# Patient Record
Sex: FEMALE | Race: WHITE | Employment: UNEMPLOYED | URBAN - METROPOLITAN AREA
[De-identification: names, ages, dates, MRNs, and addresses within clinical notes are randomized per-mention and may not be internally consistent; named-entity substitution may affect disease eponyms.]

---

## 2017-01-25 ENCOUNTER — HOSPITAL ENCOUNTER (EMERGENCY)
Facility: HOSPITAL | Age: 52
Discharge: HOME | End: 2017-01-25
Attending: EMERGENCY MEDICINE
Payer: MEDICAID

## 2017-01-25 ENCOUNTER — APPOINTMENT (OUTPATIENT)
Dept: GENERAL RADIOLOGY | Facility: HOSPITAL | Age: 52
End: 2017-01-25
Attending: PHYSICIAN ASSISTANT
Payer: MEDICAID

## 2017-01-25 ENCOUNTER — APPOINTMENT (OUTPATIENT)
Dept: ULTRASOUND IMAGING | Facility: HOSPITAL | Age: 52
End: 2017-01-25
Attending: PHYSICIAN ASSISTANT
Payer: MEDICAID

## 2017-01-25 VITALS
SYSTOLIC BLOOD PRESSURE: 144 MMHG | HEART RATE: 88 BPM | DIASTOLIC BLOOD PRESSURE: 66 MMHG | RESPIRATION RATE: 18 BRPM | TEMPERATURE: 98 F | OXYGEN SATURATION: 96 % | HEIGHT: 65 IN | WEIGHT: 230 LBS | BODY MASS INDEX: 38.32 KG/M2

## 2017-01-25 DIAGNOSIS — N30.00 ACUTE CYSTITIS WITHOUT HEMATURIA: ICD-10-CM

## 2017-01-25 DIAGNOSIS — R10.9 ABDOMINAL PAIN, ACUTE: Primary | ICD-10-CM

## 2017-01-25 LAB
ALBUMIN SERPL BCP-MCNC: 3.9 G/DL (ref 3.5–4.8)
ALP SERPL-CCNC: 63 U/L (ref 32–100)
ALT SERPL-CCNC: 24 U/L (ref 14–54)
ANION GAP SERPL CALC-SCNC: 12 MMOL/L (ref 0–18)
AST SERPL-CCNC: 25 U/L (ref 15–41)
B-HCG UR QL: NEGATIVE
BASOPHILS # BLD: 0.2 K/UL (ref 0–0.2)
BASOPHILS NFR BLD: 2 %
BILIRUB DIRECT SERPL-MCNC: 0.1 MG/DL (ref 0–0.2)
BILIRUB SERPL-MCNC: 0.6 MG/DL (ref 0.3–1.2)
BILIRUB UR QL: NEGATIVE
BUN SERPL-MCNC: 33 MG/DL (ref 8–20)
BUN/CREAT SERPL: 31.4 (ref 10–20)
CALCIUM SERPL-MCNC: 9.7 MG/DL (ref 8.5–10.5)
CHLORIDE SERPL-SCNC: 101 MMOL/L (ref 95–110)
CO2 SERPL-SCNC: 22 MMOL/L (ref 22–32)
COLOR UR: YELLOW
CREAT SERPL-MCNC: 1.05 MG/DL (ref 0.5–1.5)
EOSINOPHIL # BLD: 0.3 K/UL (ref 0–0.7)
EOSINOPHIL NFR BLD: 3 %
ERYTHROCYTE [DISTWIDTH] IN BLOOD BY AUTOMATED COUNT: 13.3 % (ref 11–15)
GLUCOSE SERPL-MCNC: 217 MG/DL (ref 70–99)
HCT VFR BLD AUTO: 40.3 % (ref 35–48)
HGB BLD-MCNC: 13.4 G/DL (ref 12–16)
KETONES UR-MCNC: NEGATIVE MG/DL
LIPASE SERPL-CCNC: 22 U/L (ref 22–51)
LYMPHOCYTES # BLD: 2.8 K/UL (ref 1–4)
LYMPHOCYTES NFR BLD: 26 %
MCH RBC QN AUTO: 29.5 PG (ref 27–32)
MCHC RBC AUTO-ENTMCNC: 33.3 G/DL (ref 32–37)
MCV RBC AUTO: 88.6 FL (ref 80–100)
MONOCYTES # BLD: 0.7 K/UL (ref 0–1)
MONOCYTES NFR BLD: 6 %
NEUTROPHILS # BLD AUTO: 7 K/UL (ref 1.8–7.7)
NEUTROPHILS NFR BLD: 64 %
NITRITE UR QL STRIP.AUTO: NEGATIVE
OSMOLALITY UR CALC.SUM OF ELEC: 294 MOSM/KG (ref 275–295)
PH UR: 5 [PH] (ref 5–8)
PLATELET # BLD AUTO: 247 K/UL (ref 140–400)
PMV BLD AUTO: 10.3 FL (ref 7.4–10.3)
POTASSIUM SERPL-SCNC: 4.8 MMOL/L (ref 3.3–5.1)
PROT SERPL-MCNC: 7.8 G/DL (ref 5.9–8.4)
PROT UR-MCNC: 100 MG/DL
RBC # BLD AUTO: 4.55 M/UL (ref 3.7–5.4)
RBC #/AREA URNS AUTO: 2 /HPF
SODIUM SERPL-SCNC: 135 MMOL/L (ref 136–144)
SP GR UR STRIP: 1.01 (ref 1–1.03)
TROPONIN I SERPL-MCNC: 0 NG/ML (ref ?–0.03)
UROBILINOGEN UR STRIP-ACNC: <2
VIT C UR-MCNC: NEGATIVE MG/DL
WBC # BLD AUTO: 11 K/UL (ref 4–11)
WBC #/AREA URNS AUTO: 84 /HPF

## 2017-01-25 PROCEDURE — 99285 EMERGENCY DEPT VISIT HI MDM: CPT

## 2017-01-25 PROCEDURE — 76705 ECHO EXAM OF ABDOMEN: CPT

## 2017-01-25 PROCEDURE — 93005 ELECTROCARDIOGRAM TRACING: CPT

## 2017-01-25 PROCEDURE — 96361 HYDRATE IV INFUSION ADD-ON: CPT

## 2017-01-25 PROCEDURE — 83690 ASSAY OF LIPASE: CPT | Performed by: PHYSICIAN ASSISTANT

## 2017-01-25 PROCEDURE — 81025 URINE PREGNANCY TEST: CPT

## 2017-01-25 PROCEDURE — 87186 SC STD MICRODIL/AGAR DIL: CPT | Performed by: PHYSICIAN ASSISTANT

## 2017-01-25 PROCEDURE — 85025 COMPLETE CBC W/AUTO DIFF WBC: CPT

## 2017-01-25 PROCEDURE — 93010 ELECTROCARDIOGRAM REPORT: CPT | Performed by: EMERGENCY MEDICINE

## 2017-01-25 PROCEDURE — 87086 URINE CULTURE/COLONY COUNT: CPT | Performed by: PHYSICIAN ASSISTANT

## 2017-01-25 PROCEDURE — 87077 CULTURE AEROBIC IDENTIFY: CPT | Performed by: PHYSICIAN ASSISTANT

## 2017-01-25 PROCEDURE — 80048 BASIC METABOLIC PNL TOTAL CA: CPT

## 2017-01-25 PROCEDURE — S0028 INJECTION, FAMOTIDINE, 20 MG: HCPCS | Performed by: PHYSICIAN ASSISTANT

## 2017-01-25 PROCEDURE — 81001 URINALYSIS AUTO W/SCOPE: CPT | Performed by: PHYSICIAN ASSISTANT

## 2017-01-25 PROCEDURE — 84484 ASSAY OF TROPONIN QUANT: CPT | Performed by: PHYSICIAN ASSISTANT

## 2017-01-25 PROCEDURE — 80076 HEPATIC FUNCTION PANEL: CPT

## 2017-01-25 PROCEDURE — 71010 XR CHEST AP PORTABLE  (CPT=71010): CPT

## 2017-01-25 PROCEDURE — 96374 THER/PROPH/DIAG INJ IV PUSH: CPT

## 2017-01-25 RX ORDER — SULFAMETHOXAZOLE AND TRIMETHOPRIM 800; 160 MG/1; MG/1
1 TABLET ORAL 2 TIMES DAILY
Qty: 14 TABLET | Refills: 0 | Status: SHIPPED | OUTPATIENT
Start: 2017-01-25 | End: 2017-02-01

## 2017-01-25 RX ORDER — FAMOTIDINE 20 MG/1
20 TABLET ORAL DAILY
Qty: 14 TABLET | Refills: 0 | Status: SHIPPED | OUTPATIENT
Start: 2017-01-25 | End: 2017-02-08

## 2017-01-25 RX ORDER — FAMOTIDINE 10 MG/ML
20 INJECTION, SOLUTION INTRAVENOUS ONCE
Status: COMPLETED | OUTPATIENT
Start: 2017-01-25 | End: 2017-01-25

## 2017-01-25 RX ORDER — TRAMADOL HYDROCHLORIDE 50 MG/1
50 TABLET ORAL EVERY 6 HOURS PRN
Qty: 20 TABLET | Refills: 0 | Status: SHIPPED | OUTPATIENT
Start: 2017-01-25 | End: 2019-01-08

## 2017-01-25 NOTE — ED PROVIDER NOTES
Patient Seen in: [facility] Emergency Department    History   Patient presents with:  Abdomen/Flank Pain (GI/)    Stated Complaint: abdominal pain    The history is provided by the patient.      45 yof with pmhx of DM and HTN c/o epigastric and R Positive for stated complaint: abdominal pain  Other systems are as noted in HPI. Constitutional and vital signs reviewed. All other systems reviewed and negative except as noted above.     PSFH elements reviewed from today and agreed except as otherw GALLBLADDER:   Mildly contracted gallbladder limits evaluation of the lumen.  No calculi, wall thickening or pericholecystic fluid.  Negative Sonographic Rivera's sign   BILE DUCTS:   Common bile duct measures 5.4 mm.    LIVER:   Mild hepatomegaly measures   URINALYSIS WITH CULTURE REFLEX (Final result)    Abnormal Component (Lab Inquiry)       Collection Time Result Time COLOR CLARITY Spec Gravity PHURINE PROUR     01/25/17 11:50:00 01/25/17 12:12:00 Yellow Cloudy (A) 1.011 5.0 100 (A)          Collection T   The following orders were created for panel order CBC WITH DIFFERENTIAL WITH PLATELET.   Procedure                               Abnormality         Status                     ---------                               -----------         ------               Collection Time Result Time NE LYMABS MOABSO EOABSO BAABSO     01/25/17 10:43:00 01/25/17 10:56:00 7.0 2.8 0.7 0.3 0.2                      TROPONIN I, 0 HOUR (Final result) Component (Lab Inquiry)       Collection Time Result Time TROP     01/25/17 10:4

## 2017-02-02 ENCOUNTER — HOSPITAL ENCOUNTER (OUTPATIENT)
Facility: HOSPITAL | Age: 52
Setting detail: OBSERVATION
Discharge: HOME | End: 2017-02-03
Attending: EMERGENCY MEDICINE | Admitting: HOSPITALIST
Payer: MEDICAID

## 2017-02-02 DIAGNOSIS — R07.89 CHEST PAIN, ATYPICAL: Primary | ICD-10-CM

## 2017-02-02 DIAGNOSIS — N17.9 AKI (ACUTE KIDNEY INJURY) (HCC): ICD-10-CM

## 2017-02-03 ENCOUNTER — APPOINTMENT (OUTPATIENT)
Dept: ULTRASOUND IMAGING | Facility: HOSPITAL | Age: 52
End: 2017-02-03
Attending: HOSPITALIST
Payer: MEDICAID

## 2017-02-03 ENCOUNTER — APPOINTMENT (OUTPATIENT)
Dept: GENERAL RADIOLOGY | Facility: HOSPITAL | Age: 52
End: 2017-02-03
Attending: EMERGENCY MEDICINE
Payer: MEDICAID

## 2017-02-03 ENCOUNTER — APPOINTMENT (OUTPATIENT)
Dept: NUCLEAR MEDICINE | Facility: HOSPITAL | Age: 52
End: 2017-02-03
Attending: EMERGENCY MEDICINE
Payer: MEDICAID

## 2017-02-03 ENCOUNTER — APPOINTMENT (OUTPATIENT)
Dept: CV DIAGNOSTICS | Facility: HOSPITAL | Age: 52
End: 2017-02-03
Attending: HOSPITALIST
Payer: MEDICAID

## 2017-02-03 VITALS
DIASTOLIC BLOOD PRESSURE: 59 MMHG | TEMPERATURE: 97 F | HEART RATE: 78 BPM | RESPIRATION RATE: 18 BRPM | BODY MASS INDEX: 38.6 KG/M2 | OXYGEN SATURATION: 100 % | WEIGHT: 231.69 LBS | HEIGHT: 65 IN | SYSTOLIC BLOOD PRESSURE: 146 MMHG

## 2017-02-03 PROBLEM — N17.9 ACUTE RENAL FAILURE (ARF): Status: ACTIVE | Noted: 2017-02-03

## 2017-02-03 PROBLEM — N17.9 ACUTE RENAL FAILURE (ARF) (HCC): Status: ACTIVE | Noted: 2017-02-03

## 2017-02-03 PROBLEM — N17.9 AKI (ACUTE KIDNEY INJURY) (HCC): Status: ACTIVE | Noted: 2017-02-03

## 2017-02-03 PROBLEM — R07.89 CHEST PAIN, ATYPICAL: Status: ACTIVE | Noted: 2017-02-03

## 2017-02-03 PROBLEM — N17.9 AKI (ACUTE KIDNEY INJURY): Status: ACTIVE | Noted: 2017-02-03

## 2017-02-03 LAB
ALBUMIN SERPL BCP-MCNC: 4 G/DL (ref 3.5–4.8)
ALBUMIN/GLOB SERPL: 1.1 {RATIO} (ref 1–2)
ALP SERPL-CCNC: 56 U/L (ref 32–100)
ALT SERPL-CCNC: 20 U/L (ref 14–54)
ANION GAP SERPL CALC-SCNC: 10 MMOL/L (ref 0–18)
AST SERPL-CCNC: 19 U/L (ref 15–41)
BASOPHILS # BLD: 0 K/UL (ref 0–0.2)
BASOPHILS NFR BLD: 0 %
BILIRUB SERPL-MCNC: 0.4 MG/DL (ref 0.3–1.2)
BUN SERPL-MCNC: 57 MG/DL (ref 8–20)
BUN/CREAT SERPL: 31.5 (ref 10–20)
CALCIUM SERPL-MCNC: 9.4 MG/DL (ref 8.5–10.5)
CHLORIDE SERPL-SCNC: 102 MMOL/L (ref 95–110)
CHOLEST SERPL-MCNC: 171 MG/DL (ref 110–200)
CO2 SERPL-SCNC: 21 MMOL/L (ref 22–32)
CREAT SERPL-MCNC: 1.81 MG/DL (ref 0.5–1.5)
D DIMER PPP FEU-MCNC: 1.69 MCG/ML
EOSINOPHIL # BLD: 0.5 K/UL (ref 0–0.7)
EOSINOPHIL NFR BLD: 5 %
ERYTHROCYTE [DISTWIDTH] IN BLOOD BY AUTOMATED COUNT: 13.1 % (ref 11–15)
GLOBULIN PLAS-MCNC: 3.5 G/DL (ref 2.5–3.7)
GLUCOSE BLDC GLUCOMTR-MCNC: 122 MG/DL (ref 70–99)
GLUCOSE BLDC GLUCOMTR-MCNC: 189 MG/DL (ref 70–99)
GLUCOSE BLDC GLUCOMTR-MCNC: 224 MG/DL (ref 70–99)
GLUCOSE SERPL-MCNC: 232 MG/DL (ref 70–99)
HCT VFR BLD AUTO: 38.8 % (ref 35–48)
HDLC SERPL-MCNC: 35 MG/DL
HGB BLD-MCNC: 13 G/DL (ref 12–16)
LDLC SERPL CALC-MCNC: 117 MG/DL (ref 0–99)
LYMPHOCYTES # BLD: 2.2 K/UL (ref 1–4)
LYMPHOCYTES NFR BLD: 25 %
MCH RBC QN AUTO: 30 PG (ref 27–32)
MCHC RBC AUTO-ENTMCNC: 33.7 G/DL (ref 32–37)
MCV RBC AUTO: 89.2 FL (ref 80–100)
MONOCYTES # BLD: 0.5 K/UL (ref 0–1)
MONOCYTES NFR BLD: 6 %
NEUTROPHILS # BLD AUTO: 5.7 K/UL (ref 1.8–7.7)
NEUTROPHILS NFR BLD: 64 %
NONHDLC SERPL-MCNC: 136 MG/DL
OSMOLALITY UR CALC.SUM OF ELEC: 299 MOSM/KG (ref 275–295)
PLATELET # BLD AUTO: 227 K/UL (ref 140–400)
PMV BLD AUTO: 10.7 FL (ref 7.4–10.3)
POTASSIUM SERPL-SCNC: 5.2 MMOL/L (ref 3.3–5.1)
PROT SERPL-MCNC: 7.5 G/DL (ref 5.9–8.4)
RBC # BLD AUTO: 4.35 M/UL (ref 3.7–5.4)
SODIUM SERPL-SCNC: 133 MMOL/L (ref 136–144)
TRIGL SERPL-MCNC: 96 MG/DL (ref 1–149)
TROPONIN I SERPL-MCNC: 0 NG/ML (ref ?–0.03)
WBC # BLD AUTO: 9 K/UL (ref 4–11)

## 2017-02-03 PROCEDURE — 99236 HOSP IP/OBS SAME DATE HI 85: CPT | Performed by: HOSPITALIST

## 2017-02-03 PROCEDURE — 76770 US EXAM ABDO BACK WALL COMP: CPT

## 2017-02-03 PROCEDURE — 71010 XR CHEST AP PORTABLE  (CPT=71010): CPT

## 2017-02-03 PROCEDURE — 93970 EXTREMITY STUDY: CPT

## 2017-02-03 PROCEDURE — 78582 LUNG VENTILAT&PERFUS IMAGING: CPT

## 2017-02-03 RX ORDER — SODIUM CHLORIDE 0.9 % (FLUSH) 0.9 %
SYRINGE (ML) INJECTION
Status: COMPLETED
Start: 2017-02-03 | End: 2017-02-03

## 2017-02-03 RX ORDER — HEPARIN SODIUM 5000 [USP'U]/ML
5000 INJECTION, SOLUTION INTRAVENOUS; SUBCUTANEOUS EVERY 8 HOURS
Status: DISCONTINUED | OUTPATIENT
Start: 2017-02-03 | End: 2017-02-03

## 2017-02-03 RX ORDER — LISINOPRIL 20 MG/1
20 TABLET ORAL DAILY
COMMUNITY
End: 2017-03-14

## 2017-02-03 RX ORDER — SODIUM CHLORIDE 9 MG/ML
INJECTION, SOLUTION INTRAVENOUS CONTINUOUS
Status: DISCONTINUED | OUTPATIENT
Start: 2017-02-03 | End: 2017-02-03

## 2017-02-03 RX ORDER — ONDANSETRON 2 MG/ML
4 INJECTION INTRAMUSCULAR; INTRAVENOUS EVERY 4 HOURS PRN
Status: DISCONTINUED | OUTPATIENT
Start: 2017-02-03 | End: 2017-02-03

## 2017-02-03 RX ORDER — NITROGLYCERIN 0.4 MG/1
0.4 TABLET SUBLINGUAL EVERY 5 MIN PRN
Status: DISCONTINUED | OUTPATIENT
Start: 2017-02-03 | End: 2017-02-03

## 2017-02-03 RX ORDER — FAMOTIDINE 20 MG/1
20 TABLET ORAL DAILY
Status: DISCONTINUED | OUTPATIENT
Start: 2017-02-04 | End: 2017-02-03

## 2017-02-03 RX ORDER — AMLODIPINE BESYLATE 5 MG/1
5 TABLET ORAL DAILY
Qty: 30 TABLET | Refills: 0 | Status: SHIPPED | OUTPATIENT
Start: 2017-02-03 | End: 2017-05-22

## 2017-02-03 RX ORDER — TRAMADOL HYDROCHLORIDE 50 MG/1
50 TABLET ORAL ONCE
Status: COMPLETED | OUTPATIENT
Start: 2017-02-03 | End: 2017-02-03

## 2017-02-03 RX ORDER — KETOROLAC TROMETHAMINE 30 MG/ML
30 INJECTION, SOLUTION INTRAMUSCULAR; INTRAVENOUS ONCE
Status: DISCONTINUED | OUTPATIENT
Start: 2017-02-03 | End: 2017-02-03

## 2017-02-03 RX ORDER — SODIUM CHLORIDE 9 MG/ML
INJECTION, SOLUTION INTRAVENOUS
Status: DISCONTINUED
Start: 2017-02-03 | End: 2017-02-03

## 2017-02-03 RX ORDER — ASPIRIN 325 MG
325 TABLET ORAL DAILY
Status: DISCONTINUED | OUTPATIENT
Start: 2017-02-03 | End: 2017-02-03

## 2017-02-03 RX ORDER — ERGOCALCIFEROL 1.25 MG/1
50000 CAPSULE ORAL
Status: DISCONTINUED | OUTPATIENT
Start: 2017-02-03 | End: 2017-02-03

## 2017-02-03 NOTE — ED PROVIDER NOTES
Patient Seen in: [facility] Emergency Department    History   Patient presents with:  Chest Pain    Stated Complaint: \"My Lungs are hurt\". States recent cough and congestion.      HPI    44 yo F with PMH HTN, NIDDM presenting for evaluation of kyler HPI.    Physical Exam     ED Triage Vitals   BP 02/02/17 2330 174/66 mmHg   Pulse 02/02/17 2330 96   Resp 02/02/17 2330 20   Temp 02/02/17 2330 97.8 °F (36.6 °C)   Temp src 02/02/17 2330 Oral   SpO2 02/02/17 2330 97 %   O2 Device 02/02/17 2330 None (Room a GREEN   RAINBOW DRAW LAVENDER TALL (BNP)   RAINBOW DRAW DARK GREEN      EKG    Rate, intervals and axes as noted on EKG Report.   Rate: 85  Rhythm: Sinus Rhythm  Reading: NSR 85 with lateral TWI unchanged from 1/27/17 as interpreted by myself          CHEST VQ scan in AM. PCP without [facility] affilitiation, graciously admitted to no doc coverage Dr. Dedra Andrade.     Disposition and Plan     Clinical Impression:  Chest pain, atypical  (primary encounter diagnosis)  NERIS (acute kidney injury) ([facility] [address]    Disposition:  Admi

## 2017-02-03 NOTE — PLAN OF CARE
Has l. Rib area tenderness w/palpation. Having vq scan, us le, echo today, cont. To monitor. Comfortable at present.
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
## 2017-02-03 NOTE — BH PROGRESS NOTE
ADMISSION NOTE    45year old female with h/o DM, HTN CKD presents with bilateral subcostal pleuritic pain and elevated ddimer . Available medical records partially reviewed. Dictation to follow.     Maico Pearson M.D.  2/3/2017

## 2017-02-03 NOTE — ED INITIAL ASSESSMENT (HPI)
Pt states that for about 6 months now the \"underside of her rib cage\" would hurt on and off when she inhales. She denies cough and fever but occasionally she would have chest pain with some shortness of breath.   She never sought consult because the symp

## 2017-02-03 NOTE — PAYOR COMM NOTE
Jazzmine Underwood #[id number]  (47 year old F)       [facility] [id number]         Reba Nelson MD Physician Signed  ED Provider Notes 2/3/2017 12:04 AM      Expand All Collapse All    Patient Seen in: [facility] AND [facility] Emergency Department  [facility] are as noted in HPI. Constitutional and vital signs reviewed.      All other systems reviewed and negative except as noted above. PSFH elements reviewed from today and agreed except as otherwise stated in HPI.       Physical Exam      ED Triage Vitals  -----------         ------                      CBC W/ DIFFERENTIAL[[id number]]          Abnormal            Final result                 Please view results for these tests on the individual orders.    D-DIMER    TROPONIN I, [address] sinus,      Evaluation for atypical chest pain in low risk HEART and low risk [place] patient. Labs notable for NERIS and mildly elevated dimer - CXR nonacute.  Given NERIS, unable to obtain CT and after discussion with NM, unable to obtain VQ until AM given lack were reassuring.  Patient underwent a VQ scan because of the pleuritic nature of the discomfort.  This is negative.  Lower extremity Dopplers was were negative for DVT.  Chest x-ray was clear.  I discussed with the patient that given the complete reproduci rebound/guarding. Neuro:  Normal reflexes, CN. Sensory/motor exams grossly normal deficit. Coordination  and gait normal.   MS: No joint effusions.  No peripheral edema. Skin: Skin is warm and dry. No rashes, erythema, diaphoresis.    Psych:   Normal moo instructions were reviewed with the patient and they verbalized understanding and agreement.  They understand to return to the emergency room for any concerning signs or symptoms.  Greater than 30 minutes spent on discharge.                              OB

## 2017-02-03 NOTE — PLAN OF CARE
Seen by dr. Mark Sharma, had vq scan & us le & both wnl, cancelled echo, to see primary md & have op stress test. See orders & notes, discharged w/all belongings w/o c/o or distress & v/u all discharge instructions & f/u care.  rx for norvasc called to [facility]

## 2017-02-03 NOTE — DISCHARGE SUMMARY
[facility] HOSPITALIST  DISCHARGE SUMMARY     Dodie Sandoval Patient Status:  Inpatient    3/30/1965 MRN [id number]   Location [facility] 3W/SW Attending Raman Smith MD   Hosp Day # 1 PCP [address]: 2017  DATE the patient would like to decline this at this time and follow-up with her primary care physician as described above. Incidentally the patient was found to have a potassium of 5.2. No EKG changes.   We have held her lisinopril and replace it with Norvas was given:  50 mg on 2/3/2017  1:20 AM   Commonly known as:  ULTRAM        Take 1 tablet (50 mg total) by mouth every 6 (six) hours as needed for Pain.     Quantity:  20 tablet   Refills:  0         STOP taking these medications          ergocalciferol 5000

## 2017-02-04 ENCOUNTER — TELEPHONE (OUTPATIENT)
Dept: CARDIOLOGY UNIT | Facility: HOSPITAL | Age: 52
End: 2017-02-04

## 2017-02-06 ENCOUNTER — TELEPHONE (OUTPATIENT)
Dept: MEDSURG UNIT | Facility: HOSPITAL | Age: 52
End: 2017-02-06

## 2017-02-06 ENCOUNTER — APPOINTMENT (OUTPATIENT)
Dept: LAB | Age: 52
End: 2017-02-06
Attending: HOSPITALIST
Payer: MEDICAID

## 2017-02-06 DIAGNOSIS — E11.00 DM HYPEROSMOLARITY TYPE II (HCC): ICD-10-CM

## 2017-02-06 PROCEDURE — 83036 HEMOGLOBIN GLYCOSYLATED A1C: CPT

## 2017-02-06 PROCEDURE — 36415 COLL VENOUS BLD VENIPUNCTURE: CPT

## 2017-02-07 ENCOUNTER — HOSPITAL ENCOUNTER (EMERGENCY)
Facility: HOSPITAL | Age: 52
Discharge: HOME | End: 2017-02-08
Attending: EMERGENCY MEDICINE
Payer: MEDICAID

## 2017-02-07 VITALS
HEIGHT: 65 IN | BODY MASS INDEX: 38.32 KG/M2 | TEMPERATURE: 97 F | SYSTOLIC BLOOD PRESSURE: 168 MMHG | RESPIRATION RATE: 18 BRPM | HEART RATE: 86 BPM | WEIGHT: 230 LBS | OXYGEN SATURATION: 99 % | DIASTOLIC BLOOD PRESSURE: 66 MMHG

## 2017-02-07 DIAGNOSIS — R07.89 CHEST PAIN, ATYPICAL: Primary | ICD-10-CM

## 2017-02-07 LAB — HBA1C MFR BLD: 11.6 % (ref 4–6)

## 2017-02-07 PROCEDURE — 93005 ELECTROCARDIOGRAM TRACING: CPT

## 2017-02-07 PROCEDURE — 36415 COLL VENOUS BLD VENIPUNCTURE: CPT

## 2017-02-07 PROCEDURE — 93010 ELECTROCARDIOGRAM REPORT: CPT | Performed by: EMERGENCY MEDICINE

## 2017-02-07 PROCEDURE — 99284 EMERGENCY DEPT VISIT MOD MDM: CPT

## 2017-02-07 PROCEDURE — 96372 THER/PROPH/DIAG INJ SC/IM: CPT

## 2017-02-08 LAB
ALBUMIN SERPL BCP-MCNC: 3.5 G/DL (ref 3.5–4.8)
ALBUMIN/GLOB SERPL: 1 {RATIO} (ref 1–2)
ALP SERPL-CCNC: 53 U/L (ref 32–100)
ALT SERPL-CCNC: 19 U/L (ref 14–54)
ANION GAP SERPL CALC-SCNC: 9 MMOL/L (ref 0–18)
AST SERPL-CCNC: 21 U/L (ref 15–41)
BASOPHILS # BLD: 0 K/UL (ref 0–0.2)
BASOPHILS NFR BLD: 1 %
BILIRUB SERPL-MCNC: 0.6 MG/DL (ref 0.3–1.2)
BUN SERPL-MCNC: 31 MG/DL (ref 8–20)
BUN/CREAT SERPL: 24 (ref 10–20)
CALCIUM SERPL-MCNC: 9 MG/DL (ref 8.5–10.5)
CHLORIDE SERPL-SCNC: 102 MMOL/L (ref 95–110)
CO2 SERPL-SCNC: 22 MMOL/L (ref 22–32)
CREAT SERPL-MCNC: 1.29 MG/DL (ref 0.5–1.5)
EOSINOPHIL # BLD: 0.6 K/UL (ref 0–0.7)
EOSINOPHIL NFR BLD: 7 %
ERYTHROCYTE [DISTWIDTH] IN BLOOD BY AUTOMATED COUNT: 13.3 % (ref 11–15)
GLOBULIN PLAS-MCNC: 3.4 G/DL (ref 2.5–3.7)
GLUCOSE BLDC GLUCOMTR-MCNC: 396 MG/DL (ref 70–99)
GLUCOSE SERPL-MCNC: 427 MG/DL (ref 70–99)
HCT VFR BLD AUTO: 35.7 % (ref 35–48)
HGB BLD-MCNC: 11.9 G/DL (ref 12–16)
LYMPHOCYTES # BLD: 2.2 K/UL (ref 1–4)
LYMPHOCYTES NFR BLD: 25 %
MCH RBC QN AUTO: 29.4 PG (ref 27–32)
MCHC RBC AUTO-ENTMCNC: 33.2 G/DL (ref 32–37)
MCV RBC AUTO: 88.7 FL (ref 80–100)
MONOCYTES # BLD: 0.6 K/UL (ref 0–1)
MONOCYTES NFR BLD: 7 %
NEUTROPHILS # BLD AUTO: 5.2 K/UL (ref 1.8–7.7)
NEUTROPHILS NFR BLD: 60 %
OSMOLALITY UR CALC.SUM OF ELEC: 301 MOSM/KG (ref 275–295)
PLATELET # BLD AUTO: 209 K/UL (ref 140–400)
PMV BLD AUTO: 10.4 FL (ref 7.4–10.3)
POTASSIUM SERPL-SCNC: 4.3 MMOL/L (ref 3.3–5.1)
PROT SERPL-MCNC: 6.9 G/DL (ref 5.9–8.4)
RBC # BLD AUTO: 4.02 M/UL (ref 3.7–5.4)
SODIUM SERPL-SCNC: 133 MMOL/L (ref 136–144)
TROPONIN I SERPL-MCNC: 0 NG/ML (ref ?–0.03)
WBC # BLD AUTO: 8.6 K/UL (ref 4–11)

## 2017-02-08 PROCEDURE — 85025 COMPLETE CBC W/AUTO DIFF WBC: CPT | Performed by: EMERGENCY MEDICINE

## 2017-02-08 PROCEDURE — 84484 ASSAY OF TROPONIN QUANT: CPT | Performed by: EMERGENCY MEDICINE

## 2017-02-08 PROCEDURE — 80053 COMPREHEN METABOLIC PANEL: CPT | Performed by: EMERGENCY MEDICINE

## 2017-02-08 PROCEDURE — 82962 GLUCOSE BLOOD TEST: CPT

## 2017-02-08 RX ORDER — FAMOTIDINE 20 MG/1
10 TABLET ORAL ONCE
Status: COMPLETED | OUTPATIENT
Start: 2017-02-08 | End: 2017-02-08

## 2017-02-08 RX ORDER — INSULIN ASPART 100 [IU]/ML
12 INJECTION, SOLUTION INTRAVENOUS; SUBCUTANEOUS ONCE
Status: COMPLETED | OUTPATIENT
Start: 2017-02-08 | End: 2017-02-08

## 2017-02-08 RX ORDER — MELOXICAM 7.5 MG/1
7.5 TABLET ORAL DAILY
Qty: 14 TABLET | Refills: 0 | Status: SHIPPED | OUTPATIENT
Start: 2017-02-08 | End: 2017-02-22

## 2017-02-08 RX ORDER — FAMOTIDINE 20 MG/1
20 TABLET ORAL 2 TIMES DAILY
Qty: 28 TABLET | Refills: 0 | Status: SHIPPED | OUTPATIENT
Start: 2017-02-08 | End: 2017-02-22

## 2017-02-08 NOTE — ED INITIAL ASSESSMENT (HPI)
Chest pain that woke pt from sleep tonight. Pt reports mild SOB, denies dizziness. + diaphoresis at home.
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
## 2017-03-13 ENCOUNTER — OFFICE VISIT (OUTPATIENT)
Dept: FAMILY MEDICINE CLINIC | Facility: CLINIC | Age: 52
End: 2017-03-13

## 2017-03-13 VITALS
HEART RATE: 93 BPM | WEIGHT: 226.63 LBS | BODY MASS INDEX: 40.16 KG/M2 | DIASTOLIC BLOOD PRESSURE: 70 MMHG | SYSTOLIC BLOOD PRESSURE: 143 MMHG | HEIGHT: 63 IN | RESPIRATION RATE: 14 BRPM | TEMPERATURE: 98 F

## 2017-03-13 DIAGNOSIS — R20.2 PARESTHESIA OF HAND, BILATERAL: ICD-10-CM

## 2017-03-13 DIAGNOSIS — H35.30 MACULAR DEGENERATION OF LEFT EYE: ICD-10-CM

## 2017-03-13 DIAGNOSIS — G56.03 BILATERAL CARPAL TUNNEL SYNDROME: ICD-10-CM

## 2017-03-13 DIAGNOSIS — Z79.4 UNCONTROLLED TYPE 2 DIABETES MELLITUS WITH OTHER DIABETIC KIDNEY COMPLICATION, WITH LONG-TERM CURRENT USE OF INSULIN: Primary | ICD-10-CM

## 2017-03-13 DIAGNOSIS — E78.2 MIXED HYPERLIPIDEMIA: ICD-10-CM

## 2017-03-13 DIAGNOSIS — E11.29 UNCONTROLLED TYPE 2 DIABETES MELLITUS WITH OTHER DIABETIC KIDNEY COMPLICATION, WITH LONG-TERM CURRENT USE OF INSULIN: Primary | ICD-10-CM

## 2017-03-13 DIAGNOSIS — E11.65 UNCONTROLLED TYPE 2 DIABETES MELLITUS WITH OTHER DIABETIC KIDNEY COMPLICATION, WITH LONG-TERM CURRENT USE OF INSULIN: Primary | ICD-10-CM

## 2017-03-13 PROBLEM — IMO0002 UNCONTROLLED TYPE 2 DIABETES MELLITUS WITH KIDNEY COMPLICATION, WITH LONG-TERM CURRENT USE OF INSULIN: Status: ACTIVE | Noted: 2017-03-13

## 2017-03-13 PROCEDURE — 99204 OFFICE O/P NEW MOD 45 MIN: CPT | Performed by: FAMILY MEDICINE

## 2017-03-13 PROCEDURE — 99212 OFFICE O/P EST SF 10 MIN: CPT | Performed by: FAMILY MEDICINE

## 2017-03-13 RX ORDER — INSULIN ASPART 100 [IU]/ML
INJECTION, SOLUTION INTRAVENOUS; SUBCUTANEOUS
Refills: 3 | COMMUNITY
Start: 2017-02-10 | End: 2017-05-22

## 2017-03-13 RX ORDER — PEN NEEDLE, DIABETIC 31 GX5/16"
NEEDLE, DISPOSABLE MISCELLANEOUS
Refills: 3 | COMMUNITY
Start: 2017-02-10

## 2017-03-13 RX ORDER — IBUPROFEN 200 MG
TABLET ORAL
Refills: 10 | COMMUNITY
Start: 2017-03-08 | End: 2019-05-23

## 2017-03-13 RX ORDER — ATORVASTATIN CALCIUM 40 MG/1
40 TABLET, FILM COATED ORAL NIGHTLY
Qty: 30 TABLET | Refills: 3 | Status: SHIPPED | OUTPATIENT
Start: 2017-03-13 | End: 2017-05-22

## 2017-03-13 RX ORDER — LANCETS
EACH MISCELLANEOUS
Refills: 6 | COMMUNITY
Start: 2017-03-08 | End: 2017-04-06

## 2017-03-13 NOTE — PROGRESS NOTES
Patient ID: Jaden Kamara is a 45year old female. HPI  Patient presents with:  Diabetes    One year of tingling and numbness in both hands. It is first, second third fingers. She thought it would j ust go away on its own.     She quit smoking sometim VOLUME      7.4-10.3 fL 10.4 (H)    Neutrophils %       60    Lymphocytes %       25    Monocytes %       7    Eosinophils %       7    Basophils %       1    Neutrophils Absolute      1.8-7.7 K/UL 5.2    Lymphocytes Absolute      1.0-4.0 K/UL 2.2    Monoc temperature source Oral, resp. rate 14, height 5' 3\" (1.6 m), weight 226 lb 9.6 oz (102.785 kg), last menstrual period 07/20/2013. Physical Exam   Constitutional: Patient is oriented to person, place, and time.  Patient appears well-developed and well-nou member understands and agrees to plan. Return in about 3 weeks (around 4/3/2017).         Jane Toribio, DO  3/13/2017

## 2017-03-13 NOTE — PATIENT INSTRUCTIONS
Lets increase Lantus to 50 units once daily and then the NovoLog should be 15 units 3 times daily before meals.

## 2017-03-14 ENCOUNTER — HOSPITAL ENCOUNTER (EMERGENCY)
Facility: HOSPITAL | Age: 52
Discharge: HOME | End: 2017-03-14
Attending: EMERGENCY MEDICINE
Payer: MEDICAID

## 2017-03-14 ENCOUNTER — APPOINTMENT (OUTPATIENT)
Dept: CT IMAGING | Facility: HOSPITAL | Age: 52
End: 2017-03-14
Attending: PHYSICIAN ASSISTANT
Payer: MEDICAID

## 2017-03-14 ENCOUNTER — APPOINTMENT (OUTPATIENT)
Dept: GENERAL RADIOLOGY | Facility: HOSPITAL | Age: 52
End: 2017-03-14
Attending: PHYSICIAN ASSISTANT
Payer: MEDICAID

## 2017-03-14 VITALS
TEMPERATURE: 98 F | DIASTOLIC BLOOD PRESSURE: 51 MMHG | BODY MASS INDEX: 37.74 KG/M2 | RESPIRATION RATE: 18 BRPM | OXYGEN SATURATION: 98 % | HEIGHT: 63 IN | HEART RATE: 99 BPM | WEIGHT: 213 LBS | SYSTOLIC BLOOD PRESSURE: 147 MMHG

## 2017-03-14 DIAGNOSIS — S09.90XA HEAD INJURY, INITIAL ENCOUNTER: ICD-10-CM

## 2017-03-14 DIAGNOSIS — S16.1XXA NECK STRAIN, INITIAL ENCOUNTER: ICD-10-CM
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
## 2017-03-14 DIAGNOSIS — S02.19XA CLOSED FRACTURE OF TEMPORAL BONE, INITIAL ENCOUNTER (HCC): Primary | ICD-10-CM

## 2017-03-14 LAB — GLUCOSE BLDC GLUCOMTR-MCNC: 282 MG/DL (ref 70–99)

## 2017-03-14 PROCEDURE — 70450 CT HEAD/BRAIN W/O DYE: CPT

## 2017-03-14 PROCEDURE — 72040 X-RAY EXAM NECK SPINE 2-3 VW: CPT

## 2017-03-14 PROCEDURE — 82962 GLUCOSE BLOOD TEST: CPT

## 2017-03-14 PROCEDURE — 99284 EMERGENCY DEPT VISIT MOD MDM: CPT

## 2017-03-14 NOTE — ED PROVIDER NOTES
Patient Seen in: [facility] Emergency Department    History   Patient presents with:  Fall (musculoskeletal, neurologic)    Stated Complaint:     The history is provided by the patient.      45 yof with pmhx of DM2, HTN reporting a slip and fall la History reviewed. No pertinent family history.       Smoking Status: Former Smoker                   Packs/Day: 0.50  Years: 13        Quit date: 01/25/2016    Smokeless Status: Never Used                        Alcohol Use: No                Review of Sy Cardiovascular: Normal rate and regular rhythm. Pulmonary/Chest: Effort normal and breath sounds normal. No respiratory distress. She has no decreased breath sounds. She has no wheezes. Abdominal: Soft. Normal appearance. There is no tenderness.    Katarzyna Bray in this area at the right temporal region. Otherwise, no visualize    calvarial fracture, mass, or other significant visible lesion.     SINUSES:         Prior surgery and repair of fracture of the anterior wall of the left maxillary sinus.  Prior surgery a 62288  [phone]    Schedule an appointment as soon as possible for a visit in 1 week        Medications Prescribed:  Current Discharge Medication List

## 2017-03-14 NOTE — ED NOTES
Pt with c/o posterior head pain, neck pain, right ear pain, left knee past after slipping on ice and falling this am .  Denies LOC, denies nausea or vomiting  Denies use of blood thinners  Moves all extremities wihout difficulty

## 2017-03-14 NOTE — ED NOTES
Patient discharged home with written/verbal discharge instructions which patient verbalizes understanding. Gait steady.

## 2017-03-14 NOTE — ED INITIAL ASSESSMENT (HPI)
Pt came in for fall on ice today. Hit back of head, no LOC. Ambulatory with steady gait, speaking in full sentences, RR even and nonlabored. No blood thinners.

## 2017-04-03 ENCOUNTER — OFFICE VISIT (OUTPATIENT)
Dept: PODIATRY CLINIC | Facility: CLINIC | Age: 52
End: 2017-04-03

## 2017-04-03 DIAGNOSIS — I73.9 CLAUDICATION OF BOTH LOWER EXTREMITIES (HCC): Primary | ICD-10-CM

## 2017-04-03 DIAGNOSIS — Z79.4 TYPE 2 DIABETES MELLITUS WITH DIABETIC POLYNEUROPATHY, WITH LONG-TERM CURRENT USE OF INSULIN (HCC): ICD-10-CM

## 2017-04-03 DIAGNOSIS — E11.42 TYPE 2 DIABETES MELLITUS WITH DIABETIC POLYNEUROPATHY, WITH LONG-TERM CURRENT USE OF INSULIN (HCC): ICD-10-CM

## 2017-04-03 PROCEDURE — 99243 OFF/OP CNSLTJ NEW/EST LOW 30: CPT | Performed by: PODIATRIST

## 2017-04-03 PROCEDURE — 99212 OFFICE O/P EST SF 10 MIN: CPT | Performed by: PODIATRIST

## 2017-04-03 NOTE — PROGRESS NOTES
HPI:    Patient ID: Concepción Garcia is a 47year old female. HPI  This 44-year-old insulin-dependent diabetic presents to me today as a new patient on consult from Abdifatah Tran. Patient states that she is here for a diabetic foot evaluation.   She has been a Exam  On physical examination the dorsalis pedis pulse was diminished and I was unable to elicit the posterior tibial pulse. Her skin is dry and hair growth is noted although diminished. Her nails are moderately dystrophic.   There are no marks nor irrita

## 2017-04-05 ENCOUNTER — TELEPHONE (OUTPATIENT)
Dept: FAMILY MEDICINE CLINIC | Facility: CLINIC | Age: 52
End: 2017-04-05

## 2017-04-05 DIAGNOSIS — Z79.4 UNCONTROLLED TYPE 2 DIABETES MELLITUS WITH COMPLICATION, WITH LONG-TERM CURRENT USE OF INSULIN (HCC): Primary | ICD-10-CM

## 2017-04-05 DIAGNOSIS — E11.65 UNCONTROLLED TYPE 2 DIABETES MELLITUS WITH COMPLICATION, WITH LONG-TERM CURRENT USE OF INSULIN (HCC): Primary | ICD-10-CM

## 2017-04-05 DIAGNOSIS — E11.8 UNCONTROLLED TYPE 2 DIABETES MELLITUS WITH COMPLICATION, WITH LONG-TERM CURRENT USE OF INSULIN (HCC): Primary | ICD-10-CM

## 2017-04-05 NOTE — TELEPHONE ENCOUNTER
Current Outpatient Prescriptions:  MICROLET LANCETS Does not apply Misc U TID Disp:  Rfl: 6   DENZEL CONTOUR NEXT TEST In Vitro Strip U UTD Disp:  Rfl: 4   Blood Glucose Monitoring Suppl (DENZEL CONTOUR NEXT MONITOR) w/Device Does not apply Kit U UTD Disp:

## 2017-04-06 RX ORDER — LANCETS
EACH MISCELLANEOUS
Qty: 100 EACH | Refills: 2 | Status: SHIPPED | OUTPATIENT
Start: 2017-04-06 | End: 2020-02-26

## 2017-04-06 NOTE — TELEPHONE ENCOUNTER
Chart reviewed, One Aly Contour Next Meter, lancets and strips refilled per [facility] refill protocol.     Diabetes Medications  Protocol Criteria:  · Appointment scheduled in the past 6 months or the next 3 months  · A1C < 7.5 in the past 6 months  · Creatini

## 2017-05-18 RX ORDER — INSULIN GLARGINE 100 [IU]/ML
INJECTION, SOLUTION SUBCUTANEOUS
Qty: 4 PEN | Refills: 0 | Status: SHIPPED | OUTPATIENT
Start: 2017-05-18 | End: 2017-05-22

## 2017-05-19 NOTE — TELEPHONE ENCOUNTER
JANETH- Please call and schedule an appointment for patient with Dr. Shashi Gilbert. See message below.

## 2017-05-22 ENCOUNTER — OFFICE VISIT (OUTPATIENT)
Dept: FAMILY MEDICINE CLINIC | Facility: CLINIC | Age: 52
End: 2017-05-22

## 2017-05-22 VITALS
BODY MASS INDEX: 39.69 KG/M2 | HEIGHT: 63 IN | WEIGHT: 224 LBS | SYSTOLIC BLOOD PRESSURE: 134 MMHG | HEART RATE: 91 BPM | TEMPERATURE: 97 F | DIASTOLIC BLOOD PRESSURE: 61 MMHG

## 2017-05-22 DIAGNOSIS — I10 ESSENTIAL HYPERTENSION: ICD-10-CM

## 2017-05-22 DIAGNOSIS — E78.2 MIXED HYPERLIPIDEMIA: ICD-10-CM

## 2017-05-22 DIAGNOSIS — Z79.4 UNCONTROLLED TYPE 2 DIABETES MELLITUS WITH OTHER DIABETIC KIDNEY COMPLICATION, WITH LONG-TERM CURRENT USE OF INSULIN: Primary | ICD-10-CM

## 2017-05-22 DIAGNOSIS — R20.2 RIGHT HAND PARESTHESIA: ICD-10-CM

## 2017-05-22 DIAGNOSIS — E11.65 UNCONTROLLED TYPE 2 DIABETES MELLITUS WITH OTHER DIABETIC KIDNEY COMPLICATION, WITH LONG-TERM CURRENT USE OF INSULIN: Primary | ICD-10-CM

## 2017-05-22 DIAGNOSIS — E11.29 UNCONTROLLED TYPE 2 DIABETES MELLITUS WITH OTHER DIABETIC KIDNEY COMPLICATION, WITH LONG-TERM CURRENT USE OF INSULIN: Primary | ICD-10-CM

## 2017-05-22 PROCEDURE — 99212 OFFICE O/P EST SF 10 MIN: CPT | Performed by: FAMILY MEDICINE

## 2017-05-22 PROCEDURE — 99214 OFFICE O/P EST MOD 30 MIN: CPT | Performed by: FAMILY MEDICINE

## 2017-05-22 RX ORDER — ATORVASTATIN CALCIUM 40 MG/1
40 TABLET, FILM COATED ORAL NIGHTLY
Qty: 30 TABLET | Refills: 3 | Status: SHIPPED | OUTPATIENT
Start: 2017-05-22 | End: 2017-11-06

## 2017-05-22 RX ORDER — LISINOPRIL 20 MG/1
TABLET ORAL
Refills: 1 | COMMUNITY
Start: 2017-05-02 | End: 2018-01-18

## 2017-05-22 RX ORDER — AMLODIPINE BESYLATE 5 MG/1
5 TABLET ORAL DAILY
Qty: 30 TABLET | Refills: 3 | Status: SHIPPED | OUTPATIENT
Start: 2017-05-22 | End: 2017-09-11

## 2017-05-22 RX ORDER — INSULIN ASPART 100 [IU]/ML
15 INJECTION, SOLUTION INTRAVENOUS; SUBCUTANEOUS
Qty: 6 PEN | Refills: 1 | Status: SHIPPED | OUTPATIENT
Start: 2017-05-22 | End: 2018-02-27

## 2017-05-22 NOTE — PROGRESS NOTES
Patient ID: Ольга Padilla is a 47year old female. HPI  Patient presents with:  Medication Request    She is taking 50 units of the basaglar and15 units of NovoLog 3 times daily before meals.   She did see the ophthalmologist and the podiatrist but is y Contour Next Monitor.  Disp: 1 Device Rfl: 0   NOVOLOG FLEXPEN 100 UNIT/ML Subcutaneous Solution Pen-injector  Disp:  Rfl: 3   BD PEN NEEDLE SHORT U/F 31G X 8 MM Does not apply Misc U TO INJ INSULIN UTD QID Disp:  Rfl: 3   Insulin Syringe 29G X 1/2\" 0.5 ML almost immediately with palpation. No hand atrophy. Nursing note and vitals reviewed.          ASSESSMENT/PLAN:     Diagnoses and all orders for this visit:    Uncontrolled type 2 diabetes mellitus with other diabetic kidney complication, with long-term c

## 2017-05-26 ENCOUNTER — APPOINTMENT (OUTPATIENT)
Dept: LAB | Age: 52
End: 2017-05-26
Attending: FAMILY MEDICINE
Payer: MEDICAID

## 2017-05-26 DIAGNOSIS — E11.65 UNCONTROLLED TYPE 2 DIABETES MELLITUS WITH OTHER DIABETIC KIDNEY COMPLICATION, WITH LONG-TERM CURRENT USE OF INSULIN: ICD-10-CM

## 2017-05-26 DIAGNOSIS — E78.2 MIXED HYPERLIPIDEMIA: ICD-10-CM

## 2017-05-26 DIAGNOSIS — Z79.4 UNCONTROLLED TYPE 2 DIABETES MELLITUS WITH OTHER DIABETIC KIDNEY COMPLICATION, WITH LONG-TERM CURRENT USE OF INSULIN: ICD-10-CM

## 2017-05-26 DIAGNOSIS — E11.29 UNCONTROLLED TYPE 2 DIABETES MELLITUS WITH OTHER DIABETIC KIDNEY COMPLICATION, WITH LONG-TERM CURRENT USE OF INSULIN: ICD-10-CM

## 2017-05-26 DIAGNOSIS — I10 ESSENTIAL HYPERTENSION: ICD-10-CM

## 2017-05-26 PROCEDURE — 84460 ALANINE AMINO (ALT) (SGPT): CPT

## 2017-05-26 PROCEDURE — 83036 HEMOGLOBIN GLYCOSYLATED A1C: CPT

## 2017-05-26 PROCEDURE — 80048 BASIC METABOLIC PNL TOTAL CA: CPT

## 2017-05-26 PROCEDURE — 36415 COLL VENOUS BLD VENIPUNCTURE: CPT

## 2017-05-26 PROCEDURE — 80061 LIPID PANEL: CPT

## 2017-05-26 PROCEDURE — 84450 TRANSFERASE (AST) (SGOT): CPT

## 2017-06-05 ENCOUNTER — TELEPHONE (OUTPATIENT)
Dept: INTERNAL MEDICINE CLINIC | Facility: CLINIC | Age: 52
End: 2017-06-05

## 2017-06-05 NOTE — TELEPHONE ENCOUNTER
----- Message from Shantel Ryder DO sent at 5/30/2017  9:52 PM CDT -----  Diabetes is minimally better. See the endocrinologist.  Even with the atorvastatin 40 mg your bad cholesterol is still elevated.   I would rather keep you on the same dose and have

## 2017-06-07 ENCOUNTER — APPOINTMENT (OUTPATIENT)
Dept: ENDOCRINOLOGY | Facility: HOSPITAL | Age: 52
End: 2017-06-07

## 2017-06-07 ENCOUNTER — OFFICE VISIT (OUTPATIENT)
Dept: ENDOCRINOLOGY CLINIC | Facility: CLINIC | Age: 52
End: 2017-06-07

## 2017-06-07 VITALS
HEART RATE: 79 BPM | SYSTOLIC BLOOD PRESSURE: 140 MMHG | HEIGHT: 64 IN | WEIGHT: 231.38 LBS | DIASTOLIC BLOOD PRESSURE: 84 MMHG | BODY MASS INDEX: 39.5 KG/M2

## 2017-06-07 DIAGNOSIS — Z79.4 UNCONTROLLED TYPE 2 DIABETES MELLITUS WITH HYPERGLYCEMIA, WITH LONG-TERM CURRENT USE OF INSULIN (HCC): Primary | ICD-10-CM

## 2017-06-07 DIAGNOSIS — E11.65 UNCONTROLLED TYPE 2 DIABETES MELLITUS WITH HYPERGLYCEMIA, WITH LONG-TERM CURRENT USE OF INSULIN (HCC): Primary | ICD-10-CM

## 2017-06-07 PROCEDURE — 82962 GLUCOSE BLOOD TEST: CPT | Performed by: INTERNAL MEDICINE

## 2017-06-07 PROCEDURE — 99204 OFFICE O/P NEW MOD 45 MIN: CPT | Performed by: INTERNAL MEDICINE

## 2017-06-07 PROCEDURE — 36416 COLLJ CAPILLARY BLOOD SPEC: CPT | Performed by: INTERNAL MEDICINE

## 2017-06-07 PROCEDURE — 99212 OFFICE O/P EST SF 10 MIN: CPT | Performed by: INTERNAL MEDICINE

## 2017-06-07 NOTE — PATIENT INSTRUCTIONS
Tomorrow morning skip basaglar and take in the evening at 9pm Basaglar 60 units SQ bedtime    Novolog  INSULIN SLIDING SCALE  Base Values  Breakfast: 15  Lunch: 15  Dinner: 20  Ranges:  80-99: -2  100-119: 0  120-139: 0  140-159: 1  160-179: 1  180-199: 2

## 2017-06-07 NOTE — PROGRESS NOTES
Name: Mirian Pedro  Date: 6/7/2017    Referring Physician: No ref.  provider found    HISTORY OF PRESENT ILLNESS   Mirian Pedro is a 47year old female who presents for diabetes mellitus diagnosed 23 years ago, transitioned to insulin therapy 15 years ago Vitro Strip, Check blood glucose three times a day., Disp: 100 strip, Rfl: 2  •  MICROLET LANCETS Does not apply Misc, Check glucose three times a day., Disp: 100 each, Rfl: 2  •  Blood Glucose Monitoring Suppl ([employer] CONTOUR MONITOR) Does not apply Device EXAM  /84 mmHg  Pulse 79  Ht 5' 4\" (1.626 m)  Wt 231 lb 6.4 oz (104.962 kg)  BMI 39.70 kg/m2  LMP 07/20/2013    General Appearance:  alert, well developed, in no acute distress  Eyes:  normal conjunctivae, sclera. , normal sclera and normal pupils  E coordinating care.     RTC 2 months        Orders Placed This Encounter  POC Finger stick glucose [73043]      6/7/2017  Maame Barnes MD

## 2017-06-14 NOTE — TELEPHONE ENCOUNTER
Transfer to [phone] to relay results     Patient was seen by ENDO 6/7/17    Left message for patient to call back for lab results 5/26/17;  Patient to f/u with DR CORTEZ after EMG .

## 2017-07-05 ENCOUNTER — TELEPHONE (OUTPATIENT)
Dept: FAMILY MEDICINE CLINIC | Facility: CLINIC | Age: 52
End: 2017-07-05

## 2017-07-05 NOTE — TELEPHONE ENCOUNTER
Patient is calling to request a referral for Dr. Lunsford Smoker / Ophthalmologist. Problem with left eye, follow up visit. Patient has an appointment tomorrow at 2:15 PM. Please advise.
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
## 2017-07-06 NOTE — TELEPHONE ENCOUNTER
LM on pt voice mail that a ref is not required. Pt needs to be sure Dr. Rafael Handy accepts her ins.

## 2017-07-09 DIAGNOSIS — E78.2 MIXED HYPERLIPIDEMIA: ICD-10-CM

## 2017-07-11 DIAGNOSIS — E11.65 UNCONTROLLED TYPE 2 DIABETES MELLITUS WITH COMPLICATION, WITH LONG-TERM CURRENT USE OF INSULIN (HCC): ICD-10-CM

## 2017-07-11 DIAGNOSIS — Z79.4 UNCONTROLLED TYPE 2 DIABETES MELLITUS WITH COMPLICATION, WITH LONG-TERM CURRENT USE OF INSULIN (HCC): ICD-10-CM

## 2017-07-11 DIAGNOSIS — E11.8 UNCONTROLLED TYPE 2 DIABETES MELLITUS WITH COMPLICATION, WITH LONG-TERM CURRENT USE OF INSULIN (HCC): ICD-10-CM

## 2017-07-13 RX ORDER — ATORVASTATIN CALCIUM 40 MG/1
TABLET, FILM COATED ORAL
Qty: 30 TABLET | Refills: 2 | Status: SHIPPED | OUTPATIENT
Start: 2017-07-13 | End: 2017-11-06

## 2017-07-13 NOTE — TELEPHONE ENCOUNTER
Cholesterol Medications: Refilled per protocol    Protocol Criteria:  · Appointment scheduled in the past 12 months or in the next 3 months  · ALT & LDL on file in the past 12 months  · ALT result < 80  · LDL result <130   Recent Outpatient Visits

## 2017-07-15 NOTE — TELEPHONE ENCOUNTER
Diabetes Medications  Protocol Criteria:  · Appointment scheduled in the past 6 months or the next 3 months  · A1C < 7.5 in the past 6 months  · Creatinine in the past 12 months  · Creatinine result < 1.5   Recent Outpatient Visits            1 month ago U

## 2017-08-03 NOTE — TELEPHONE ENCOUNTER
Medication refilled as requested per protocol.     Patient's plan does not require referrals. Dr Lilian Kimble office is looking for something referring patient.

## 2017-08-09 ENCOUNTER — OFFICE VISIT (OUTPATIENT)
Dept: ENDOCRINOLOGY CLINIC | Facility: CLINIC | Age: 52
End: 2017-08-09

## 2017-08-09 VITALS
BODY MASS INDEX: 40.06 KG/M2 | HEIGHT: 64 IN | SYSTOLIC BLOOD PRESSURE: 126 MMHG | WEIGHT: 234.63 LBS | DIASTOLIC BLOOD PRESSURE: 73 MMHG | HEART RATE: 76 BPM

## 2017-08-09 DIAGNOSIS — I70.209 DIABETES TYPE 2 WITH ATHEROSCLEROSIS OF ARTERIES OF EXTREMITIES (HCC): Primary | ICD-10-CM

## 2017-08-09 DIAGNOSIS — E11.51 DIABETES TYPE 2 WITH ATHEROSCLEROSIS OF ARTERIES OF EXTREMITIES (HCC): Primary | ICD-10-CM

## 2017-08-09 LAB
CARTRIDGE LOT#: ABNORMAL NUMERIC
GLUCOSE BLOOD: 124
HEMOGLOBIN A1C: 10.3 % (ref 4.3–5.6)
TEST STRIP LOT #: NORMAL NUMERIC

## 2017-08-09 PROCEDURE — 83036 HEMOGLOBIN GLYCOSYLATED A1C: CPT | Performed by: INTERNAL MEDICINE

## 2017-08-09 PROCEDURE — 82962 GLUCOSE BLOOD TEST: CPT | Performed by: INTERNAL MEDICINE

## 2017-08-09 PROCEDURE — 99212 OFFICE O/P EST SF 10 MIN: CPT | Performed by: INTERNAL MEDICINE

## 2017-08-09 PROCEDURE — 36416 COLLJ CAPILLARY BLOOD SPEC: CPT | Performed by: INTERNAL MEDICINE

## 2017-08-09 PROCEDURE — 99213 OFFICE O/P EST LOW 20 MIN: CPT | Performed by: INTERNAL MEDICINE

## 2017-08-09 NOTE — PROGRESS NOTES
Name: Roopa Odonnell  Date: 8/9/2017    Referring Physician: No ref.  provider found    HISTORY OF PRESENT ILLNESS   Roopa Odonnell is a 47year old female who presents for diabetes mellitus diagnosed 23 years ago, transitioned to insulin therapy 15 years ago Monitoring Suppl (ALY CONTOUR MONITOR) Does not apply Device, Provide one Aly Contour Next Monitor. , Disp: 1 Device, Rfl: 0  •  BD PEN NEEDLE SHORT U/F 31G X 8 MM Does not apply Misc, U TO INJ INSULIN UTD QID, Disp: , Rfl: 3  •  Insulin Syringe 29G X 1 Surgical History:  No date: OTHER SURGICAL HISTORY      Comment: lipoma removed      PHYSICAL EXAM  /73 (BP Location: Left arm, Patient Position: Sitting, Cuff Size: large)   Pulse 76   Ht 5' 4\" (1.626 m)   Wt 234 lb 9.6 oz (106.4 kg)   LMP 07/20/20 week    8/9/2017  Maribell Newman MD

## 2017-08-09 NOTE — PATIENT INSTRUCTIONS
Basaglar 75 units SQ dinner    Novolog  INSULIN SLIDING SCALE  Base Values  Breakfast: 20  Lunch: 20  Dinner: 20  Ranges:  80-99: -2  100-119: 0  120-139: 0  140-159: 1  160-179: 2  180-199: 3  200-219: 4  220-239: 5  240-259: 6  260-279: 7  280-299: 8  30

## 2017-09-11 DIAGNOSIS — I10 ESSENTIAL HYPERTENSION: ICD-10-CM

## 2017-09-12 RX ORDER — AMLODIPINE BESYLATE 5 MG/1
TABLET ORAL
Qty: 30 TABLET | Refills: 2 | Status: SHIPPED | OUTPATIENT
Start: 2017-09-12 | End: 2018-03-20

## 2017-09-12 NOTE — TELEPHONE ENCOUNTER
Hypertensive Medications: Refilled per protocol    Protocol Criteria:  · Appointment scheduled in the past 6 months or in the next 3 months  · BMP or CMP in the past 12 months  · Creatinine result < 2  Recent Outpatient Visits            1 month ago Diabet

## 2017-09-16 DIAGNOSIS — Z79.4 UNCONTROLLED TYPE 2 DIABETES MELLITUS WITH COMPLICATION, WITH LONG-TERM CURRENT USE OF INSULIN (HCC): ICD-10-CM

## 2017-09-16 DIAGNOSIS — E11.65 UNCONTROLLED TYPE 2 DIABETES MELLITUS WITH COMPLICATION, WITH LONG-TERM CURRENT USE OF INSULIN (HCC): ICD-10-CM

## 2017-09-16 DIAGNOSIS — E11.8 UNCONTROLLED TYPE 2 DIABETES MELLITUS WITH COMPLICATION, WITH LONG-TERM CURRENT USE OF INSULIN (HCC): ICD-10-CM

## 2017-09-21 NOTE — TELEPHONE ENCOUNTER
Diabetes Medications  Protocol Criteria:  · Appointment scheduled in the past 6 months or the next 3 months  · A1C < 7.5 in the past 6 months  · Creatinine in the past 12 months  · Creatinine result < 1.5   Recent Outpatient Visits            1 month ago D

## 2017-11-06 DIAGNOSIS — E78.2 MIXED HYPERLIPIDEMIA: ICD-10-CM

## 2017-11-06 RX ORDER — ATORVASTATIN CALCIUM 40 MG/1
TABLET, FILM COATED ORAL
Qty: 30 TABLET | Refills: 0 | Status: SHIPPED | OUTPATIENT
Start: 2017-11-06 | End: 2017-12-11

## 2017-11-06 NOTE — TELEPHONE ENCOUNTER
Cholesterol Medications  Protocol Criteria:  · Appointment scheduled in the past 12 months or in the next 3 months  · ALT & LDL on file in the past 12 months  · ALT result < 80  · LDL result <130   Recent Outpatient Visits            2 months ago Diabetes

## 2017-11-06 NOTE — TELEPHONE ENCOUNTER
Signed Prescriptions Disp Refills    ATORVASTATIN 40 MG Oral Tab 30 tablet 0      Sig: TAKE 1 TABLET(40 MG) BY MOUTH EVERY NIGHT FOR CHOLESTEROL        Authorizing Provider: Dalia Cogan        Ordering User: Sheba Burt           Refill appr

## 2017-12-11 DIAGNOSIS — E78.2 MIXED HYPERLIPIDEMIA: ICD-10-CM

## 2017-12-13 RX ORDER — ATORVASTATIN CALCIUM 40 MG/1
TABLET, FILM COATED ORAL
Qty: 90 TABLET | Refills: 0 | Status: SHIPPED | OUTPATIENT
Start: 2017-12-13 | End: 2018-03-20

## 2017-12-13 NOTE — TELEPHONE ENCOUNTER
Cholesterol Medications: Medication filled for 90 days per protocol.     Protocol Criteria:  · Appointment scheduled in the past 12 months or in the next 3 months  · ALT & LDL on file in the past 12 months  · ALT result < 80  · LDL result <130   Recent Outp

## 2018-01-13 ENCOUNTER — HOSPITAL ENCOUNTER (EMERGENCY)
Facility: HOSPITAL | Age: 53
Discharge: HOME | End: 2018-01-13
Attending: EMERGENCY MEDICINE
Payer: MEDICAID

## 2018-01-13 ENCOUNTER — APPOINTMENT (OUTPATIENT)
Dept: ULTRASOUND IMAGING | Facility: HOSPITAL | Age: 53
End: 2018-01-13
Attending: EMERGENCY MEDICINE
Payer: MEDICAID

## 2018-01-13 VITALS
HEIGHT: 64 IN | HEART RATE: 86 BPM | OXYGEN SATURATION: 96 % | TEMPERATURE: 98 F | RESPIRATION RATE: 18 BRPM | BODY MASS INDEX: 36.7 KG/M2 | WEIGHT: 215 LBS | DIASTOLIC BLOOD PRESSURE: 61 MMHG | SYSTOLIC BLOOD PRESSURE: 130 MMHG

## 2018-01-13 DIAGNOSIS — R25.2 MUSCLE CRAMPS: Primary | ICD-10-CM

## 2018-01-13 DIAGNOSIS — E86.0 DEHYDRATION: ICD-10-CM

## 2018-01-13 DIAGNOSIS — N39.0 URINARY TRACT INFECTION WITHOUT HEMATURIA, SITE UNSPECIFIED: ICD-10-CM

## 2018-01-13 LAB
ALBUMIN SERPL BCP-MCNC: 4 G/DL (ref 3.5–4.8)
ALP SERPL-CCNC: 57 U/L (ref 32–100)
ALT SERPL-CCNC: 21 U/L (ref 14–54)
ANION GAP SERPL CALC-SCNC: 13 MMOL/L (ref 0–18)
AST SERPL-CCNC: 22 U/L (ref 15–41)
BASOPHILS # BLD: 0.1 K/UL (ref 0–0.2)
BASOPHILS NFR BLD: 1 %
BILIRUB DIRECT SERPL-MCNC: 0.1 MG/DL (ref 0–0.2)
BILIRUB SERPL-MCNC: 0.4 MG/DL (ref 0.3–1.2)
BILIRUB UR QL: NEGATIVE
BUN SERPL-MCNC: 32 MG/DL (ref 8–20)
BUN/CREAT SERPL: 26.7 (ref 10–20)
CALCIUM SERPL-MCNC: 9.4 MG/DL (ref 8.5–10.5)
CHLORIDE SERPL-SCNC: 101 MMOL/L (ref 95–110)
CK SERPL-CCNC: 460 U/L (ref 38–234)
CLARITY UR: CLEAR
CO2 SERPL-SCNC: 21 MMOL/L (ref 22–32)
COLOR UR: YELLOW
CREAT SERPL-MCNC: 1.2 MG/DL (ref 0.5–1.5)
EOSINOPHIL # BLD: 0.4 K/UL (ref 0–0.7)
EOSINOPHIL NFR BLD: 4 %
ERYTHROCYTE [DISTWIDTH] IN BLOOD BY AUTOMATED COUNT: 13.1 % (ref 11–15)
GLUCOSE SERPL-MCNC: 135 MG/DL (ref 70–99)
GLUCOSE UR-MCNC: >=500 MG/DL
HCT VFR BLD AUTO: 39.5 % (ref 35–48)
HGB BLD-MCNC: 13.1 G/DL (ref 12–16)
KETONES UR-MCNC: NEGATIVE MG/DL
LIPASE SERPL-CCNC: 26 U/L (ref 22–51)
LYMPHOCYTES # BLD: 3.3 K/UL (ref 1–4)
LYMPHOCYTES NFR BLD: 31 %
MCH RBC QN AUTO: 30.2 PG (ref 27–32)
MCHC RBC AUTO-ENTMCNC: 33.2 G/DL (ref 32–37)
MCV RBC AUTO: 90.9 FL (ref 80–100)
MONOCYTES # BLD: 0.9 K/UL (ref 0–1)
MONOCYTES NFR BLD: 8 %
NEUTROPHILS # BLD AUTO: 6 K/UL (ref 1.8–7.7)
NEUTROPHILS NFR BLD: 56 %
NITRITE UR QL STRIP.AUTO: POSITIVE
OSMOLALITY UR CALC.SUM OF ELEC: 289 MOSM/KG (ref 275–295)
PH UR: 5 [PH] (ref 5–8)
PLATELET # BLD AUTO: 275 K/UL (ref 140–400)
PMV BLD AUTO: 10.3 FL (ref 7.4–10.3)
POTASSIUM SERPL-SCNC: 4.3 MMOL/L (ref 3.3–5.1)
PROT SERPL-MCNC: 7.5 G/DL (ref 5.9–8.4)
PROT UR-MCNC: 100 MG/DL
RBC # BLD AUTO: 4.35 M/UL (ref 3.7–5.4)
RBC #/AREA URNS AUTO: 2 /HPF
SODIUM SERPL-SCNC: 135 MMOL/L (ref 136–144)
SP GR UR STRIP: 1.01 (ref 1–1.03)
UROBILINOGEN UR STRIP-ACNC: <2
VIT C UR-MCNC: NEGATIVE MG/DL
WBC # BLD AUTO: 10.7 K/UL (ref 4–11)
WBC #/AREA URNS AUTO: 21 /HPF

## 2018-01-13 PROCEDURE — 80076 HEPATIC FUNCTION PANEL: CPT | Performed by: EMERGENCY MEDICINE

## 2018-01-13 PROCEDURE — 82550 ASSAY OF CK (CPK): CPT | Performed by: EMERGENCY MEDICINE

## 2018-01-13 PROCEDURE — 76705 ECHO EXAM OF ABDOMEN: CPT | Performed by: EMERGENCY MEDICINE

## 2018-01-13 PROCEDURE — 87077 CULTURE AEROBIC IDENTIFY: CPT | Performed by: EMERGENCY MEDICINE

## 2018-01-13 PROCEDURE — 99284 EMERGENCY DEPT VISIT MOD MDM: CPT

## 2018-01-13 PROCEDURE — 83690 ASSAY OF LIPASE: CPT | Performed by: EMERGENCY MEDICINE

## 2018-01-13 PROCEDURE — 87186 SC STD MICRODIL/AGAR DIL: CPT | Performed by: EMERGENCY MEDICINE

## 2018-01-13 PROCEDURE — 96360 HYDRATION IV INFUSION INIT: CPT

## 2018-01-13 PROCEDURE — 81001 URINALYSIS AUTO W/SCOPE: CPT | Performed by: EMERGENCY MEDICINE

## 2018-01-13 PROCEDURE — 85025 COMPLETE CBC W/AUTO DIFF WBC: CPT | Performed by: EMERGENCY MEDICINE

## 2018-01-13 PROCEDURE — 87086 URINE CULTURE/COLONY COUNT: CPT | Performed by: EMERGENCY MEDICINE

## 2018-01-13 PROCEDURE — 80048 BASIC METABOLIC PNL TOTAL CA: CPT | Performed by: EMERGENCY MEDICINE

## 2018-01-13 RX ORDER — NITROFURANTOIN 25; 75 MG/1; MG/1
100 CAPSULE ORAL 2 TIMES DAILY
Qty: 20 CAPSULE | Refills: 0 | Status: SHIPPED | OUTPATIENT
Start: 2018-01-13 | End: 2018-01-23

## 2018-01-13 NOTE — ED INITIAL ASSESSMENT (HPI)
Pt  Received via EMS with intermittent upper quadrant abdominal cramping for the past \"couple weeks\". Pt denies any N/V/D or chest pain. Pt also c/o left calf cramping that is intermittent. Calf warm, dry, and tender to the touch.

## 2018-01-13 NOTE — ED PROVIDER NOTES
Patient Seen in: [facility] AND [facility] Emergency Department    History   Patient presents with:  Abdomen/Flank Pain (GI/)    Stated Complaint: Upper quadrant abdominal pain    HPI    Patient is a 44-year-old female who presents with 2 weeks of intermittent cm (5' 4\")   Wt 97.5 kg   LMP 07/20/2013   SpO2 96%   BMI 36.90 kg/m²         Physical Exam    GENERAL: No acute distress, awake and alert  HEENT: MMM, EOMI, PERRL  Neck: supple, non tender  CV: RRR, no murmurs  Resp: CTAB, no wheezes or retractions  Ab: DRAW LAVENDER   RAINBOW DRAW DARK GREEN   RAINBOW DRAW LIGHT GREEN   RAINBOW DRAW GOLD   RAINBOW DRAW LAVENDER TALL (BNP)   URINE CULTURE, ROUTINE   CBC W/ DIFFERENTIAL       ED Course as of Jan 13 1851  ----------------------------------------------------

## 2018-01-15 ENCOUNTER — TELEPHONE (OUTPATIENT)
Dept: OTHER | Age: 53
End: 2018-01-15

## 2018-01-16 NOTE — TELEPHONE ENCOUNTER
Pt returned call, verified pt name and . Reviewed doctor's recommendations, appt scheduled 18 as advised. Pt had no further questions at this time.

## 2018-01-16 NOTE — TELEPHONE ENCOUNTER
Notes Recorded by Meño Hendrickson DO on 1/15/2018 at 8:54 AM CST  Let her know that I have not seen her since May.  She has diabetes.  I saw that she went to the emergency room. Hussain Marshall was spilling quite a bit of sugar in her urine. [facility] an appointment in t

## 2018-01-18 ENCOUNTER — OFFICE VISIT (OUTPATIENT)
Dept: FAMILY MEDICINE CLINIC | Facility: CLINIC | Age: 53
End: 2018-01-18

## 2018-01-18 VITALS
WEIGHT: 234 LBS | DIASTOLIC BLOOD PRESSURE: 60 MMHG | SYSTOLIC BLOOD PRESSURE: 152 MMHG | HEART RATE: 116 BPM | BODY MASS INDEX: 39.95 KG/M2 | HEIGHT: 64 IN | TEMPERATURE: 98 F

## 2018-01-18 DIAGNOSIS — R10.9 ABDOMINAL PAIN, UNSPECIFIED ABDOMINAL LOCATION: ICD-10-CM

## 2018-01-18 DIAGNOSIS — E11.29 UNCONTROLLED TYPE 2 DIABETES MELLITUS WITH OTHER DIABETIC KIDNEY COMPLICATION, WITH LONG-TERM CURRENT USE OF INSULIN: Primary | ICD-10-CM

## 2018-01-18 DIAGNOSIS — E11.65 UNCONTROLLED TYPE 2 DIABETES MELLITUS WITH OTHER DIABETIC KIDNEY COMPLICATION, WITH LONG-TERM CURRENT USE OF INSULIN: Primary | ICD-10-CM

## 2018-01-18 DIAGNOSIS — Z23 NEED FOR VACCINATION: ICD-10-CM

## 2018-01-18 DIAGNOSIS — N17.9 ACUTE RENAL FAILURE, UNSPECIFIED ACUTE RENAL FAILURE TYPE (HCC): ICD-10-CM

## 2018-01-18 DIAGNOSIS — K83.8 DILATED BILE DUCT: ICD-10-CM

## 2018-01-18 DIAGNOSIS — Z79.4 UNCONTROLLED TYPE 2 DIABETES MELLITUS WITH OTHER DIABETIC KIDNEY COMPLICATION, WITH LONG-TERM CURRENT USE OF INSULIN: Primary | ICD-10-CM

## 2018-01-18 DIAGNOSIS — I10 ESSENTIAL HYPERTENSION: ICD-10-CM

## 2018-01-18 PROCEDURE — 99212 OFFICE O/P EST SF 10 MIN: CPT | Performed by: FAMILY MEDICINE

## 2018-01-18 PROCEDURE — 99214 OFFICE O/P EST MOD 30 MIN: CPT | Performed by: FAMILY MEDICINE

## 2018-01-18 RX ORDER — LISINOPRIL 40 MG/1
40 TABLET ORAL DAILY
Qty: 30 TABLET | Refills: 3 | Status: SHIPPED | OUTPATIENT
Start: 2018-01-18 | End: 2018-03-20

## 2018-01-18 RX ORDER — ASPIRIN 81 MG/1
TABLET, CHEWABLE ORAL
Refills: 6 | COMMUNITY
Start: 2017-12-12

## 2018-01-18 NOTE — PROGRESS NOTES
Patient ID: Talon Avalos is a 47year old female. HPI  Patient presents with:  ER F/U: [facility]    Us Gallbladder (cpt=76705)    Result Date: 1/13/2018  CONCLUSION:   1. No cholelithiasis or sonographic evidence of acute cholecystitis.   2. Borderline bilia 134/61  03/14/17 : 147/51        Review of Systems      Past Medical History:   Diagnosis Date   • High blood pressure    • Lipid screening 4/2/2007    per NG   • Lipoma     surgical excision age 24   • Pneumonia due to organism    • Renal disorder    • Ty X 1/2\" 0.5 ML Does not apply Misc INJ 1 UNITS INTO THE SKIN TID Disp:  Rfl: 10   Blood Glucose Monitoring Suppl ([employer] CONTOUR NEXT MONITOR) w/Device Does not apply Kit U UTD Disp:  Rfl: 0   aspirin 81 MG Oral Tab Take 81 mg by mouth daily.  Disp:  Rfl: normal  Abdominal pain, unspecified abdominal location  MRCP has been ordered  Essential hypertension  -     lisinopril 40 MG Oral Tab; Take 1 tablet (40 mg total) by mouth daily.   Blood pressure clearly not controlled we will increase the lisinopril to 40

## 2018-02-14 ENCOUNTER — OFFICE VISIT (OUTPATIENT)
Dept: ENDOCRINOLOGY CLINIC | Facility: CLINIC | Age: 53
End: 2018-02-14

## 2018-02-14 VITALS
HEIGHT: 65 IN | HEART RATE: 71 BPM | DIASTOLIC BLOOD PRESSURE: 79 MMHG | WEIGHT: 230 LBS | BODY MASS INDEX: 38.32 KG/M2 | SYSTOLIC BLOOD PRESSURE: 143 MMHG

## 2018-02-14 DIAGNOSIS — Z79.4 UNCONTROLLED TYPE 2 DIABETES MELLITUS WITH OTHER DIABETIC KIDNEY COMPLICATION, WITH LONG-TERM CURRENT USE OF INSULIN: ICD-10-CM

## 2018-02-14 DIAGNOSIS — E11.8 UNCONTROLLED TYPE 2 DIABETES MELLITUS WITH COMPLICATION, UNSPECIFIED LONG TERM INSULIN USE STATUS: Primary | ICD-10-CM

## 2018-02-14 DIAGNOSIS — E11.65 UNCONTROLLED TYPE 2 DIABETES MELLITUS WITH OTHER DIABETIC KIDNEY COMPLICATION, WITH LONG-TERM CURRENT USE OF INSULIN: ICD-10-CM

## 2018-02-14 DIAGNOSIS — E11.65 UNCONTROLLED TYPE 2 DIABETES MELLITUS WITH COMPLICATION, UNSPECIFIED LONG TERM INSULIN USE STATUS: Primary | ICD-10-CM

## 2018-02-14 DIAGNOSIS — E11.29 UNCONTROLLED TYPE 2 DIABETES MELLITUS WITH OTHER DIABETIC KIDNEY COMPLICATION, WITH LONG-TERM CURRENT USE OF INSULIN: ICD-10-CM

## 2018-02-14 LAB
CARTRIDGE LOT#: ABNORMAL NUMERIC
GLUCOSE BLOOD: 297
HEMOGLOBIN A1C: 9.5 % (ref 4.3–5.6)
TEST STRIP LOT #: NORMAL NUMERIC

## 2018-02-14 PROCEDURE — 36416 COLLJ CAPILLARY BLOOD SPEC: CPT | Performed by: INTERNAL MEDICINE

## 2018-02-14 PROCEDURE — 83036 HEMOGLOBIN GLYCOSYLATED A1C: CPT | Performed by: INTERNAL MEDICINE

## 2018-02-14 PROCEDURE — 82962 GLUCOSE BLOOD TEST: CPT | Performed by: INTERNAL MEDICINE

## 2018-02-14 PROCEDURE — 99214 OFFICE O/P EST MOD 30 MIN: CPT | Performed by: INTERNAL MEDICINE

## 2018-02-14 PROCEDURE — 99212 OFFICE O/P EST SF 10 MIN: CPT | Performed by: INTERNAL MEDICINE

## 2018-02-14 RX ORDER — LISINOPRIL 20 MG/1
TABLET ORAL
Refills: 2 | COMMUNITY
Start: 2018-02-05 | End: 2018-02-14 | Stop reason: DRUGHIGH

## 2018-02-14 NOTE — PROGRESS NOTES
Name: Jaden Kamara  Date: 2/14/2018    Referring Physician: No ref.  provider found    HISTORY OF PRESENT ILLNESS   Jaden Kamara is a 47year old female who presents for diabetes mellitus diagnosed 23 years ago, transitioned to insulin therapy 15 years ag Strip, TEST THREE TIMES DAILY, Disp: 100 strip, Rfl: 1  •  Insulin Glargine (BASAGLAR KWIKPEN) 100 UNIT/ML Subcutaneous Solution Pen-injector, INJECT 45 UNITS INTO THE SKIN DAILY (Patient taking differently: INJECT 60 UNITS INTO THE SKIN DAILY ), Disp: 4 p or unspecified type diabetes mellitus without mention of complication, not stated as uncontrolled    • Unspecified essential hypertension        Surgical history:   Past Surgical History:  No date: EYE SURGERY  No date: LIPOMA REMOVAL  No date: OTHER SURGI medications  -However given current problem issue with pancreas and nausea will need to await result of MRCP  -Therefore continue current insulin dose for now and discussed possible ways to improve compliance  -Continue Novolog 20 units SQ TID with meals

## 2018-02-14 NOTE — PATIENT INSTRUCTIONS
Change to Basaglar 72 units SQ dinner    Novolog    INSULIN SLIDING SCALE  Base Values  Breakfast: 20  Lunch: 20  Dinner: 20  Ranges:  80-99: -2  100-119: 0  120-139: 0  140-159: 1  160-179: 1  180-199: 2  200-219: 2  220-239: 3  240-259: 3  260-279: 4  28

## 2018-02-27 DIAGNOSIS — Z79.4 UNCONTROLLED TYPE 2 DIABETES MELLITUS WITH OTHER DIABETIC KIDNEY COMPLICATION, WITH LONG-TERM CURRENT USE OF INSULIN: ICD-10-CM

## 2018-02-27 DIAGNOSIS — E11.65 UNCONTROLLED TYPE 2 DIABETES MELLITUS WITH OTHER DIABETIC KIDNEY COMPLICATION, WITH LONG-TERM CURRENT USE OF INSULIN: ICD-10-CM

## 2018-02-27 DIAGNOSIS — E11.29 UNCONTROLLED TYPE 2 DIABETES MELLITUS WITH OTHER DIABETIC KIDNEY COMPLICATION, WITH LONG-TERM CURRENT USE OF INSULIN: ICD-10-CM

## 2018-03-01 RX ORDER — INSULIN ASPART 100 [IU]/ML
INJECTION, SOLUTION INTRAVENOUS; SUBCUTANEOUS
Qty: 30 ML | Refills: 0 | Status: SHIPPED | OUTPATIENT
Start: 2018-03-01 | End: 2019-01-08

## 2018-03-01 NOTE — TELEPHONE ENCOUNTER
Pharmacy calling to follow up on pending medication. Pharmacy states that Pt is out of medication. Please advise.

## 2018-03-02 ENCOUNTER — HOSPITAL ENCOUNTER (OUTPATIENT)
Dept: MRI IMAGING | Age: 53
Discharge: HOME | End: 2018-03-02
Attending: FAMILY MEDICINE
Payer: MEDICAID

## 2018-03-02 DIAGNOSIS — K83.8 DILATED BILE DUCT: ICD-10-CM

## 2018-03-02 PROCEDURE — 74181 MRI ABDOMEN W/O CONTRAST: CPT | Performed by: FAMILY MEDICINE

## 2018-03-05 ENCOUNTER — TELEPHONE (OUTPATIENT)
Dept: FAMILY MEDICINE CLINIC | Facility: CLINIC | Age: 53
End: 2018-03-05

## 2018-03-06 ENCOUNTER — TELEPHONE (OUTPATIENT)
Dept: OTHER | Age: 53
End: 2018-03-06

## 2018-03-06 NOTE — TELEPHONE ENCOUNTER
I did receive quite a bit of notes from [facility].   Lab date was 2/25/2018 showing a white blood cell count which was elevated in 19 but otherwise hemoglobin, hematocrit, platelets were normal.  The neutrophils, lymphocytes, monocytes, eosinophils and b

## 2018-03-06 NOTE — TELEPHONE ENCOUNTER
----- Message from Nicanor Velasquez DO sent at 3/4/2018  8:15 PM CST -----  Please come see me as I think he was supposed to see me around February 8, 2018. Your blood pressure was a bit high so we did increase her lisinopril to 40 mg at the last visit.    A

## 2018-03-06 NOTE — TELEPHONE ENCOUNTER
Patient calling - verified patient's name and  - informed patient of doctor's note - patient verbalized understanding. Appt created with Dr Maryjane Smith in [address] [place] [address] for 3/20 at 2:30. Patient verbalized intent to comply.

## 2018-03-20 ENCOUNTER — OFFICE VISIT (OUTPATIENT)
Dept: FAMILY MEDICINE CLINIC | Facility: CLINIC | Age: 53
End: 2018-03-20

## 2018-03-20 ENCOUNTER — TELEPHONE (OUTPATIENT)
Dept: FAMILY MEDICINE CLINIC | Facility: CLINIC | Age: 53
End: 2018-03-20

## 2018-03-20 VITALS
WEIGHT: 228 LBS | SYSTOLIC BLOOD PRESSURE: 150 MMHG | DIASTOLIC BLOOD PRESSURE: 69 MMHG | BODY MASS INDEX: 38.93 KG/M2 | HEART RATE: 84 BPM | HEIGHT: 64 IN | TEMPERATURE: 97 F

## 2018-03-20 DIAGNOSIS — E78.2 MIXED HYPERLIPIDEMIA: ICD-10-CM

## 2018-03-20 DIAGNOSIS — I10 ESSENTIAL HYPERTENSION: Primary | ICD-10-CM

## 2018-03-20 DIAGNOSIS — E11.65 UNCONTROLLED TYPE 2 DIABETES MELLITUS WITH OTHER DIABETIC KIDNEY COMPLICATION, WITH LONG-TERM CURRENT USE OF INSULIN: ICD-10-CM

## 2018-03-20 DIAGNOSIS — Z79.4 UNCONTROLLED TYPE 2 DIABETES MELLITUS WITH OTHER DIABETIC KIDNEY COMPLICATION, WITH LONG-TERM CURRENT USE OF INSULIN: ICD-10-CM

## 2018-03-20 DIAGNOSIS — E11.29 UNCONTROLLED TYPE 2 DIABETES MELLITUS WITH OTHER DIABETIC KIDNEY COMPLICATION, WITH LONG-TERM CURRENT USE OF INSULIN: ICD-10-CM

## 2018-03-20 DIAGNOSIS — R20.2 PARESTHESIA OF BOTH HANDS: ICD-10-CM

## 2018-03-20 DIAGNOSIS — E66.01 SEVERE OBESITY (BMI 35.0-35.9 WITH COMORBIDITY) (HCC): ICD-10-CM

## 2018-03-20 DIAGNOSIS — N28.1 RENAL CYST, LEFT: ICD-10-CM

## 2018-03-20 DIAGNOSIS — D73.89 SPLENIC LESION: ICD-10-CM

## 2018-03-20 PROCEDURE — 99212 OFFICE O/P EST SF 10 MIN: CPT | Performed by: FAMILY MEDICINE

## 2018-03-20 PROCEDURE — 99214 OFFICE O/P EST MOD 30 MIN: CPT | Performed by: FAMILY MEDICINE

## 2018-03-20 RX ORDER — AMLODIPINE BESYLATE 5 MG/1
TABLET ORAL
Qty: 90 TABLET | Refills: 1 | Status: SHIPPED | OUTPATIENT
Start: 2018-03-20 | End: 2018-04-30

## 2018-03-20 RX ORDER — LISINOPRIL 40 MG/1
40 TABLET ORAL DAILY
Qty: 90 TABLET | Refills: 1 | Status: SHIPPED | OUTPATIENT
Start: 2018-03-20 | End: 2018-09-17

## 2018-03-20 RX ORDER — ATORVASTATIN CALCIUM 40 MG/1
TABLET, FILM COATED ORAL
Qty: 90 TABLET | Refills: 1 | Status: SHIPPED | OUTPATIENT
Start: 2018-03-20 | End: 2018-09-17

## 2018-03-20 NOTE — PROGRESS NOTES
Patient ID: Miguel A Pelaez is a 47year old female. HPI  Patient presents with:  Test Results: MRI of abdomen ,    =====  CONCLUSION:   1. Normal MRCP. 2. 5 indeterminate noncystic splenic lesions.  In this patient with no known malignancy, these prob High blood pressure    • Lipid screening 4/2/2007    per NG   • Lipoma     surgical excision age 24   • Pneumonia due to organism    • Renal disorder    • Type II or unspecified type diabetes mellitus without mention of complication, not stated as Marylee Corning UTD Disp:  Rfl: 0     Allergies:  Penicillins             Hives   PHYSICAL EXAM:   Physical Exam  Blood pressure 150/69, pulse 84, temperature (!) 97.1 °F (36.2 °C), temperature source Tympanic, height 5' 4\" (1.626 m), weight 228 lb (103.4 kg), last menst [facility])  Continue seeing endocrinology and continue your diabetic regimen  Mixed hyperlipidemia  -     atorvastatin 40 MG Oral Tab; TAKE 1 TABLET(40 MG) BY MOUTH EVERY NIGHT FOR CHOLESTEROL    Splenic lesion  I reviewed the MRCP with her  Severe obesity (BMI

## 2018-03-21 NOTE — TELEPHONE ENCOUNTER
Please let her know I forgot to give her the referral to Dr. Alaina Seay, the weight loss specialist.  Go ahead and give her the number and she can make an appointment.

## 2018-04-11 ENCOUNTER — OFFICE VISIT (OUTPATIENT)
Dept: NEUROLOGY | Facility: CLINIC | Age: 53
End: 2018-04-11

## 2018-04-11 DIAGNOSIS — R20.2 NUMBNESS AND TINGLING IN BOTH HANDS: Primary | ICD-10-CM

## 2018-04-11 DIAGNOSIS — R20.0 NUMBNESS AND TINGLING IN BOTH HANDS: Primary | ICD-10-CM

## 2018-04-11 PROCEDURE — 95911 NRV CNDJ TEST 9-10 STUDIES: CPT | Performed by: PHYSICAL MEDICINE & REHABILITATION

## 2018-04-11 PROCEDURE — 95886 MUSC TEST DONE W/N TEST COMP: CPT | Performed by: PHYSICAL MEDICINE & REHABILITATION

## 2018-04-11 NOTE — PROCEDURES
[place] [address]  [place]  Phone: [phone]  Fax: [phone]    ELECTRODIAGNOSTIC REPORT          Patient: Joe Painter YOB: 1965  Patient ID: [id number] Hand Dominance: Right h response was considered absent for Wrist stimulation  • In the L MEDIAN - Digit II study  o the peak latency was increased for Wrist stimulation  • In the R ULNAR - Digit  V study  o the peak latency was increased for Wrist stimulation  o the peak amplitud Segments Distance Lat Diff Velocity     ms mV ms  cm ms m/s   R MEDIAN - APB      Wrist Dig II 10.73 2.1 10.36 Wrist - Dig II 7.5        Elbow Dig II 17.40 1.5 9.53 Elbow - Wrist 20 6.67 30   L MEDIAN - APB      Wrist Dig II 9.48 4.8 6.25 Wrist - Dig II 7.

## 2018-04-11 NOTE — PROGRESS NOTES
She has bilateral carpal tunnel syndrome - severe on the right, moderate to severe on the left. 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     Thanks for the consult,  Lara Ramírez DO  Physical Medicine and Re

## 2018-04-17 ENCOUNTER — TELEPHONE (OUTPATIENT)
Dept: OTHER | Age: 53
End: 2018-04-17

## 2018-04-17 NOTE — TELEPHONE ENCOUNTER
Verified name and .   Results/recommendations reviewed with pt and pt verb understanding                                        Result Notes     Notes recorded by Donnal Dance, RN on 2018 at 10:48 AM CDT  [facility]et - transfer to triage - pt has f/u

## 2018-04-30 ENCOUNTER — OFFICE VISIT (OUTPATIENT)
Dept: FAMILY MEDICINE CLINIC | Facility: CLINIC | Age: 53
End: 2018-04-30

## 2018-04-30 VITALS
HEART RATE: 96 BPM | HEIGHT: 64 IN | TEMPERATURE: 98 F | DIASTOLIC BLOOD PRESSURE: 66 MMHG | SYSTOLIC BLOOD PRESSURE: 152 MMHG | BODY MASS INDEX: 37.73 KG/M2 | WEIGHT: 221 LBS

## 2018-04-30 DIAGNOSIS — I10 ESSENTIAL HYPERTENSION: ICD-10-CM

## 2018-04-30 DIAGNOSIS — G56.03 BILATERAL CARPAL TUNNEL SYNDROME: Primary | ICD-10-CM

## 2018-04-30 DIAGNOSIS — R29.898 WEAKNESS OF RIGHT HAND: ICD-10-CM

## 2018-04-30 DIAGNOSIS — M79.642 PAIN IN BOTH HANDS: ICD-10-CM

## 2018-04-30 DIAGNOSIS — E11.42 DIABETIC POLYNEUROPATHY ASSOCIATED WITH TYPE 2 DIABETES MELLITUS (HCC): ICD-10-CM

## 2018-04-30 DIAGNOSIS — G62.9 AXONAL POLYNEUROPATHY: ICD-10-CM

## 2018-04-30 DIAGNOSIS — M79.641 PAIN IN BOTH HANDS: ICD-10-CM

## 2018-04-30 PROCEDURE — 99215 OFFICE O/P EST HI 40 MIN: CPT | Performed by: FAMILY MEDICINE

## 2018-04-30 PROCEDURE — 99212 OFFICE O/P EST SF 10 MIN: CPT | Performed by: FAMILY MEDICINE

## 2018-04-30 RX ORDER — GABAPENTIN 100 MG/1
100 CAPSULE ORAL 2 TIMES DAILY
Qty: 180 CAPSULE | Refills: 0 | Status: SHIPPED | OUTPATIENT
Start: 2018-04-30 | End: 2018-07-27

## 2018-04-30 RX ORDER — AMLODIPINE BESYLATE 10 MG/1
10 TABLET ORAL DAILY
Qty: 90 TABLET | Refills: 1 | Status: SHIPPED | OUTPATIENT
Start: 2018-04-30 | End: 2018-10-31

## 2018-04-30 NOTE — PROGRESS NOTES
Patient ID: Bryce Lopez is a 46year old female. HPI  Patient presents with:  Test Results    Falguni Saavedra DO   Physical Medicine   Procedure Orders:   1.  EMG [[id number]] ordered by Falguni Saavedra DO at 04/11/18 1457      []Manual[]Template  [] · the velocity was reduced for A.Elbow-B. Elbow segment  · In the L ULNAR - ADM study  · the amplitude was reduced for Wrist stimulation  · the velocity was reduced for B. Elbow-Wrist segment  · the amplitude was reduced for B. Elbow stimulation  · the amplit 3. There is a superimposed bilateral median mononeuropathy at the wrist consistent with the clinical diagnosis of carpal tunnel syndrome.  The findings are severe in the right upper extremity, and moderate to severe in the left upper extremity.     Thank yo Neurological: Positive for weakness and numbness. Negative for dizziness, tremors, seizures, speech difficulty and light-headedness.          Past Medical History:   Diagnosis Date   • High blood pressure    • Lipid screening 4/2/2007    per NG   • Lipoma Insulin Syringe 29G X 1/2\" 0.5 ML Does not apply Misc INJ 1 UNITS INTO THE SKIN TID Disp:  Rfl: 10   Blood Glucose Monitoring Suppl (Cool Containers CONTOUR NEXT MONITOR) w/Device Does not apply Kit U UTD Disp:  Rfl: 0   TraMADol HCl 50 MG Oral Tab Take 1 tablet (5 Diabetic polyneuropathy associated with type 2 diabetes mellitus (HCC)  -     gabapentin 100 MG Oral Cap; Take 1 capsule (100 mg total) by mouth 2 (two) times daily.   In the upper arms bilaterally per EMG  Pain in both hands  -     PLASTIC SURGERY - INTERN

## 2018-05-02 ENCOUNTER — OFFICE VISIT (OUTPATIENT)
Dept: SURGERY | Facility: CLINIC | Age: 53
End: 2018-05-02

## 2018-05-02 VITALS
HEART RATE: 80 BPM | OXYGEN SATURATION: 96 % | WEIGHT: 220.19 LBS | DIASTOLIC BLOOD PRESSURE: 74 MMHG | HEIGHT: 64 IN | BODY MASS INDEX: 37.59 KG/M2 | SYSTOLIC BLOOD PRESSURE: 168 MMHG | RESPIRATION RATE: 16 BRPM

## 2018-05-02 DIAGNOSIS — E66.9 OBESITY (BMI 30-39.9): ICD-10-CM

## 2018-05-02 DIAGNOSIS — R12 HEART BURN: ICD-10-CM

## 2018-05-02 DIAGNOSIS — I10 ESSENTIAL HYPERTENSION: ICD-10-CM

## 2018-05-02 DIAGNOSIS — E11.65 UNCONTROLLED TYPE 2 DIABETES MELLITUS WITH OTHER DIABETIC KIDNEY COMPLICATION, WITH LONG-TERM CURRENT USE OF INSULIN: Primary | ICD-10-CM

## 2018-05-02 DIAGNOSIS — R53.82 CHRONIC FATIGUE: ICD-10-CM

## 2018-05-02 DIAGNOSIS — Z79.4 UNCONTROLLED TYPE 2 DIABETES MELLITUS WITH OTHER DIABETIC KIDNEY COMPLICATION, WITH LONG-TERM CURRENT USE OF INSULIN: Primary | ICD-10-CM

## 2018-05-02 DIAGNOSIS — E11.29 UNCONTROLLED TYPE 2 DIABETES MELLITUS WITH OTHER DIABETIC KIDNEY COMPLICATION, WITH LONG-TERM CURRENT USE OF INSULIN: Primary | ICD-10-CM

## 2018-05-02 PROCEDURE — 99204 OFFICE O/P NEW MOD 45 MIN: CPT | Performed by: INTERNAL MEDICINE

## 2018-05-02 RX ORDER — TOPIRAMATE 25 MG/1
25 TABLET ORAL 2 TIMES DAILY
Qty: 60 TABLET | Refills: 1 | Status: SHIPPED | OUTPATIENT
Start: 2018-05-02 | End: 2018-06-26

## 2018-05-02 NOTE — PATIENT INSTRUCTIONS
Start topiramate for your cravings. Please take one tablet for the next week in the evening with dinner.    After the first week, you may add the second dose in the mornings with breakfast.

## 2018-05-02 NOTE — PROGRESS NOTES
The Wellness and Weight Loss Consultation Note       Date of Consult:  2018    Patient:  Lanie Lozano  :      3/30/1965  MRN:      [id number]    Referring Provider: Dr. Anna Patiño       Chief Complaint:  Patient presents with:  Consult  Weight Nadia UNIT/ML Subcutaneous Solution Pen-injector INJECT 72 UNITS INTO THE SKIN DAILY Disp: 30 mL Rfl: 3   aspirin 81 MG Oral Chew Tab CSW ONE T D Disp:  Rfl: 6   MICROLET LANCETS Does not apply Misc Check glucose three times a day.  Disp: 100 each Rfl: 2   TraMAD veggies   +fast eater  +portion sizes    Soda Drinker?: No  If yes, how much?:      Number of restaurant or fast food meals/week:  4 meals/week    Nutritional Goals Reviewed and Discussed:     Limit carbohydrates to 100 gms per day, Eat 100-200 calories wi Uncontrolled type 2 diabetes mellitus with other diabetic kidney complication, with long-term current use of insulin (HCC)  (primary encounter diagnosis)  Essential hypertension  Chronic fatigue  Heart burn  Obesity (BMI 30-39. 9)    PLAN     Patient is n

## 2018-05-05 DIAGNOSIS — E11.29 UNCONTROLLED TYPE 2 DIABETES MELLITUS WITH OTHER DIABETIC KIDNEY COMPLICATION, WITH LONG-TERM CURRENT USE OF INSULIN: ICD-10-CM

## 2018-05-05 DIAGNOSIS — Z79.4 UNCONTROLLED TYPE 2 DIABETES MELLITUS WITH OTHER DIABETIC KIDNEY COMPLICATION, WITH LONG-TERM CURRENT USE OF INSULIN: ICD-10-CM

## 2018-05-05 DIAGNOSIS — E11.65 UNCONTROLLED TYPE 2 DIABETES MELLITUS WITH OTHER DIABETIC KIDNEY COMPLICATION, WITH LONG-TERM CURRENT USE OF INSULIN: ICD-10-CM

## 2018-05-09 RX ORDER — INSULIN ASPART 100 [IU]/ML
INJECTION, SOLUTION INTRAVENOUS; SUBCUTANEOUS
Qty: 30 ML | Refills: 0 | Status: SHIPPED | OUTPATIENT
Start: 2018-05-09 | End: 2019-01-09

## 2018-06-12 ENCOUNTER — OFFICE VISIT (OUTPATIENT)
Dept: FAMILY MEDICINE CLINIC | Facility: CLINIC | Age: 53
End: 2018-06-12

## 2018-06-12 VITALS
WEIGHT: 224 LBS | DIASTOLIC BLOOD PRESSURE: 60 MMHG | SYSTOLIC BLOOD PRESSURE: 120 MMHG | BODY MASS INDEX: 38 KG/M2 | TEMPERATURE: 98 F | HEART RATE: 84 BPM

## 2018-06-12 DIAGNOSIS — E11.29 UNCONTROLLED TYPE 2 DIABETES MELLITUS WITH OTHER DIABETIC KIDNEY COMPLICATION, WITH LONG-TERM CURRENT USE OF INSULIN: ICD-10-CM

## 2018-06-12 DIAGNOSIS — E11.42 DIABETIC POLYNEUROPATHY ASSOCIATED WITH TYPE 2 DIABETES MELLITUS (HCC): Primary | ICD-10-CM

## 2018-06-12 DIAGNOSIS — Z79.4 UNCONTROLLED TYPE 2 DIABETES MELLITUS WITH OTHER DIABETIC KIDNEY COMPLICATION, WITH LONG-TERM CURRENT USE OF INSULIN: ICD-10-CM

## 2018-06-12 DIAGNOSIS — E11.65 UNCONTROLLED TYPE 2 DIABETES MELLITUS WITH OTHER DIABETIC KIDNEY COMPLICATION, WITH LONG-TERM CURRENT USE OF INSULIN: ICD-10-CM

## 2018-06-12 DIAGNOSIS — I10 ESSENTIAL HYPERTENSION: ICD-10-CM

## 2018-06-12 DIAGNOSIS — Z23 NEED FOR VACCINATION: ICD-10-CM

## 2018-06-12 DIAGNOSIS — G56.03 BILATERAL CARPAL TUNNEL SYNDROME: ICD-10-CM

## 2018-06-12 PROCEDURE — 99214 OFFICE O/P EST MOD 30 MIN: CPT | Performed by: FAMILY MEDICINE

## 2018-06-12 PROCEDURE — 90732 PPSV23 VACC 2 YRS+ SUBQ/IM: CPT | Performed by: FAMILY MEDICINE

## 2018-06-12 PROCEDURE — 99212 OFFICE O/P EST SF 10 MIN: CPT | Performed by: FAMILY MEDICINE

## 2018-06-12 PROCEDURE — 90471 IMMUNIZATION ADMIN: CPT | Performed by: FAMILY MEDICINE

## 2018-06-12 NOTE — PROGRESS NOTES
Patient ID: Olivier Lopez is a 46year old female. HPI  Patient presents with: Follow - Up  HTN  High Cholesterol  Diabetes    ASSESSMENT/PLAN: From our last visit.      Diagnoses and all orders for this visit:    Bilateral carpal tunnel syndrome  - 01/13/2018   MOPERCENT 8 01/13/2018   EOPERCENT 4 01/13/2018   BAPERCENT 1 01/13/2018   NE 6.0 01/13/2018   LYMABS 3.3 01/13/2018   MOABSO 0.9 01/13/2018   EOABSO 0.4 01/13/2018   BAABSO 0.1 01/13/2018         Lab Results  Component Value Date    (H Florencia 140 (H) 05/26/2017     No results found for: T4F, TSH, TSHT4    No results found for: B12, VITB12    No results found for: IRON, IRONTOT    No results found for: SAT    No results found for: IRON, IRONTOT, TIBC, IRONSAT, TRANSFERRIN, TIBCP, IRONB (40 mg total) by mouth daily. Take with the amlodipine for blood pressure.  Disp: 90 tablet Rfl: 1   atorvastatin 40 MG Oral Tab TAKE 1 TABLET(40 MG) BY MOUTH EVERY NIGHT FOR CHOLESTEROL Disp: 90 tablet Rfl: 1   NOVOLOG FLEXPEN 100 UNIT/ML Subcutaneous Solu normal heart sounds. Pulmonary/Chest: Effort normal and breath sounds normal. No respiratory distress. Abdominal: Normal appearance and bowel sounds are normal. There is    no tenderness.   Neurological: Patient is alert and oriented to person, place, Provider: Brad Lopes MD          Requested Specialty: SURGERY, PLASTIC          Number of Visits Requested: 3      Podiatry- Dr Micki Sargent:              ([facility]). HE IS ALSO AT THE [place] LOCATION.           Referral Pr

## 2018-06-12 NOTE — PATIENT INSTRUCTIONS
HEALTH TIPS     Exercise, work on your diet, watch your portion sizes. Avoid eating late at night. Make sure to EAT BREAKFAST as people who skip breakfast do not lose weight.   I myself have 3-4 \"Brown 'N Serve\" s

## 2018-06-26 RX ORDER — TOPIRAMATE 25 MG/1
TABLET ORAL
Qty: 60 TABLET | Refills: 0 | Status: SHIPPED | OUTPATIENT
Start: 2018-06-26 | End: 2018-07-27

## 2018-07-27 DIAGNOSIS — G62.9 AXONAL POLYNEUROPATHY: ICD-10-CM

## 2018-07-27 DIAGNOSIS — E11.42 DIABETIC POLYNEUROPATHY ASSOCIATED WITH TYPE 2 DIABETES MELLITUS (HCC): ICD-10-CM

## 2018-07-27 DIAGNOSIS — G56.03 BILATERAL CARPAL TUNNEL SYNDROME: ICD-10-CM

## 2018-07-27 RX ORDER — TOPIRAMATE 25 MG/1
TABLET ORAL
Qty: 60 TABLET | Refills: 0 | Status: SHIPPED | OUTPATIENT
Start: 2018-07-27 | End: 2019-01-08

## 2018-07-28 RX ORDER — GABAPENTIN 100 MG/1
CAPSULE ORAL
Qty: 180 CAPSULE | Refills: 0 | Status: SHIPPED | OUTPATIENT
Start: 2018-07-28 | End: 2018-10-31

## 2018-07-28 NOTE — TELEPHONE ENCOUNTER
Gabapentin 100 mg PASSED Neurology Protocol. Rx filled per protocol.      Refill Protocol Appointment Criteria  · Appointment scheduled in the past 6 months or in the next 3 months  Recent Outpatient Visits            1 month ago Diabetic polyneuropathy ass

## 2018-08-27 RX ORDER — TOPIRAMATE 25 MG/1
TABLET ORAL
Qty: 60 TABLET | Refills: 0 | OUTPATIENT
Start: 2018-08-27

## 2018-09-17 DIAGNOSIS — E78.2 MIXED HYPERLIPIDEMIA: ICD-10-CM

## 2018-09-17 DIAGNOSIS — I10 ESSENTIAL HYPERTENSION: ICD-10-CM

## 2018-09-18 RX ORDER — LISINOPRIL 40 MG/1
TABLET ORAL
Qty: 90 TABLET | Refills: 0 | Status: SHIPPED | OUTPATIENT
Start: 2018-09-18 | End: 2018-12-26

## 2018-09-18 NOTE — TELEPHONE ENCOUNTER
Protocol failed, please advise on prescription request  Cholesterol Medications  Protocol Criteria:  · Appointment scheduled in the past 12 months or in the next 3 months  · ALT & LDL on file in the past 12 months  · ALT result < 80  · LDL result <130   Re Lola Felix,     Office Visit    5 months ago Numbness and tingling in both hands    [facility], [address], [address] [place], [place]    Office Visit    6 months ago Essential hypertension    [address]

## 2018-09-20 RX ORDER — ATORVASTATIN CALCIUM 40 MG/1
TABLET, FILM COATED ORAL
Qty: 90 TABLET | Refills: 0 | Status: SHIPPED | OUTPATIENT
Start: 2018-09-20 | End: 2019-01-08

## 2018-10-31 DIAGNOSIS — G56.03 BILATERAL CARPAL TUNNEL SYNDROME: ICD-10-CM

## 2018-10-31 DIAGNOSIS — G62.9 AXONAL POLYNEUROPATHY: ICD-10-CM

## 2018-10-31 DIAGNOSIS — E11.42 DIABETIC POLYNEUROPATHY ASSOCIATED WITH TYPE 2 DIABETES MELLITUS (HCC): ICD-10-CM

## 2018-10-31 DIAGNOSIS — I10 ESSENTIAL HYPERTENSION: ICD-10-CM

## 2018-11-01 RX ORDER — AMLODIPINE BESYLATE 10 MG/1
TABLET ORAL
Qty: 90 TABLET | Refills: 0 | Status: SHIPPED | OUTPATIENT
Start: 2018-11-01 | End: 2019-01-08

## 2018-11-01 RX ORDER — GABAPENTIN 100 MG/1
CAPSULE ORAL
Qty: 180 CAPSULE | Refills: 0 | Status: SHIPPED | OUTPATIENT
Start: 2018-11-01 | End: 2019-01-08

## 2018-12-25 DIAGNOSIS — E78.2 MIXED HYPERLIPIDEMIA: ICD-10-CM

## 2018-12-26 DIAGNOSIS — I10 ESSENTIAL HYPERTENSION: ICD-10-CM

## 2018-12-28 ENCOUNTER — TELEPHONE (OUTPATIENT)
Dept: OTHER | Age: 53
End: 2018-12-28

## 2018-12-28 RX ORDER — ATORVASTATIN CALCIUM 40 MG/1
TABLET, FILM COATED ORAL
Qty: 90 TABLET | Refills: 0 | Status: SHIPPED | OUTPATIENT
Start: 2018-12-28 | End: 2019-01-08

## 2018-12-28 RX ORDER — LISINOPRIL 40 MG/1
TABLET ORAL
Qty: 90 TABLET | Refills: 0 | Status: SHIPPED | OUTPATIENT
Start: 2018-12-28 | End: 2019-01-08

## 2018-12-28 NOTE — TELEPHONE ENCOUNTER
Hypertensive Medications  Protocol Criteria:  · Appointment scheduled in the past 6 months or in the next 3 months  · BMP or CMP in the past 12 months  · Creatinine result < 2  Recent Outpatient Visits            6 months ago Diabetic polyneuropathy associ

## 2018-12-28 NOTE — TELEPHONE ENCOUNTER
Pt schedule an appt on 1/8 and states out of meds below and req a refill until appt. •  AMLODIPINE BESYLATE 10 MG Oral Tab, TAKE 1 TABLET BY MOUTH DAILY.  TAKE WITH THE LISINOPRIL 40 MG., Disp: 90 tablet, Rfl: 0  •  LISINOPRIL 40 MG Oral Tab, TAKE 1 TA

## 2019-01-08 ENCOUNTER — TELEPHONE (OUTPATIENT)
Dept: ENDOCRINOLOGY CLINIC | Facility: CLINIC | Age: 54
End: 2019-01-08

## 2019-01-08 ENCOUNTER — LAB ENCOUNTER (OUTPATIENT)
Dept: LAB | Age: 54
End: 2019-01-08
Attending: FAMILY MEDICINE
Payer: MEDICAID

## 2019-01-08 ENCOUNTER — OFFICE VISIT (OUTPATIENT)
Dept: FAMILY MEDICINE CLINIC | Facility: CLINIC | Age: 54
End: 2019-01-08
Payer: MEDICAID

## 2019-01-08 VITALS
HEIGHT: 64 IN | SYSTOLIC BLOOD PRESSURE: 118 MMHG | TEMPERATURE: 98 F | WEIGHT: 222 LBS | HEART RATE: 94 BPM | DIASTOLIC BLOOD PRESSURE: 63 MMHG | BODY MASS INDEX: 37.9 KG/M2

## 2019-01-08 DIAGNOSIS — E11.65 UNCONTROLLED TYPE 2 DIABETES MELLITUS WITH HYPERGLYCEMIA (HCC): ICD-10-CM

## 2019-01-08 DIAGNOSIS — E11.65 UNCONTROLLED TYPE 2 DIABETES MELLITUS WITH KIDNEY COMPLICATION, WITH LONG-TERM CURRENT USE OF INSULIN (HCC): Primary | ICD-10-CM

## 2019-01-08 DIAGNOSIS — G56.03 BILATERAL CARPAL TUNNEL SYNDROME: ICD-10-CM

## 2019-01-08 DIAGNOSIS — E11.42 DIABETIC POLYNEUROPATHY ASSOCIATED WITH TYPE 2 DIABETES MELLITUS (HCC): ICD-10-CM

## 2019-01-08 DIAGNOSIS — Z79.4 UNCONTROLLED TYPE 2 DIABETES MELLITUS WITH KIDNEY COMPLICATION, WITH LONG-TERM CURRENT USE OF INSULIN (HCC): Primary | ICD-10-CM

## 2019-01-08 DIAGNOSIS — Z79.4 UNCONTROLLED TYPE 2 DIABETES MELLITUS WITH KIDNEY COMPLICATION, WITH LONG-TERM CURRENT USE OF INSULIN (HCC): ICD-10-CM

## 2019-01-08 DIAGNOSIS — I10 ESSENTIAL HYPERTENSION: ICD-10-CM

## 2019-01-08 DIAGNOSIS — Z23 NEED FOR VACCINATION: ICD-10-CM

## 2019-01-08 DIAGNOSIS — E11.29 UNCONTROLLED TYPE 2 DIABETES MELLITUS WITH KIDNEY COMPLICATION, WITH LONG-TERM CURRENT USE OF INSULIN (HCC): Primary | ICD-10-CM

## 2019-01-08 DIAGNOSIS — E78.2 MIXED HYPERLIPIDEMIA: ICD-10-CM

## 2019-01-08 DIAGNOSIS — E11.65 UNCONTROLLED TYPE 2 DIABETES MELLITUS WITH KIDNEY COMPLICATION, WITH LONG-TERM CURRENT USE OF INSULIN (HCC): ICD-10-CM

## 2019-01-08 DIAGNOSIS — E11.65 UNCONTROLLED TYPE 2 DIABETES MELLITUS WITH OTHER DIABETIC KIDNEY COMPLICATION, WITH LONG-TERM CURRENT USE OF INSULIN: ICD-10-CM

## 2019-01-08 DIAGNOSIS — E11.29 UNCONTROLLED TYPE 2 DIABETES MELLITUS WITH OTHER DIABETIC KIDNEY COMPLICATION, WITH LONG-TERM CURRENT USE OF INSULIN: ICD-10-CM

## 2019-01-08 DIAGNOSIS — Z79.4 UNCONTROLLED TYPE 2 DIABETES MELLITUS WITH OTHER DIABETIC KIDNEY COMPLICATION, WITH LONG-TERM CURRENT USE OF INSULIN: ICD-10-CM

## 2019-01-08 DIAGNOSIS — E11.29 UNCONTROLLED TYPE 2 DIABETES MELLITUS WITH KIDNEY COMPLICATION, WITH LONG-TERM CURRENT USE OF INSULIN (HCC): ICD-10-CM

## 2019-01-08 DIAGNOSIS — G62.9 AXONAL POLYNEUROPATHY: ICD-10-CM

## 2019-01-08 LAB
ALT SERPL-CCNC: 31 U/L (ref 14–54)
ANION GAP SERPL CALC-SCNC: 10 MMOL/L (ref 0–18)
AST SERPL-CCNC: 24 U/L (ref 15–41)
BASOPHILS # BLD: 0.1 K/UL (ref 0–0.2)
BASOPHILS NFR BLD: 1 %
BUN SERPL-MCNC: 49 MG/DL (ref 8–20)
BUN/CREAT SERPL: 37.1 (ref 10–20)
CALCIUM SERPL-MCNC: 10 MG/DL (ref 8.5–10.5)
CHLORIDE SERPL-SCNC: 101 MMOL/L (ref 95–110)
CHOLEST SERPL-MCNC: 231 MG/DL (ref 110–200)
CO2 SERPL-SCNC: 24 MMOL/L (ref 22–32)
CREAT SERPL-MCNC: 1.32 MG/DL (ref 0.5–1.5)
CREAT UR-MCNC: 134.3 MG/DL
EOSINOPHIL # BLD: 0.4 K/UL (ref 0–0.7)
EOSINOPHIL NFR BLD: 5 %
ERYTHROCYTE [DISTWIDTH] IN BLOOD BY AUTOMATED COUNT: 13.5 % (ref 11–15)
EST. AVERAGE GLUCOSE BLD GHB EST-MCNC: 318 MG/DL (ref 68–126)
GLUCOSE SERPL-MCNC: 188 MG/DL (ref 70–99)
HBA1C MFR BLD HPLC: 12.7 % (ref ?–5.7)
HCT VFR BLD AUTO: 36.9 % (ref 35–48)
HDLC SERPL-MCNC: 45 MG/DL
HGB BLD-MCNC: 12.4 G/DL (ref 12–16)
LDLC SERPL CALC-MCNC: 164 MG/DL (ref 0–99)
LYMPHOCYTES # BLD: 2.6 K/UL (ref 1–4)
LYMPHOCYTES NFR BLD: 26 %
MCH RBC QN AUTO: 30.7 PG (ref 27–32)
MCHC RBC AUTO-ENTMCNC: 33.6 G/DL (ref 32–37)
MCV RBC AUTO: 91.4 FL (ref 80–100)
MICROALBUMIN UR-MCNC: 12.6 MG/DL (ref 0–1.8)
MICROALBUMIN/CREAT UR: 93.8 MG/G{CREAT} (ref 0–20)
MONOCYTES # BLD: 0.6 K/UL (ref 0–1)
MONOCYTES NFR BLD: 6 %
NEUTROPHILS # BLD AUTO: 6.1 K/UL (ref 1.8–7.7)
NEUTROPHILS NFR BLD: 62 %
NONHDLC SERPL-MCNC: 186 MG/DL
OSMOLALITY UR CALC.SUM OF ELEC: 298 MOSM/KG (ref 275–295)
PLATELET # BLD AUTO: 265 K/UL (ref 140–400)
PMV BLD AUTO: 11 FL (ref 7.4–10.3)
POTASSIUM SERPL-SCNC: 5.3 MMOL/L (ref 3.3–5.1)
RBC # BLD AUTO: 4.04 M/UL (ref 3.7–5.4)
SODIUM SERPL-SCNC: 135 MMOL/L (ref 136–144)
TRIGL SERPL-MCNC: 108 MG/DL (ref 1–149)
WBC # BLD AUTO: 9.9 K/UL (ref 4–11)

## 2019-01-08 PROCEDURE — 83036 HEMOGLOBIN GLYCOSYLATED A1C: CPT

## 2019-01-08 PROCEDURE — 90471 IMMUNIZATION ADMIN: CPT | Performed by: FAMILY MEDICINE

## 2019-01-08 PROCEDURE — 99212 OFFICE O/P EST SF 10 MIN: CPT | Performed by: FAMILY MEDICINE

## 2019-01-08 PROCEDURE — 36415 COLL VENOUS BLD VENIPUNCTURE: CPT

## 2019-01-08 PROCEDURE — 80061 LIPID PANEL: CPT

## 2019-01-08 PROCEDURE — 84460 ALANINE AMINO (ALT) (SGPT): CPT

## 2019-01-08 PROCEDURE — 90686 IIV4 VACC NO PRSV 0.5 ML IM: CPT | Performed by: FAMILY MEDICINE

## 2019-01-08 PROCEDURE — 99214 OFFICE O/P EST MOD 30 MIN: CPT | Performed by: FAMILY MEDICINE

## 2019-01-08 PROCEDURE — 82570 ASSAY OF URINE CREATININE: CPT

## 2019-01-08 PROCEDURE — 82043 UR ALBUMIN QUANTITATIVE: CPT

## 2019-01-08 PROCEDURE — 80048 BASIC METABOLIC PNL TOTAL CA: CPT

## 2019-01-08 PROCEDURE — 84450 TRANSFERASE (AST) (SGOT): CPT

## 2019-01-08 PROCEDURE — 85025 COMPLETE CBC W/AUTO DIFF WBC: CPT

## 2019-01-08 RX ORDER — AMLODIPINE BESYLATE 10 MG/1
TABLET ORAL
Qty: 90 TABLET | Refills: 1 | Status: SHIPPED | OUTPATIENT
Start: 2019-01-08 | End: 2019-05-31

## 2019-01-08 RX ORDER — GABAPENTIN 100 MG/1
CAPSULE ORAL
Qty: 180 CAPSULE | Refills: 1 | Status: SHIPPED | OUTPATIENT
Start: 2019-01-08 | End: 2019-05-31

## 2019-01-08 RX ORDER — ATORVASTATIN CALCIUM 40 MG/1
40 TABLET, FILM COATED ORAL NIGHTLY
Qty: 90 TABLET | Refills: 1 | Status: SHIPPED | OUTPATIENT
Start: 2019-01-08 | End: 2019-04-03

## 2019-01-08 RX ORDER — LISINOPRIL 40 MG/1
TABLET ORAL
Qty: 90 TABLET | Refills: 1 | Status: SHIPPED | OUTPATIENT
Start: 2019-01-08 | End: 2019-05-31

## 2019-01-08 RX ORDER — INSULIN ASPART 100 [IU]/ML
INJECTION, SOLUTION INTRAVENOUS; SUBCUTANEOUS
Qty: 30 ML | Refills: 0 | Status: SHIPPED | OUTPATIENT
Start: 2019-01-08 | End: 2019-01-09

## 2019-01-08 NOTE — TELEPHONE ENCOUNTER
Pt states she needs a appointment sooner then offered in march due to needing a refill on her insulin pt is without insulin now  please call thank you

## 2019-01-08 NOTE — TELEPHONE ENCOUNTER
Returned call to patient. Explained that if appt booked insulin can be refilled through follow up visit. Offered first available with [facility] and will refill meds or sooner appt with [facility]. Declined [facility] appt.  Booked with [facility]  And refills sent for insulin per ishaan

## 2019-01-08 NOTE — PROGRESS NOTES
Patient ID: Miguel A Pelaez is a 46year old female. HPI  Patient presents with:  Medication Follow-Up    I had seen her in June of last year. In June I did a referral to podiatry, ophthalmology and order labs on her.   She has not had any of these don Outpatient Medications:  ATORVASTATIN 40 MG Oral Tab TAKE 1 TABLET(40 MG) BY MOUTH EVERY NIGHT FOR CHOLESTEROL Disp: 90 tablet Rfl: 0   LISINOPRIL 40 MG Oral Tab TAKE 1 TABLET BY MOUTH DAILY.  TAKE WITH THE AMLODIPINE FOR BLOOD PRESSURE Disp: 90 tablet Rfl: mouth every 6 (six) hours as needed for Pain.  Disp: 20 tablet Rfl: 0     Allergies:  Penicillins             HIVES   PHYSICAL EXAM:   Physical Exam  Blood pressure 118/63, pulse 94, temperature 98 °F (36.7 °C), temperature source Oral, height 5' 4\" (1.626 TABLET BY MOUTH DAILY. TAKE WITH THE AMLODIPINE FOR BLOOD PRESSURE  -     AmLODIPine Besylate 10 MG Oral Tab; TAKE 1 TABLET BY MOUTH DAILY. TAKE WITH THE LISINOPRIL 40 MG.     Diabetic polyneuropathy associated with type 2 diabetes mellitus (HCC)  -     BERNABE Referred to Girma Neves MD          Requested Specialty:ENDOCRINOLOGY          Number of Visits Requested:3        Follow up if symptoms persist.  Take medicine (if given) as prescribed.   Approach to treatment discussed and patient/family membe

## 2019-01-09 ENCOUNTER — TELEPHONE (OUTPATIENT)
Dept: ENDOCRINOLOGY CLINIC | Facility: CLINIC | Age: 54
End: 2019-01-09

## 2019-01-09 RX ORDER — INSULIN LISPRO 100 U/ML
20 INJECTION, SOLUTION SUBCUTANEOUS 3 TIMES DAILY
Qty: 30 ML | Refills: 0 | Status: SHIPPED | OUTPATIENT
Start: 2019-01-09 | End: 2019-02-16

## 2019-01-09 NOTE — TELEPHONE ENCOUNTER
Covered alternative Admelog. Changed per [facility] protocol at same dose.

## 2019-01-09 NOTE — TELEPHONE ENCOUNTER
Current Outpatient Medications:  NOVOLOG FLEXPEN 100 UNIT/ML Subcutaneous Solution Pen-injector INJECT 20 UNITS UNDER THE SKIN THREE TIMES DAILY BEFORE MEALS Disp: 30 mL Rfl: 0     Med not covered alt requested Interdisciplinary team rounds were held 5/25/2017 with the following team members:Care Management, Nursing, Nurse Practitioner, Nutrition, Palliative Care, Pastoral Care, Pharmacy, Physical Therapy, Physician, Respiratory Therapy and Clinical Coordinator. Plan of care discussed. See clinical pathway and/or care plan for interventions and desired outcomes.

## 2019-01-14 ENCOUNTER — TELEPHONE (OUTPATIENT)
Dept: OTHER | Age: 54
End: 2019-01-14

## 2019-01-14 NOTE — PROGRESS NOTES
Patient returning a call, advised Dr Abhinav Arreola note and verbalized understanding. Notes recorded by Amalia Luis DO on 1/11/2019 at 2:00 PM CST  Must see endocrinology as her hemoglobin A1c is much too high at 12.7 and used to be 9.9 . . Anna Spencer

## 2019-01-21 ENCOUNTER — OFFICE VISIT (OUTPATIENT)
Dept: PODIATRY CLINIC | Facility: CLINIC | Age: 54
End: 2019-01-21
Payer: MEDICAID

## 2019-01-21 DIAGNOSIS — E11.42 TYPE 2 DIABETES MELLITUS WITH DIABETIC POLYNEUROPATHY, WITH LONG-TERM CURRENT USE OF INSULIN (HCC): Primary | ICD-10-CM

## 2019-01-21 DIAGNOSIS — Z79.4 TYPE 2 DIABETES MELLITUS WITH DIABETIC POLYNEUROPATHY, WITH LONG-TERM CURRENT USE OF INSULIN (HCC): Primary | ICD-10-CM

## 2019-01-21 DIAGNOSIS — M79.674 PAIN IN TOES OF BOTH FEET: ICD-10-CM

## 2019-01-21 DIAGNOSIS — M79.675 PAIN IN TOES OF BOTH FEET: ICD-10-CM

## 2019-01-21 DIAGNOSIS — B35.1 ONYCHOMYCOSIS: ICD-10-CM

## 2019-01-21 PROCEDURE — 99212 OFFICE O/P EST SF 10 MIN: CPT | Performed by: PODIATRIST

## 2019-01-21 PROCEDURE — 11721 DEBRIDE NAIL 6 OR MORE: CPT | Performed by: PODIATRIST

## 2019-01-21 PROCEDURE — 99243 OFF/OP CNSLTJ NEW/EST LOW 30: CPT | Performed by: PODIATRIST

## 2019-01-21 NOTE — PROGRESS NOTES
HPI:    Patient ID: Dina López is a 46year old female. This 50-year-old female presents as a new patient to me on consult from [address]   For a diabetic foot evaluation but admits to having pain associated with her toenails and states that she is u Disp: 100 each Rfl: 2   Blood Glucose Monitoring Suppl (ALY CONTOUR MONITOR) Does not apply Device Provide one Aly Contour Next Monitor.  Disp: 1 Device Rfl: 0   BD PEN NEEDLE SHORT U/F 31G X 8 MM Does not apply Misc U TO INJ INSULIN UTD QID Disp:  Rfl: defined types were placed in this encounter.       Meds This Visit:  Requested Prescriptions      No prescriptions requested or ordered in this encounter       Imaging & Referrals:  None       SV#[id number]

## 2019-01-25 DIAGNOSIS — I10 ESSENTIAL HYPERTENSION: ICD-10-CM

## 2019-01-25 RX ORDER — AMLODIPINE BESYLATE 10 MG/1
TABLET ORAL
Qty: 90 TABLET | Refills: 0 | Status: ON HOLD | OUTPATIENT
Start: 2019-01-25 | End: 2019-05-03

## 2019-01-25 NOTE — TELEPHONE ENCOUNTER
Refill passed per [facility] protocol.   Hypertensive Medications  Protocol Criteria:  · Appointment scheduled in the past 6 months or in the next 3 months  · BMP or CMP in the past 12 months  · Creatinine result < 2  Recent Outpatient Visits

## 2019-01-28 DIAGNOSIS — IMO0002 UNCONTROLLED TYPE 2 DIABETES MELLITUS WITH COMPLICATION, WITH LONG-TERM CURRENT USE OF INSULIN: ICD-10-CM

## 2019-01-28 RX ORDER — PERPHENAZINE 16 MG/1
TABLET, FILM COATED ORAL
Qty: 300 STRIP | Refills: 0 | Status: SHIPPED | OUTPATIENT
Start: 2019-01-28 | End: 2019-05-23

## 2019-02-07 ENCOUNTER — OFFICE VISIT (OUTPATIENT)
Dept: ENDOCRINOLOGY CLINIC | Facility: CLINIC | Age: 54
End: 2019-02-07
Payer: MEDICAID

## 2019-02-07 VITALS
BODY MASS INDEX: 39 KG/M2 | WEIGHT: 228.63 LBS | DIASTOLIC BLOOD PRESSURE: 75 MMHG | SYSTOLIC BLOOD PRESSURE: 134 MMHG | HEART RATE: 83 BPM

## 2019-02-07 DIAGNOSIS — E11.65 UNCONTROLLED TYPE 2 DIABETES MELLITUS WITH HYPERGLYCEMIA (HCC): Primary | ICD-10-CM

## 2019-02-07 LAB
GLUCOSE BLOOD: 102
TEST STRIP LOT #: NORMAL NUMERIC

## 2019-02-07 PROCEDURE — 82962 GLUCOSE BLOOD TEST: CPT | Performed by: INTERNAL MEDICINE

## 2019-02-07 PROCEDURE — 99212 OFFICE O/P EST SF 10 MIN: CPT | Performed by: INTERNAL MEDICINE

## 2019-02-07 PROCEDURE — 36416 COLLJ CAPILLARY BLOOD SPEC: CPT | Performed by: INTERNAL MEDICINE

## 2019-02-07 PROCEDURE — 99214 OFFICE O/P EST MOD 30 MIN: CPT | Performed by: INTERNAL MEDICINE

## 2019-02-07 RX ORDER — GLIMEPIRIDE 4 MG/1
4 TABLET ORAL
Qty: 30 TABLET | Refills: 3 | Status: SHIPPED | OUTPATIENT
Start: 2019-02-07 | End: 2019-05-31

## 2019-02-07 RX ORDER — METFORMIN HYDROCHLORIDE 500 MG/1
500 TABLET, EXTENDED RELEASE ORAL 2 TIMES DAILY WITH MEALS
Qty: 60 TABLET | Refills: 3 | Status: SHIPPED | OUTPATIENT
Start: 2019-02-07 | End: 2019-05-31

## 2019-02-07 NOTE — PATIENT INSTRUCTIONS
Increase Basaglar to 80 units SQ daily    Start Victoza 0.6mg SQ daily for one week then increase to 1.2mg SQ daily    Start Stegaltro 15mg daily in the morning    Start Glimepiride 4mg daily in the morning    Start Metformin ER 2 tablets daily in the [employer]

## 2019-02-07 NOTE — PROGRESS NOTES
Name: Giovanni Vo  Date: 2/7/2019    Referring Physician: No ref.  provider found    HISTORY OF PRESENT ILLNESS   Giovanni Vo is a 46year old female who presents for diabetes mellitus diagnosed 23 years ago, transitioned to insulin therapy 15 years CAPSULE(100 MG) BY MOUTH TWICE DAILY, Disp: 180 capsule, Rfl: 1  •  AmLODIPine Besylate 10 MG Oral Tab, TAKE 1 TABLET BY MOUTH DAILY.  TAKE WITH THE LISINOPRIL 40 MG., Disp: 90 tablet, Rfl: 1  •  insulin glargine (BASAGLAR KWIKPEN) 100 UNIT/ML Subcutaneous without mention of complication, not stated as uncontrolled    • Unspecified essential hypertension        Surgical history:   Past Surgical History:   Procedure Laterality Date   • EYE SURGERY     • LIPOMA REMOVAL     • OTHER SURGICAL HISTORY      lipoma injection during visit  -Start Stegaltro 15mg PO daily, verbalized understanding of risks and benefits  -Start Glimepiride 4mg PO daily, verbalized understanding of risks and benefits  -Start Metformin ER 1000mg PO daily, verbalized understanding of risks

## 2019-02-18 RX ORDER — INSULIN LISPRO 100 U/ML
INJECTION, SOLUTION SUBCUTANEOUS
Qty: 30 ML | Refills: 5 | Status: ON HOLD | OUTPATIENT
Start: 2019-02-18 | End: 2019-05-03

## 2019-03-04 DIAGNOSIS — E11.65 UNCONTROLLED TYPE 2 DIABETES MELLITUS WITH HYPERGLYCEMIA (HCC): ICD-10-CM

## 2019-03-04 NOTE — TELEPHONE ENCOUNTER
insulin glargine (BASAGLAR KWIKPEN) 100 UNIT/ML Subcutaneous Solution Pen-injector INJECT 72 UNITS INTO THE SKIN DAILY Disp: 30 mL Rfl: 3

## 2019-03-14 ENCOUNTER — OFFICE VISIT (OUTPATIENT)
Dept: ENDOCRINOLOGY CLINIC | Facility: CLINIC | Age: 54
End: 2019-03-14
Payer: MEDICAID

## 2019-03-14 VITALS
WEIGHT: 233 LBS | HEART RATE: 98 BPM | BODY MASS INDEX: 40 KG/M2 | SYSTOLIC BLOOD PRESSURE: 120 MMHG | DIASTOLIC BLOOD PRESSURE: 68 MMHG

## 2019-03-14 DIAGNOSIS — E11.65 UNCONTROLLED TYPE 2 DIABETES MELLITUS WITH HYPERGLYCEMIA (HCC): Primary | ICD-10-CM

## 2019-03-14 LAB
CARTRIDGE LOT#: ABNORMAL NUMERIC
GLUCOSE BLOOD: 55
HEMOGLOBIN A1C: 8.9 % (ref 4.3–5.6)
TEST STRIP LOT #: NORMAL NUMERIC

## 2019-03-14 PROCEDURE — 99214 OFFICE O/P EST MOD 30 MIN: CPT | Performed by: INTERNAL MEDICINE

## 2019-03-14 PROCEDURE — 36416 COLLJ CAPILLARY BLOOD SPEC: CPT | Performed by: INTERNAL MEDICINE

## 2019-03-14 PROCEDURE — 83036 HEMOGLOBIN GLYCOSYLATED A1C: CPT | Performed by: INTERNAL MEDICINE

## 2019-03-14 PROCEDURE — 82962 GLUCOSE BLOOD TEST: CPT | Performed by: INTERNAL MEDICINE

## 2019-03-14 NOTE — PROGRESS NOTES
Name: Thomas Reina  Date: 3/14/2019    Referring Physician: No ref.  provider found    HISTORY OF PRESENT ILLNESS   Thomas Reina is a 46year old female who presents for diabetes mellitus diagnosed 23 years ago, transitioned to insulin therapy 15 year MetFORMIN HCl  MG Oral Tablet 24 Hr, Take 1 tablet (500 mg total) by mouth 2 (two) times daily with meals. , Disp: 60 tablet, Rfl: 3  •  CONTOUR NEXT TEST In Vitro Strip, TEST THREE TIMES DAILY. , Disp: 300 strip, Rfl: 0  •  AMLODIPINE BESYLATE 10 MG O date: 1/25/2016        Years since quitting: 3.1      Smokeless tobacco: Never Used    Substance and Sexual Activity      Alcohol use: No        Alcohol/week: 0.0 oz      Drug use: No      Medical History:   Past Medical History:   Diagnosis Date   • High retinopathy, neuropathy, nephropathy and cardiovascular disease  -Discussed importance of SBGM  -Discussed importance of low CHO diet, recommend 45gm per meal or 135gm per day  -Decrease Basaglar to 50 units SQ daily   -Continue Victoza 1.2mg SQ daily, kwabena

## 2019-03-14 NOTE — PATIENT INSTRUCTIONS
Decrease Basaglar to 50 units SQ daily     Continue Victoza 1.2mg SQ daily   Continue Glimepiride 4mg PO daily  Continue Stegaltro 15mg PO daily  Continue Metformin ER 1000mg PO BID    Send MyChart note with BG levels in 2 weeks    OR send message earlier

## 2019-04-02 ENCOUNTER — APPOINTMENT (OUTPATIENT)
Dept: LAB | Age: 54
End: 2019-04-02
Attending: FAMILY MEDICINE
Payer: MEDICAID

## 2019-04-02 ENCOUNTER — OFFICE VISIT (OUTPATIENT)
Dept: FAMILY MEDICINE CLINIC | Facility: CLINIC | Age: 54
End: 2019-04-02
Payer: MEDICAID

## 2019-04-02 VITALS
DIASTOLIC BLOOD PRESSURE: 64 MMHG | HEIGHT: 64 IN | TEMPERATURE: 96 F | SYSTOLIC BLOOD PRESSURE: 130 MMHG | BODY MASS INDEX: 38.07 KG/M2 | RESPIRATION RATE: 20 BRPM | HEART RATE: 78 BPM | WEIGHT: 223 LBS

## 2019-04-02 DIAGNOSIS — R29.898 WEAKNESS OF BOTH HANDS: ICD-10-CM

## 2019-04-02 DIAGNOSIS — E11.8 UNCONTROLLED TYPE 2 DIABETES MELLITUS WITH COMPLICATION, WITH LONG-TERM CURRENT USE OF INSULIN (HCC): ICD-10-CM

## 2019-04-02 DIAGNOSIS — E78.2 MIXED HYPERLIPIDEMIA: ICD-10-CM

## 2019-04-02 DIAGNOSIS — E11.65 UNCONTROLLED TYPE 2 DIABETES MELLITUS WITH COMPLICATION, WITH LONG-TERM CURRENT USE OF INSULIN (HCC): ICD-10-CM

## 2019-04-02 DIAGNOSIS — E11.42 DIABETIC POLYNEUROPATHY ASSOCIATED WITH TYPE 2 DIABETES MELLITUS (HCC): ICD-10-CM

## 2019-04-02 DIAGNOSIS — G56.03 BILATERAL CARPAL TUNNEL SYNDROME: Primary | ICD-10-CM

## 2019-04-02 DIAGNOSIS — Z79.4 UNCONTROLLED TYPE 2 DIABETES MELLITUS WITH COMPLICATION, WITH LONG-TERM CURRENT USE OF INSULIN (HCC): ICD-10-CM

## 2019-04-02 PROCEDURE — 99214 OFFICE O/P EST MOD 30 MIN: CPT | Performed by: FAMILY MEDICINE

## 2019-04-02 PROCEDURE — 84450 TRANSFERASE (AST) (SGOT): CPT

## 2019-04-02 PROCEDURE — 84460 ALANINE AMINO (ALT) (SGPT): CPT

## 2019-04-02 PROCEDURE — 99212 OFFICE O/P EST SF 10 MIN: CPT | Performed by: FAMILY MEDICINE

## 2019-04-02 PROCEDURE — 80061 LIPID PANEL: CPT

## 2019-04-02 PROCEDURE — 36415 COLL VENOUS BLD VENIPUNCTURE: CPT

## 2019-04-02 NOTE — PROGRESS NOTES
Patient ID: Raoul Pratt is a 53year old female. HPI  Patient presents with:  Diabetes: Patient present for follow up visit    ASSESSMENT/PLAN:       1.  Diabetes Mellitus Type 2, Uncontrolled  -Uncontrolled, HgA1c 8.7% -->significantly improved  -C but neda needs to see ophthalmologist.       Wt Readings from Last 6 Encounters:  04/02/19 : 223 lb (101.2 kg)  03/14/19 : 233 lb (105.7 kg)  02/07/19 : 228 lb 9.6 oz (103.7 kg)  01/08/19 : 222 lb (100.7 kg)  06/12/18 : 224 lb (101.6 kg)  05/02/18 : 220 l (VICTOZA) 18 MG/3ML Subcutaneous Solution Pen-injector Inject 1.8 mg into the skin daily.  Disp: 9 mL Rfl: 3   glimepiride 4 MG Oral Tab Take 1 tablet (4 mg total) by mouth every morning before breakfast. Disp: 30 tablet Rfl: 3   MetFORMIN HCl  MG Ora menstrual period 07/20/2013, not currently breastfeeding. Physical Exam   Constitutional: Patient is oriented to person, place, and time. Patient appears well-developed and well-nourished. No distress. HENT:   Head: Normocephalic and atraumatic.    Tamia Chen Familia Sheets  4/2/2019      By signing my name below, I, Fiorella Wong,  attest that this documentation has been prepared under the direction and in the presence of Gregorio Martell DO.    Electronically Signed: Fiorella Wong, 4/2/2019, 10:16 AM.    Tiago DANIELS

## 2019-04-08 ENCOUNTER — TELEPHONE (OUTPATIENT)
Dept: FAMILY MEDICINE CLINIC | Facility: CLINIC | Age: 54
End: 2019-04-08

## 2019-04-08 RX ORDER — BLOOD-GLUCOSE CONTROL, NORMAL
EACH MISCELLANEOUS
Qty: 3 VIAL | Refills: 3 | Status: SHIPPED | OUTPATIENT
Start: 2019-04-08

## 2019-04-08 RX ORDER — BLOOD-GLUCOSE METER
EACH MISCELLANEOUS
Qty: 1 KIT | Refills: 0 | Status: SHIPPED | OUTPATIENT
Start: 2019-04-08 | End: 2019-08-02

## 2019-04-08 NOTE — TELEPHONE ENCOUNTER
Rx approved for 1 year per diabetic supply protocol.       Diabetes Medications  Protocol Criteria:  · Appointment scheduled in the past 6 months or the next 3 months  · A1C < 7.5 in the past 6 months  · Creatinine in the past 12 months  · Creatinine result

## 2019-04-08 NOTE — TELEPHONE ENCOUNTER
Per Inés York the insurance no longer covers the Contour next medication. It does cover the One touch ultra, or the Verio. The pt will need the meters, strips, lancets and anything else . Can the doctor write a new script for the diabetic meter and supplies.

## 2019-04-11 ENCOUNTER — OFFICE VISIT (OUTPATIENT)
Dept: SURGERY | Facility: CLINIC | Age: 54
End: 2019-04-11
Payer: MEDICAID

## 2019-04-11 DIAGNOSIS — G56.03 BILATERAL CARPAL TUNNEL SYNDROME: Primary | ICD-10-CM

## 2019-04-11 PROCEDURE — 99243 OFF/OP CNSLTJ NEW/EST LOW 30: CPT | Performed by: PLASTIC SURGERY

## 2019-04-11 PROCEDURE — 99212 OFFICE O/P EST SF 10 MIN: CPT | Performed by: PLASTIC SURGERY

## 2019-04-11 RX ORDER — ERTUGLIFLOZIN 15 MG/1
15 TABLET, FILM COATED ORAL DAILY
Refills: 2 | COMMUNITY
Start: 2019-04-01 | End: 2019-12-02

## 2019-04-11 RX ORDER — HYDROCODONE BITARTRATE AND ACETAMINOPHEN 7.5; 325 MG/1; MG/1
1 TABLET ORAL
Qty: 10 TABLET | Refills: 0 | Status: SHIPPED | OUTPATIENT
Start: 2019-04-11 | End: 2019-05-31

## 2019-04-11 NOTE — H&P (VIEW-ONLY)
Lena Crystal is a 53year old female that presents with Patient presents with:  Carpal Tunnel Syndrome: Bilateral  .    REFERRED BY:  Tiago Amin     Pacemaker: No  Latex Allergy: no  Coumadin: No  Plavix: No  Other anticoagulants: No  Cardiac stents: 6/10    Night symptoms: Yes, pain worse at night awakened at night with tingling    Functional problems: Hands are weak and drops objects   Pt feels condition worsening     Previous therapy: none          Current Outpatient Medications:  Blood Glucose Xochitl MOUTH DAILY. TAKE WITH THE LISINOPRIL 40 MG.  Disp: 90 tablet Rfl: 1   aspirin 81 MG Oral Chew Tab CSW ONE T D Disp:  Rfl: 6   DENZEL CONTOUR NEXT TEST In Vitro Strip TEST THREE TIMES DAILY Disp: 100 strip Rfl: 1   MICROLET LANCETS Does not apply Misc Check 1/25/2016        Years since quitting: 3.2      Smokeless tobacco: Never Used    Substance and Sexual Activity      Alcohol use: No        Alcohol/week: 0.0 oz      Drug use: No      Sexual activity: Not on file    Lifestyle      Physical activity: bilateral index, middle and ring   Median Nerve Compression: bilateral index, middle and ring      ASSESSMENT/PLAN:       CARPAL TUNNEL SYNDROME  bilateral        SEVERITY INDEX    Constant numbness, Absent 2-point discrimination, Thenar atrophy, NR sensor hand/digit may not regain normal ROM or normal function.     Because of the peripheral neuropathy, some numbness may persist postoperatively    PLAN    RECTR; LECTR in approximately 6 weeks          4/11/2019  Angel Councilman, MD      [facility] [place]

## 2019-04-11 NOTE — H&P
Bailey Mercado is a 53year old female that presents with Patient presents with:  Carpal Tunnel Syndrome: Bilateral  .    REFERRED BY:  Tiago Amin     Pacemaker: No  Latex Allergy: no  Coumadin: No  Plavix: No  Other anticoagulants: No  Cardiac stents: 6/10    Night symptoms: Yes, pain worse at night awakened at night with tingling    Functional problems: Hands are weak and drops objects   Pt feels condition worsening     Previous therapy: none          Current Outpatient Medications:  Blood Glucose Xochitl MOUTH DAILY. TAKE WITH THE LISINOPRIL 40 MG.  Disp: 90 tablet Rfl: 1   aspirin 81 MG Oral Chew Tab CSW ONE T D Disp:  Rfl: 6   DENZEL CONTOUR NEXT TEST In Vitro Strip TEST THREE TIMES DAILY Disp: 100 strip Rfl: 1   MICROLET LANCETS Does not apply Misc Check 1/25/2016        Years since quitting: 3.2      Smokeless tobacco: Never Used    Substance and Sexual Activity      Alcohol use: No        Alcohol/week: 0.0 oz      Drug use: No      Sexual activity: Not on file    Lifestyle      Physical activity: bilateral index, middle and ring   Median Nerve Compression: bilateral index, middle and ring      ASSESSMENT/PLAN:       CARPAL TUNNEL SYNDROME  bilateral        SEVERITY INDEX    Constant numbness, Absent 2-point discrimination, Thenar atrophy, NR sensor hand/digit may not regain normal ROM or normal function.     Because of the peripheral neuropathy, some numbness may persist postoperatively    PLAN    RECTR; LECTR in approximately 6 weeks          4/11/2019  Juliane Menard MD      [facility]  [place]

## 2019-04-11 NOTE — PROGRESS NOTES
Pt request for surgery signed by pt and witnessed and signed by RN. Prescription for norco and narcotic hand-out instruction sheet given to and reviewed w/patient.   Pre-Surgical Instruction Handout, Hand Elevation Handout, Dressing Protector Handout, and

## 2019-04-18 ENCOUNTER — TELEPHONE (OUTPATIENT)
Dept: SURGERY | Facility: CLINIC | Age: 54
End: 2019-04-18

## 2019-04-18 DIAGNOSIS — G56.01 RIGHT CARPAL TUNNEL SYNDROME: Primary | ICD-10-CM

## 2019-05-03 ENCOUNTER — ANESTHESIA (OUTPATIENT)
Dept: SURGERY | Facility: HOSPITAL | Age: 54
End: 2019-05-03

## 2019-05-03 ENCOUNTER — HOSPITAL DOCUMENTATION (OUTPATIENT)
Dept: SURGERY | Facility: CLINIC | Age: 54
End: 2019-05-03

## 2019-05-03 ENCOUNTER — ANESTHESIA EVENT (OUTPATIENT)
Dept: SURGERY | Facility: HOSPITAL | Age: 54
End: 2019-05-03

## 2019-05-03 ENCOUNTER — HOSPITAL ENCOUNTER (OUTPATIENT)
Facility: HOSPITAL | Age: 54
Setting detail: HOSPITAL OUTPATIENT SURGERY
Discharge: HOME | End: 2019-05-03
Attending: PLASTIC SURGERY | Admitting: PLASTIC SURGERY
Payer: MEDICAID

## 2019-05-03 VITALS
WEIGHT: 217.88 LBS | SYSTOLIC BLOOD PRESSURE: 114 MMHG | OXYGEN SATURATION: 95 % | DIASTOLIC BLOOD PRESSURE: 65 MMHG | HEIGHT: 65 IN | BODY MASS INDEX: 36.3 KG/M2 | TEMPERATURE: 97 F | HEART RATE: 77 BPM | RESPIRATION RATE: 15 BRPM

## 2019-05-03 DIAGNOSIS — G56.01 RIGHT CARPAL TUNNEL SYNDROME: Primary | ICD-10-CM

## 2019-05-03 PROCEDURE — 29848 WRIST ENDOSCOPY/SURGERY: CPT | Performed by: PLASTIC SURGERY

## 2019-05-03 PROCEDURE — 01N54ZZ RELEASE MEDIAN NERVE, PERCUTANEOUS ENDOSCOPIC APPROACH: ICD-10-PCS | Performed by: PLASTIC SURGERY

## 2019-05-03 RX ORDER — MIDAZOLAM HYDROCHLORIDE 1 MG/ML
INJECTION INTRAMUSCULAR; INTRAVENOUS AS NEEDED
Status: DISCONTINUED | OUTPATIENT
Start: 2019-05-03 | End: 2019-05-03 | Stop reason: SURG

## 2019-05-03 RX ORDER — MORPHINE SULFATE 2 MG/ML
2 INJECTION, SOLUTION INTRAMUSCULAR; INTRAVENOUS EVERY 10 MIN PRN
Status: DISCONTINUED | OUTPATIENT
Start: 2019-05-03 | End: 2019-05-03

## 2019-05-03 RX ORDER — NALOXONE HYDROCHLORIDE 0.4 MG/ML
80 INJECTION, SOLUTION INTRAMUSCULAR; INTRAVENOUS; SUBCUTANEOUS AS NEEDED
Status: DISCONTINUED | OUTPATIENT
Start: 2019-05-03 | End: 2019-05-03

## 2019-05-03 RX ORDER — MORPHINE SULFATE 4 MG/ML
4 INJECTION, SOLUTION INTRAMUSCULAR; INTRAVENOUS EVERY 10 MIN PRN
Status: DISCONTINUED | OUTPATIENT
Start: 2019-05-03 | End: 2019-05-03

## 2019-05-03 RX ORDER — SODIUM CHLORIDE, SODIUM LACTATE, POTASSIUM CHLORIDE, CALCIUM CHLORIDE 600; 310; 30; 20 MG/100ML; MG/100ML; MG/100ML; MG/100ML
INJECTION, SOLUTION INTRAVENOUS CONTINUOUS
Status: DISCONTINUED | OUTPATIENT
Start: 2019-05-03 | End: 2019-05-03

## 2019-05-03 RX ORDER — HYDROCODONE BITARTRATE AND ACETAMINOPHEN 5; 325 MG/1; MG/1
2 TABLET ORAL AS NEEDED
Status: COMPLETED | OUTPATIENT
Start: 2019-05-03 | End: 2019-05-03

## 2019-05-03 RX ORDER — FAMOTIDINE 20 MG/1
20 TABLET ORAL ONCE
Status: COMPLETED | OUTPATIENT
Start: 2019-05-03 | End: 2019-05-03

## 2019-05-03 RX ORDER — HYDROCODONE BITARTRATE AND ACETAMINOPHEN 7.5; 325 MG/1; MG/1
1 TABLET ORAL EVERY 4 HOURS PRN
Status: DISCONTINUED | OUTPATIENT
Start: 2019-05-03 | End: 2019-05-03

## 2019-05-03 RX ORDER — METOCLOPRAMIDE 10 MG/1
10 TABLET ORAL ONCE
Status: COMPLETED | OUTPATIENT
Start: 2019-05-03 | End: 2019-05-03

## 2019-05-03 RX ORDER — ONDANSETRON 2 MG/ML
INJECTION INTRAMUSCULAR; INTRAVENOUS AS NEEDED
Status: DISCONTINUED | OUTPATIENT
Start: 2019-05-03 | End: 2019-05-03 | Stop reason: SURG

## 2019-05-03 RX ORDER — PHENYLEPHRINE HCL 10 MG/ML
VIAL (ML) INJECTION AS NEEDED
Status: DISCONTINUED | OUTPATIENT
Start: 2019-05-03 | End: 2019-05-03 | Stop reason: SURG

## 2019-05-03 RX ORDER — MORPHINE SULFATE 10 MG/ML
6 INJECTION, SOLUTION INTRAMUSCULAR; INTRAVENOUS EVERY 10 MIN PRN
Status: DISCONTINUED | OUTPATIENT
Start: 2019-05-03 | End: 2019-05-03

## 2019-05-03 RX ORDER — HYDROCODONE BITARTRATE AND ACETAMINOPHEN 5; 325 MG/1; MG/1
1 TABLET ORAL AS NEEDED
Status: COMPLETED | OUTPATIENT
Start: 2019-05-03 | End: 2019-05-03

## 2019-05-03 RX ORDER — DEXTROSE MONOHYDRATE 25 G/50ML
50 INJECTION, SOLUTION INTRAVENOUS
Status: DISCONTINUED | OUTPATIENT
Start: 2019-05-03 | End: 2019-05-03

## 2019-05-03 RX ORDER — HYDROMORPHONE HYDROCHLORIDE 1 MG/ML
0.4 INJECTION, SOLUTION INTRAMUSCULAR; INTRAVENOUS; SUBCUTANEOUS EVERY 5 MIN PRN
Status: DISCONTINUED | OUTPATIENT
Start: 2019-05-03 | End: 2019-05-03

## 2019-05-03 RX ORDER — INSULIN ASPART 100 [IU]/ML
INJECTION, SOLUTION INTRAVENOUS; SUBCUTANEOUS ONCE
Status: DISCONTINUED | OUTPATIENT
Start: 2019-05-03 | End: 2019-05-03

## 2019-05-03 RX ORDER — HYDROMORPHONE HYDROCHLORIDE 1 MG/ML
0.2 INJECTION, SOLUTION INTRAMUSCULAR; INTRAVENOUS; SUBCUTANEOUS EVERY 5 MIN PRN
Status: DISCONTINUED | OUTPATIENT
Start: 2019-05-03 | End: 2019-05-03

## 2019-05-03 RX ORDER — KETAMINE HYDROCHLORIDE 50 MG/ML
INJECTION, SOLUTION, CONCENTRATE INTRAMUSCULAR; INTRAVENOUS AS NEEDED
Status: DISCONTINUED | OUTPATIENT
Start: 2019-05-03 | End: 2019-05-03 | Stop reason: SURG

## 2019-05-03 RX ORDER — HYDROMORPHONE HYDROCHLORIDE 1 MG/ML
0.6 INJECTION, SOLUTION INTRAMUSCULAR; INTRAVENOUS; SUBCUTANEOUS EVERY 5 MIN PRN
Status: DISCONTINUED | OUTPATIENT
Start: 2019-05-03 | End: 2019-05-03

## 2019-05-03 RX ORDER — ONDANSETRON 2 MG/ML
4 INJECTION INTRAMUSCULAR; INTRAVENOUS ONCE AS NEEDED
Status: DISCONTINUED | OUTPATIENT
Start: 2019-05-03 | End: 2019-05-03

## 2019-05-03 RX ORDER — ACETAMINOPHEN 500 MG
1000 TABLET ORAL ONCE
Status: COMPLETED | OUTPATIENT
Start: 2019-05-03 | End: 2019-05-03

## 2019-05-03 RX ADMIN — PHENYLEPHRINE HCL 100 MCG: 10 MG/ML VIAL (ML) INJECTION at 13:08:00

## 2019-05-03 RX ADMIN — ONDANSETRON 4 MG: 2 INJECTION INTRAMUSCULAR; INTRAVENOUS at 12:41:00

## 2019-05-03 RX ADMIN — PHENYLEPHRINE HCL 100 MCG: 10 MG/ML VIAL (ML) INJECTION at 12:37:00

## 2019-05-03 RX ADMIN — PHENYLEPHRINE HCL 100 MCG: 10 MG/ML VIAL (ML) INJECTION at 12:41:00

## 2019-05-03 RX ADMIN — SODIUM CHLORIDE, SODIUM LACTATE, POTASSIUM CHLORIDE, CALCIUM CHLORIDE: 600; 310; 30; 20 INJECTION, SOLUTION INTRAVENOUS at 12:13:00

## 2019-05-03 RX ADMIN — PHENYLEPHRINE HCL 100 MCG: 10 MG/ML VIAL (ML) INJECTION at 12:47:00

## 2019-05-03 RX ADMIN — PHENYLEPHRINE HCL 100 MCG: 10 MG/ML VIAL (ML) INJECTION at 12:46:00

## 2019-05-03 RX ADMIN — PHENYLEPHRINE HCL 100 MCG: 10 MG/ML VIAL (ML) INJECTION at 13:02:00

## 2019-05-03 RX ADMIN — MIDAZOLAM HYDROCHLORIDE 2 MG: 1 INJECTION INTRAMUSCULAR; INTRAVENOUS at 12:17:00

## 2019-05-03 RX ADMIN — PHENYLEPHRINE HCL 100 MCG: 10 MG/ML VIAL (ML) INJECTION at 13:01:00

## 2019-05-03 RX ADMIN — KETAMINE HYDROCHLORIDE 50 MG: 50 INJECTION, SOLUTION, CONCENTRATE INTRAMUSCULAR; INTRAVENOUS at 12:48:00

## 2019-05-03 RX ADMIN — SODIUM CHLORIDE, SODIUM LACTATE, POTASSIUM CHLORIDE, CALCIUM CHLORIDE: 600; 310; 30; 20 INJECTION, SOLUTION INTRAVENOUS at 13:27:00

## 2019-05-03 NOTE — BRIEF OP NOTE
Pre-Operative Diagnosis: carpal tunnel syndrome     Post-Operative Diagnosis: carpal tunnel syndrome      Procedure Performed:   Procedure(s):  right endoscopic carpal tunnel release    Surgeon(s) and Role:     * Damari Delacruz MD - Primary    Assi

## 2019-05-03 NOTE — INTERVAL H&P NOTE
Pre-op Diagnosis: carpal tunnel syndrome    The above referenced H&P was reviewed by Flor Bland MD on 5/3/2019, the patient was examined and no significant changes have occurred in the patient's condition since the H&P was performed.   I discuss

## 2019-05-03 NOTE — ANESTHESIA POSTPROCEDURE EVALUATION
Patient: Olivier Lopez    Procedure Summary     Date:  05/03/19 Room / Location:  [address] [place] [address] MAIN OR 01 / [address] [facility] MAIN OR    Anesthesia Start:  3030 Anesthesia Stop:  4097    Procedure:  ENDOSCOPIC CARPAL TUNNEL RELEASE (Right ) Diagnosis:  (carpal tunnel syndrome)

## 2019-05-03 NOTE — ANESTHESIA PROCEDURE NOTES
Airway  Date/Time: 5/3/2019 12:34 PM  Urgency: elective    Airway not difficult    General Information and Staff    Patient location during procedure: OR  Anesthesiologist: Liu Peace MD  Resident/CRNA: Phong Yaor, MARNI  Performed: MARNI Riddle

## 2019-05-03 NOTE — ANESTHESIA PREPROCEDURE EVALUATION
Anesthesia PreOp Note    HPI:     Marvel Gallego is a 53year old female who presents for preoperative consultation requested by: Ayden Salmon MD    Date of Surgery: 5/3/2019    Procedure(s):  ENDOSCOPIC CARPAL TUNNEL RELEASE  Indication: carpal uncontrolled        Past Surgical History:   Procedure Laterality Date   • EYE SURGERY      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   • LIPOMA REMOVAL           Medications Prior to Admission:  STEGLATRO 15 MG Oral Tab tablet Take 15 mg by mouth daily.  Dis MG Oral Tab TAKE 1 TABLET BY MOUTH DAILY. TAKE WITH THE LISINOPRIL 40 MG.  Disp: 90 tablet Rfl: 1 5/3/2019 at 0730   aspirin 81 MG Oral Chew Tab [facility] ONE T D Disp:  Rfl: 6 5/2/2019 at 0800   DENZEL CONTOUR NEXT TEST In Vitro Strip TEST THREE TIMES DAILY Disp: on file      Food insecurity:        Worry: Not on file        Inability: Not on file      Transportation needs:        Medical: Not on file        Non-medical: Not on file    Tobacco Use      Smoking status: Former Smoker        Packs/day: 0.50        Yea reviewed and Nursing notes reviewed    No history of anesthetic complications   Airway   Mallampati: II  TM distance: >3 FB  Neck ROM: full  Dental    (+) upper dentures and lower dentures    Pulmonary - normal exam   (-) recent URI    ROS comment: Smoker

## 2019-05-03 NOTE — OPERATIVE REPORT
[facility]    PATIENT'S NAME: Thomas Robledo   ATTENDING PHYSICIAN: Yohan Michaud MD   OPERATING PHYSICIAN: Yohan Michaud MD   PATIENT ACCOUNT#:   [id number]    LOCATION:  [place] [address]  MEDICAL RECORD #:   [id number] proximal to the proximal incision. A curved dissector was placed deep to the antebrachial fascia and in continuity with the transverse carpal ligament. The slide cannula and endoscope were placed.   We had excellent visualization of the dorsal surface of

## 2019-05-04 ENCOUNTER — TELEPHONE (OUTPATIENT)
Dept: SURGERY | Facility: CLINIC | Age: 54
End: 2019-05-04

## 2019-05-04 NOTE — TELEPHONE ENCOUNTER
LVM for pt to please call the office with any questions and/or concerns. Reminded next appointment 5/6/19 with OT  Dr Xander Justice notified.

## 2019-05-06 ENCOUNTER — OFFICE VISIT (OUTPATIENT)
Dept: SURGERY | Facility: CLINIC | Age: 54
End: 2019-05-06
Payer: MEDICAID

## 2019-05-06 DIAGNOSIS — M25.641 JOINT STIFFNESS OF HAND, RIGHT: Primary | ICD-10-CM

## 2019-05-06 DIAGNOSIS — M62.81 DISTAL MUSCLE WEAKNESS: ICD-10-CM

## 2019-05-06 NOTE — PROGRESS NOTES
Carpal Tunnel Post - Op Note:    Subjective: My right hand is swollen.       Objective:  Occupational Therapy completed the following educational areas status post elective carpal tunnel procedure:    1)  Dressing was removed and handwashing technique was r

## 2019-05-08 ENCOUNTER — OFFICE VISIT (OUTPATIENT)
Dept: SURGERY | Facility: CLINIC | Age: 54
End: 2019-05-08
Payer: MEDICAID

## 2019-05-08 DIAGNOSIS — M25.641 JOINT STIFFNESS OF HAND, RIGHT: Primary | ICD-10-CM

## 2019-05-08 DIAGNOSIS — M62.81 DISTAL MUSCLE WEAKNESS: ICD-10-CM

## 2019-05-08 PROCEDURE — 97110 THERAPEUTIC EXERCISES: CPT | Performed by: OCCUPATIONAL THERAPIST

## 2019-05-08 PROCEDURE — 97166 OT EVAL MOD COMPLEX 45 MIN: CPT | Performed by: OCCUPATIONAL THERAPIST

## 2019-05-08 NOTE — PROGRESS NOTES
OCCUPATIONAL THERAPY EVALUATION:   Dino Mckeon   [id number]       SUBJECTIVE:    HX of Injury: Right hand pain and numbness. Chief Complaint:   Without complaints. Precautions: Protected use of the right hand.   Premorbid Functional Status: Independ self-care, leisure and work related tasks:  . Patient will be seen 2 x /week for 3 weeks or a total of 6 visits. Pt. was advised regarding the findings of this evaluation and agrees to the plan of care.      Sheryle Holt I have reviewed the graeme

## 2019-05-15 ENCOUNTER — OFFICE VISIT (OUTPATIENT)
Dept: SURGERY | Facility: CLINIC | Age: 54
End: 2019-05-15
Payer: MEDICAID

## 2019-05-15 DIAGNOSIS — M25.642 STIFFNESS OF JOINT, HAND, LEFT: ICD-10-CM

## 2019-05-15 DIAGNOSIS — M25.641 JOINT STIFFNESS OF HAND, RIGHT: Primary | ICD-10-CM

## 2019-05-15 PROCEDURE — 97110 THERAPEUTIC EXERCISES: CPT | Performed by: OCCUPATIONAL THERAPIST

## 2019-05-15 NOTE — PROGRESS NOTES
Subjective: What a difference, my hand feels really good. Objective:     Current level of performance:  ADL: Independent  Work: N/A  Leisure: Not addressed    Measurements/Tests:  ROM:         Full right hand range of motion.          Treatment Provide

## 2019-05-23 ENCOUNTER — TELEPHONE (OUTPATIENT)
Dept: SURGERY | Facility: CLINIC | Age: 54
End: 2019-05-23

## 2019-05-23 ENCOUNTER — OFFICE VISIT (OUTPATIENT)
Dept: SURGERY | Facility: CLINIC | Age: 54
End: 2019-05-23
Payer: MEDICAID

## 2019-05-23 DIAGNOSIS — M25.641 JOINT STIFFNESS OF HAND, RIGHT: Primary | ICD-10-CM

## 2019-05-23 DIAGNOSIS — G56.01 RIGHT CARPAL TUNNEL SYNDROME: Primary | ICD-10-CM

## 2019-05-23 DIAGNOSIS — G56.02 CARPAL TUNNEL SYNDROME, LEFT: Primary | ICD-10-CM

## 2019-05-23 DIAGNOSIS — M62.81 DISTAL MUSCLE WEAKNESS: ICD-10-CM

## 2019-05-23 DIAGNOSIS — G56.03 BILATERAL CARPAL TUNNEL SYNDROME: ICD-10-CM

## 2019-05-23 PROCEDURE — 97110 THERAPEUTIC EXERCISES: CPT | Performed by: OCCUPATIONAL THERAPIST

## 2019-05-23 PROCEDURE — 99024 POSTOP FOLLOW-UP VISIT: CPT | Performed by: PLASTIC SURGERY

## 2019-05-23 PROCEDURE — 99212 OFFICE O/P EST SF 10 MIN: CPT | Performed by: PLASTIC SURGERY

## 2019-05-23 RX ORDER — ACETAMINOPHEN 500 MG
500 TABLET ORAL EVERY 6 HOURS PRN
COMMUNITY

## 2019-05-23 NOTE — H&P
Needs L ECTR      Pacemaker: No  Latex Allergy: no  Coumadin: No  Plavix: No  Other anticoagulants: No  Cardiac stents: No    HAND DOMINANCE: Right  Profession: Disability, Retired    RECONSTRUCTIVE HISTORY    SUN EXPOSURE  Current no  Past no  [place] what carpal tunnel syndrome is including treatment options. The patient  may not have relief of symptoms with treatment. This is a severe carpal tunnel syndrome.     Because of the severity (see Severity Index) of this carpal tunnel, non-operative t Disp: 300 strip Rfl: 3   Blood Glucose Calibration (ONETOUCH ULTRA CONTROL) In Vitro Solution Use to check blood sugar three times daily Disp: 3 vial Rfl: 3   ONETOUCH LANCETS Does not apply Misc Test blood sugar three times daily.  Disp: 400 each Rfl: 1 UNITS INTO THE SKIN TID Disp:  Rfl: 10   Blood Glucose Monitoring Suppl ([employer] CONTOUR NEXT MONITOR) w/Device Does not apply Kit U UTD Disp:  Rfl: 0   HYDROcodone-acetaminophen 7.5-325 MG Oral Tab Take 1 tablet by mouth every 4 to 6 hours as needed for Marjan Wolf Alcohol use: No        Alcohol/week: 0.0 oz      Drug use: No      Sexual activity: Not on file    Lifestyle      Physical activity:        Days per week: Not on file        Minutes per session: Not on file      Stress: Not on file    Relationships      So

## 2019-05-23 NOTE — PROGRESS NOTES
Pt request for surgery signed by pt and witnessed and signed by RN. Narcotic hand-out instruction sheet given to and reviewed w/patient, pt has supply of norco 7.5mg.   Pre-Surgical Instruction Handout, and Post-Operative Instruction Handout given to and

## 2019-05-23 NOTE — H&P (VIEW-ONLY)
Needs L ECTR      Pacemaker: No  Latex Allergy: no  Coumadin: No  Plavix: No  Other anticoagulants: No  Cardiac stents: No    HAND DOMINANCE: Right  Profession: Disability, Retired    RECONSTRUCTIVE HISTORY    SUN EXPOSURE  Current no  Past no  [place] what carpal tunnel syndrome is including treatment options. The patient  may not have relief of symptoms with treatment. This is a severe carpal tunnel syndrome.     Because of the severity (see Severity Index) of this carpal tunnel, non-operative t Disp: 300 strip Rfl: 3   Blood Glucose Calibration (ONETOUCH ULTRA CONTROL) In Vitro Solution Use to check blood sugar three times daily Disp: 3 vial Rfl: 3   ONETOUCH LANCETS Does not apply Misc Test blood sugar three times daily.  Disp: 400 each Rfl: 1 UNITS INTO THE SKIN TID Disp:  Rfl: 10   Blood Glucose Monitoring Suppl ([employer] CONTOUR NEXT MONITOR) w/Device Does not apply Kit U UTD Disp:  Rfl: 0   HYDROcodone-acetaminophen 7.5-325 MG Oral Tab Take 1 tablet by mouth every 4 to 6 hours as needed for [employer] Alcohol use: No        Alcohol/week: 0.0 oz      Drug use: No      Sexual activity: Not on file    Lifestyle      Physical activity:        Days per week: Not on file        Minutes per session: Not on file      Stress: Not on file    Relationships      So

## 2019-05-23 NOTE — PROGRESS NOTES
Subjective: I can't believe how much better I feel.       Objective:     Current level of performance:  ADL: Independent  Work: N/A  Leisure: Not addressed    Measurements/Tests:  ROM:  Testing By: fernando   Strength Right: 60 #      Strength Left: 65 #

## 2019-05-23 NOTE — PROGRESS NOTES
Surgery 1: RECTR  - Date: 05/03/19  - Days Since: 20  \"Doing much better\"  Denies pain and need for analgesics  Less numbness and tingling of fingertips. Scars healing well without s/s of infection, slight tenderness and scabbing.   Doing Eucerin massage

## 2019-05-28 ENCOUNTER — ANESTHESIA (OUTPATIENT)
Dept: SURGERY | Facility: HOSPITAL | Age: 54
End: 2019-05-28
Payer: MEDICAID

## 2019-05-28 ENCOUNTER — ANESTHESIA EVENT (OUTPATIENT)
Dept: SURGERY | Facility: HOSPITAL | Age: 54
End: 2019-05-28
Payer: MEDICAID

## 2019-05-28 ENCOUNTER — HOSPITAL ENCOUNTER (OUTPATIENT)
Facility: HOSPITAL | Age: 54
Setting detail: HOSPITAL OUTPATIENT SURGERY
Discharge: HOME | End: 2019-05-28
Attending: PLASTIC SURGERY | Admitting: PLASTIC SURGERY
Payer: MEDICAID

## 2019-05-28 ENCOUNTER — HOSPITAL DOCUMENTATION (OUTPATIENT)
Dept: SURGERY | Facility: CLINIC | Age: 54
End: 2019-05-28

## 2019-05-28 VITALS
HEIGHT: 65 IN | OXYGEN SATURATION: 98 % | HEART RATE: 90 BPM | TEMPERATURE: 98 F | DIASTOLIC BLOOD PRESSURE: 58 MMHG | BODY MASS INDEX: 36.65 KG/M2 | SYSTOLIC BLOOD PRESSURE: 119 MMHG | RESPIRATION RATE: 16 BRPM | WEIGHT: 220 LBS

## 2019-05-28 DIAGNOSIS — G56.02 CARPAL TUNNEL SYNDROME OF LEFT WRIST: Primary | ICD-10-CM

## 2019-05-28 DIAGNOSIS — G56.02 CARPAL TUNNEL SYNDROME, LEFT: Primary | ICD-10-CM

## 2019-05-28 PROCEDURE — 29848 WRIST ENDOSCOPY/SURGERY: CPT | Performed by: PLASTIC SURGERY

## 2019-05-28 PROCEDURE — 01N54ZZ RELEASE MEDIAN NERVE, PERCUTANEOUS ENDOSCOPIC APPROACH: ICD-10-PCS | Performed by: PLASTIC SURGERY

## 2019-05-28 RX ORDER — DEXTROSE MONOHYDRATE 25 G/50ML
50 INJECTION, SOLUTION INTRAVENOUS
Status: DISCONTINUED | OUTPATIENT
Start: 2019-05-28 | End: 2019-05-28

## 2019-05-28 RX ORDER — MORPHINE SULFATE 2 MG/ML
2 INJECTION, SOLUTION INTRAMUSCULAR; INTRAVENOUS EVERY 10 MIN PRN
Status: DISCONTINUED | OUTPATIENT
Start: 2019-05-28 | End: 2019-05-28

## 2019-05-28 RX ORDER — HYDROCODONE BITARTRATE AND ACETAMINOPHEN 5; 325 MG/1; MG/1
1 TABLET ORAL AS NEEDED
Status: DISCONTINUED | OUTPATIENT
Start: 2019-05-28 | End: 2019-05-28

## 2019-05-28 RX ORDER — METOCLOPRAMIDE 10 MG/1
10 TABLET ORAL ONCE
Status: COMPLETED | OUTPATIENT
Start: 2019-05-28 | End: 2019-05-28

## 2019-05-28 RX ORDER — ACETAMINOPHEN 500 MG
1000 TABLET ORAL ONCE
Status: COMPLETED | OUTPATIENT
Start: 2019-05-28 | End: 2019-05-28

## 2019-05-28 RX ORDER — GLYCOPYRROLATE 0.2 MG/ML
INJECTION INTRAMUSCULAR; INTRAVENOUS AS NEEDED
Status: DISCONTINUED | OUTPATIENT
Start: 2019-05-28 | End: 2019-05-28 | Stop reason: SURG

## 2019-05-28 RX ORDER — HYDROMORPHONE HYDROCHLORIDE 1 MG/ML
0.6 INJECTION, SOLUTION INTRAMUSCULAR; INTRAVENOUS; SUBCUTANEOUS EVERY 5 MIN PRN
Status: DISCONTINUED | OUTPATIENT
Start: 2019-05-28 | End: 2019-05-28

## 2019-05-28 RX ORDER — HYDROMORPHONE HYDROCHLORIDE 1 MG/ML
0.4 INJECTION, SOLUTION INTRAMUSCULAR; INTRAVENOUS; SUBCUTANEOUS EVERY 5 MIN PRN
Status: DISCONTINUED | OUTPATIENT
Start: 2019-05-28 | End: 2019-05-28

## 2019-05-28 RX ORDER — HYDROMORPHONE HYDROCHLORIDE 1 MG/ML
0.2 INJECTION, SOLUTION INTRAMUSCULAR; INTRAVENOUS; SUBCUTANEOUS EVERY 5 MIN PRN
Status: DISCONTINUED | OUTPATIENT
Start: 2019-05-28 | End: 2019-05-28

## 2019-05-28 RX ORDER — SODIUM CHLORIDE, SODIUM LACTATE, POTASSIUM CHLORIDE, CALCIUM CHLORIDE 600; 310; 30; 20 MG/100ML; MG/100ML; MG/100ML; MG/100ML
INJECTION, SOLUTION INTRAVENOUS CONTINUOUS
Status: DISCONTINUED | OUTPATIENT
Start: 2019-05-28 | End: 2019-05-28

## 2019-05-28 RX ORDER — HALOPERIDOL 5 MG/ML
0.25 INJECTION INTRAMUSCULAR ONCE AS NEEDED
Status: DISCONTINUED | OUTPATIENT
Start: 2019-05-28 | End: 2019-05-28

## 2019-05-28 RX ORDER — MORPHINE SULFATE 4 MG/ML
4 INJECTION, SOLUTION INTRAMUSCULAR; INTRAVENOUS EVERY 10 MIN PRN
Status: DISCONTINUED | OUTPATIENT
Start: 2019-05-28 | End: 2019-05-28

## 2019-05-28 RX ORDER — ONDANSETRON 2 MG/ML
4 INJECTION INTRAMUSCULAR; INTRAVENOUS ONCE AS NEEDED
Status: DISCONTINUED | OUTPATIENT
Start: 2019-05-28 | End: 2019-05-28

## 2019-05-28 RX ORDER — PROCHLORPERAZINE EDISYLATE 5 MG/ML
5 INJECTION INTRAMUSCULAR; INTRAVENOUS ONCE AS NEEDED
Status: DISCONTINUED | OUTPATIENT
Start: 2019-05-28 | End: 2019-05-28

## 2019-05-28 RX ORDER — FAMOTIDINE 20 MG/1
20 TABLET ORAL ONCE
Status: COMPLETED | OUTPATIENT
Start: 2019-05-28 | End: 2019-05-28

## 2019-05-28 RX ORDER — NALOXONE HYDROCHLORIDE 0.4 MG/ML
80 INJECTION, SOLUTION INTRAMUSCULAR; INTRAVENOUS; SUBCUTANEOUS AS NEEDED
Status: DISCONTINUED | OUTPATIENT
Start: 2019-05-28 | End: 2019-05-28

## 2019-05-28 RX ORDER — HYDROCODONE BITARTRATE AND ACETAMINOPHEN 7.5; 325 MG/1; MG/1
1 TABLET ORAL EVERY 4 HOURS PRN
Status: DISCONTINUED | OUTPATIENT
Start: 2019-05-28 | End: 2019-05-28

## 2019-05-28 RX ORDER — HYDROCODONE BITARTRATE AND ACETAMINOPHEN 5; 325 MG/1; MG/1
2 TABLET ORAL AS NEEDED
Status: DISCONTINUED | OUTPATIENT
Start: 2019-05-28 | End: 2019-05-28

## 2019-05-28 RX ORDER — MIDAZOLAM HYDROCHLORIDE 1 MG/ML
INJECTION INTRAMUSCULAR; INTRAVENOUS AS NEEDED
Status: DISCONTINUED | OUTPATIENT
Start: 2019-05-28 | End: 2019-05-28 | Stop reason: SURG

## 2019-05-28 RX ORDER — MORPHINE SULFATE 10 MG/ML
6 INJECTION, SOLUTION INTRAMUSCULAR; INTRAVENOUS EVERY 10 MIN PRN
Status: DISCONTINUED | OUTPATIENT
Start: 2019-05-28 | End: 2019-05-28

## 2019-05-28 RX ADMIN — MIDAZOLAM HYDROCHLORIDE 2 MG: 1 INJECTION INTRAMUSCULAR; INTRAVENOUS at 08:59:00

## 2019-05-28 RX ADMIN — SODIUM CHLORIDE, SODIUM LACTATE, POTASSIUM CHLORIDE, CALCIUM CHLORIDE: 600; 310; 30; 20 INJECTION, SOLUTION INTRAVENOUS at 09:27:00

## 2019-05-28 RX ADMIN — GLYCOPYRROLATE 0.1 MG: 0.2 INJECTION INTRAMUSCULAR; INTRAVENOUS at 08:59:00

## 2019-05-28 RX ADMIN — SODIUM CHLORIDE, SODIUM LACTATE, POTASSIUM CHLORIDE, CALCIUM CHLORIDE: 600; 310; 30; 20 INJECTION, SOLUTION INTRAVENOUS at 08:57:00

## 2019-05-28 NOTE — ANESTHESIA PREPROCEDURE EVALUATION
Anesthesia PreOp Note    HPI:     Marivel Hutson is a 53year old female who presents for preoperative consultation requested by: Luz Marina Mendez MD    Date of Surgery: 5/28/2019    Procedure(s):  ENDOSCOPIC CARPAL TUNNEL RELEASE  Indication: carpa Osteoarthritis    • Renal disorder    • Type II or unspecified type diabetes mellitus without mention of complication, not stated as uncontrolled     diabetes 24 yrs       Past Surgical History:   Procedure Laterality Date   • ENDOSCOPIC CARPAL TUNNEL RELE (500 mg total) by mouth 2 (two) times daily with meals. Disp: 60 tablet Rfl: 3 5/27/2019 at 1800   lisinopril 40 MG Oral Tab TAKE 1 TABLET BY MOUTH DAILY.  TAKE WITH THE AMLODIPINE FOR BLOOD PRESSURE Disp: 90 tablet Rfl: 1 5/27/2019 at 0800   gabapentin 100 on file    Tobacco Use      Smoking status: Former Smoker        Packs/day: 0.50        Years: 15.00        Pack years: 7.5        Quit date: 1/25/2016        Years since quitting: 3.3      Smokeless tobacco: Never Used    Substance and Sexual Activity auscultation  Cardiovascular - normal exam  (+) hypertension,     Rhythm: regular  Rate: normal    Neuro/Psych    (+)  neuromuscular disease,       GI/Hepatic/Renal - negative ROS     Endo/Other    (+) diabetes mellitus,   Abdominal              Anesthesia

## 2019-05-28 NOTE — INTERVAL H&P NOTE
Pre-op Diagnosis: carpal tunnel syndrome    The above referenced H&P was reviewed by Mono Wakefield MD on 5/28/2019, the patient was examined and no significant changes have occurred in the patient's condition since the H&P was performed.   I discus

## 2019-05-28 NOTE — ANESTHESIA POSTPROCEDURE EVALUATION
Patient: Faiza Watkins    Procedure Summary     Date:  05/28/19 Room / Location:  [facility] OR 01 / [facility] OR    Anesthesia Start:  5684 Anesthesia Stop:  9753    Procedure:  ENDOSCOPIC CARPAL TUNNEL RELEASE (Left Wrist) Diagnosis:  (carpal tunnel syndr

## 2019-05-28 NOTE — OPERATIVE REPORT
[facility]    PATIENT'S NAME: Marco A Chatman   ATTENDING PHYSICIAN: Juliane Menard MD   OPERATING PHYSICIAN: Juliane Menard MD   PATIENT ACCOUNT#:   [id number]    LOCATION:  [facility]  MEDICAL RECORD #:   [id number] placed. We had excellent visualization of the dorsal surface of the transverse carpal ligament. Complete release of the transverse carpal ligament was accomplished with a push knife, a triangle knife, and a pull knife.   We had excellent herniation of pal

## 2019-05-28 NOTE — ANESTHESIA PROCEDURE NOTES
Peripheral Block    Anesthesiologist:  Frankie Tabares MD  CRNA:  Paola Andino CRNA  Performed by:   Anesthesiologist and CRNA  Patient Location:  OR  Start Time:  5/28/2019 9:01 AM  End Time:  5/28/2019 9:08 AM  Site Identification: surface landmarks

## 2019-05-28 NOTE — BRIEF OP NOTE
Pre-Operative Diagnosis: carpal tunnel syndrome     Post-Operative Diagnosis: carpal tunnel syndrome      Procedure Performed:   Procedure(s):  left endoscopic carpal tunnel release    Surgeon(s) and Role:     * Benigno Hernandez MD - Primary    [place]

## 2019-05-29 ENCOUNTER — TELEPHONE (OUTPATIENT)
Dept: SURGERY | Facility: CLINIC | Age: 54
End: 2019-05-29

## 2019-05-29 DIAGNOSIS — I10 ESSENTIAL HYPERTENSION: ICD-10-CM

## 2019-05-29 RX ORDER — AMLODIPINE BESYLATE 10 MG/1
TABLET ORAL
Qty: 90 TABLET | Refills: 1 | Status: SHIPPED | OUTPATIENT
Start: 2019-05-29 | End: 2019-05-31

## 2019-05-29 NOTE — TELEPHONE ENCOUNTER
Spoke w/the pt and she states, \"I'm doing good, I have a little pain and taking tylenol, which helps\". Pt reminded to keep dressing clean and dry, and elevate surgical hand to decrease pain and swelling.    Pt reminded of rescheduled OT appt on 5/30/19

## 2019-05-30 ENCOUNTER — OFFICE VISIT (OUTPATIENT)
Dept: SURGERY | Facility: CLINIC | Age: 54
End: 2019-05-30
Payer: MEDICAID

## 2019-05-30 DIAGNOSIS — M25.642 STIFFNESS OF JOINT, HAND, LEFT: Primary | ICD-10-CM

## 2019-05-30 DIAGNOSIS — M62.81 DISTAL MUSCLE WEAKNESS: ICD-10-CM

## 2019-05-30 NOTE — PROGRESS NOTES
Carpal Tunnel Post - Op Note:    Subjective: I am doing great      Objective:  Occupational Therapy completed the following educational areas status post elective carpal tunnel procedure:    1)  Dressing was removed and handwashing technique was reviewed u

## 2019-05-30 NOTE — TELEPHONE ENCOUNTER
Refill passed per [facility] protocol. Requested Prescriptions   Pending Prescriptions Disp Refills   • AMLODIPINE BESYLATE 10 MG Oral Tab [Pharmacy Med Name: AMLODIPINE BESYLATE 10MG TABLETS] 90 tablet 0     Sig: TAKE 1 TABLET BY MOUTH DAILY.  TAKE

## 2019-05-31 ENCOUNTER — OFFICE VISIT (OUTPATIENT)
Dept: FAMILY MEDICINE CLINIC | Facility: CLINIC | Age: 54
End: 2019-05-31
Payer: MEDICAID

## 2019-05-31 VITALS
WEIGHT: 218 LBS | HEIGHT: 64 IN | SYSTOLIC BLOOD PRESSURE: 120 MMHG | DIASTOLIC BLOOD PRESSURE: 65 MMHG | BODY MASS INDEX: 37.22 KG/M2 | TEMPERATURE: 97 F | HEART RATE: 84 BPM

## 2019-05-31 DIAGNOSIS — G62.9 AXONAL POLYNEUROPATHY: ICD-10-CM

## 2019-05-31 DIAGNOSIS — J30.89 OTHER ALLERGIC RHINITIS: ICD-10-CM

## 2019-05-31 DIAGNOSIS — E78.2 MIXED HYPERLIPIDEMIA: ICD-10-CM

## 2019-05-31 DIAGNOSIS — E11.8 UNCONTROLLED TYPE 2 DIABETES MELLITUS WITH COMPLICATION, WITH LONG-TERM CURRENT USE OF INSULIN (HCC): ICD-10-CM

## 2019-05-31 DIAGNOSIS — Z00.00 ADULT GENERAL MEDICAL EXAM: Primary | ICD-10-CM

## 2019-05-31 DIAGNOSIS — Z12.31 VISIT FOR SCREENING MAMMOGRAM: ICD-10-CM

## 2019-05-31 DIAGNOSIS — Z12.11 SCREENING FOR COLON CANCER: ICD-10-CM

## 2019-05-31 DIAGNOSIS — G56.03 BILATERAL CARPAL TUNNEL SYNDROME: ICD-10-CM

## 2019-05-31 DIAGNOSIS — I10 ESSENTIAL HYPERTENSION: ICD-10-CM

## 2019-05-31 DIAGNOSIS — Z79.4 UNCONTROLLED TYPE 2 DIABETES MELLITUS WITH COMPLICATION, WITH LONG-TERM CURRENT USE OF INSULIN (HCC): ICD-10-CM

## 2019-05-31 DIAGNOSIS — Z12.4 CERVICAL CANCER SCREENING: ICD-10-CM

## 2019-05-31 DIAGNOSIS — E11.42 DIABETIC POLYNEUROPATHY ASSOCIATED WITH TYPE 2 DIABETES MELLITUS (HCC): ICD-10-CM

## 2019-05-31 DIAGNOSIS — E11.65 UNCONTROLLED TYPE 2 DIABETES MELLITUS WITH COMPLICATION, WITH LONG-TERM CURRENT USE OF INSULIN (HCC): ICD-10-CM

## 2019-05-31 PROCEDURE — 99212 OFFICE O/P EST SF 10 MIN: CPT | Performed by: FAMILY MEDICINE

## 2019-05-31 PROCEDURE — 99396 PREV VISIT EST AGE 40-64: CPT | Performed by: FAMILY MEDICINE

## 2019-05-31 PROCEDURE — 99213 OFFICE O/P EST LOW 20 MIN: CPT | Performed by: FAMILY MEDICINE

## 2019-05-31 RX ORDER — METFORMIN HYDROCHLORIDE 500 MG/1
500 TABLET, EXTENDED RELEASE ORAL 2 TIMES DAILY WITH MEALS
Qty: 180 TABLET | Refills: 1 | Status: SHIPPED | OUTPATIENT
Start: 2019-05-31 | End: 2019-12-02

## 2019-05-31 RX ORDER — AMLODIPINE BESYLATE 10 MG/1
TABLET ORAL
Qty: 90 TABLET | Refills: 1 | Status: SHIPPED | OUTPATIENT
Start: 2019-05-31 | End: 2019-12-02

## 2019-05-31 RX ORDER — GABAPENTIN 100 MG/1
CAPSULE ORAL
Qty: 180 CAPSULE | Refills: 1 | Status: SHIPPED | OUTPATIENT
Start: 2019-05-31 | End: 2019-12-02

## 2019-05-31 RX ORDER — FLUTICASONE PROPIONATE 50 MCG
2 SPRAY, SUSPENSION (ML) NASAL DAILY
Qty: 3 BOTTLE | Refills: 1 | Status: SHIPPED | OUTPATIENT
Start: 2019-05-31 | End: 2019-09-28

## 2019-05-31 RX ORDER — LISINOPRIL 40 MG/1
TABLET ORAL
Qty: 90 TABLET | Refills: 1 | Status: SHIPPED | OUTPATIENT
Start: 2019-05-31 | End: 2019-10-11

## 2019-05-31 RX ORDER — GLIMEPIRIDE 4 MG/1
4 TABLET ORAL
Qty: 90 TABLET | Refills: 1 | Status: SHIPPED | OUTPATIENT
Start: 2019-05-31 | End: 2019-11-08

## 2019-05-31 NOTE — PROGRESS NOTES
Patient ID: Prince Goss is a 53year old female. HPI  Patient presents with:  Routine Physical  Does not smoke, is single, and pt is on disability. Pt has lost weight since last visit. She is walking a lot to stay active.  Pt is motivated to lose EOPERCENT 5 01/08/2019    BAPERCENT 1 01/08/2019    NE 6.1 01/08/2019    LYMABS 2.6 01/08/2019    MOABSO 0.6 01/08/2019    EOABSO 0.4 01/08/2019    BAABSO 0.1 01/08/2019       Lab Results   Component Value Date     (H) 01/08/2019    BUN 49 (H) 01/08 04/02/2019    LDL 39 04/02/2019    VLDL 12 04/02/2019    NONHDLC 51 04/02/2019     No results found for: T4F, TSH, TSHT4    No results found for: B12, VITB12    No results found for: IRON, IRONTOT    No results found for: SAT    No results found for: IRON, degeneration of left eye    • Neuropathy    • Obesity (BMI 30-39. 9)    • Osteoarthritis    • Renal disorder    • Type II or unspecified type diabetes mellitus without mention of complication, not stated as uncontrolled     diabetes 24 yrs       Past Surgic Relationship status: Not on file      Intimate partner violence:        Fear of current or ex partner: Not on file        Emotionally abused: Not on file        Physically abused: Not on file        Forced sexual activity: Not on file    Other Topics AmLODIPine Besylate 10 MG Oral Tab TAKE 1 TABLET BY MOUTH DAILY. TAKE WITH THE LISINOPRIL 40 MG. Disp: 90 tablet Rfl: 1   aspirin 81 MG Oral Chew Tab CSW ONE T D Disp:  Rfl: 6   MICROLET LANCETS Does not apply Misc Check glucose three times a day.  Disp: pressure 120/65, pulse 84, temperature 97.3 °F (36.3 °C), temperature source Oral, height 5' 4\" (1.626 m), weight 218 lb (98.9 kg), last menstrual period 07/20/2013, not currently breastfeeding.          ASSESSMENT/PLAN:     Diagnoses and all orders for th allergic rhinitis  -     Fluticasone Propionate 50 MCG/ACT Nasal Suspension; 2 sprays by Nasal route daily. Squeeze nose after sprays. Do not snort into the back of the throat.         Referrals (if applicable)  Orders Placed This Encounter      YAEL ROSE

## 2019-05-31 NOTE — PATIENT INSTRUCTIONS
Start Flonase every morning and you could take your Claritin at the same time. This will really help open up your nasal passages and stop you from being congested.

## 2019-06-04 RX ORDER — ERTUGLIFLOZIN 15 MG/1
TABLET, FILM COATED ORAL
Qty: 30 TABLET | Refills: 0 | Status: SHIPPED | OUTPATIENT
Start: 2019-06-04 | End: 2019-07-02

## 2019-06-11 ENCOUNTER — OFFICE VISIT (OUTPATIENT)
Dept: SURGERY | Facility: CLINIC | Age: 54
End: 2019-06-11
Payer: MEDICAID

## 2019-06-11 DIAGNOSIS — M62.81 DISTAL MUSCLE WEAKNESS: ICD-10-CM

## 2019-06-11 DIAGNOSIS — M25.642 STIFFNESS OF JOINT, HAND, LEFT: Primary | ICD-10-CM

## 2019-06-11 PROCEDURE — 97110 THERAPEUTIC EXERCISES: CPT | Performed by: OCCUPATIONAL THERAPIST

## 2019-06-11 PROCEDURE — 97166 OT EVAL MOD COMPLEX 45 MIN: CPT | Performed by: OCCUPATIONAL THERAPIST

## 2019-06-11 NOTE — PROGRESS NOTES
OCCUPATIONAL THERAPY EVALUATION:   Virginia Byers   [id number]       SUBJECTIVE:    HX of Injury: Left hand pain and numbness. Chief Complaint:   Status post LECTR, without complaints. .    Precautions: Protected use of the left hand.   Premorbid Functiona tasks:  . Patient will be seen 1 x /week for 3 weeks or a total of 3 visits. Pt. was advised regarding the findings of this evaluation and agrees to the plan of care. Jackie Prior     I have reviewed the treatment plan and concur.    Nayla Helms

## 2019-06-12 ENCOUNTER — HOSPITAL ENCOUNTER (OUTPATIENT)
Dept: MAMMOGRAPHY | Age: 54
Discharge: HOME | End: 2019-06-12
Attending: FAMILY MEDICINE
Payer: MEDICAID

## 2019-06-12 DIAGNOSIS — Z12.31 VISIT FOR SCREENING MAMMOGRAM: ICD-10-CM

## 2019-06-12 PROCEDURE — 77063 BREAST TOMOSYNTHESIS BI: CPT | Performed by: FAMILY MEDICINE

## 2019-06-12 PROCEDURE — 77067 SCR MAMMO BI INCL CAD: CPT | Performed by: FAMILY MEDICINE

## 2019-06-17 ENCOUNTER — OFFICE VISIT (OUTPATIENT)
Dept: SURGERY | Facility: CLINIC | Age: 54
End: 2019-06-17
Payer: MEDICAID

## 2019-06-17 DIAGNOSIS — M25.642 STIFFNESS OF JOINT, HAND, LEFT: Primary | ICD-10-CM

## 2019-06-17 DIAGNOSIS — M62.81 DISTAL MUSCLE WEAKNESS: ICD-10-CM

## 2019-06-17 DIAGNOSIS — G56.02 CARPAL TUNNEL SYNDROME, LEFT: Primary | ICD-10-CM

## 2019-06-17 DIAGNOSIS — G56.01 RIGHT CARPAL TUNNEL SYNDROME: ICD-10-CM

## 2019-06-17 PROCEDURE — 99024 POSTOP FOLLOW-UP VISIT: CPT | Performed by: PLASTIC SURGERY

## 2019-06-17 PROCEDURE — 99212 OFFICE O/P EST SF 10 MIN: CPT | Performed by: PLASTIC SURGERY

## 2019-06-17 PROCEDURE — 97110 THERAPEUTIC EXERCISES: CPT | Performed by: OCCUPATIONAL THERAPIST

## 2019-06-17 NOTE — PROGRESS NOTES
Surgery 1: RECTR  - Date: 05/03/19  - Days Since: 45    Surgery 2: Radha Alvarado  - Date: 05/28/19  - Days Since: 20      Pt states she's doing \"good\"  Denies pain, numbness and tingling to RH and LH incisional sites   LH incisional sites have minimal scabbing,

## 2019-06-17 NOTE — PROGRESS NOTES
Subjective: I feel great. Objective:     Current level of performance:  ADL: Independent  Work: Disabled.   Leisure: Not addressed    Measurements/Tests:  ROM:  Testing By: fernando   Strength Right: 60 #      Strength Left: 50 #      Treatment Prov

## 2019-07-02 RX ORDER — ERTUGLIFLOZIN 15 MG/1
TABLET, FILM COATED ORAL
Qty: 30 TABLET | Refills: 0 | Status: SHIPPED | OUTPATIENT
Start: 2019-07-02 | End: 2019-08-01

## 2019-07-11 RX ORDER — LIRAGLUTIDE 6 MG/ML
INJECTION SUBCUTANEOUS
Qty: 9 ML | Refills: 0 | Status: SHIPPED | OUTPATIENT
Start: 2019-07-11 | End: 2019-08-14

## 2019-07-19 ENCOUNTER — OFFICE VISIT (OUTPATIENT)
Dept: ENDOCRINOLOGY CLINIC | Facility: CLINIC | Age: 54
End: 2019-07-19
Payer: MEDICAID

## 2019-07-19 VITALS
SYSTOLIC BLOOD PRESSURE: 102 MMHG | BODY MASS INDEX: 38 KG/M2 | HEART RATE: 80 BPM | DIASTOLIC BLOOD PRESSURE: 60 MMHG | WEIGHT: 221 LBS

## 2019-07-19 DIAGNOSIS — E11.65 UNCONTROLLED TYPE 2 DIABETES MELLITUS WITH HYPERGLYCEMIA (HCC): Primary | ICD-10-CM

## 2019-07-19 LAB
CARTRIDGE LOT#: ABNORMAL NUMERIC
GLUCOSE BLOOD: 118
HEMOGLOBIN A1C: 7.1 % (ref 4.3–5.6)
TEST STRIP LOT #: NORMAL NUMERIC

## 2019-07-19 PROCEDURE — 83036 HEMOGLOBIN GLYCOSYLATED A1C: CPT | Performed by: INTERNAL MEDICINE

## 2019-07-19 PROCEDURE — 99213 OFFICE O/P EST LOW 20 MIN: CPT | Performed by: INTERNAL MEDICINE

## 2019-07-19 PROCEDURE — 36416 COLLJ CAPILLARY BLOOD SPEC: CPT | Performed by: INTERNAL MEDICINE

## 2019-07-19 PROCEDURE — 82962 GLUCOSE BLOOD TEST: CPT | Performed by: INTERNAL MEDICINE

## 2019-07-19 NOTE — PROGRESS NOTES
Name: Wagner Glynn  Date: 7/19/2019    Referring Physician: No ref.  provider found    HISTORY OF PRESENT ILLNESS   Wagner Glynn is a 53year old female who presents for diabetes mellitus diagnosed 23 years ago, transitioned to insulin therapy 15 year LISINOPRIL 40 MG., Disp: 90 tablet, Rfl: 1  •  metFORMIN HCl  MG Oral Tablet 24 Hr, Take 1 tablet (500 mg total) by mouth 2 (two) times daily with meals. , Disp: 180 tablet, Rfl: 1  •  glimepiride 4 MG Oral Tab, Take 1 tablet (4 mg total) by mouth jay History      Marital status:       Spouse name: Not on file      Number of children: Not on file      Years of education: Not on file      Highest education level: Not on file    Tobacco Use      Smoking status: Former Smoker        Packs/day: 0.50 nodules or cervical lymphadenopathy  Back: no kyphosis or back tenderness  Respiratory:  clear to auscultation bilaterally  Cardiovascular:  regular rate, rhythm, , no murmurs, S3 or S4  Gastrointestinal:  normal bowel sounds and no palpable masses in abdo

## 2019-08-01 RX ORDER — ERTUGLIFLOZIN 15 MG/1
TABLET, FILM COATED ORAL
Qty: 30 TABLET | Refills: 5 | Status: SHIPPED | OUTPATIENT
Start: 2019-08-01 | End: 2019-10-25

## 2019-08-02 RX ORDER — METFORMIN HYDROCHLORIDE 500 MG/1
TABLET, EXTENDED RELEASE ORAL
Qty: 60 TABLET | Refills: 5 | Status: SHIPPED | OUTPATIENT
Start: 2019-08-02 | End: 2019-12-02

## 2019-08-02 RX ORDER — BLOOD-GLUCOSE METER
EACH MISCELLANEOUS
Qty: 1 KIT | Refills: 0 | Status: SHIPPED | OUTPATIENT
Start: 2019-08-02

## 2019-08-02 NOTE — TELEPHONE ENCOUNTER
Refill passed per [facility] protocol.   Refill Protocol Appointment Criteria  · Appointment scheduled in the past 12 months or in the next 3 months  Recent Outpatient Visits            2 weeks ago Uncontrolled type 2 diabetes mellitus with hyperglycem

## 2019-08-14 RX ORDER — LIRAGLUTIDE 6 MG/ML
INJECTION SUBCUTANEOUS
Qty: 9 ML | Refills: 2 | Status: SHIPPED | OUTPATIENT
Start: 2019-08-14 | End: 2019-10-24

## 2019-09-05 RX ORDER — BLOOD-GLUCOSE METER
EACH MISCELLANEOUS
Qty: 1 KIT | Refills: 0 | OUTPATIENT
Start: 2019-09-05

## 2019-09-26 DIAGNOSIS — E78.2 MIXED HYPERLIPIDEMIA: ICD-10-CM

## 2019-09-28 RX ORDER — ATORVASTATIN CALCIUM 20 MG/1
TABLET, FILM COATED ORAL
Qty: 90 TABLET | Refills: 1 | Status: SHIPPED | OUTPATIENT
Start: 2019-09-28 | End: 2020-03-26

## 2019-09-28 NOTE — TELEPHONE ENCOUNTER
Please review; protocol failed.     Requested Prescriptions     Pending Prescriptions Disp Refills   • ATORVASTATIN 20 MG Oral Tab [Pharmacy Med Name: ATORVASTATIN 20MG TABLETS] 90 tablet 0     Sig: TAKE 1 TABLET(20 MG) BY MOUTH EVERY NIGHT         Recent V

## 2019-10-10 DIAGNOSIS — I10 ESSENTIAL HYPERTENSION: ICD-10-CM

## 2019-10-11 RX ORDER — LISINOPRIL 40 MG/1
TABLET ORAL
Qty: 90 TABLET | Refills: 1 | Status: SHIPPED | OUTPATIENT
Start: 2019-10-11 | End: 2020-04-25

## 2019-10-24 ENCOUNTER — OFFICE VISIT (OUTPATIENT)
Dept: ENDOCRINOLOGY CLINIC | Facility: CLINIC | Age: 54
End: 2019-10-24
Payer: MEDICAID

## 2019-10-24 VITALS
HEART RATE: 88 BPM | DIASTOLIC BLOOD PRESSURE: 65 MMHG | SYSTOLIC BLOOD PRESSURE: 116 MMHG | BODY MASS INDEX: 38 KG/M2 | WEIGHT: 219.38 LBS

## 2019-10-24 DIAGNOSIS — E11.65 UNCONTROLLED TYPE 2 DIABETES MELLITUS WITH HYPERGLYCEMIA (HCC): Primary | ICD-10-CM

## 2019-10-24 PROCEDURE — 99213 OFFICE O/P EST LOW 20 MIN: CPT | Performed by: INTERNAL MEDICINE

## 2019-10-24 PROCEDURE — 82962 GLUCOSE BLOOD TEST: CPT | Performed by: INTERNAL MEDICINE

## 2019-10-24 PROCEDURE — 83036 HEMOGLOBIN GLYCOSYLATED A1C: CPT | Performed by: INTERNAL MEDICINE

## 2019-10-24 PROCEDURE — 36416 COLLJ CAPILLARY BLOOD SPEC: CPT | Performed by: INTERNAL MEDICINE

## 2019-10-24 NOTE — PROGRESS NOTES
Name: Bryon Wills  Date: 10/24/2019    Referring Physician: No ref.  provider found    HISTORY OF PRESENT ILLNESS   Bryon Wills is a 53year old female who presents for diabetes mellitus diagnosed 23 years ago, transitioned to insulin therapy 15 arturo SUGAR THREE TIMES DAILY, Disp: 1 kit, Rfl: 0  •  STEGLATRO 15 MG Oral Tab tablet, TAKE 1 TABLET(15 MG) BY MOUTH DAILY, Disp: 30 tablet, Rfl: 5  •  gabapentin 100 MG Oral Cap, TAKE 1 CAPSULE(100 MG) BY MOUTH TWICE DAILY, Disp: 180 capsule, Rfl: 1  •  amLODI on file      Years of education: Not on file      Highest education level: Not on file    Tobacco Use      Smoking status: Former Smoker        Packs/day: 0.50        Years: 15.00        Pack years: 7.5        Quit date: 1/25/2016        [facility]  Respiratory:  clear to auscultation bilaterally  Cardiovascular:  regular rate, rhythm, , no murmurs, S3 or S4  Gastrointestinal:  normal bowel sounds and no palpable masses in abdomen, organomegaly or tenderness   Musculoskeletal:  normal musc

## 2019-10-25 RX ORDER — ERTUGLIFLOZIN 15 MG/1
TABLET, FILM COATED ORAL
Qty: 30 TABLET | Refills: 0 | Status: SHIPPED | OUTPATIENT
Start: 2019-10-25 | End: 2019-12-27

## 2019-11-08 DIAGNOSIS — IMO0002 UNCONTROLLED TYPE 2 DIABETES MELLITUS WITH COMPLICATION, WITH LONG-TERM CURRENT USE OF INSULIN: ICD-10-CM

## 2019-11-08 RX ORDER — GLIMEPIRIDE 4 MG/1
TABLET ORAL
Qty: 90 TABLET | Refills: 0 | Status: SHIPPED | OUTPATIENT
Start: 2019-11-08 | End: 2020-01-24

## 2019-11-17 DIAGNOSIS — IMO0002 UNCONTROLLED TYPE 2 DIABETES MELLITUS WITH COMPLICATION, WITH LONG-TERM CURRENT USE OF INSULIN: ICD-10-CM

## 2019-12-02 ENCOUNTER — APPOINTMENT (OUTPATIENT)
Dept: LAB | Age: 54
End: 2019-12-02
Attending: FAMILY MEDICINE
Payer: MEDICAID

## 2019-12-02 ENCOUNTER — OFFICE VISIT (OUTPATIENT)
Dept: FAMILY MEDICINE CLINIC | Facility: CLINIC | Age: 54
End: 2019-12-02
Payer: MEDICAID

## 2019-12-02 VITALS
WEIGHT: 218.63 LBS | HEIGHT: 64 IN | SYSTOLIC BLOOD PRESSURE: 130 MMHG | HEART RATE: 89 BPM | TEMPERATURE: 97 F | BODY MASS INDEX: 37.33 KG/M2 | DIASTOLIC BLOOD PRESSURE: 68 MMHG

## 2019-12-02 DIAGNOSIS — E78.2 MIXED HYPERLIPIDEMIA: ICD-10-CM

## 2019-12-02 DIAGNOSIS — F51.02 INSOMNIA DUE TO PSYCHOLOGICAL STRESS: ICD-10-CM

## 2019-12-02 DIAGNOSIS — R01.1 HEART MURMUR: ICD-10-CM

## 2019-12-02 DIAGNOSIS — Z87.898 HISTORY OF DOMESTIC VIOLENCE: ICD-10-CM

## 2019-12-02 DIAGNOSIS — I10 ESSENTIAL HYPERTENSION: ICD-10-CM

## 2019-12-02 DIAGNOSIS — R79.89 ELEVATED LIVER FUNCTION TESTS: ICD-10-CM

## 2019-12-02 DIAGNOSIS — R79.89 ELEVATED SERUM CREATININE: ICD-10-CM

## 2019-12-02 DIAGNOSIS — G62.9 AXONAL POLYNEUROPATHY: ICD-10-CM

## 2019-12-02 DIAGNOSIS — E11.42 DIABETIC POLYNEUROPATHY ASSOCIATED WITH TYPE 2 DIABETES MELLITUS (HCC): ICD-10-CM

## 2019-12-02 DIAGNOSIS — Z79.4 UNCONTROLLED TYPE 2 DIABETES MELLITUS WITH COMPLICATION, WITH LONG-TERM CURRENT USE OF INSULIN (HCC): Primary | ICD-10-CM

## 2019-12-02 DIAGNOSIS — F32.A DEPRESSIVE DISORDER: ICD-10-CM

## 2019-12-02 DIAGNOSIS — F41.9 ANXIETY: ICD-10-CM

## 2019-12-02 DIAGNOSIS — E11.65 UNCONTROLLED TYPE 2 DIABETES MELLITUS WITH HYPERGLYCEMIA (HCC): ICD-10-CM

## 2019-12-02 DIAGNOSIS — E11.8 UNCONTROLLED TYPE 2 DIABETES MELLITUS WITH COMPLICATION, WITH LONG-TERM CURRENT USE OF INSULIN (HCC): Primary | ICD-10-CM

## 2019-12-02 DIAGNOSIS — Z23 NEED FOR VACCINATION: ICD-10-CM

## 2019-12-02 DIAGNOSIS — E11.65 UNCONTROLLED TYPE 2 DIABETES MELLITUS WITH COMPLICATION, WITH LONG-TERM CURRENT USE OF INSULIN (HCC): Primary | ICD-10-CM

## 2019-12-02 PROCEDURE — 99215 OFFICE O/P EST HI 40 MIN: CPT | Performed by: FAMILY MEDICINE

## 2019-12-02 PROCEDURE — 84460 ALANINE AMINO (ALT) (SGPT): CPT

## 2019-12-02 PROCEDURE — 90686 IIV4 VACC NO PRSV 0.5 ML IM: CPT | Performed by: FAMILY MEDICINE

## 2019-12-02 PROCEDURE — 84450 TRANSFERASE (AST) (SGOT): CPT

## 2019-12-02 PROCEDURE — 90471 IMMUNIZATION ADMIN: CPT | Performed by: FAMILY MEDICINE

## 2019-12-02 PROCEDURE — 80048 BASIC METABOLIC PNL TOTAL CA: CPT

## 2019-12-02 PROCEDURE — 36415 COLL VENOUS BLD VENIPUNCTURE: CPT

## 2019-12-02 RX ORDER — AMLODIPINE BESYLATE 10 MG/1
TABLET ORAL
Qty: 90 TABLET | Refills: 1 | Status: SHIPPED | OUTPATIENT
Start: 2019-12-02 | End: 2020-05-04

## 2019-12-02 RX ORDER — ESCITALOPRAM OXALATE 10 MG/1
10 TABLET ORAL DAILY
Qty: 90 TABLET | Refills: 0 | Status: SHIPPED | OUTPATIENT
Start: 2019-12-02 | End: 2020-05-04

## 2019-12-02 RX ORDER — GABAPENTIN 100 MG/1
CAPSULE ORAL
Qty: 180 CAPSULE | Refills: 1 | Status: SHIPPED | OUTPATIENT
Start: 2019-12-02 | End: 2020-05-04

## 2019-12-02 RX ORDER — TRAZODONE HYDROCHLORIDE 50 MG/1
TABLET ORAL
Qty: 180 TABLET | Refills: 0 | Status: SHIPPED | OUTPATIENT
Start: 2019-12-02 | End: 2020-05-04

## 2019-12-02 RX ORDER — METFORMIN HYDROCHLORIDE 500 MG/1
500 TABLET, EXTENDED RELEASE ORAL 2 TIMES DAILY WITH MEALS
Qty: 180 TABLET | Refills: 1 | Status: SHIPPED | OUTPATIENT
Start: 2019-12-02 | End: 2020-05-04

## 2019-12-02 NOTE — PROGRESS NOTES
Patient ID: Thomas Reina is a 53year old female. HPI  Patient presents with:  Medication Follow-Up: refills  Diabetes: follow up    Last seen by me on 5/31/19. Regularly sees Dr. Raul Bryant for endocrinology.  Last saw her on 10/24/19, I reviewed the she is a good person and does not deserve the way he treats her. No suicidal ideations. She was told she has a heart murmur when she was a young child. No heart murmur recently. No syncope or orthostasis. There is no diaphoresis.     Would like to re BLOODURINE Moderate (A) 01/13/2018    PHURINE 5.0 01/13/2018    PROUR 100  (A) 01/13/2018    UROBILINOGEN <2.0 01/13/2018    NITRITE Positive (A) 01/13/2018    LEUUR Trace (A) 01/13/2018    WBCUR 21 (H) 01/13/2018    RBCUR 2 01/13/2018    EPIUR Few 01/13/2 Carpal tunnel syndrome, left 05/23/2019   • High blood pressure    • High cholesterol    • Lipid screening 4/2/2007    per NG   • Lipoma     surgical excision age 24   • Macular degeneration of left eye    • Neuropathy    • Obesity (BMI 30-39. 9)    • Osteo tablet 1   • acetaminophen 500 MG Oral Tab Take 500 mg by mouth every 6 (six) hours as needed for Pain.      • Glucose Blood (ONETOUCH ULTRA BLUE) In Vitro Strip Test blood sugar three times daily 300 strip 3   • Blood Glucose Calibration (ONETOUCH ULTRA CO last menstrual period 07/20/2013, not currently breastfeeding.    12/02/19  1418 12/02/19  1431   BP: 126/66 130/68   Pulse: 89    Temp: 97.1 °F (36.2 °C)    TempSrc: Oral    Weight: 218 lb 9.6 oz (99.2 kg)    Height: 5' 4\" (1.626 m)            ASSESSMENT/ ago.  Insomnia due to psychological stress  -     traZODone HCl 50 MG Oral Tab; 1 by mouth nightly for sleep. If after 7 to 10 days still having trouble sleeping start taking 2 at the same time. -     escitalopram 10 MG Oral Tab;  Take 1 tablet (10 mg tot under the direction and in the presence of Heather Rosas DO. Electronically Signed: Morenita Guerrier, 12/2/2019, 2:17 PM.    ITiago DO,  personally performed the services described in this documentation.  All medical record entries made by the

## 2019-12-27 RX ORDER — ERTUGLIFLOZIN 15 MG/1
TABLET, FILM COATED ORAL
Qty: 30 TABLET | Refills: 0 | Status: SHIPPED | OUTPATIENT
Start: 2019-12-27 | End: 2020-04-27

## 2020-01-24 ENCOUNTER — OFFICE VISIT (OUTPATIENT)
Dept: ENDOCRINOLOGY CLINIC | Facility: CLINIC | Age: 55
End: 2020-01-24
Payer: MEDICAID

## 2020-01-24 VITALS
SYSTOLIC BLOOD PRESSURE: 119 MMHG | BODY MASS INDEX: 37 KG/M2 | WEIGHT: 218 LBS | HEART RATE: 81 BPM | DIASTOLIC BLOOD PRESSURE: 55 MMHG

## 2020-01-24 DIAGNOSIS — E11.65 UNCONTROLLED TYPE 2 DIABETES MELLITUS WITH HYPERGLYCEMIA (HCC): Primary | ICD-10-CM

## 2020-01-24 LAB
CARTRIDGE LOT#: ABNORMAL NUMERIC
GLUCOSE BLOOD: 61
HEMOGLOBIN A1C: 7 % (ref 4.3–5.6)
TEST STRIP LOT #: NORMAL NUMERIC

## 2020-01-24 PROCEDURE — 36416 COLLJ CAPILLARY BLOOD SPEC: CPT | Performed by: INTERNAL MEDICINE

## 2020-01-24 PROCEDURE — 83036 HEMOGLOBIN GLYCOSYLATED A1C: CPT | Performed by: INTERNAL MEDICINE

## 2020-01-24 PROCEDURE — 82962 GLUCOSE BLOOD TEST: CPT | Performed by: INTERNAL MEDICINE

## 2020-01-24 PROCEDURE — 99213 OFFICE O/P EST LOW 20 MIN: CPT | Performed by: INTERNAL MEDICINE

## 2020-01-24 RX ORDER — GLIMEPIRIDE 2 MG/1
2 TABLET ORAL
Qty: 90 TABLET | Refills: 1 | Status: SHIPPED | OUTPATIENT
Start: 2020-01-24 | End: 2020-05-13

## 2020-01-24 NOTE — PROGRESS NOTES
Name: Bailey Mercado  Date: 1/24/2020    Referring Physician: No ref.  provider found    HISTORY OF PRESENT ILLNESS   Bailey Mercado is a 53year old female who presents for diabetes mellitus diagnosed 23 years ago, transitioned to insulin therapy 15 year 1  •  traZODone HCl 50 MG Oral Tab, 1 by mouth nightly for sleep. If after 7 to 10 days still having trouble sleeping start taking 2 at the same time. , Disp: 180 tablet, Rfl: 0  •  escitalopram 10 MG Oral Tab, Take 1 tablet (10 mg total) by mouth daily.  Viji Euceda on file      Number of children: Not on file      Years of education: Not on file      Highest education level: Not on file    Tobacco Use      Smoking status: Former Smoker        Packs/day: 0.50        Years: 15.00        Pack years: 7.5        Quit date kyphosis or back tenderness  Respiratory:  clear to auscultation bilaterally  Cardiovascular:  regular rate, rhythm, , no murmurs, S3 or S4  Gastrointestinal:  normal bowel sounds and no palpable masses in abdomen, organomegaly or tenderness   Musculoskele

## 2020-02-25 ENCOUNTER — HOSPITAL ENCOUNTER (OUTPATIENT)
Age: 55
Discharge: HOME | End: 2020-02-25
Attending: EMERGENCY MEDICINE
Payer: MEDICAID

## 2020-02-25 VITALS
RESPIRATION RATE: 18 BRPM | WEIGHT: 215 LBS | BODY MASS INDEX: 39.56 KG/M2 | OXYGEN SATURATION: 98 % | HEIGHT: 62 IN | DIASTOLIC BLOOD PRESSURE: 55 MMHG | TEMPERATURE: 97 F | HEART RATE: 92 BPM | SYSTOLIC BLOOD PRESSURE: 145 MMHG

## 2020-02-25 DIAGNOSIS — J01.00 ACUTE NON-RECURRENT MAXILLARY SINUSITIS: Primary | ICD-10-CM

## 2020-02-25 DIAGNOSIS — IMO0002 UNCONTROLLED TYPE 2 DIABETES MELLITUS WITH COMPLICATION, WITH LONG-TERM CURRENT USE OF INSULIN: ICD-10-CM

## 2020-02-25 PROCEDURE — 99214 OFFICE O/P EST MOD 30 MIN: CPT

## 2020-02-25 PROCEDURE — 99213 OFFICE O/P EST LOW 20 MIN: CPT

## 2020-02-25 RX ORDER — AZITHROMYCIN 250 MG/1
TABLET, FILM COATED ORAL
Qty: 1 PACKAGE | Refills: 0 | Status: SHIPPED | OUTPATIENT
Start: 2020-02-25 | End: 2020-05-04

## 2020-02-25 RX ORDER — FLUTICASONE PROPIONATE 50 MCG
2 SPRAY, SUSPENSION (ML) NASAL DAILY
Qty: 16 G | Refills: 0 | Status: SHIPPED | OUTPATIENT
Start: 2020-02-25 | End: 2020-03-26

## 2020-02-25 RX ORDER — ECHINACEA PURPUREA EXTRACT 125 MG
2 TABLET ORAL AS NEEDED
Qty: 60 ML | Refills: 0 | Status: SHIPPED | OUTPATIENT
Start: 2020-02-25 | End: 2020-03-01

## 2020-02-25 NOTE — ED PROVIDER NOTES
Patient Seen in: [facility] In [address] [place] [address]    History   Patient presents with:  Headache    Stated Complaint: Back Of Head Pain/Ear Pain    HPI      HISTORY OF PRESENT ILLNESS:Patient complains of URI symptoms for 10 days.   Complains o Oral Tab,  Take 1 tablet (2 mg total) by mouth daily with breakfast.   STEGLATRO 15 MG Oral Tab tablet,  TAKE 1 TABLET(15 MG) BY MOUTH DAILY   gabapentin 100 MG Oral Cap,  TAKE 1 CAPSULE(100 MG) BY MOUTH TWICE DAILY   amLODIPine Besylate 10 MG Oral Tab,  T Father    • Diabetes Mother      Family history reviewed with patient/caregiver and is not pertinent to presenting problem.    Social History    Tobacco Use      Smoking status: Former Smoker        Packs/day: 0.50        Years: 15.00        Pack years: 7.5 [facility]  [phone]    Schedule an appointment as soon as possible for a visit in 1 week  If symptoms worsen      Medications Prescribed:  Current Discharge Medication List    START taking these medications    Saline Nasal Spray (AYR) 0.65 % Nasal Solution

## 2020-02-25 NOTE — ED INITIAL ASSESSMENT (HPI)
Hx of periorbital wall fx that required surg and has some chronic pain in left eye no for past 2 days has pain in mastoid area of head- no nausea or vomiting pain helped with tylenol thought it was sinus issue because recent cold no blurred vision that is

## 2020-02-26 RX ORDER — LANCETS 30 GAUGE
EACH MISCELLANEOUS
Qty: 400 EACH | Refills: 5 | Status: SHIPPED | OUTPATIENT
Start: 2020-02-26 | End: 2020-12-31

## 2020-02-27 RX ORDER — GLIMEPIRIDE 4 MG/1
TABLET ORAL
Qty: 90 TABLET | Refills: 1 | OUTPATIENT
Start: 2020-02-27

## 2020-03-26 DIAGNOSIS — E78.2 MIXED HYPERLIPIDEMIA: ICD-10-CM

## 2020-03-26 RX ORDER — ATORVASTATIN CALCIUM 20 MG/1
TABLET, FILM COATED ORAL
Qty: 90 TABLET | Refills: 1 | Status: SHIPPED | OUTPATIENT
Start: 2020-03-26 | End: 2020-09-24

## 2020-04-25 DIAGNOSIS — I10 ESSENTIAL HYPERTENSION: ICD-10-CM

## 2020-04-25 DIAGNOSIS — E11.65 UNCONTROLLED TYPE 2 DIABETES MELLITUS WITH HYPERGLYCEMIA (HCC): ICD-10-CM

## 2020-04-25 RX ORDER — LISINOPRIL 40 MG/1
TABLET ORAL
Qty: 90 TABLET | Refills: 0 | Status: SHIPPED | OUTPATIENT
Start: 2020-04-25 | End: 2020-07-27

## 2020-04-26 NOTE — TELEPHONE ENCOUNTER
Refilled times 1 but needs appointment before the next prescription will be filled. Please call patient and set up an office visit as overdue. Overdue for diabetic exam.  Set up for appointment after May 1, 2020.

## 2020-04-27 RX ORDER — INSULIN GLARGINE 100 [IU]/ML
INJECTION, SOLUTION SUBCUTANEOUS
Qty: 30 ML | Refills: 0 | Status: SHIPPED | OUTPATIENT
Start: 2020-04-27 | End: 2020-07-07

## 2020-04-27 RX ORDER — ERTUGLIFLOZIN 15 MG/1
TABLET, FILM COATED ORAL
Qty: 30 TABLET | Refills: 0 | Status: SHIPPED | OUTPATIENT
Start: 2020-04-27 | End: 2020-12-02

## 2020-05-04 ENCOUNTER — OFFICE VISIT (OUTPATIENT)
Dept: FAMILY MEDICINE CLINIC | Facility: CLINIC | Age: 55
End: 2020-05-04
Payer: MEDICAID

## 2020-05-04 VITALS
TEMPERATURE: 99 F | HEIGHT: 62 IN | WEIGHT: 218.63 LBS | DIASTOLIC BLOOD PRESSURE: 64 MMHG | BODY MASS INDEX: 40.23 KG/M2 | HEART RATE: 88 BPM | SYSTOLIC BLOOD PRESSURE: 127 MMHG

## 2020-05-04 DIAGNOSIS — E78.2 MIXED HYPERLIPIDEMIA: ICD-10-CM

## 2020-05-04 DIAGNOSIS — I10 ESSENTIAL HYPERTENSION: Primary | ICD-10-CM

## 2020-05-04 DIAGNOSIS — E11.42 DIABETIC POLYNEUROPATHY ASSOCIATED WITH TYPE 2 DIABETES MELLITUS (HCC): ICD-10-CM

## 2020-05-04 DIAGNOSIS — Z79.4 UNCONTROLLED TYPE 2 DIABETES MELLITUS WITH COMPLICATION, WITH LONG-TERM CURRENT USE OF INSULIN (HCC): ICD-10-CM

## 2020-05-04 DIAGNOSIS — R60.0 BILATERAL LEG EDEMA: ICD-10-CM

## 2020-05-04 DIAGNOSIS — G47.62 NOCTURNAL LEG CRAMPS: ICD-10-CM

## 2020-05-04 DIAGNOSIS — G62.9 AXONAL POLYNEUROPATHY: ICD-10-CM

## 2020-05-04 DIAGNOSIS — E11.65 UNCONTROLLED TYPE 2 DIABETES MELLITUS WITH HYPERGLYCEMIA (HCC): ICD-10-CM

## 2020-05-04 DIAGNOSIS — E11.65 UNCONTROLLED TYPE 2 DIABETES MELLITUS WITH COMPLICATION, WITH LONG-TERM CURRENT USE OF INSULIN (HCC): ICD-10-CM

## 2020-05-04 DIAGNOSIS — E11.8 UNCONTROLLED TYPE 2 DIABETES MELLITUS WITH COMPLICATION, WITH LONG-TERM CURRENT USE OF INSULIN (HCC): ICD-10-CM

## 2020-05-04 PROCEDURE — 99214 OFFICE O/P EST MOD 30 MIN: CPT | Performed by: FAMILY MEDICINE

## 2020-05-04 RX ORDER — AMLODIPINE BESYLATE 10 MG/1
TABLET ORAL
Qty: 90 TABLET | Refills: 1 | Status: SHIPPED | OUTPATIENT
Start: 2020-05-04 | End: 2020-11-23

## 2020-05-04 RX ORDER — GABAPENTIN 100 MG/1
CAPSULE ORAL
Qty: 180 CAPSULE | Refills: 1 | Status: SHIPPED | OUTPATIENT
Start: 2020-05-04 | End: 2020-11-23

## 2020-05-04 RX ORDER — METFORMIN HYDROCHLORIDE 500 MG/1
500 TABLET, EXTENDED RELEASE ORAL 2 TIMES DAILY WITH MEALS
Qty: 180 TABLET | Refills: 1 | Status: SHIPPED | OUTPATIENT
Start: 2020-05-04 | End: 2020-11-23

## 2020-05-04 NOTE — PROGRESS NOTES
Patient ID: Lebron Arguelles is a 63year old female. HPI  Patient presents with: Follow - Up: diabetic f/u    Last seen by me on 12/2/20. I reviewed labs with the pt. Her A1C was 7.0 on 1/24/20.  Informed pt Dr. Johan Lake has ordered labs for the pt on MCV 91.4 01/08/2019    MCH 30.7 01/08/2019    MCHC 33.6 01/08/2019    RDW 13.5 01/08/2019    NEPERCENT 62 01/08/2019    LYPERCENT 26 01/08/2019    MOPERCENT 6 01/08/2019    EOPERCENT 5 01/08/2019    BAPERCENT 1 01/08/2019    NE 6.1 01/08/2019    LYMABS MALBP 12.6 (H) 01/08/2019    CREUR 134.3 01/08/2019       Lab Results   Component Value Date    CHOLEST 101 04/02/2019    TRIG 58 04/02/2019    HDL 50 04/02/2019    LDL 39 04/02/2019    VLDL 12 04/02/2019    NONHDLC 51 04/02/2019       Wt Readings from not stated as uncontrolled     diabetes 24 yrs       Past Surgical History:   Procedure Laterality Date   • ENDOSCOPIC CARPAL TUNNEL RELEASE Left 5/28/2019    Performed by Amanda Hitchcock MD at [facility] OR   • ENDOSCOPIC CARPAL TUNNEL RELEASE Right w/Device Does not apply Kit TEST BLOOD SUGAR THREE TIMES DAILY 1 kit 0   • acetaminophen 500 MG Oral Tab Take 500 mg by mouth every 6 (six) hours as needed for Pain.      • Blood Glucose Calibration (ONETOUCH ULTRA CONTROL) In Vitro Solution Use to check bl the endocrinology labs ordered in January. Depending on the kidney function we may need to decrease the amlodipine as she clearly is getting some edema of the legs.     Uncontrolled type 2 diabetes mellitus with complication, with long-term current use of record entries made by the scribe were at my direction and in my presence. I have reviewed the chart and discharge instructions (if applicable) and agree that the record reflects my personal performance and is accurate and complete.   Jr Sellers, DO, 5/

## 2020-05-06 RX ORDER — PEN NEEDLE, DIABETIC 31 GX5/16"
NEEDLE, DISPOSABLE MISCELLANEOUS
Qty: 400 EACH | Refills: 0 | Status: SHIPPED | OUTPATIENT
Start: 2020-05-06 | End: 2021-09-16

## 2020-05-06 NOTE — TELEPHONE ENCOUNTER
Left message to call back. Needs labs and also need bs.    lov 1/24/20    Fu 5/29/20    Dr Blake Leal patient had gerald wth dr Chauncey Cardozo (5/4) and told him she stopped taking glimepiride for 1mo.  Due to pharmacy issues he told her to cont hold until next aic which n

## 2020-05-06 NOTE — TELEPHONE ENCOUNTER
Patient calling to request 3 script refills for rx:Novolog,rx:Pen Needles,rx:Glimipride 2 MG, and alternative for rx:Ozempic, insurance will not cover. Please call at:[phone],thanks.

## 2020-05-07 ENCOUNTER — APPOINTMENT (OUTPATIENT)
Dept: LAB | Age: 55
End: 2020-05-07
Attending: FAMILY MEDICINE
Payer: MEDICAID

## 2020-05-07 DIAGNOSIS — E11.65 UNCONTROLLED TYPE 2 DIABETES MELLITUS WITH COMPLICATION, WITH LONG-TERM CURRENT USE OF INSULIN (HCC): ICD-10-CM

## 2020-05-07 DIAGNOSIS — G47.62 NOCTURNAL LEG CRAMPS: ICD-10-CM

## 2020-05-07 DIAGNOSIS — E11.65 UNCONTROLLED TYPE 2 DIABETES MELLITUS WITH HYPERGLYCEMIA (HCC): ICD-10-CM

## 2020-05-07 DIAGNOSIS — E78.2 MIXED HYPERLIPIDEMIA: ICD-10-CM

## 2020-05-07 DIAGNOSIS — Z79.4 UNCONTROLLED TYPE 2 DIABETES MELLITUS WITH COMPLICATION, WITH LONG-TERM CURRENT USE OF INSULIN (HCC): ICD-10-CM

## 2020-05-07 DIAGNOSIS — R79.89 ELEVATED SERUM CREATININE: ICD-10-CM

## 2020-05-07 DIAGNOSIS — E11.8 UNCONTROLLED TYPE 2 DIABETES MELLITUS WITH COMPLICATION, WITH LONG-TERM CURRENT USE OF INSULIN (HCC): ICD-10-CM

## 2020-05-07 PROCEDURE — 82570 ASSAY OF URINE CREATININE: CPT

## 2020-05-07 PROCEDURE — 82043 UR ALBUMIN QUANTITATIVE: CPT

## 2020-05-07 PROCEDURE — 83735 ASSAY OF MAGNESIUM: CPT

## 2020-05-07 PROCEDURE — 36415 COLL VENOUS BLD VENIPUNCTURE: CPT

## 2020-05-07 PROCEDURE — 83036 HEMOGLOBIN GLYCOSYLATED A1C: CPT

## 2020-05-07 PROCEDURE — 80061 LIPID PANEL: CPT

## 2020-05-07 PROCEDURE — 80053 COMPREHEN METABOLIC PANEL: CPT

## 2020-05-07 PROCEDURE — 84443 ASSAY THYROID STIM HORMONE: CPT

## 2020-05-07 NOTE — TELEPHONE ENCOUNTER
Called patient and is not home. states [facility] will call me back with blood sugar readings. She already had blood work done today. - pending.

## 2020-05-13 NOTE — TELEPHONE ENCOUNTER
LOV 1/29/2020  F/U 5/29/2020      Component  Ref Range & Units 5/7/20  9:10 AM   HgbA1C  <5.7 % 7.8High

## 2020-05-14 RX ORDER — PEN NEEDLE, DIABETIC 31 GX5/16"
NEEDLE, DISPOSABLE MISCELLANEOUS
Qty: 400 EACH | Refills: 0 | Status: SHIPPED | OUTPATIENT
Start: 2020-05-14 | End: 2021-03-21

## 2020-05-14 RX ORDER — GLIMEPIRIDE 4 MG/1
TABLET ORAL
Qty: 30 TABLET | Refills: 3 | OUTPATIENT
Start: 2020-05-14

## 2020-05-14 RX ORDER — GLIMEPIRIDE 4 MG/1
4 TABLET ORAL
Qty: 90 TABLET | Refills: 1 | Status: SHIPPED | OUTPATIENT
Start: 2020-05-14 | End: 2020-05-29

## 2020-05-14 NOTE — TELEPHONE ENCOUNTER
Refill for pen needles sent to pharmacy. Please follow up with patient on recent blood sugar readings and also if she is currently taking ozempic?      Seems that insurance was recently not covering ozempic and if she is not taking it and her blood sugar

## 2020-05-14 NOTE — TELEPHONE ENCOUNTER
LOV 1/29/2020  F/U 5/29/2020    Glimepiride was sent on 5/13 as patient had A1C drawn. Pended rx for pen needles.

## 2020-05-29 ENCOUNTER — VIRTUAL PHONE E/M (OUTPATIENT)
Dept: ENDOCRINOLOGY CLINIC | Facility: CLINIC | Age: 55
End: 2020-05-29
Payer: MEDICAID

## 2020-05-29 DIAGNOSIS — Z79.4 UNCONTROLLED TYPE 2 DIABETES MELLITUS WITH COMPLICATION, WITH LONG-TERM CURRENT USE OF INSULIN (HCC): Primary | ICD-10-CM

## 2020-05-29 DIAGNOSIS — E11.8 UNCONTROLLED TYPE 2 DIABETES MELLITUS WITH COMPLICATION, WITH LONG-TERM CURRENT USE OF INSULIN (HCC): Primary | ICD-10-CM

## 2020-05-29 DIAGNOSIS — E11.65 UNCONTROLLED TYPE 2 DIABETES MELLITUS WITH COMPLICATION, WITH LONG-TERM CURRENT USE OF INSULIN (HCC): Primary | ICD-10-CM

## 2020-05-29 PROCEDURE — 99213 OFFICE O/P EST LOW 20 MIN: CPT | Performed by: INTERNAL MEDICINE

## 2020-05-29 RX ORDER — GLIMEPIRIDE 2 MG/1
2 TABLET ORAL
Qty: 90 TABLET | Refills: 1 | Status: SHIPPED | OUTPATIENT
Start: 2020-05-29 | End: 2021-02-15

## 2020-05-29 NOTE — PROGRESS NOTES
Virtual Telephone Check-In    Florian Mendoza verbally consents to a Virtual/Telephone Check-In visit on 05/29/20. Patient has been referred to the [facility] website at [email or web address] to review the yearly Consent to Treat document.     Patient underst Liraglutide (VICTOZA) 18 MG/3ML Subcutaneous Solution Pen-injector, ADMINISTER 1.8 MG UNDER THE SKIN DAILY, Disp: 9 mL, Rfl: 2  •  TRUEPLUS PEN NEEDLES 31G X 8 MM Does not apply Misc, USE TO INJECT INSULIN AS DIRECTED FOUR TIMES DAILY, Disp: 400 each, Rfl: Blood Glucose Calibration (ONETOUCH ULTRA CONTROL) In Vitro Solution, Use to check blood sugar three times daily, Disp: 3 vial, Rfl: 3  •  aspirin 81 MG Oral Chew Tab, CSW ONE T D, Disp: , Rfl: 6  •  Blood Glucose Monitoring Suppl ([employer] CONTOUR MONITOR) D 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   • LIPOMA REMOVAL     • WRIST ARTHROSCOP,RELEASE Rj Agudelo LIG Right 05/03/2019    Right endoscopic carpal tunnel release   • WRIST ARTHROSCOP,RELEASE ARMAAN LIG Left 05/28/2019    Left endoscopic carpal tunnel rele labs, medications, radiology tests and decision making. Appropriate medical decision-making and tests are ordered as detailed in the plan of care above.

## 2020-06-25 ENCOUNTER — TELEPHONE (OUTPATIENT)
Dept: ENDOCRINOLOGY CLINIC | Facility: CLINIC | Age: 55
End: 2020-06-25

## 2020-07-06 DIAGNOSIS — E11.65 UNCONTROLLED TYPE 2 DIABETES MELLITUS WITH HYPERGLYCEMIA (HCC): ICD-10-CM

## 2020-07-07 NOTE — TELEPHONE ENCOUNTER
Patient calling in to advise that she ran out of insulin 4 days ago. Please refill as soon as possible. Thank you.

## 2020-07-27 ENCOUNTER — TELEPHONE (OUTPATIENT)
Dept: FAMILY MEDICINE CLINIC | Facility: CLINIC | Age: 55
End: 2020-07-27

## 2020-07-27 DIAGNOSIS — I10 ESSENTIAL HYPERTENSION: ICD-10-CM

## 2020-07-27 RX ORDER — LISINOPRIL 40 MG/1
40 TABLET ORAL DAILY
Qty: 90 TABLET | Refills: 1 | Status: SHIPPED | OUTPATIENT
Start: 2020-07-27 | End: 2021-04-16

## 2020-07-27 NOTE — TELEPHONE ENCOUNTER
LISINOPRIL 40 MG Oral Tab, TAKE 1 TABLET BY MOUTH DAILY WITH AMLODIPINE FOR BLOOD PRESSURE., Disp: 90 tablet, Rfl: 0

## 2020-09-17 DIAGNOSIS — E11.65 UNCONTROLLED TYPE 2 DIABETES MELLITUS WITH HYPERGLYCEMIA (HCC): ICD-10-CM

## 2020-09-17 NOTE — TELEPHONE ENCOUNTER
•  insulin glargine (BASAGLAR KWIKPEN) 100 UNIT/ML Subcutaneous Solution Pen-injector, Inject 50 Units into the skin daily. , Disp: 30 mL, Rfl: 0

## 2020-09-22 DIAGNOSIS — E78.2 MIXED HYPERLIPIDEMIA: ICD-10-CM

## 2020-09-24 RX ORDER — ATORVASTATIN CALCIUM 20 MG/1
TABLET, FILM COATED ORAL
Qty: 90 TABLET | Refills: 1 | Status: SHIPPED | OUTPATIENT
Start: 2020-09-24 | End: 2021-01-16

## 2020-11-23 DIAGNOSIS — E11.8 UNCONTROLLED TYPE 2 DIABETES MELLITUS WITH COMPLICATION, WITH LONG-TERM CURRENT USE OF INSULIN (HCC): ICD-10-CM

## 2020-11-23 DIAGNOSIS — E11.42 DIABETIC POLYNEUROPATHY ASSOCIATED WITH TYPE 2 DIABETES MELLITUS (HCC): ICD-10-CM

## 2020-11-23 DIAGNOSIS — E11.65 UNCONTROLLED TYPE 2 DIABETES MELLITUS WITH COMPLICATION, WITH LONG-TERM CURRENT USE OF INSULIN (HCC): ICD-10-CM

## 2020-11-23 DIAGNOSIS — Z79.4 UNCONTROLLED TYPE 2 DIABETES MELLITUS WITH COMPLICATION, WITH LONG-TERM CURRENT USE OF INSULIN (HCC): ICD-10-CM

## 2020-11-23 DIAGNOSIS — G62.9 AXONAL POLYNEUROPATHY: ICD-10-CM

## 2020-11-23 DIAGNOSIS — I10 ESSENTIAL HYPERTENSION: ICD-10-CM

## 2020-11-23 RX ORDER — GABAPENTIN 100 MG/1
CAPSULE ORAL
Qty: 180 CAPSULE | Refills: 1 | Status: SHIPPED | OUTPATIENT
Start: 2020-11-23 | End: 2021-01-05

## 2020-11-23 RX ORDER — METFORMIN HYDROCHLORIDE 500 MG/1
500 TABLET, EXTENDED RELEASE ORAL 2 TIMES DAILY WITH MEALS
Qty: 180 TABLET | Refills: 1 | Status: SHIPPED | OUTPATIENT
Start: 2020-11-23 | End: 2021-05-16

## 2020-11-23 RX ORDER — AMLODIPINE BESYLATE 10 MG/1
TABLET ORAL
Qty: 90 TABLET | Refills: 1 | Status: SHIPPED | OUTPATIENT
Start: 2020-11-23 | End: 2021-01-05

## 2020-11-23 NOTE — TELEPHONE ENCOUNTER
•  amLODIPine Besylate 10 MG Oral Tab, TAKE 1 TABLET BY MOUTH DAILY. TAKE WITH THE LISINOPRIL 40 MG., Disp: 90 tablet, Rfl: 1    •  metFORMIN HCl  MG Oral Tablet 24 Hr, Take 1 tablet (500 mg total) by mouth 2 (two) times daily with meals. , Disp: 180

## 2020-12-01 NOTE — TELEPHONE ENCOUNTER
Current Outpatient Medications   Medication Sig Dispense Refill   • STEGLATRO 15 MG Oral Tab tablet TAKE 1 TABLET(15 MG) BY MOUTH DAILY 30 tablet 0

## 2020-12-31 DIAGNOSIS — E11.42 DIABETIC POLYNEUROPATHY ASSOCIATED WITH TYPE 2 DIABETES MELLITUS (HCC): ICD-10-CM

## 2020-12-31 DIAGNOSIS — E11.65 UNCONTROLLED TYPE 2 DIABETES MELLITUS WITH COMPLICATION, WITH LONG-TERM CURRENT USE OF INSULIN (HCC): ICD-10-CM

## 2020-12-31 DIAGNOSIS — Z79.4 UNCONTROLLED TYPE 2 DIABETES MELLITUS WITH COMPLICATION, WITH LONG-TERM CURRENT USE OF INSULIN (HCC): ICD-10-CM

## 2020-12-31 DIAGNOSIS — E11.8 UNCONTROLLED TYPE 2 DIABETES MELLITUS WITH COMPLICATION, WITH LONG-TERM CURRENT USE OF INSULIN (HCC): ICD-10-CM

## 2020-12-31 DIAGNOSIS — G62.9 AXONAL POLYNEUROPATHY: ICD-10-CM

## 2020-12-31 RX ORDER — LANCETS 30 GAUGE
1 EACH MISCELLANEOUS 3 TIMES DAILY
Qty: 400 EACH | Refills: 5 | Status: SHIPPED | OUTPATIENT
Start: 2020-12-31 | End: 2021-06-21

## 2020-12-31 NOTE — TELEPHONE ENCOUNTER
•  gabapentin 100 MG Oral Cap, TAKE 1 CAPSULE(100 MG) BY MOUTH TWICE DAILY, Disp: 180 capsule, Rfl: 1    •  ONETOUCH DELICA LANCETS 75Z Does not apply Misc, TEST BLOOD SUGAR THREE TIMES DAILY, Disp: 400 each, Rfl: 5    •  amLODIPine Besylate 10 MG Oral Tab

## 2021-01-04 DIAGNOSIS — G62.9 AXONAL POLYNEUROPATHY: ICD-10-CM

## 2021-01-04 DIAGNOSIS — E11.42 DIABETIC POLYNEUROPATHY ASSOCIATED WITH TYPE 2 DIABETES MELLITUS (HCC): ICD-10-CM

## 2021-01-04 DIAGNOSIS — I10 ESSENTIAL HYPERTENSION: ICD-10-CM

## 2021-01-04 NOTE — TELEPHONE ENCOUNTER
•  amLODIPine Besylate 10 MG Oral Tab, TAKE 1 TABLET BY MOUTH DAILY.  TAKE WITH THE LISINOPRIL 40 MG., Disp: 90 tablet, Rfl: 1    •  gabapentin 100 MG Oral Cap, TAKE 1 CAPSULE(100 MG) BY MOUTH TWICE DAILY, Disp: 180 capsule, Rfl: 1

## 2021-01-05 RX ORDER — GABAPENTIN 100 MG/1
CAPSULE ORAL
Qty: 180 CAPSULE | Refills: 1 | Status: SHIPPED | OUTPATIENT
Start: 2021-01-05 | End: 2021-11-12

## 2021-01-05 RX ORDER — AMLODIPINE BESYLATE 10 MG/1
TABLET ORAL
Qty: 30 TABLET | Refills: 0 | Status: SHIPPED | OUTPATIENT
Start: 2021-01-05 | End: 2021-05-16

## 2021-01-06 NOTE — TELEPHONE ENCOUNTER
Spoke with patient. Provided message below. Patient verbalized understanding. Appointment scheduled with Dr. Nelson Ibarra on 1/18/21.

## 2021-01-16 DIAGNOSIS — E78.2 MIXED HYPERLIPIDEMIA: ICD-10-CM

## 2021-01-16 RX ORDER — ATORVASTATIN CALCIUM 20 MG/1
TABLET, FILM COATED ORAL
Qty: 90 TABLET | Refills: 1 | Status: SHIPPED | OUTPATIENT
Start: 2021-01-16 | End: 2021-07-19

## 2021-01-18 ENCOUNTER — OFFICE VISIT (OUTPATIENT)
Dept: FAMILY MEDICINE CLINIC | Facility: CLINIC | Age: 56
End: 2021-01-18
Payer: MEDICAID

## 2021-01-18 VITALS
TEMPERATURE: 97 F | DIASTOLIC BLOOD PRESSURE: 64 MMHG | WEIGHT: 232 LBS | HEART RATE: 81 BPM | HEIGHT: 62 IN | SYSTOLIC BLOOD PRESSURE: 123 MMHG | BODY MASS INDEX: 42.69 KG/M2

## 2021-01-18 DIAGNOSIS — Z79.4 UNCONTROLLED TYPE 2 DIABETES MELLITUS WITH KIDNEY COMPLICATION, WITH LONG-TERM CURRENT USE OF INSULIN (HCC): Primary | ICD-10-CM

## 2021-01-18 DIAGNOSIS — J30.89 OTHER ALLERGIC RHINITIS: ICD-10-CM

## 2021-01-18 DIAGNOSIS — Z12.11 SCREENING FOR COLON CANCER: ICD-10-CM

## 2021-01-18 DIAGNOSIS — H69.82 DYSFUNCTION OF LEFT EUSTACHIAN TUBE: ICD-10-CM

## 2021-01-18 DIAGNOSIS — R80.9 MICROALBUMINURIA DUE TO TYPE 2 DIABETES MELLITUS (HCC): ICD-10-CM

## 2021-01-18 DIAGNOSIS — E11.29 MICROALBUMINURIA DUE TO TYPE 2 DIABETES MELLITUS (HCC): ICD-10-CM

## 2021-01-18 DIAGNOSIS — E11.65 UNCONTROLLED TYPE 2 DIABETES MELLITUS WITH KIDNEY COMPLICATION, WITH LONG-TERM CURRENT USE OF INSULIN (HCC): Primary | ICD-10-CM

## 2021-01-18 DIAGNOSIS — E11.29 UNCONTROLLED TYPE 2 DIABETES MELLITUS WITH KIDNEY COMPLICATION, WITH LONG-TERM CURRENT USE OF INSULIN (HCC): Primary | ICD-10-CM

## 2021-01-18 DIAGNOSIS — M65.332 TRIGGER MIDDLE FINGER OF LEFT HAND: ICD-10-CM

## 2021-01-18 PROCEDURE — 99214 OFFICE O/P EST MOD 30 MIN: CPT | Performed by: FAMILY MEDICINE

## 2021-01-18 PROCEDURE — 3078F DIAST BP <80 MM HG: CPT | Performed by: FAMILY MEDICINE

## 2021-01-18 PROCEDURE — 3008F BODY MASS INDEX DOCD: CPT | Performed by: FAMILY MEDICINE

## 2021-01-18 PROCEDURE — 3074F SYST BP LT 130 MM HG: CPT | Performed by: FAMILY MEDICINE

## 2021-01-18 RX ORDER — MONTELUKAST SODIUM 10 MG/1
10 TABLET ORAL NIGHTLY
Qty: 90 TABLET | Refills: 1 | Status: SHIPPED | OUTPATIENT
Start: 2021-01-18 | End: 2021-07-15

## 2021-01-18 RX ORDER — LIRAGLUTIDE 6 MG/ML
INJECTION SUBCUTANEOUS
Qty: 9 ML | Refills: 2 | Status: SHIPPED | OUTPATIENT
Start: 2021-01-18 | End: 2021-04-16

## 2021-01-18 RX ORDER — FLUTICASONE PROPIONATE 50 MCG
2 SPRAY, SUSPENSION (ML) NASAL DAILY
Qty: 3 BOTTLE | Refills: 1 | Status: SHIPPED | OUTPATIENT
Start: 2021-01-18 | End: 2021-12-07

## 2021-01-18 NOTE — TELEPHONE ENCOUNTER
LMTCB and sent [facility]- pt is due for apt    LOV 5/29/20 RTC 4 mos  No F/U (no show 9/28/20)
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
## 2021-01-18 NOTE — PATIENT INSTRUCTIONS
*When you see your eye doctor at [facility] have them fax me a note to [phone], attention Dr. Ivana Crenshaw.       *Go ahead and make a appointment with Amena Mabry my nurse practitioner for a Pap smear and well woman exam.

## 2021-02-15 RX ORDER — GLIMEPIRIDE 2 MG/1
TABLET ORAL
Qty: 90 TABLET | Refills: 1 | Status: SHIPPED | OUTPATIENT
Start: 2021-02-15 | End: 2021-08-16

## 2021-03-03 RX ORDER — ERTUGLIFLOZIN 15 MG/1
TABLET, FILM COATED ORAL
Qty: 30 TABLET | Refills: 0 | Status: SHIPPED | OUTPATIENT
Start: 2021-03-03 | End: 2021-06-16

## 2021-03-03 NOTE — TELEPHONE ENCOUNTER
LOV 5/29/20. No f/u. No showed to apt 9/28/20. [facility] reminder, Pended 1 month supply.

## 2021-03-18 DIAGNOSIS — Z23 NEED FOR VACCINATION: ICD-10-CM

## 2021-03-21 NOTE — TELEPHONE ENCOUNTER
LOV 5/29/20 RTC 4 mos  No F/U     [facility] reminder sent and note added to script.

## 2021-03-22 RX ORDER — PEN NEEDLE, DIABETIC 31 GX5/16"
NEEDLE, DISPOSABLE MISCELLANEOUS
Qty: 400 EACH | Refills: 0 | Status: SHIPPED | OUTPATIENT
Start: 2021-03-22 | End: 2021-09-16

## 2021-04-16 DIAGNOSIS — I10 ESSENTIAL HYPERTENSION: ICD-10-CM

## 2021-04-16 RX ORDER — LISINOPRIL 40 MG/1
TABLET ORAL
Qty: 90 TABLET | Refills: 1 | Status: SHIPPED | OUTPATIENT
Start: 2021-04-16 | End: 2021-10-14

## 2021-04-16 RX ORDER — LIRAGLUTIDE 6 MG/ML
INJECTION SUBCUTANEOUS
Qty: 9 ML | Refills: 2 | Status: SHIPPED | OUTPATIENT
Start: 2021-04-16 | End: 2021-07-16

## 2021-04-19 ENCOUNTER — HOSPITAL ENCOUNTER (EMERGENCY)
Facility: HOSPITAL | Age: 56
Discharge: HOME | End: 2021-04-19
Attending: EMERGENCY MEDICINE
Payer: MEDICAID

## 2021-04-19 VITALS
OXYGEN SATURATION: 96 % | RESPIRATION RATE: 19 BRPM | WEIGHT: 200 LBS | DIASTOLIC BLOOD PRESSURE: 84 MMHG | HEIGHT: 65 IN | BODY MASS INDEX: 33.32 KG/M2 | SYSTOLIC BLOOD PRESSURE: 140 MMHG | HEART RATE: 102 BPM | TEMPERATURE: 99 F

## 2021-04-19 DIAGNOSIS — N17.9 AKI (ACUTE KIDNEY INJURY) (HCC): Primary | ICD-10-CM

## 2021-04-19 PROCEDURE — 83690 ASSAY OF LIPASE: CPT | Performed by: EMERGENCY MEDICINE

## 2021-04-19 PROCEDURE — 80076 HEPATIC FUNCTION PANEL: CPT | Performed by: EMERGENCY MEDICINE

## 2021-04-19 PROCEDURE — 93010 ELECTROCARDIOGRAM REPORT: CPT | Performed by: EMERGENCY MEDICINE

## 2021-04-19 PROCEDURE — 84443 ASSAY THYROID STIM HORMONE: CPT | Performed by: EMERGENCY MEDICINE

## 2021-04-19 PROCEDURE — 83735 ASSAY OF MAGNESIUM: CPT | Performed by: EMERGENCY MEDICINE

## 2021-04-19 PROCEDURE — 93005 ELECTROCARDIOGRAM TRACING: CPT

## 2021-04-19 PROCEDURE — 85025 COMPLETE CBC W/AUTO DIFF WBC: CPT | Performed by: EMERGENCY MEDICINE

## 2021-04-19 PROCEDURE — 96360 HYDRATION IV INFUSION INIT: CPT

## 2021-04-19 PROCEDURE — 80048 BASIC METABOLIC PNL TOTAL CA: CPT | Performed by: EMERGENCY MEDICINE

## 2021-04-19 PROCEDURE — 99284 EMERGENCY DEPT VISIT MOD MDM: CPT

## 2021-04-19 NOTE — ED PROVIDER NOTES
Patient Seen in: [facility] AND [facility] Emergency Department      History   Patient presents with:  Fatigue    Stated Complaint: fatigue, increased irritability, crying    HPI/Subjective:   HPI  18-year-old female with diabetes, high cholesterol, hyper blood movements intact. Pupils: Pupils are equal, round, and reactive to light. Cardiovascular:      Rate and Rhythm: Normal rate and regular rhythm. Heart sounds: Normal heart sounds.    Pulmonary:      Effort: Pulmonary effort is normal.      Breath Abnormality         Status                     ---------                               -----------         ------                     CBC W/ DIFFERENTIAL[[id number]]                              Final result                 Please view results for these jose

## 2021-04-19 NOTE — ED INITIAL ASSESSMENT (HPI)
Pt from home for complaints of fatigue x 3 days with \"restless sleeping\". No fevers. Pt reports decrease in appetite and reports that she is having \"crying outbursts\" and has been \"yelling at her \" with irritability x 3 days.

## 2021-04-22 ENCOUNTER — TELEPHONE (OUTPATIENT)
Dept: FAMILY MEDICINE CLINIC | Facility: CLINIC | Age: 56
End: 2021-04-22

## 2021-04-22 ENCOUNTER — OFFICE VISIT (OUTPATIENT)
Dept: FAMILY MEDICINE CLINIC | Facility: CLINIC | Age: 56
End: 2021-04-22
Payer: MEDICAID

## 2021-04-22 ENCOUNTER — LAB ENCOUNTER (OUTPATIENT)
Dept: LAB | Age: 56
End: 2021-04-22
Attending: FAMILY MEDICINE
Payer: MEDICAID

## 2021-04-22 VITALS
HEART RATE: 93 BPM | SYSTOLIC BLOOD PRESSURE: 109 MMHG | TEMPERATURE: 98 F | HEIGHT: 65 IN | WEIGHT: 254.38 LBS | BODY MASS INDEX: 42.38 KG/M2 | DIASTOLIC BLOOD PRESSURE: 70 MMHG

## 2021-04-22 DIAGNOSIS — L30.9 ECZEMA, UNSPECIFIED TYPE: ICD-10-CM

## 2021-04-22 DIAGNOSIS — E11.65 UNCONTROLLED TYPE 2 DIABETES MELLITUS WITH KIDNEY COMPLICATION, WITH LONG-TERM CURRENT USE OF INSULIN (HCC): ICD-10-CM

## 2021-04-22 DIAGNOSIS — N17.9 AKI (ACUTE KIDNEY INJURY) (HCC): ICD-10-CM

## 2021-04-22 DIAGNOSIS — E11.29 UNCONTROLLED TYPE 2 DIABETES MELLITUS WITH KIDNEY COMPLICATION, WITH LONG-TERM CURRENT USE OF INSULIN (HCC): ICD-10-CM

## 2021-04-22 DIAGNOSIS — Z79.4 UNCONTROLLED TYPE 2 DIABETES MELLITUS WITH KIDNEY COMPLICATION, WITH LONG-TERM CURRENT USE OF INSULIN (HCC): ICD-10-CM

## 2021-04-22 DIAGNOSIS — R53.83 LETHARGY: ICD-10-CM

## 2021-04-22 DIAGNOSIS — R01.1 HEART MURMUR: ICD-10-CM

## 2021-04-22 DIAGNOSIS — R53.1 WEAKNESS GENERALIZED: ICD-10-CM

## 2021-04-22 DIAGNOSIS — E11.29 MICROALBUMINURIA DUE TO TYPE 2 DIABETES MELLITUS (HCC): ICD-10-CM

## 2021-04-22 DIAGNOSIS — R80.9 MICROALBUMINURIA DUE TO TYPE 2 DIABETES MELLITUS (HCC): ICD-10-CM

## 2021-04-22 DIAGNOSIS — R53.83 LETHARGY: Primary | ICD-10-CM

## 2021-04-22 DIAGNOSIS — Z72.820 POOR SLEEP: ICD-10-CM

## 2021-04-22 PROCEDURE — 36415 COLL VENOUS BLD VENIPUNCTURE: CPT

## 2021-04-22 PROCEDURE — 80061 LIPID PANEL: CPT

## 2021-04-22 PROCEDURE — 3061F NEG MICROALBUMINURIA REV: CPT | Performed by: FAMILY MEDICINE

## 2021-04-22 PROCEDURE — 85025 COMPLETE CBC W/AUTO DIFF WBC: CPT

## 2021-04-22 PROCEDURE — 3060F POS MICROALBUMINURIA REV: CPT | Performed by: INTERNAL MEDICINE

## 2021-04-22 PROCEDURE — 3046F HEMOGLOBIN A1C LEVEL >9.0%: CPT | Performed by: INTERNAL MEDICINE

## 2021-04-22 PROCEDURE — 87086 URINE CULTURE/COLONY COUNT: CPT

## 2021-04-22 PROCEDURE — 81001 URINALYSIS AUTO W/SCOPE: CPT

## 2021-04-22 PROCEDURE — 99214 OFFICE O/P EST MOD 30 MIN: CPT | Performed by: FAMILY MEDICINE

## 2021-04-22 PROCEDURE — 82570 ASSAY OF URINE CREATININE: CPT

## 2021-04-22 PROCEDURE — 87186 SC STD MICRODIL/AGAR DIL: CPT

## 2021-04-22 PROCEDURE — 86769 SARS-COV-2 COVID-19 ANTIBODY: CPT

## 2021-04-22 PROCEDURE — 3008F BODY MASS INDEX DOCD: CPT | Performed by: FAMILY MEDICINE

## 2021-04-22 PROCEDURE — 84460 ALANINE AMINO (ALT) (SGPT): CPT

## 2021-04-22 PROCEDURE — 83036 HEMOGLOBIN GLYCOSYLATED A1C: CPT

## 2021-04-22 PROCEDURE — 80048 BASIC METABOLIC PNL TOTAL CA: CPT

## 2021-04-22 PROCEDURE — 3078F DIAST BP <80 MM HG: CPT | Performed by: FAMILY MEDICINE

## 2021-04-22 PROCEDURE — 82043 UR ALBUMIN QUANTITATIVE: CPT

## 2021-04-22 PROCEDURE — 84443 ASSAY THYROID STIM HORMONE: CPT

## 2021-04-22 PROCEDURE — 3060F POS MICROALBUMINURIA REV: CPT | Performed by: FAMILY MEDICINE

## 2021-04-22 PROCEDURE — 84450 TRANSFERASE (AST) (SGOT): CPT

## 2021-04-22 PROCEDURE — 3061F NEG MICROALBUMINURIA REV: CPT | Performed by: INTERNAL MEDICINE

## 2021-04-22 PROCEDURE — 87077 CULTURE AEROBIC IDENTIFY: CPT

## 2021-04-22 PROCEDURE — 3074F SYST BP LT 130 MM HG: CPT | Performed by: FAMILY MEDICINE

## 2021-04-22 RX ORDER — CIPROFLOXACIN 500 MG/1
500 TABLET, FILM COATED ORAL 2 TIMES DAILY
Qty: 14 TABLET | Refills: 0 | Status: SHIPPED | OUTPATIENT
Start: 2021-04-22 | End: 2021-04-29

## 2021-04-22 NOTE — PROGRESS NOTES
Patient ID: Venkatesh Hopkins is a 61year old female. HPI  Patient presents with:  Er F/u: kidney injury    Last seen by me on 1/18/2021. Pt reports she has had a lot of stress recently. She is not working and has been on disability for 13 years.  She for weakness. Psychiatric/Behavioral: Positive for sleep disturbance.            Medical History:      Past Medical History:   Diagnosis Date   • Acute carpal tunnel syndrome of right wrist 04/11/2019    Right endoscopic carpal tunnel release   • Carpal t Oral Tab TAKE 1 TABLET BY MOUTH DAILY. TAKE WITH THE LISINOPRIL 40 MG.  30 tablet 0   • gabapentin 100 MG Oral Cap TAKE 1 CAPSULE(100 MG) BY MOUTH TWICE DAILY 180 capsule 1   • OneTouch Delica Lancets 03Q Does not apply Misc 1 strip by In Vitro route 3 (thr Cardiovascular: Normal rate and regular rhythm. 2/6 SERENITY at right upper sternal border   Pulmonary/Chest: Effort normal and breath sounds normal. No respiratory distress. Neurological: Patient is alert and oriented to person, place, and time.    Skin: Sk Caity    4/22/2021    By signing my name below, Mer Holley,  attest that this documentation has been prepared under the direction and in the presence of Patrica Vega DO.    Electronically Signed: Vince Webster, 4/22/2021, 10:19 AM.    I,

## 2021-04-22 NOTE — TELEPHONE ENCOUNTER
Dann Elmore calling from the [facility] lab :     Critical high Blood glucose result of 464    Please see other aspects of BMP and advise          BASIC METABOLIC PANEL (8)  Order: [id number]  Collected:  4/22/2021 11:20 AM Status:  Final result Dx:  Isela Lancaster

## 2021-04-23 ENCOUNTER — IMMUNIZATION (OUTPATIENT)
Dept: LAB | Facility: HOSPITAL | Age: 56
End: 2021-04-23
Attending: HOSPITALIST
Payer: MEDICAID

## 2021-04-23 DIAGNOSIS — Z23 NEED FOR VACCINATION: Primary | ICD-10-CM

## 2021-04-23 PROCEDURE — 0011A SARSCOV2 VAC 100MCG/0.5ML IM: CPT

## 2021-05-16 DIAGNOSIS — I10 ESSENTIAL HYPERTENSION: ICD-10-CM

## 2021-05-16 DIAGNOSIS — E11.65 UNCONTROLLED TYPE 2 DIABETES MELLITUS WITH COMPLICATION, WITH LONG-TERM CURRENT USE OF INSULIN (HCC): ICD-10-CM

## 2021-05-16 DIAGNOSIS — E11.8 UNCONTROLLED TYPE 2 DIABETES MELLITUS WITH COMPLICATION, WITH LONG-TERM CURRENT USE OF INSULIN (HCC): ICD-10-CM

## 2021-05-16 DIAGNOSIS — Z79.4 UNCONTROLLED TYPE 2 DIABETES MELLITUS WITH COMPLICATION, WITH LONG-TERM CURRENT USE OF INSULIN (HCC): ICD-10-CM

## 2021-05-16 RX ORDER — AMLODIPINE BESYLATE 10 MG/1
TABLET ORAL
Qty: 90 TABLET | Refills: 0 | Status: SHIPPED | OUTPATIENT
Start: 2021-05-16 | End: 2021-08-16

## 2021-05-16 RX ORDER — METFORMIN HYDROCHLORIDE 500 MG/1
TABLET, EXTENDED RELEASE ORAL
Qty: 180 TABLET | Refills: 1 | Status: SHIPPED | OUTPATIENT
Start: 2021-05-16 | End: 2021-11-12

## 2021-05-21 ENCOUNTER — IMMUNIZATION (OUTPATIENT)
Dept: LAB | Facility: HOSPITAL | Age: 56
End: 2021-05-21
Attending: EMERGENCY MEDICINE
Payer: MEDICAID

## 2021-05-21 DIAGNOSIS — Z23 NEED FOR VACCINATION: Primary | ICD-10-CM

## 2021-05-21 PROCEDURE — 0012A SARSCOV2 VAC 100MCG/0.5ML IM: CPT

## 2021-05-24 DIAGNOSIS — L30.9 ECZEMA, UNSPECIFIED TYPE: ICD-10-CM

## 2021-05-24 NOTE — TELEPHONE ENCOUNTER
Current Outpatient Medications:     •  triamcinolone acetonide 0.1 % External Cream, Apply topically 2 (two) times daily as needed. , Disp: 60 g, Rfl: 0

## 2021-05-26 DIAGNOSIS — E11.65 UNCONTROLLED TYPE 2 DIABETES MELLITUS WITH HYPERGLYCEMIA (HCC): ICD-10-CM

## 2021-05-26 RX ORDER — INSULIN GLARGINE 100 [IU]/ML
INJECTION, SOLUTION SUBCUTANEOUS
Qty: 45 ML | Refills: 0 | Status: SHIPPED | OUTPATIENT
Start: 2021-05-26 | End: 2021-09-16

## 2021-05-26 NOTE — TELEPHONE ENCOUNTER
LOV 05/29/20. RTC 4 months. Patient has not been reading the [employer] messages. Called to schedule first available. Booked 07/26/21. Pended 3 month supply.

## 2021-06-16 RX ORDER — ERTUGLIFLOZIN 15 MG/1
TABLET, FILM COATED ORAL
Qty: 90 TABLET | Refills: 0 | Status: SHIPPED | OUTPATIENT
Start: 2021-06-16 | End: 2021-06-17

## 2021-06-17 RX ORDER — ERTUGLIFLOZIN 15 MG/1
TABLET, FILM COATED ORAL
Qty: 30 TABLET | Refills: 0 | Status: SHIPPED | OUTPATIENT
Start: 2021-06-17 | End: 2021-09-17

## 2021-06-17 NOTE — TELEPHONE ENCOUNTER
LOV: 05/29/20  LR: 6/16/21    OK to refuse - duplicate    Future Appointments   Date Time Provider Kathryn Chew   7/26/2021  2:30 PM Sherice Sims MD [address] [place] [address]

## 2021-06-21 ENCOUNTER — TELEPHONE (OUTPATIENT)
Dept: FAMILY MEDICINE CLINIC | Facility: CLINIC | Age: 56
End: 2021-06-21

## 2021-06-21 DIAGNOSIS — Z79.4 UNCONTROLLED TYPE 2 DIABETES MELLITUS WITH COMPLICATION, WITH LONG-TERM CURRENT USE OF INSULIN (HCC): ICD-10-CM

## 2021-06-21 DIAGNOSIS — E11.65 UNCONTROLLED TYPE 2 DIABETES MELLITUS WITH COMPLICATION, WITH LONG-TERM CURRENT USE OF INSULIN (HCC): ICD-10-CM

## 2021-06-21 DIAGNOSIS — E11.8 UNCONTROLLED TYPE 2 DIABETES MELLITUS WITH COMPLICATION, WITH LONG-TERM CURRENT USE OF INSULIN (HCC): ICD-10-CM

## 2021-06-21 RX ORDER — BLOOD SUGAR DIAGNOSTIC
STRIP MISCELLANEOUS
Qty: 400 STRIP | Refills: 3 | Status: SHIPPED | OUTPATIENT
Start: 2021-06-21

## 2021-06-21 RX ORDER — LANCETS 30 GAUGE
1 EACH MISCELLANEOUS 3 TIMES DAILY
Qty: 400 EACH | Refills: 5 | Status: SHIPPED | OUTPATIENT
Start: 2021-06-21

## 2021-06-21 NOTE — TELEPHONE ENCOUNTER
•  OneTouch Delica Lancets 89N Does not apply Misc, 1 strip by In Vitro route 3 (three) times daily. , Disp: 400 each, Rfl: 5

## 2021-07-15 DIAGNOSIS — E78.2 MIXED HYPERLIPIDEMIA: ICD-10-CM

## 2021-07-15 DIAGNOSIS — H69.92 DYSFUNCTION OF LEFT EUSTACHIAN TUBE: ICD-10-CM

## 2021-07-15 DIAGNOSIS — J30.89 OTHER ALLERGIC RHINITIS: ICD-10-CM

## 2021-07-15 RX ORDER — MONTELUKAST SODIUM 10 MG/1
10 TABLET ORAL NIGHTLY
Qty: 90 TABLET | Refills: 1 | Status: SHIPPED | OUTPATIENT
Start: 2021-07-15 | End: 2022-01-11

## 2021-07-16 RX ORDER — LIRAGLUTIDE 6 MG/ML
INJECTION SUBCUTANEOUS
Qty: 9 ML | Refills: 0 | Status: SHIPPED | OUTPATIENT
Start: 2021-07-16 | End: 2021-08-16

## 2021-07-16 NOTE — TELEPHONE ENCOUNTER
LOV: 05/29/20    Future Appointments   Date Time Provider Kathryn Chew   7/26/2021  2:30 PM Noé Calvo MD [address] [place] [address]

## 2021-07-19 RX ORDER — ATORVASTATIN CALCIUM 20 MG/1
TABLET, FILM COATED ORAL
Qty: 90 TABLET | Refills: 1 | Status: SHIPPED | OUTPATIENT
Start: 2021-07-19 | End: 2022-01-11

## 2021-08-14 ENCOUNTER — HOSPITAL ENCOUNTER (OUTPATIENT)
Dept: CV DIAGNOSTICS | Facility: HOSPITAL | Age: 56
Discharge: HOME | End: 2021-08-14
Attending: FAMILY MEDICINE
Payer: MEDICAID

## 2021-08-14 DIAGNOSIS — I10 ESSENTIAL HYPERTENSION: ICD-10-CM

## 2021-08-14 DIAGNOSIS — R53.83 LETHARGY: ICD-10-CM

## 2021-08-14 DIAGNOSIS — R01.1 HEART MURMUR: ICD-10-CM

## 2021-08-14 PROCEDURE — 93306 TTE W/DOPPLER COMPLETE: CPT | Performed by: FAMILY MEDICINE

## 2021-08-16 DIAGNOSIS — E11.65 UNCONTROLLED TYPE 2 DIABETES MELLITUS WITH COMPLICATION, WITH LONG-TERM CURRENT USE OF INSULIN (HCC): Primary | ICD-10-CM

## 2021-08-16 DIAGNOSIS — Z79.4 UNCONTROLLED TYPE 2 DIABETES MELLITUS WITH COMPLICATION, WITH LONG-TERM CURRENT USE OF INSULIN (HCC): Primary | ICD-10-CM

## 2021-08-16 DIAGNOSIS — E11.8 UNCONTROLLED TYPE 2 DIABETES MELLITUS WITH COMPLICATION, WITH LONG-TERM CURRENT USE OF INSULIN (HCC): Primary | ICD-10-CM

## 2021-08-16 RX ORDER — LIRAGLUTIDE 6 MG/ML
INJECTION SUBCUTANEOUS
Qty: 9 ML | Refills: 0 | Status: SHIPPED | OUTPATIENT
Start: 2021-08-16 | End: 2021-09-13

## 2021-08-16 RX ORDER — GLIMEPIRIDE 2 MG/1
TABLET ORAL
Qty: 30 TABLET | Refills: 0 | Status: SHIPPED | OUTPATIENT
Start: 2021-08-16 | End: 2021-09-13

## 2021-08-16 RX ORDER — AMLODIPINE BESYLATE 10 MG/1
TABLET ORAL
Qty: 90 TABLET | Refills: 0 | Status: SHIPPED | OUTPATIENT
Start: 2021-08-16 | End: 2021-12-28

## 2021-09-13 RX ORDER — GLIMEPIRIDE 2 MG/1
TABLET ORAL
Qty: 30 TABLET | Refills: 0 | Status: SHIPPED | OUTPATIENT
Start: 2021-09-13 | End: 2021-09-17

## 2021-09-13 RX ORDER — LIRAGLUTIDE 6 MG/ML
INJECTION SUBCUTANEOUS
Qty: 9 ML | Refills: 0 | Status: SHIPPED | OUTPATIENT
Start: 2021-09-13 | End: 2021-09-16

## 2021-09-15 DIAGNOSIS — E11.65 UNCONTROLLED TYPE 2 DIABETES MELLITUS WITH HYPERGLYCEMIA (HCC): ICD-10-CM

## 2021-09-16 RX ORDER — LIRAGLUTIDE 6 MG/ML
INJECTION SUBCUTANEOUS
Qty: 9 ML | Refills: 0 | Status: SHIPPED | OUTPATIENT
Start: 2021-09-16 | End: 2021-11-12

## 2021-09-16 RX ORDER — PEN NEEDLE, DIABETIC 31 GX5/16"
NEEDLE, DISPOSABLE MISCELLANEOUS
Qty: 400 EACH | Refills: 0 | Status: SHIPPED | OUTPATIENT
Start: 2021-09-16

## 2021-09-16 RX ORDER — INSULIN GLARGINE 100 [IU]/ML
INJECTION, SOLUTION SUBCUTANEOUS
Qty: 45 ML | Refills: 0 | Status: SHIPPED | OUTPATIENT
Start: 2021-09-16 | End: 2021-09-17

## 2021-09-17 ENCOUNTER — OFFICE VISIT (OUTPATIENT)
Dept: ENDOCRINOLOGY CLINIC | Facility: CLINIC | Age: 56
End: 2021-09-17
Payer: MEDICAID

## 2021-09-17 VITALS
SYSTOLIC BLOOD PRESSURE: 133 MMHG | BODY MASS INDEX: 39 KG/M2 | DIASTOLIC BLOOD PRESSURE: 73 MMHG | HEART RATE: 85 BPM | WEIGHT: 237 LBS

## 2021-09-17 DIAGNOSIS — E11.65 UNCONTROLLED TYPE 2 DIABETES MELLITUS WITH HYPERGLYCEMIA (HCC): Primary | ICD-10-CM

## 2021-09-17 LAB
CARTRIDGE LOT#: ABNORMAL NUMERIC
GLUCOSE BLOOD: 187
HEMOGLOBIN A1C: 10.3 % (ref 4.3–5.6)
TEST STRIP LOT #: NORMAL NUMERIC

## 2021-09-17 PROCEDURE — 83036 HEMOGLOBIN GLYCOSYLATED A1C: CPT | Performed by: INTERNAL MEDICINE

## 2021-09-17 PROCEDURE — 3046F HEMOGLOBIN A1C LEVEL >9.0%: CPT | Performed by: FAMILY MEDICINE

## 2021-09-17 PROCEDURE — 36416 COLLJ CAPILLARY BLOOD SPEC: CPT | Performed by: INTERNAL MEDICINE

## 2021-09-17 PROCEDURE — 3078F DIAST BP <80 MM HG: CPT | Performed by: INTERNAL MEDICINE

## 2021-09-17 PROCEDURE — 82947 ASSAY GLUCOSE BLOOD QUANT: CPT | Performed by: INTERNAL MEDICINE

## 2021-09-17 PROCEDURE — 99214 OFFICE O/P EST MOD 30 MIN: CPT | Performed by: INTERNAL MEDICINE

## 2021-09-17 PROCEDURE — 3075F SYST BP GE 130 - 139MM HG: CPT | Performed by: INTERNAL MEDICINE

## 2021-09-17 RX ORDER — GLIMEPIRIDE 4 MG/1
4 TABLET ORAL
Qty: 90 TABLET | Refills: 1 | Status: SHIPPED | OUTPATIENT
Start: 2021-09-17

## 2021-09-17 RX ORDER — INSULIN GLARGINE 100 [IU]/ML
INJECTION, SOLUTION SUBCUTANEOUS
Qty: 60 ML | Refills: 1 | Status: SHIPPED | OUTPATIENT
Start: 2021-09-17

## 2021-09-17 NOTE — PATIENT INSTRUCTIONS
STOP Stegaltro    START Farxiga 10mg daily    INCREASE Basaglar to 60 unit subcutaneous daily    INCREASE Glimepiride to 4mg daily    CONTINUE Victoza and Metformin

## 2021-09-17 NOTE — PROGRESS NOTES
Name: Marvel Gallego  Date: 9/17/2021    HISTORY OF PRESENT ILLNESS   Marvel Gallego is a 61year old female who presents for diabetes mellitus diagnosed 23 years ago, transitioned to insulin therapy 15 years ago.       Prior HbA, C or glycohemoglobin wer 1 TABLET(2 MG) BY MOUTH DAILY WITH BREAKFAST, Disp: 30 tablet, Rfl: 0  •  AMLODIPINE BESYLATE 10 MG Oral Tab, TAKE 1 TABLET BY MOUTH EVERY DAY(TAKE WITH LISINOPRIL 40MG), Disp: 90 tablet, Rfl: 0  •  ATORVASTATIN 20 MG Oral Tab, TAKE 1 TABLET(20 MG) BY MOUT [employer] Contour Next Monitor. , Disp: 1 Device, Rfl: 0  •  BD PEN NEEDLE SHORT U/F 31G X 8 MM Does not apply Misc, U TO INJ INSULIN UTD QID, Disp: , Rfl: 3     Allergies:     Penicillins             HIVES    Social History:   Social History    Socioeconomic H breathing.     Lymph Nodes:  No abnormal nodes noted  Skin:  normal moisture and skin texture  Hematologic:  no excessive bruising  Psychiatric:  oriented to time, self, and place  Feet: AbNormal monofilament exam, 2+ DP, PT pulses, mild onchomycosis, no sk

## 2021-09-22 ENCOUNTER — TELEPHONE (OUTPATIENT)
Dept: ENDOCRINOLOGY CLINIC | Facility: CLINIC | Age: 56
End: 2021-09-22

## 2021-09-22 NOTE — TELEPHONE ENCOUNTER
Medication PA Requested: dapagliflozin (FARXIGA) 10 MG Oral Tab,  Disp:                                                         CoverMyMeds Used:  Key:  Quantity: 30 tablet, Rfl: 5   Day Supply: 30  Sig: Take 1 tablet (10 mg total) by mouth daily. ,  [address]

## 2021-09-22 NOTE — TELEPHONE ENCOUNTER
•  dapagliflozin (FARXIGA) 10 MG Oral Tab, Take 1 tablet (10 mg total) by mouth daily. , Disp: 30 tablet, Rfl: 5    KEY: [id number]

## 2021-09-23 NOTE — TELEPHONE ENCOUNTER
Medication PA Requested: dapagliflozin (FARXIGA) 10 MG Oral Tab,  Disp:                                                         CoverMyMeds Used:  Key:  [id number]  Quantity: 30 tablet, Rfl: 5   Day Supply: 30  Sig: Take 1 tablet (10 mg total) by mouth daily

## 2021-09-28 NOTE — TELEPHONE ENCOUNTER
PA Denied for [place]. No update in cover my meds, called [facility] [employer] [phone], spoke with Julian Fernandez. States [place] had duo review. Step therapy was approved but is nonpreferred.  Patient needs to try Invokana and Jardiance or submit informa

## 2021-09-29 RX ORDER — EMPAGLIFLOZIN 25 MG/1
25 TABLET, FILM COATED ORAL DAILY
Qty: 90 TABLET | Refills: 0 | Status: SHIPPED | OUTPATIENT
Start: 2021-09-29

## 2021-09-29 NOTE — TELEPHONE ENCOUNTER
Spoke to patient who is agreeable to trying Jardiance. Prescription sent to patient's pharmacy per provider request below.

## 2021-10-12 ENCOUNTER — TELEPHONE (OUTPATIENT)
Dept: ENDOCRINOLOGY CLINIC | Facility: CLINIC | Age: 56
End: 2021-10-12

## 2021-10-13 DIAGNOSIS — I10 ESSENTIAL HYPERTENSION: ICD-10-CM

## 2021-10-13 NOTE — TELEPHONE ENCOUNTER
Please review. Protocol failed / No protocol.     Requested Prescriptions   Pending Prescriptions Disp Refills    LISINOPRIL 40 MG Oral Tab [Pharmacy Med Name: LISINOPRIL 40MG TABLETS] 90 tablet 1     Sig: TAKE 1 TABLET BY MOUTH DAILY WITH AMLODIPINE        Hypertensive Medications Protocol Failed - 10/13/2021  5:58 AM        Failed - GFR Non- > 50     Lab Results   Component Value Date    GFRNAA 28 (L) 04/22/2021                 Passed - CMP or BMP in past 12 months        Passed - Appointment in past 6 or next 3 months              Future Appointments         Provider Department Appt Notes    In 1 month Odilia Jurado, [facility] Endocrinology 2 month fu             Recent Outpatient Visits              3 weeks ago Uncontrolled type 2 diabetes mellitus with hyperglycemia [facility])    [address] Endocrinology Elizabeth Hermosillo MD    Office Visit    5 months ago [address] [facility], [facility], [address], [place],     Office Visit    8 months ago Uncontrolled type 2 diabetes mellitus with kidney complication, with long-term current use of insulin ([facility]    [address] [facility], [address], [place],     Office Visit    1 year ago Uncontrolled type 2 diabetes mellitus with complication, with long-term current use of insulin [facility])    [address] [facility] Endocrinology Elizabeth Hermosillo MD    Virtual Phone E/M    1 year ago Essential hypertension    [address] [facility], [address], [place], [place]    Office Visit

## 2021-10-14 RX ORDER — LISINOPRIL 40 MG/1
TABLET ORAL
Qty: 90 TABLET | Refills: 0 | Status: SHIPPED | OUTPATIENT
Start: 2021-10-14 | End: 2022-01-11

## 2021-10-22 ENCOUNTER — APPOINTMENT (OUTPATIENT)
Dept: GENERAL RADIOLOGY | Facility: HOSPITAL | Age: 56
End: 2021-10-22
Payer: MEDICAID

## 2021-10-22 ENCOUNTER — HOSPITAL ENCOUNTER (EMERGENCY)
Facility: HOSPITAL | Age: 56
Discharge: HOME | End: 2021-10-22
Payer: MEDICAID

## 2021-10-22 VITALS
TEMPERATURE: 99 F | BODY MASS INDEX: 35.82 KG/M2 | SYSTOLIC BLOOD PRESSURE: 138 MMHG | HEIGHT: 65 IN | RESPIRATION RATE: 16 BRPM | DIASTOLIC BLOOD PRESSURE: 58 MMHG | HEART RATE: 75 BPM | OXYGEN SATURATION: 97 % | WEIGHT: 215 LBS

## 2021-10-22 DIAGNOSIS — M25.562 ACUTE PAIN OF LEFT KNEE: Primary | ICD-10-CM

## 2021-10-22 PROCEDURE — 73562 X-RAY EXAM OF KNEE 3: CPT

## 2021-10-22 PROCEDURE — 99283 EMERGENCY DEPT VISIT LOW MDM: CPT

## 2021-10-22 RX ORDER — ACETAMINOPHEN 500 MG
1000 TABLET ORAL ONCE
Status: COMPLETED | OUTPATIENT
Start: 2021-10-22 | End: 2021-10-22

## 2021-10-22 NOTE — ED PROVIDER NOTES
Patient Seen in: [facility] AND [facility] Emergency Department    History   Patient presents with:  Knee Pain    Stated Complaint: Left knee pain     HPI    HPI: Fredi Beatty is a 61year old female who presents for pain to the medial line of the left knee a UNIT/ML Subcutaneous Solution Pen-injector,  ADMINISTER 60 UNITS UNDER THE SKIN DAILY   VICTOZA 18 MG/3ML Subcutaneous Solution Pen-injector,  ADMINISTER 1.8 MG UNDER THE SKIN DAILY   TRUEPLUS 5-BEVEL PEN NEEDLES 31G X 8 MM Does not apply Misc,  USE TO INJ Father    • Diabetes Mother        Social History    Tobacco Use      Smoking status: Former Smoker        Packs/day: 0.50        Years: 15.00        Pack years: 7.5        Quit date: 2016        Years since quittin.7      Smokeless tobacco: Never Left knee x-ray discussed with patient. Chronic findings. Patient was encouraged to follow-up with orthopedics. Supportive measures discussed with patient. She is able to ambulate. Return to the ER as needed. Ace wrap provided. CMS intact.     Doc Alarcon

## 2021-10-22 NOTE — ED QUICK NOTES
Assumed care to this pt who came in ambulatory with the use of a cane from triage to room 56 for left knee pain x 3 weeks , denies trauma, noted swelling to left lower leg. Pt is A/O x 4, breathing is unlabored.   Pt changed to hospital gown, warm blanket,

## 2021-10-22 NOTE — ED INITIAL ASSESSMENT (HPI)
Pt reports knee pain for 3 weeks, reports increase in pain, pt reports unable to sleep due to pain, denies injury

## 2021-11-12 DIAGNOSIS — IMO0002 UNCONTROLLED TYPE 2 DIABETES MELLITUS WITH COMPLICATION, WITH LONG-TERM CURRENT USE OF INSULIN: ICD-10-CM

## 2021-11-12 DIAGNOSIS — E11.42 DIABETIC POLYNEUROPATHY ASSOCIATED WITH TYPE 2 DIABETES MELLITUS (HCC): ICD-10-CM

## 2021-11-12 DIAGNOSIS — G62.9 AXONAL POLYNEUROPATHY: ICD-10-CM

## 2021-11-12 RX ORDER — GABAPENTIN 100 MG/1
CAPSULE ORAL
Qty: 180 CAPSULE | Refills: 1 | Status: SHIPPED | OUTPATIENT
Start: 2021-11-12 | End: 2022-03-29

## 2021-11-12 RX ORDER — LIRAGLUTIDE 6 MG/ML
INJECTION SUBCUTANEOUS
Qty: 9 ML | Refills: 2 | Status: SHIPPED | OUTPATIENT
Start: 2021-11-12 | End: 2021-12-06

## 2021-11-12 NOTE — TELEPHONE ENCOUNTER
Refill passed per [facility] Mario protocol.     Requested Prescriptions   Pending Prescriptions Disp Refills    GABAPENTIN 100 MG Oral Cap [Pharmacy Med Name: GABAPENTIN 100MG CAPSULES] 180 capsule 1     Sig: TAKE 1 CAPSULE(100 MG) BY MOUTH TWICE DAILY        Neurology Medications Passed - 11/12/2021  5:59 AM        Passed - Appointment in the past 6 or next 3 months              Future Appointments         Provider Department Appt Notes    In 3 days Lawrence Adorno, [facility], [facility], Subhash follow up   policy informed    In 6 days [facility] Endocrinology 2 month fu             Recent Outpatient Visits              1 month ago Uncontrolled type 2 diabetes mellitus with hyperglycemia [facility])    [address] [place] [address] Mario Endocrinology Masha Dc MD    Office Visit    6 months ago [address] [facility], [facility], [address], [address] Shahida Corrigan DO    Office Visit    9 months ago Uncontrolled type 2 diabetes mellitus with kidney complication, with long-term current use of insulin ([facility]    [facility], [address], [address] Shahida Corrigan DO    Office Visit    1 year ago Uncontrolled type 2 diabetes mellitus with complication, with long-term current use of insulin [facility])    [address] [place] [address] [facility] Endocrinology Masha Dc MD    Virtual Phone E/M    1 year ago Essential hypertension    [address] [place], [place] [address]susie Martin [facility], [address], [address] Shahida Corrigan, [place]    Office Visit

## 2021-11-12 NOTE — TELEPHONE ENCOUNTER
Please review; protocol failed.     Requested Prescriptions   Pending Prescriptions Disp Refills    METFORMIN HCL  MG Oral Tablet 24 Hr [Pharmacy Med Name: METFORMIN ER 500MG 24HR TABS] 180 tablet 1     Sig: TAKE 1 TABLET(500 MG) BY MOUTH TWICE DAILY WITH MEALS        Diabetes Medication Protocol Failed - 11/12/2021  5:59 AM        Failed - Last A1C < 7.5 and within past 6 months     Lab Results   Component Value Date    A1C 10.3 (A) 09/17/2021               Failed - GFR Non- > 50     Lab Results   Component Value Date    GFRNAA 28 (L) 04/22/2021                 Passed - Appointment in past 6 or next 3 months        Passed - GFR in the past 12 months              Future Appointments         Provider Department Appt Notes    In 3 days Re[facility], [facility], [place] follow up   policy informed    In 6 days [facility], [address] [place] [address] Endocrinology 2 month fu              Recent Outpatient Visits              1 month ago Uncontrolled type 2 diabetes mellitus with hyperglycemia [facility])    [place] [employer] [facility] Endocrinology Ethel Munoz MD    Office Visit    6 months ago [address] [facility], [address], [address], [place],     Office Visit    9 months ago Uncontrolled type 2 diabetes mellitus with kidney complication, with long-term current use of insulin ([facility]    [place] [employer] [facility] [facility], [address], [address], [place],     Office Visit    1 year ago Uncontrolled type 2 diabetes mellitus with complication, with long-term current use of insulin [facility])    [place] [employer] [facility] Endocrinology Ethel Munoz MD    Virtual Phone E/M    1 year ago Essential hypertension    [facility], [facility], [address], [place], [place]    Office Visit

## 2021-11-13 RX ORDER — METFORMIN HYDROCHLORIDE 500 MG/1
500 TABLET, EXTENDED RELEASE ORAL 2 TIMES DAILY WITH MEALS
Qty: 180 TABLET | Refills: 0 | Status: SHIPPED | OUTPATIENT
Start: 2021-11-13 | End: 2022-02-10

## 2021-12-05 DIAGNOSIS — L30.9 ECZEMA, UNSPECIFIED TYPE: ICD-10-CM

## 2021-12-06 ENCOUNTER — TELEPHONE (OUTPATIENT)
Dept: FAMILY MEDICINE CLINIC | Facility: CLINIC | Age: 56
End: 2021-12-06

## 2021-12-06 DIAGNOSIS — J30.89 OTHER ALLERGIC RHINITIS: ICD-10-CM

## 2021-12-06 DIAGNOSIS — H69.92 DYSFUNCTION OF LEFT EUSTACHIAN TUBE: ICD-10-CM

## 2021-12-06 RX ORDER — LIRAGLUTIDE 6 MG/ML
INJECTION SUBCUTANEOUS
Qty: 9 ML | Refills: 2 | Status: SHIPPED | OUTPATIENT
Start: 2021-12-06 | End: 2022-01-13

## 2021-12-06 RX ORDER — ERTUGLIFLOZIN 15 MG/1
TABLET, FILM COATED ORAL
Qty: 90 TABLET | Refills: 1 | Status: SHIPPED | OUTPATIENT
Start: 2021-12-06 | End: 2021-12-09

## 2021-12-06 NOTE — TELEPHONE ENCOUNTER
Per pharmacy PA is needed for the following medication. Drug: Fluticasone 50MCG nasal sp (120) RX    Message: Plan limits. Max Daily dose of .[phone] exceeded. Please call plan at [phone] to initiate PA.  Patient id# [id number]

## 2021-12-07 RX ORDER — FLUTICASONE PROPIONATE 50 MCG
2 SPRAY, SUSPENSION (ML) NASAL DAILY
Qty: 1 EACH | Refills: 0 | Status: SHIPPED | OUTPATIENT
Start: 2021-12-07 | End: 2022-03-29

## 2021-12-09 ENCOUNTER — OFFICE VISIT (OUTPATIENT)
Dept: FAMILY MEDICINE CLINIC | Facility: CLINIC | Age: 56
End: 2021-12-09
Payer: MEDICAID

## 2021-12-09 VITALS
WEIGHT: 242 LBS | DIASTOLIC BLOOD PRESSURE: 68 MMHG | SYSTOLIC BLOOD PRESSURE: 129 MMHG | HEIGHT: 65 IN | HEART RATE: 80 BPM | BODY MASS INDEX: 40.32 KG/M2 | TEMPERATURE: 97 F

## 2021-12-09 DIAGNOSIS — I35.8 NON-RHEUMATIC AORTIC SCLEROSIS: ICD-10-CM

## 2021-12-09 DIAGNOSIS — G89.29 CHRONIC PAIN OF LEFT KNEE: Primary | ICD-10-CM

## 2021-12-09 DIAGNOSIS — M25.562 CHRONIC PAIN OF LEFT KNEE: Primary | ICD-10-CM

## 2021-12-09 DIAGNOSIS — M11.262 CHONDROCALCINOSIS OF LEFT KNEE: ICD-10-CM

## 2021-12-09 DIAGNOSIS — E11.65 UNCONTROLLED TYPE 2 DIABETES MELLITUS WITH KIDNEY COMPLICATION, WITH LONG-TERM CURRENT USE OF INSULIN (HCC): ICD-10-CM

## 2021-12-09 DIAGNOSIS — Z79.4 UNCONTROLLED TYPE 2 DIABETES MELLITUS WITH KIDNEY COMPLICATION, WITH LONG-TERM CURRENT USE OF INSULIN (HCC): ICD-10-CM

## 2021-12-09 DIAGNOSIS — E11.29 UNCONTROLLED TYPE 2 DIABETES MELLITUS WITH KIDNEY COMPLICATION, WITH LONG-TERM CURRENT USE OF INSULIN (HCC): ICD-10-CM

## 2021-12-09 PROCEDURE — 3074F SYST BP LT 130 MM HG: CPT | Performed by: FAMILY MEDICINE

## 2021-12-09 PROCEDURE — 20610 DRAIN/INJ JOINT/BURSA W/O US: CPT | Performed by: FAMILY MEDICINE

## 2021-12-09 PROCEDURE — 3008F BODY MASS INDEX DOCD: CPT | Performed by: FAMILY MEDICINE

## 2021-12-09 PROCEDURE — 3078F DIAST BP <80 MM HG: CPT | Performed by: FAMILY MEDICINE

## 2021-12-09 PROCEDURE — 99214 OFFICE O/P EST MOD 30 MIN: CPT | Performed by: FAMILY MEDICINE

## 2021-12-09 RX ORDER — TRIAMCINOLONE ACETONIDE 40 MG/ML
80 INJECTION, SUSPENSION INTRA-ARTICULAR; INTRAMUSCULAR ONCE
Status: COMPLETED | OUTPATIENT
Start: 2021-12-09 | End: 2021-12-09

## 2021-12-09 RX ADMIN — TRIAMCINOLONE ACETONIDE 80 MG: 40 INJECTION, SUSPENSION INTRA-ARTICULAR; INTRAMUSCULAR at 13:06:00

## 2021-12-09 NOTE — PROGRESS NOTES
Patient ID: Prince Goss is a 61year old female. HPI  Patient presents with:  Diabetes: f/u    Left knee with medial pain. Swelling has been present as well. She states she has had months of pain now.    She had an x-ray of her left knee 10/22/202 pressure    • High cholesterol    • Lipid screening 4/2/2007    per NG   • Lipoma     surgical excision age 24   • Macular degeneration of left eye    • Neuropathy    • Obesity (BMI 30-39. 9)    • Osteoarthritis    • Renal disorder    • Type II or unspecifi • AMLODIPINE BESYLATE 10 MG Oral Tab TAKE 1 TABLET BY MOUTH EVERY DAY(TAKE WITH LISINOPRIL 40MG) 90 tablet 0   • ATORVASTATIN 20 MG Oral Tab TAKE 1 TABLET(20 MG) BY MOUTH EVERY NIGHT 90 tablet 1   • Montelukast Sodium 10 MG Oral Tab Take 1 tablet (10 mg and 2 out of 6 systolic ejection murmur at the left upper sternal border without radiation into the carotids. Pulmonary/Chest: Effort normal and breath sounds normal. No respiratory distress.    Neurological: Patient is alert and oriented to person, place, diabetes mellitus with kidney complication, with long-term current use of insulin ([facility]  Work on portion control. Avoid eating late at night. See your endocrinologist for further care. Non-rheumatic aortic sclerosis  Reviewed echocardiogram with her.

## 2021-12-27 DIAGNOSIS — I10 ESSENTIAL HYPERTENSION: ICD-10-CM

## 2021-12-28 RX ORDER — AMLODIPINE BESYLATE 10 MG/1
TABLET ORAL
Qty: 90 TABLET | Refills: 0 | Status: SHIPPED | OUTPATIENT
Start: 2021-12-28

## 2022-01-02 ENCOUNTER — APPOINTMENT (OUTPATIENT)
Dept: CT IMAGING | Facility: HOSPITAL | Age: 57
End: 2022-01-02
Attending: NURSE PRACTITIONER
Payer: MEDICAID

## 2022-01-02 ENCOUNTER — HOSPITAL ENCOUNTER (EMERGENCY)
Facility: HOSPITAL | Age: 57
Discharge: HOME | End: 2022-01-03
Payer: MEDICAID

## 2022-01-02 DIAGNOSIS — N12 PYELONEPHRITIS: Primary | ICD-10-CM

## 2022-01-02 LAB
ALBUMIN SERPL-MCNC: 3.5 G/DL (ref 3.4–5)
ALBUMIN/GLOB SERPL: 0.9 {RATIO} (ref 1–2)
ALP LIVER SERPL-CCNC: 75 U/L
ALT SERPL-CCNC: 35 U/L
ANION GAP SERPL CALC-SCNC: 5 MMOL/L (ref 0–18)
AST SERPL-CCNC: 16 U/L (ref 15–37)
BASOPHILS # BLD AUTO: 0.07 X10(3) UL (ref 0–0.2)
BASOPHILS NFR BLD AUTO: 0.7 %
BILIRUB SERPL-MCNC: 0.2 MG/DL (ref 0.1–2)
BILIRUB UR QL: NEGATIVE
BUN BLD-MCNC: 41 MG/DL (ref 7–18)
BUN/CREAT SERPL: 33.9 (ref 10–20)
CALCIUM BLD-MCNC: 8.6 MG/DL (ref 8.5–10.1)
CHLORIDE SERPL-SCNC: 108 MMOL/L (ref 98–112)
CO2 SERPL-SCNC: 26 MMOL/L (ref 21–32)
COLOR UR: YELLOW
CREAT BLD-MCNC: 1.21 MG/DL
DEPRECATED RDW RBC AUTO: 41.3 FL (ref 35.1–46.3)
EOSINOPHIL # BLD AUTO: 0.53 X10(3) UL (ref 0–0.7)
EOSINOPHIL NFR BLD AUTO: 5.2 %
ERYTHROCYTE [DISTWIDTH] IN BLOOD BY AUTOMATED COUNT: 12.7 % (ref 11–15)
GLOBULIN PLAS-MCNC: 3.9 G/DL (ref 2.8–4.4)
GLUCOSE BLD-MCNC: 207 MG/DL (ref 70–99)
GLUCOSE UR-MCNC: 150 MG/DL
HCT VFR BLD AUTO: 31.9 %
HGB BLD-MCNC: 10.3 G/DL
HYALINE CASTS #/AREA URNS AUTO: PRESENT /LPF
IMM GRANULOCYTES # BLD AUTO: 0.04 X10(3) UL (ref 0–1)
IMM GRANULOCYTES NFR BLD: 0.4 %
KETONES UR-MCNC: NEGATIVE MG/DL
LIPASE SERPL-CCNC: 218 U/L (ref 73–393)
LYMPHOCYTES # BLD AUTO: 2.5 X10(3) UL (ref 1–4)
LYMPHOCYTES NFR BLD AUTO: 24.4 %
MCH RBC QN AUTO: 29.2 PG (ref 26–34)
MCHC RBC AUTO-ENTMCNC: 32.3 G/DL (ref 31–37)
MCV RBC AUTO: 90.4 FL
MONOCYTES # BLD AUTO: 0.62 X10(3) UL (ref 0.1–1)
MONOCYTES NFR BLD AUTO: 6.1 %
NEUTROPHILS # BLD AUTO: 6.47 X10 (3) UL (ref 1.5–7.7)
NEUTROPHILS # BLD AUTO: 6.47 X10(3) UL (ref 1.5–7.7)
NEUTROPHILS NFR BLD AUTO: 63.2 %
NITRITE UR QL STRIP.AUTO: NEGATIVE
OSMOLALITY SERPL CALC.SUM OF ELEC: 304 MOSM/KG (ref 275–295)
PH UR: 5 [PH] (ref 5–8)
PLATELET # BLD AUTO: 225 10(3)UL (ref 150–450)
POTASSIUM SERPL-SCNC: 5.1 MMOL/L (ref 3.5–5.1)
PROT SERPL-MCNC: 7.4 G/DL (ref 6.4–8.2)
PROT UR-MCNC: 100 MG/DL
RBC # BLD AUTO: 3.53 X10(6)UL
SODIUM SERPL-SCNC: 139 MMOL/L (ref 136–145)
SP GR UR STRIP: 1.02 (ref 1–1.03)
UROBILINOGEN UR STRIP-ACNC: <2
WBC # BLD AUTO: 10.2 X10(3) UL (ref 4–11)
WBC #/AREA URNS AUTO: >50 /HPF
WBC CLUMPS UR QL AUTO: PRESENT /HPF

## 2022-01-02 PROCEDURE — 81001 URINALYSIS AUTO W/SCOPE: CPT | Performed by: NURSE PRACTITIONER

## 2022-01-02 PROCEDURE — 83690 ASSAY OF LIPASE: CPT | Performed by: NURSE PRACTITIONER

## 2022-01-02 PROCEDURE — 87186 SC STD MICRODIL/AGAR DIL: CPT | Performed by: NURSE PRACTITIONER

## 2022-01-02 PROCEDURE — 36415 COLL VENOUS BLD VENIPUNCTURE: CPT

## 2022-01-02 PROCEDURE — 87077 CULTURE AEROBIC IDENTIFY: CPT | Performed by: NURSE PRACTITIONER

## 2022-01-02 PROCEDURE — 85025 COMPLETE CBC W/AUTO DIFF WBC: CPT | Performed by: NURSE PRACTITIONER

## 2022-01-02 PROCEDURE — 99284 EMERGENCY DEPT VISIT MOD MDM: CPT

## 2022-01-02 PROCEDURE — 80053 COMPREHEN METABOLIC PANEL: CPT | Performed by: NURSE PRACTITIONER

## 2022-01-02 PROCEDURE — 74177 CT ABD & PELVIS W/CONTRAST: CPT | Performed by: NURSE PRACTITIONER

## 2022-01-02 PROCEDURE — 87086 URINE CULTURE/COLONY COUNT: CPT | Performed by: NURSE PRACTITIONER

## 2022-01-03 VITALS
TEMPERATURE: 99 F | WEIGHT: 225 LBS | BODY MASS INDEX: 37 KG/M2 | RESPIRATION RATE: 14 BRPM | DIASTOLIC BLOOD PRESSURE: 60 MMHG | HEART RATE: 79 BPM | SYSTOLIC BLOOD PRESSURE: 134 MMHG | OXYGEN SATURATION: 94 %

## 2022-01-03 RX ORDER — SULFAMETHOXAZOLE AND TRIMETHOPRIM 800; 160 MG/1; MG/1
1 TABLET ORAL ONCE
Status: COMPLETED | OUTPATIENT
Start: 2022-01-03 | End: 2022-01-03

## 2022-01-03 RX ORDER — SULFAMETHOXAZOLE AND TRIMETHOPRIM 800; 160 MG/1; MG/1
1 TABLET ORAL 2 TIMES DAILY
Qty: 20 TABLET | Refills: 0 | Status: SHIPPED | OUTPATIENT
Start: 2022-01-03 | End: 2022-01-13

## 2022-01-03 NOTE — ED QUICK NOTES
Pt provided with discharge instructions, prescription sent to pts phamacy. Verbalized understanding for plan of care at home and follow up. All questions/concerns addressed prior to discharge. IV removed, catheter intact.   Pt ambulatory out of ED with st

## 2022-01-03 NOTE — ED PROVIDER NOTES
Patient Seen in: [facility] AND [facility] Emergency Department      History   Patient presents with:  Abdominal Pain    Stated Complaint: Stomach pains    Subjective:   57yo/f w hx of HTN, HLD, Obesitya, DM 2 reports with central abdominal pain. Burning.  Perium HPI.  Constitutional and vital signs reviewed. All other systems reviewed and negative except as noted above.     Physical Exam     ED Triage Vitals [01/02/22 2139]   BP (!) 161/82   Pulse 97   Resp 18   Temp 99.3 °F (37.4 °C)   Temp src Temporal   SpO Non- 50 (*)     GFR, -American 57 (*)     A/G Ratio 0.9 (*)     All other components within normal limits   URINALYSIS WITH CULTURE REFLEX - Abnormal; Notable for the following components:    Clarity Urine Hazy (*)     Glucose Urine dull for a few days before that.     No fevers or leukocytosis  No cva tenderness  Ct with moderate stranding, in conjunction with UA concerning for pyelo  No vomiting, tolerating po, well appearing  No diarrhea  No weakness or lethargy  Overall well appear

## 2022-01-03 NOTE — ED INITIAL ASSESSMENT (HPI)
patient states she has upper abdominal burning pain x 3 days. +n/v, denies fever or diarrhea. Patient states she has taken tums with minimal relief.

## 2022-01-11 DIAGNOSIS — H69.92 DYSFUNCTION OF LEFT EUSTACHIAN TUBE: ICD-10-CM

## 2022-01-11 DIAGNOSIS — I10 ESSENTIAL HYPERTENSION: ICD-10-CM

## 2022-01-11 DIAGNOSIS — E78.2 MIXED HYPERLIPIDEMIA: ICD-10-CM

## 2022-01-11 DIAGNOSIS — J30.89 OTHER ALLERGIC RHINITIS: ICD-10-CM

## 2022-01-11 RX ORDER — MONTELUKAST SODIUM 10 MG/1
10 TABLET ORAL NIGHTLY
Qty: 90 TABLET | Refills: 1 | Status: SHIPPED | OUTPATIENT
Start: 2022-01-11 | End: 2022-03-29

## 2022-01-11 RX ORDER — ATORVASTATIN CALCIUM 20 MG/1
20 TABLET, FILM COATED ORAL NIGHTLY
Qty: 90 TABLET | Refills: 1 | Status: SHIPPED | OUTPATIENT
Start: 2022-01-11 | End: 2022-06-22

## 2022-01-11 RX ORDER — LISINOPRIL 40 MG/1
40 TABLET ORAL DAILY
Qty: 90 TABLET | Refills: 1 | Status: SHIPPED | OUTPATIENT
Start: 2022-01-11 | End: 2022-06-22

## 2022-01-11 NOTE — TELEPHONE ENCOUNTER
Refill passed per [facility] protocol. Requested Prescriptions   Pending Prescriptions Disp Refills    ATORVASTATIN 20 MG Oral Tab [Pharmacy Med Name: ATORVASTATIN 20MG TABLETS] 90 tablet 1     Sig: TAKE 1 TABLET(20 MG) BY MOUTH EVERY NIGHT        Cholesterol Medication Protocol Passed - 1/11/2022  5:56 AM        Passed - ALT in past 12 months        Passed - LDL in past 12 months        Passed - Last ALT < 80       Lab Results   Component Value Date    ALT 35 01/02/2022             Passed - Last LDL < 130     Lab Results   Component Value Date    LDL 33 04/22/2021               Passed - Appointment in past 12 or next 3 months             Recent Outpatient Visits              1 month ago Chronic pain of left knee    [facility], [address], [address], [place], [place]    Office Visit    3 months ago Uncontrolled type 2 diabetes mellitus with hyperglycemia [facility])    [facility] End[facility] Layton Alston MD    Office Visit    8 months ago [facility], [address], [address], [place], [place]    Office Visit    11 months ago Uncontrolled type 2 diabetes mellitus with kidney complication, with long-term current use of insulin ([address]    [facility], [address], [address], [place], [place]    Office Visit    1 year ago Uncontrolled type 2 diabetes mellitus with complication, with long-term current use of insulin [facility])    [facility] Endocrinology Layton Alston MD    Virtual Phone E/M           Future Appointments         Provider Department Appt Notes    Today Nahum Joseph, [address] Endocrinology [place] to reschedule appt. Provider not available at that time. mcps 1/10/22.  LS ---- DM/Policy informed    In 1 month Tiago Amin Luverne Chin, [place] 3 month f/u

## 2022-01-11 NOTE — TELEPHONE ENCOUNTER
[facility]. Requested Prescriptions   Pending Prescriptions Disp Refills    LISINOPRIL 40 MG Oral Tab [Pharmacy Med Name: LISINOPRIL 40MG TABLETS] 90 tablet 0     Sig: TAKE 1 TABLET BY MOUTH DAILY WITH AMLODIPINE        Hypertensive Medications Protocol Passed - 1/11/2022  5:56 AM        Passed - CMP or BMP in past 12 months        Passed - Appointment in past 6 or next 3 months        Passed - GFR Non- > 50     Lab Results   Component Value Date    GFRNAA 50 (L) 01/02/2022                      Recent Outpatient Visits              1 month ago Chronic pain of left knee    [facility], [address], [address], [place], [place]    Office Visit    3 months ago Uncontrolled type 2 diabetes mellitus with hyperglycemia [facility])    [facility] Endocrinology Manny River MD    Office Visit    8 months ago [address] [place] [address], [address], [address], [place], [place]    Office Visit    11 months ago Uncontrolled type 2 diabetes mellitus with kidney complication, with long-term current use of insulin [facility])    [facility], [address], [address], [place], [place]    Office Visit    1 year ago Uncontrolled type 2 diabetes mellitus with complication, with long-term current use of insulin [facility])    [facility] Endocrinology Manny River MD    Virtual Phone E/M           Future Appointments         Provider Department Appt Notes    Today Devin Dakin, [facility] [place] to reschedule appt. Provider not available at that time. mcps 1/10/22.  LS ---- DM/Policy informed    In 2 days Devin Dakin, [facility]     In 1 month Regino Eubanks, [address] 3 month f/u

## 2022-01-13 ENCOUNTER — OFFICE VISIT (OUTPATIENT)
Dept: ENDOCRINOLOGY CLINIC | Facility: CLINIC | Age: 57
End: 2022-01-13
Payer: MEDICAID

## 2022-01-13 VITALS
HEART RATE: 93 BPM | WEIGHT: 240 LBS | SYSTOLIC BLOOD PRESSURE: 146 MMHG | RESPIRATION RATE: 18 BRPM | HEIGHT: 65 IN | DIASTOLIC BLOOD PRESSURE: 71 MMHG | BODY MASS INDEX: 39.99 KG/M2

## 2022-01-13 DIAGNOSIS — E11.65 UNCONTROLLED TYPE 2 DIABETES MELLITUS WITH HYPERGLYCEMIA (HCC): Primary | ICD-10-CM

## 2022-01-13 LAB
CARTRIDGE LOT#: ABNORMAL NUMERIC
GLUCOSE BLOOD: 379
HEMOGLOBIN A1C: 9 % (ref 4.3–5.6)
TEST STRIP LOT #: NORMAL NUMERIC

## 2022-01-13 PROCEDURE — 3078F DIAST BP <80 MM HG: CPT | Performed by: NURSE PRACTITIONER

## 2022-01-13 PROCEDURE — 3052F HG A1C>EQUAL 8.0%<EQUAL 9.0%: CPT | Performed by: FAMILY MEDICINE

## 2022-01-13 PROCEDURE — 3077F SYST BP >= 140 MM HG: CPT | Performed by: NURSE PRACTITIONER

## 2022-01-13 PROCEDURE — 82947 ASSAY GLUCOSE BLOOD QUANT: CPT | Performed by: NURSE PRACTITIONER

## 2022-01-13 PROCEDURE — 99214 OFFICE O/P EST MOD 30 MIN: CPT | Performed by: NURSE PRACTITIONER

## 2022-01-13 PROCEDURE — 83036 HEMOGLOBIN GLYCOSYLATED A1C: CPT | Performed by: NURSE PRACTITIONER

## 2022-01-13 PROCEDURE — 36416 COLLJ CAPILLARY BLOOD SPEC: CPT | Performed by: NURSE PRACTITIONER

## 2022-01-13 PROCEDURE — 3008F BODY MASS INDEX DOCD: CPT | Performed by: NURSE PRACTITIONER

## 2022-01-13 RX ORDER — DULAGLUTIDE 1.5 MG/.5ML
1.5 INJECTION, SOLUTION SUBCUTANEOUS WEEKLY
Qty: 2 ML | Refills: 0 | Status: SHIPPED | OUTPATIENT
Start: 2022-01-13

## 2022-01-13 RX ORDER — DULAGLUTIDE 3 MG/.5ML
3 INJECTION, SOLUTION SUBCUTANEOUS WEEKLY
Qty: 2 ML | Refills: 0 | Status: SHIPPED | OUTPATIENT
Start: 2022-02-03

## 2022-01-13 NOTE — PATIENT INSTRUCTIONS
A1C: 9.0% today -->  from 10.3% on 2021  Blood glucose: 379 in clinic today    Medications:   -continue with Glimepiride 4mg once daily in AM  -continue with Metfromin ER 1,000mg twice daily   -increase Basaglar 60 --> 70 units once daily in A

## 2022-01-13 NOTE — PROGRESS NOTES
Name: Laura Fountain  Date: 1/13/2022      HISTORY OF PRESENT ILLNESS   Laura Fountain is a 61year old female who presents for diabetes mellitus diagnosed 23 years ago, transitioned to insulin therapy 15 years ago.       Prior HbA, C or glycohemoglobin w Tab, TAKE 1 TABLET BY MOUTH EVERY DAY(TAKE WITH LISINOPRIL 40MG), Disp: 90 tablet, Rfl: 0  •  TRIAMCINOLONE 0.1 % External Cream, APPLY TOPICALLY TO THE AFFECTED AREA TWICE DAILY AS NEEDED, Disp: 60 g, Rfl: 0  •  fluticasone propionate 50 MCG/ACT Nasal Miranda Take 500 mg by mouth every 6 (six) hours as needed for Pain., Disp: , Rfl:   •  Blood Glucose Calibration (ONETOUCH ULTRA CONTROL) In Vitro Solution, Use to check blood sugar three times daily, Disp: 3 vial, Rfl: 3  •  aspirin 81 MG Oral Chew Tab, CSW ONE carpal tunnel release       PHYSICAL EXAM  Resp 18   Ht 5' 5\" (1.651 m)   LMP 07/20/2013   BMI 37.44 kg/m²     General Appearance:  alert, well developed, in no acute distress  Eyes:  normal conjunctivae, sclera. , normal sclera and normal pupils  Throat/N

## 2022-02-10 RX ORDER — METFORMIN HYDROCHLORIDE 500 MG/1
TABLET, EXTENDED RELEASE ORAL
Qty: 180 TABLET | Refills: 0 | Status: SHIPPED | OUTPATIENT
Start: 2022-02-10 | End: 2022-03-29

## 2022-02-21 RX ORDER — DULAGLUTIDE 1.5 MG/.5ML
INJECTION, SOLUTION SUBCUTANEOUS
Qty: 2 ML | Refills: 0 | Status: SHIPPED | OUTPATIENT
Start: 2022-02-21

## 2022-03-21 RX ORDER — GLIMEPIRIDE 4 MG/1
TABLET ORAL
Qty: 90 TABLET | Refills: 1 | Status: SHIPPED | OUTPATIENT
Start: 2022-03-21

## 2022-03-25 RX ORDER — AMLODIPINE BESYLATE 10 MG/1
TABLET ORAL
Qty: 90 TABLET | Refills: 0 | Status: SHIPPED | OUTPATIENT
Start: 2022-03-25

## 2022-03-29 ENCOUNTER — OFFICE VISIT (OUTPATIENT)
Dept: FAMILY MEDICINE CLINIC | Facility: CLINIC | Age: 57
End: 2022-03-29
Payer: MEDICAID

## 2022-03-29 VITALS
BODY MASS INDEX: 41.22 KG/M2 | DIASTOLIC BLOOD PRESSURE: 64 MMHG | SYSTOLIC BLOOD PRESSURE: 120 MMHG | HEIGHT: 65 IN | WEIGHT: 247.38 LBS | HEART RATE: 97 BPM | TEMPERATURE: 98 F

## 2022-03-29 DIAGNOSIS — J30.89 OTHER ALLERGIC RHINITIS: ICD-10-CM

## 2022-03-29 DIAGNOSIS — R60.0 EDEMA OF BOTH LOWER EXTREMITIES: ICD-10-CM

## 2022-03-29 DIAGNOSIS — I10 ESSENTIAL HYPERTENSION: ICD-10-CM

## 2022-03-29 DIAGNOSIS — E78.2 MIXED HYPERLIPIDEMIA: ICD-10-CM

## 2022-03-29 DIAGNOSIS — E11.42 DIABETIC POLYNEUROPATHY ASSOCIATED WITH TYPE 2 DIABETES MELLITUS (HCC): ICD-10-CM

## 2022-03-29 DIAGNOSIS — G62.9 AXONAL POLYNEUROPATHY: ICD-10-CM

## 2022-03-29 DIAGNOSIS — IMO0002 UNCONTROLLED TYPE 2 DIABETES MELLITUS WITH KIDNEY COMPLICATION, WITH LONG-TERM CURRENT USE OF INSULIN: Primary | ICD-10-CM

## 2022-03-29 DIAGNOSIS — M11.262 CHONDROCALCINOSIS OF LEFT KNEE: ICD-10-CM

## 2022-03-29 DIAGNOSIS — H69.82 DYSFUNCTION OF LEFT EUSTACHIAN TUBE: ICD-10-CM

## 2022-03-29 DIAGNOSIS — IMO0002 UNCONTROLLED TYPE 2 DIABETES MELLITUS WITH COMPLICATION, WITH LONG-TERM CURRENT USE OF INSULIN: ICD-10-CM

## 2022-03-29 PROBLEM — E66.01 MORBID (SEVERE) OBESITY DUE TO EXCESS CALORIES (HCC): Status: ACTIVE | Noted: 2022-03-29

## 2022-03-29 PROCEDURE — 3008F BODY MASS INDEX DOCD: CPT | Performed by: FAMILY MEDICINE

## 2022-03-29 PROCEDURE — 3078F DIAST BP <80 MM HG: CPT | Performed by: FAMILY MEDICINE

## 2022-03-29 PROCEDURE — 3074F SYST BP LT 130 MM HG: CPT | Performed by: FAMILY MEDICINE

## 2022-03-29 PROCEDURE — 99214 OFFICE O/P EST MOD 30 MIN: CPT | Performed by: FAMILY MEDICINE

## 2022-03-29 RX ORDER — GABAPENTIN 100 MG/1
CAPSULE ORAL
Qty: 180 CAPSULE | Refills: 3 | Status: SHIPPED | OUTPATIENT
Start: 2022-03-29

## 2022-03-29 RX ORDER — METFORMIN HYDROCHLORIDE 500 MG/1
500 TABLET, EXTENDED RELEASE ORAL 2 TIMES DAILY WITH MEALS
Qty: 180 TABLET | Refills: 3 | Status: SHIPPED | OUTPATIENT
Start: 2022-03-29

## 2022-03-29 RX ORDER — FLUTICASONE PROPIONATE 50 MCG
2 SPRAY, SUSPENSION (ML) NASAL DAILY
Qty: 3 EACH | Refills: 3 | Status: SHIPPED | OUTPATIENT
Start: 2022-03-29

## 2022-03-29 RX ORDER — MONTELUKAST SODIUM 10 MG/1
10 TABLET ORAL NIGHTLY
Qty: 90 TABLET | Refills: 3 | Status: SHIPPED | OUTPATIENT
Start: 2022-03-29

## 2022-04-20 ENCOUNTER — TELEPHONE (OUTPATIENT)
Dept: ENDOCRINOLOGY CLINIC | Facility: CLINIC | Age: 57
End: 2022-04-20

## 2022-04-20 RX ORDER — DULAGLUTIDE 3 MG/.5ML
INJECTION, SOLUTION SUBCUTANEOUS
Qty: 2 ML | Refills: 0 | Status: SHIPPED | OUTPATIENT
Start: 2022-04-20 | End: 2022-04-25

## 2022-04-20 RX ORDER — DULAGLUTIDE 1.5 MG/.5ML
INJECTION, SOLUTION SUBCUTANEOUS
Qty: 2 ML | Refills: 0 | Status: SHIPPED | OUTPATIENT
Start: 2022-04-20 | End: 2022-04-25

## 2022-04-20 NOTE — TELEPHONE ENCOUNTER
LOV 1/3/2022. RTC 3/17/22. [facility]. LVM for patient. Pending 30 day supply.

## 2022-04-25 RX ORDER — DULAGLUTIDE 4.5 MG/.5ML
4.5 INJECTION, SOLUTION SUBCUTANEOUS WEEKLY
Qty: 6 ML | Refills: 0 | Status: SHIPPED | OUTPATIENT
Start: 2022-05-16

## 2022-04-25 RX ORDER — DULAGLUTIDE 3 MG/.5ML
3 INJECTION, SOLUTION SUBCUTANEOUS WEEKLY
Qty: 2 ML | Refills: 0 | Status: SHIPPED | OUTPATIENT
Start: 2022-04-25

## 2022-04-25 NOTE — TELEPHONE ENCOUNTER
Pharmacy is calling to clarify Trulicity dosage. States 2 doses were sent 1.5mg and 3 mg.  Please call back to let them know which one to fill

## 2022-04-25 NOTE — TELEPHONE ENCOUNTER
[address] Brenda Nichols noted. Her BG readings continue to stay above goal.     Lets increase Trulicity to 3mg once weekly for 4 weeks, then increase to 4.5mg once weekly. I have sent new rx to pharmacy. Thank you!

## 2022-04-25 NOTE — TELEPHONE ENCOUNTER
Spoke to patient and relayed below message as outlined below. She voiced understanding and denied further questions. Also called [facility] to make them aware of proper dosing and to discontinue 1.5 mg script on file.

## 2022-04-25 NOTE — TELEPHONE ENCOUNTER
Melissa JEFFERS,     Spoke to patient to confirm what dose she's currently taking for trulicity. Per your LOV note she were to take 1.5 mg weekly x4 weeks and go up to 3 mg weekly. She has been on 1.5 mg weekly x2 months and didn't increase to 3mg. Her BG in the morning are between 150-175 (used to be in the 250s), HS BG in the 300s always. She admits to overeating. Confirmed regimen:   - basaglar 30 units in the morning and 30 units in the evening    - glimepiride 4 mg daily    - trulicity 1.5 mg weekly    - metformin ER 1000 mg BID    RN did advise she should be on 3 mg weekly but will double check to make sure and to see if there are no other changes to her medications. Thank you.

## 2022-04-29 ENCOUNTER — OFFICE VISIT (OUTPATIENT)
Dept: ENDOCRINOLOGY CLINIC | Facility: CLINIC | Age: 57
End: 2022-04-29
Payer: MEDICAID

## 2022-04-29 VITALS
SYSTOLIC BLOOD PRESSURE: 135 MMHG | WEIGHT: 252 LBS | BODY MASS INDEX: 42 KG/M2 | DIASTOLIC BLOOD PRESSURE: 66 MMHG | HEART RATE: 88 BPM

## 2022-04-29 DIAGNOSIS — E11.65 UNCONTROLLED TYPE 2 DIABETES MELLITUS WITH HYPERGLYCEMIA (HCC): Primary | ICD-10-CM

## 2022-04-29 LAB
CARTRIDGE LOT#: ABNORMAL NUMERIC
GLUCOSE BLOOD: 184
HEMOGLOBIN A1C: 8.9 % (ref 4.3–5.6)
TEST STRIP LOT #: NORMAL NUMERIC

## 2022-04-29 PROCEDURE — 82947 ASSAY GLUCOSE BLOOD QUANT: CPT | Performed by: INTERNAL MEDICINE

## 2022-04-29 PROCEDURE — 83036 HEMOGLOBIN GLYCOSYLATED A1C: CPT | Performed by: INTERNAL MEDICINE

## 2022-04-29 PROCEDURE — 3078F DIAST BP <80 MM HG: CPT | Performed by: INTERNAL MEDICINE

## 2022-04-29 PROCEDURE — 3075F SYST BP GE 130 - 139MM HG: CPT | Performed by: INTERNAL MEDICINE

## 2022-04-29 PROCEDURE — 3052F HG A1C>EQUAL 8.0%<EQUAL 9.0%: CPT | Performed by: INTERNAL MEDICINE

## 2022-04-29 PROCEDURE — 99213 OFFICE O/P EST LOW 20 MIN: CPT | Performed by: INTERNAL MEDICINE

## 2022-04-29 RX ORDER — DULAGLUTIDE 1.5 MG/.5ML
1.5 INJECTION, SOLUTION SUBCUTANEOUS WEEKLY
Qty: 12 EACH | Refills: 1 | Status: SHIPPED | OUTPATIENT
Start: 2022-04-29

## 2022-06-22 DIAGNOSIS — E78.2 MIXED HYPERLIPIDEMIA: ICD-10-CM

## 2022-06-22 DIAGNOSIS — I10 ESSENTIAL HYPERTENSION: ICD-10-CM

## 2022-06-22 RX ORDER — ATORVASTATIN CALCIUM 20 MG/1
20 TABLET, FILM COATED ORAL NIGHTLY
Qty: 90 TABLET | Refills: 1 | OUTPATIENT
Start: 2022-06-22

## 2022-06-22 RX ORDER — ATORVASTATIN CALCIUM 20 MG/1
TABLET, FILM COATED ORAL
Qty: 90 TABLET | Refills: 0 | Status: SHIPPED | OUTPATIENT
Start: 2022-06-22 | End: 2023-01-10

## 2022-06-22 RX ORDER — AMLODIPINE BESYLATE 10 MG/1
TABLET ORAL
Qty: 90 TABLET | Refills: 0 | Status: SHIPPED | OUTPATIENT
Start: 2022-06-22 | End: 2022-09-24

## 2022-06-22 RX ORDER — ATORVASTATIN CALCIUM 20 MG/1
TABLET, FILM COATED ORAL
Qty: 90 TABLET | Refills: 0 | Status: SHIPPED | OUTPATIENT
Start: 2022-06-22 | End: 2023-02-08

## 2022-06-22 RX ORDER — LISINOPRIL 40 MG/1
40 TABLET ORAL DAILY
Qty: 90 TABLET | Refills: 1 | Status: SHIPPED | OUTPATIENT
Start: 2022-06-22

## 2022-06-22 NOTE — TELEPHONE ENCOUNTER
Refilled times 1 but needs appointment before the next prescription will be filled. Please call patient and set up an office visit as overdue for diabetes.

## 2022-06-23 NOTE — TELEPHONE ENCOUNTER
1st attempt - Hairbobot message sent for patient to contact the office to schedule an appointment; see notes below

## 2022-06-23 NOTE — TELEPHONE ENCOUNTER
1st attempt - Tragarat message sent for patient to contact the office to schedule an appointment; see notes below

## 2022-06-30 DIAGNOSIS — E11.65 UNCONTROLLED TYPE 2 DIABETES MELLITUS WITH HYPERGLYCEMIA (HCC): ICD-10-CM

## 2022-06-30 RX ORDER — INSULIN GLARGINE 100 [IU]/ML
INJECTION, SOLUTION SUBCUTANEOUS
Qty: 60 ML | Refills: 0 | Status: SHIPPED | OUTPATIENT
Start: 2022-06-30

## 2022-07-18 RX ORDER — ERTUGLIFLOZIN 15 MG/1
TABLET, FILM COATED ORAL
Qty: 90 TABLET | Refills: 0 | Status: SHIPPED | OUTPATIENT
Start: 2022-07-18

## 2022-08-29 ENCOUNTER — HOSPITAL ENCOUNTER (EMERGENCY)
Facility: HOSPITAL | Age: 57
Discharge: HOME | End: 2022-08-29
Payer: MEDICAID

## 2022-08-29 VITALS
TEMPERATURE: 98 F | WEIGHT: 200 LBS | RESPIRATION RATE: 18 BRPM | HEIGHT: 65 IN | SYSTOLIC BLOOD PRESSURE: 145 MMHG | DIASTOLIC BLOOD PRESSURE: 69 MMHG | OXYGEN SATURATION: 95 % | HEART RATE: 97 BPM | BODY MASS INDEX: 33.32 KG/M2

## 2022-08-29 DIAGNOSIS — M54.40 BACK PAIN OF LUMBAR REGION WITH SCIATICA: Primary | ICD-10-CM

## 2022-08-29 PROCEDURE — 99283 EMERGENCY DEPT VISIT LOW MDM: CPT

## 2022-08-29 RX ORDER — LIDOCAINE 50 MG/G
1 PATCH TOPICAL EVERY 24 HOURS
Qty: 7 PATCH | Refills: 0 | Status: SHIPPED | OUTPATIENT
Start: 2022-08-29

## 2022-08-29 RX ORDER — TRAMADOL HYDROCHLORIDE 50 MG/1
50 TABLET ORAL ONCE
Status: COMPLETED | OUTPATIENT
Start: 2022-08-29 | End: 2022-08-29

## 2022-08-29 RX ORDER — TRAMADOL HYDROCHLORIDE 50 MG/1
TABLET ORAL EVERY 6 HOURS PRN
Qty: 10 TABLET | Refills: 0 | Status: SHIPPED | OUTPATIENT
Start: 2022-08-29 | End: 2022-09-03

## 2022-08-29 NOTE — ED QUICK NOTES
Patient discharged home in no acute distress. A&Ox4, skin p/w/d, denies cp/sob. Ambulating with steady gait and verbalized understanding of d/c instructions and prescriptions. Assisted to ED exit via wheelchair.

## 2022-08-29 NOTE — ED INITIAL ASSESSMENT (HPI)
Pt presents to the ER with c/o bilateral shoulder and leg pain x 2-3 months. Pt suffers from sciatica pain however states she now is having a hard time ambulating.

## 2022-09-04 DIAGNOSIS — L30.9 ECZEMA, UNSPECIFIED TYPE: ICD-10-CM

## 2022-09-05 NOTE — TELEPHONE ENCOUNTER
Please review; protocol failed/no protocol.      Requested Prescriptions   Pending Prescriptions Disp Refills    TRIAMCINOLONE 0.1 % External Cream [Pharmacy Med Name: TRIAMCINOLONE 0.1% CREAM   30GM] 60 g 0     Sig: APPLY TOPICALLY TO THE AFFECTED AREA TWICE DAILY AS NEEDED        There is no refill protocol information for this order            Recent Outpatient Visits              4 months ago Uncontrolled type 2 diabetes mellitus with hyperglycemia [facility])    [address] [facility] Yeni Huitron MD    Office Visit    5 months ago Uncontrolled type 2 diabetes mellitus with kidney complication, with long-term current use of insulin [facility])    [address], [address], [place], [place]    Office Visit    7 months ago Uncontrolled type 2 diabetes mellitus with hyperglycemia [facility])    [address] [facility] Trell Para, APRN    Office Visit    9 months ago Chronic pain of left knee    [address], [address], [place], [place]    Office Visit    11 months ago Uncontrolled type 2 diabetes mellitus with hyperglycemia [facility])    [address] [facility] Yeni Huitron MD    Office Visit             Future Appointments         Provider Department Appt Notes    In 2 weeks [facility], [address] [facility], [address], [place] physical/care partner policy informed to pt    In 1 month Yeni Huitron MD [address] [facility] DM FU

## 2022-09-06 RX ORDER — TRIAMCINOLONE ACETONIDE 1 MG/G
CREAM TOPICAL 2 TIMES DAILY PRN
Qty: 60 G | Refills: 1 | Status: SHIPPED | OUTPATIENT
Start: 2022-09-06

## 2022-09-16 RX ORDER — PEN NEEDLE, DIABETIC 31 GX5/16"
NEEDLE, DISPOSABLE MISCELLANEOUS
Qty: 400 EACH | Refills: 0 | Status: SHIPPED | OUTPATIENT
Start: 2022-09-16

## 2022-09-19 ENCOUNTER — OFFICE VISIT (OUTPATIENT)
Dept: FAMILY MEDICINE CLINIC | Facility: CLINIC | Age: 57
End: 2022-09-19
Payer: MEDICAID

## 2022-09-19 VITALS
HEIGHT: 65 IN | WEIGHT: 234.19 LBS | BODY MASS INDEX: 39.02 KG/M2 | TEMPERATURE: 97 F | HEART RATE: 96 BPM | DIASTOLIC BLOOD PRESSURE: 70 MMHG | SYSTOLIC BLOOD PRESSURE: 138 MMHG

## 2022-09-19 DIAGNOSIS — M54.32 CHRONIC SCIATICA, LEFT: ICD-10-CM

## 2022-09-19 DIAGNOSIS — E11.65 UNCONTROLLED DIABETES MELLITUS WITH HYPERGLYCEMIA, WITH LONG-TERM CURRENT USE OF INSULIN (HCC): ICD-10-CM

## 2022-09-19 DIAGNOSIS — R80.9 MICROALBUMINURIA DUE TO TYPE 2 DIABETES MELLITUS (HCC): ICD-10-CM

## 2022-09-19 DIAGNOSIS — E78.2 MIXED HYPERLIPIDEMIA: ICD-10-CM

## 2022-09-19 DIAGNOSIS — E11.42 DIABETIC POLYNEUROPATHY ASSOCIATED WITH TYPE 2 DIABETES MELLITUS (HCC): ICD-10-CM

## 2022-09-19 DIAGNOSIS — J30.89 OTHER ALLERGIC RHINITIS: ICD-10-CM

## 2022-09-19 DIAGNOSIS — Z00.00 ADULT GENERAL MEDICAL EXAM: Primary | ICD-10-CM

## 2022-09-19 DIAGNOSIS — Z23 NEED FOR VACCINATION: ICD-10-CM

## 2022-09-19 DIAGNOSIS — Z79.4 UNCONTROLLED DIABETES MELLITUS WITH HYPERGLYCEMIA, WITH LONG-TERM CURRENT USE OF INSULIN (HCC): ICD-10-CM

## 2022-09-19 DIAGNOSIS — E11.29 MICROALBUMINURIA DUE TO TYPE 2 DIABETES MELLITUS (HCC): ICD-10-CM

## 2022-09-19 DIAGNOSIS — Z12.4 CERVICAL CANCER SCREENING: ICD-10-CM

## 2022-09-19 DIAGNOSIS — Z12.11 SCREENING FOR COLON CANCER: ICD-10-CM

## 2022-09-19 DIAGNOSIS — Z12.31 VISIT FOR SCREENING MAMMOGRAM: ICD-10-CM

## 2022-09-19 DIAGNOSIS — I10 ESSENTIAL HYPERTENSION: ICD-10-CM

## 2022-09-19 RX ORDER — PEN NEEDLE, DIABETIC 31 GX5/16"
NEEDLE, DISPOSABLE MISCELLANEOUS
Qty: 200 EACH | Refills: 3 | Status: SHIPPED | OUTPATIENT
Start: 2022-09-19

## 2022-09-20 DIAGNOSIS — I10 ESSENTIAL HYPERTENSION: ICD-10-CM

## 2022-09-24 RX ORDER — AMLODIPINE BESYLATE 10 MG/1
TABLET ORAL
Qty: 90 TABLET | Refills: 0 | Status: SHIPPED | OUTPATIENT
Start: 2022-09-24

## 2022-09-28 NOTE — TELEPHONE ENCOUNTER
Advised patient of Uriel Mask note. Patient verbalized understanding and will schedule follow up after gets labs done.

## 2022-10-10 ENCOUNTER — LAB ENCOUNTER (OUTPATIENT)
Dept: LAB | Age: 57
End: 2022-10-10
Attending: FAMILY MEDICINE
Payer: MEDICAID

## 2022-10-10 DIAGNOSIS — E11.65 UNCONTROLLED DIABETES MELLITUS WITH HYPERGLYCEMIA, WITH LONG-TERM CURRENT USE OF INSULIN (HCC): ICD-10-CM

## 2022-10-10 DIAGNOSIS — E78.2 MIXED HYPERLIPIDEMIA: ICD-10-CM

## 2022-10-10 DIAGNOSIS — IMO0002 UNCONTROLLED TYPE 2 DIABETES MELLITUS WITH KIDNEY COMPLICATION, WITH LONG-TERM CURRENT USE OF INSULIN: ICD-10-CM

## 2022-10-10 DIAGNOSIS — Z00.00 ADULT GENERAL MEDICAL EXAM: ICD-10-CM

## 2022-10-10 DIAGNOSIS — I10 ESSENTIAL HYPERTENSION: ICD-10-CM

## 2022-10-10 DIAGNOSIS — Z79.4 UNCONTROLLED DIABETES MELLITUS WITH HYPERGLYCEMIA, WITH LONG-TERM CURRENT USE OF INSULIN (HCC): ICD-10-CM

## 2022-10-10 LAB
ALBUMIN SERPL-MCNC: 3.6 G/DL (ref 3.4–5)
ALBUMIN/GLOB SERPL: 0.8 {RATIO} (ref 1–2)
ALP LIVER SERPL-CCNC: 88 U/L
ALT SERPL-CCNC: 33 U/L
ANION GAP SERPL CALC-SCNC: 8 MMOL/L (ref 0–18)
AST SERPL-CCNC: 13 U/L (ref 15–37)
BASOPHILS # BLD AUTO: 0.13 X10(3) UL (ref 0–0.2)
BASOPHILS NFR BLD AUTO: 1.4 %
BILIRUB SERPL-MCNC: 0.3 MG/DL (ref 0.1–2)
BUN BLD-MCNC: 33 MG/DL (ref 7–18)
BUN/CREAT SERPL: 22.9 (ref 10–20)
CALCIUM BLD-MCNC: 10.1 MG/DL (ref 8.5–10.1)
CHLORIDE SERPL-SCNC: 104 MMOL/L (ref 98–112)
CHOLEST SERPL-MCNC: 158 MG/DL (ref ?–200)
CO2 SERPL-SCNC: 25 MMOL/L (ref 21–32)
CREAT BLD-MCNC: 1.44 MG/DL
CREAT UR-SCNC: 92.2 MG/DL
DEPRECATED RDW RBC AUTO: 45.8 FL (ref 35.1–46.3)
EOSINOPHIL # BLD AUTO: 0.52 X10(3) UL (ref 0–0.7)
EOSINOPHIL NFR BLD AUTO: 5.5 %
ERYTHROCYTE [DISTWIDTH] IN BLOOD BY AUTOMATED COUNT: 13.8 % (ref 11–15)
EST. AVERAGE GLUCOSE BLD GHB EST-MCNC: 289 MG/DL (ref 68–126)
FASTING PATIENT LIPID ANSWER: YES
FASTING STATUS PATIENT QL REPORTED: YES
GFR SERPLBLD BASED ON 1.73 SQ M-ARVRAT: 42 ML/MIN/1.73M2 (ref 60–?)
GLOBULIN PLAS-MCNC: 4.3 G/DL (ref 2.8–4.4)
GLUCOSE BLD-MCNC: 117 MG/DL (ref 70–99)
HBA1C MFR BLD: 11.7 % (ref ?–5.7)
HCT VFR BLD AUTO: 34.9 %
HDLC SERPL-MCNC: 53 MG/DL (ref 40–59)
HGB BLD-MCNC: 11 G/DL
IMM GRANULOCYTES # BLD AUTO: 0.09 X10(3) UL (ref 0–1)
IMM GRANULOCYTES NFR BLD: 0.9 %
LDLC SERPL CALC-MCNC: 81 MG/DL (ref ?–100)
LYMPHOCYTES # BLD AUTO: 2.29 X10(3) UL (ref 1–4)
LYMPHOCYTES NFR BLD AUTO: 24.1 %
MCH RBC QN AUTO: 28.8 PG (ref 26–34)
MCHC RBC AUTO-ENTMCNC: 31.5 G/DL (ref 31–37)
MCV RBC AUTO: 91.4 FL
MICROALBUMIN UR-MCNC: 14.5 MG/DL
MICROALBUMIN/CREAT 24H UR-RTO: 157.3 UG/MG (ref ?–30)
MONOCYTES # BLD AUTO: 0.64 X10(3) UL (ref 0.1–1)
MONOCYTES NFR BLD AUTO: 6.7 %
NEUTROPHILS # BLD AUTO: 5.84 X10 (3) UL (ref 1.5–7.7)
NEUTROPHILS # BLD AUTO: 5.84 X10(3) UL (ref 1.5–7.7)
NEUTROPHILS NFR BLD AUTO: 61.4 %
NONHDLC SERPL-MCNC: 105 MG/DL (ref ?–130)
OSMOLALITY SERPL CALC.SUM OF ELEC: 292 MOSM/KG (ref 275–295)
PLATELET # BLD AUTO: 328 10(3)UL (ref 150–450)
POTASSIUM SERPL-SCNC: 5.2 MMOL/L (ref 3.5–5.1)
PROT SERPL-MCNC: 7.9 G/DL (ref 6.4–8.2)
RBC # BLD AUTO: 3.82 X10(6)UL
SODIUM SERPL-SCNC: 137 MMOL/L (ref 136–145)
TRIGL SERPL-MCNC: 136 MG/DL (ref 30–149)
TSI SER-ACNC: 2.34 MIU/ML (ref 0.36–3.74)
VLDLC SERPL CALC-MCNC: 21 MG/DL (ref 0–30)
WBC # BLD AUTO: 9.5 X10(3) UL (ref 4–11)

## 2022-10-10 PROCEDURE — 80053 COMPREHEN METABOLIC PANEL: CPT

## 2022-10-10 PROCEDURE — 3046F HEMOGLOBIN A1C LEVEL >9.0%: CPT | Performed by: INTERNAL MEDICINE

## 2022-10-10 PROCEDURE — 80061 LIPID PANEL: CPT

## 2022-10-10 PROCEDURE — 3061F NEG MICROALBUMINURIA REV: CPT | Performed by: INTERNAL MEDICINE

## 2022-10-10 PROCEDURE — 83036 HEMOGLOBIN GLYCOSYLATED A1C: CPT

## 2022-10-10 PROCEDURE — 3060F POS MICROALBUMINURIA REV: CPT | Performed by: INTERNAL MEDICINE

## 2022-10-10 PROCEDURE — 85025 COMPLETE CBC W/AUTO DIFF WBC: CPT

## 2022-10-10 PROCEDURE — 36415 COLL VENOUS BLD VENIPUNCTURE: CPT

## 2022-10-10 PROCEDURE — 82043 UR ALBUMIN QUANTITATIVE: CPT

## 2022-10-10 PROCEDURE — 84443 ASSAY THYROID STIM HORMONE: CPT

## 2022-10-10 PROCEDURE — 82570 ASSAY OF URINE CREATININE: CPT

## 2022-10-10 RX ORDER — GLIMEPIRIDE 4 MG/1
TABLET ORAL
Qty: 90 TABLET | Refills: 1 | Status: SHIPPED | OUTPATIENT
Start: 2022-10-10

## 2022-10-24 ENCOUNTER — OFFICE VISIT (OUTPATIENT)
Dept: ENDOCRINOLOGY CLINIC | Facility: CLINIC | Age: 57
End: 2022-10-24
Payer: MEDICAID

## 2022-10-24 VITALS
HEART RATE: 93 BPM | WEIGHT: 239 LBS | DIASTOLIC BLOOD PRESSURE: 69 MMHG | BODY MASS INDEX: 40 KG/M2 | SYSTOLIC BLOOD PRESSURE: 129 MMHG

## 2022-10-24 DIAGNOSIS — E11.65 UNCONTROLLED TYPE 2 DIABETES MELLITUS WITH HYPERGLYCEMIA (HCC): Primary | ICD-10-CM

## 2022-10-24 LAB
GLUCOSE BLOOD: 343
TEST STRIP LOT #: NORMAL NUMERIC

## 2022-10-24 PROCEDURE — 3074F SYST BP LT 130 MM HG: CPT | Performed by: INTERNAL MEDICINE

## 2022-10-24 PROCEDURE — 90686 IIV4 VACC NO PRSV 0.5 ML IM: CPT | Performed by: INTERNAL MEDICINE

## 2022-10-24 PROCEDURE — 82947 ASSAY GLUCOSE BLOOD QUANT: CPT | Performed by: INTERNAL MEDICINE

## 2022-10-24 PROCEDURE — 3078F DIAST BP <80 MM HG: CPT | Performed by: INTERNAL MEDICINE

## 2022-10-24 PROCEDURE — 99214 OFFICE O/P EST MOD 30 MIN: CPT | Performed by: INTERNAL MEDICINE

## 2022-10-24 PROCEDURE — 90471 IMMUNIZATION ADMIN: CPT | Performed by: INTERNAL MEDICINE

## 2022-10-24 RX ORDER — DULAGLUTIDE 3 MG/.5ML
3 INJECTION, SOLUTION SUBCUTANEOUS WEEKLY
Qty: 2 ML | Refills: 0 | Status: SHIPPED | OUTPATIENT
Start: 2022-10-24

## 2022-10-24 RX ORDER — DULAGLUTIDE 4.5 MG/.5ML
4.5 INJECTION, SOLUTION SUBCUTANEOUS WEEKLY
Qty: 2 ML | Refills: 3 | Status: SHIPPED | OUTPATIENT
Start: 2022-11-24

## 2022-10-24 NOTE — PATIENT INSTRUCTIONS
INCREASE Basaglar to 35 units subcutaneous 2 times per day    INCREASE Trulicity to 3mg subcutaneous weekly for one month then increase to 4.5mg subcutaneous weekly    CONTINUE Glimepiride, Metformin and Stegaltro

## 2022-10-31 RX ORDER — ERTUGLIFLOZIN 15 MG/1
TABLET, FILM COATED ORAL
Qty: 90 TABLET | Refills: 0 | Status: SHIPPED | OUTPATIENT
Start: 2022-10-31

## 2022-11-07 DIAGNOSIS — E11.65 UNCONTROLLED TYPE 2 DIABETES MELLITUS WITH HYPERGLYCEMIA (HCC): ICD-10-CM

## 2022-11-07 RX ORDER — INSULIN GLARGINE 100 [IU]/ML
INJECTION, SOLUTION SUBCUTANEOUS
Qty: 60 ML | Refills: 0 | Status: CANCELLED | OUTPATIENT
Start: 2022-11-07

## 2022-11-08 RX ORDER — INSULIN GLARGINE 100 [IU]/ML
INJECTION, SOLUTION SUBCUTANEOUS
Qty: 45 ML | Refills: 0 | Status: SHIPPED | OUTPATIENT
Start: 2022-11-08

## 2022-11-09 ENCOUNTER — TELEPHONE (OUTPATIENT)
Dept: FAMILY MEDICINE CLINIC | Facility: CLINIC | Age: 57
End: 2022-11-09

## 2022-11-09 DIAGNOSIS — J30.89 OTHER ALLERGIC RHINITIS: ICD-10-CM

## 2022-11-09 DIAGNOSIS — H69.82 DYSFUNCTION OF LEFT EUSTACHIAN TUBE: ICD-10-CM

## 2022-11-09 RX ORDER — FLUTICASONE PROPIONATE 50 MCG
2 SPRAY, SUSPENSION (ML) NASAL DAILY
Qty: 3 EACH | Refills: 3 | Status: CANCELLED | OUTPATIENT
Start: 2022-11-09

## 2022-11-09 NOTE — TELEPHONE ENCOUNTER
[id number] - [place], [place] - [address], [phone], [phone]     Associated Reports   View Encounter   Priority and Order Details      Disp Refills Start End    fluticasone propionate 50 MCG/ACT Nasal Suspension 3 each 3 3/29/2022     Sig - Route: 2 sprays by Nasal route daily. - Nasal    Sent to pharmacy as: Fluticasone Propionate 50 MCG/ACT Nasal Suspension (FLONASE)    E-Prescribing Status: Receipt confirmed by pharmacy (3/29/2022 12:06 PM CDT)

## 2022-11-30 ENCOUNTER — TELEPHONE (OUTPATIENT)
Dept: ENDOCRINOLOGY CLINIC | Facility: CLINIC | Age: 57
End: 2022-11-30

## 2022-11-30 NOTE — TELEPHONE ENCOUNTER
Patient is requesting refill of medication, Dulaglutide (TRULICITY) 4.5 TC/7.4OD Subcutaneous Solution Pen-injector. Patient states pharmacy will not dispense medication due to needing the doctor to authorize the medication because of dosage change.

## 2022-11-30 NOTE — TELEPHONE ENCOUNTER
Spoke to pharmacist - they are waiting for medication from distributor - pharmacy does not need anything from clinic  Pharmacist stated they will contact patient when trulicity is ready for pickup     LM advising patient pharmacy will contact her when trulicity 4.3PV is ready for pickup

## 2022-12-19 DIAGNOSIS — I10 ESSENTIAL HYPERTENSION: ICD-10-CM

## 2022-12-20 RX ORDER — LISINOPRIL 40 MG/1
TABLET ORAL
Qty: 90 TABLET | Refills: 1 | Status: SHIPPED | OUTPATIENT
Start: 2022-12-20

## 2022-12-20 RX ORDER — AMLODIPINE BESYLATE 10 MG/1
TABLET ORAL
Qty: 90 TABLET | Refills: 0 | Status: SHIPPED | OUTPATIENT
Start: 2022-12-20

## 2023-01-03 ENCOUNTER — TELEPHONE (OUTPATIENT)
Dept: ENDOCRINOLOGY CLINIC | Facility: CLINIC | Age: 58
End: 2023-01-03

## 2023-01-03 DIAGNOSIS — E11.65 UNCONTROLLED TYPE 2 DIABETES MELLITUS WITH HYPERGLYCEMIA (HCC): Primary | ICD-10-CM

## 2023-01-03 RX ORDER — DULAGLUTIDE 3 MG/.5ML
3 INJECTION, SOLUTION SUBCUTANEOUS WEEKLY
Qty: 6 ML | Refills: 0 | Status: SHIPPED | OUTPATIENT
Start: 2023-01-03 | End: 2023-04-03

## 2023-01-03 NOTE — TELEPHONE ENCOUNTER
Hi!  If patient unable to obtain 4.5mg dose. Let us please prescribe the 3.0mg dose for her. Please also get an idea from her of blood sugars, fasting and two hours after biggest meal for the past few days. Then we can adjust glimepiride and Basaglar doses in the meantime. Tahnk you!

## 2023-01-03 NOTE — TELEPHONE ENCOUNTER
Dr. Mickey Jason    Patient has not been recording blood sugar levels, she stated her blood sugars have been in 4 and 500's Her blood sugar is 250 now. She has not had Trulicity in 2 months, patient stated she has called around to pharmacies in her area and they do not have any doses in stock. Can patient come in for Ozempic 2 mg sample? Please advise:    Current medications:    Basaglar 60 units day--she has been taking more.   Glimepiride 4 mg daily  Stegaltro 15 mg daily  Metformin ER 1,000 mg daily

## 2023-01-03 NOTE — TELEPHONE ENCOUNTER
Pt is having high blood sugar levels since being off trulicity.  Ranging from 450 to 580 attempting to contact Rn now please call

## 2023-01-03 NOTE — TELEPHONE ENCOUNTER
Dr. Alberto Pandey,     Called patient, no answer, left VM     Per TE 31/68:    Trulicity on b/o. Patient Rx is 4.5mg dose. LOV 10/24:   Basaglar 35 Units BID  Trulicity 8.3RX weekly  Stegaltro 15mg PO daily   Glimepiride 4mg PO daily  Metformin ER 1000mg PO daily    Leo--pharmacist states no Trulicity doses at pharmacy.

## 2023-01-04 NOTE — TELEPHONE ENCOUNTER
Hi!  I have written a prescription for the 3mg dose. Please ask her to pick this up. In the meantime, please ask her to increase glimepiride to 4mg PO bid and increase the Basaglar to 65 units daily. Please ask her to start checking blood sugars fasting and two hours after biggest meal and follow up with us day after tomorrow. Tahnk you!

## 2023-01-05 NOTE — TELEPHONE ENCOUNTER
Received phone call from patient and states she followed on call provider instruction as indicated below and helped bring down her BG. Today, it was 150 fasting. RN advised her to continue regimen and scheduled with [facility] as below;pt prefers [facility] location. Pt is familiar with [facility] as her PCP is there. Pt states she's splitting her basaglar dose as she was doing before hyperglycemia occurs. RN informed her to continue how she's been injecting just as long as she's getting the full 65 units daily.     Future Appointments   Date Time Provider Kathryn Chew   1/9/2023 10:00 AM [facility]   1/27/2023  7:45 AM Fawad Santos MD [facility]

## 2023-01-10 ENCOUNTER — OFFICE VISIT (OUTPATIENT)
Dept: OBGYN CLINIC | Facility: CLINIC | Age: 58
End: 2023-01-10
Payer: MEDICAID

## 2023-01-10 VITALS
SYSTOLIC BLOOD PRESSURE: 153 MMHG | BODY MASS INDEX: 39.32 KG/M2 | HEIGHT: 65 IN | DIASTOLIC BLOOD PRESSURE: 83 MMHG | WEIGHT: 236 LBS

## 2023-01-10 DIAGNOSIS — L29.2 VULVAR ITCHING: ICD-10-CM

## 2023-01-10 DIAGNOSIS — R21 PERINEAL RASH IN FEMALE: ICD-10-CM

## 2023-01-10 DIAGNOSIS — N89.8 VAGINAL ITCHING: Primary | ICD-10-CM

## 2023-01-10 DIAGNOSIS — N76.0 VAGINITIS AND VULVOVAGINITIS: ICD-10-CM

## 2023-01-10 PROCEDURE — 3008F BODY MASS INDEX DOCD: CPT | Performed by: NURSE PRACTITIONER

## 2023-01-10 PROCEDURE — 99213 OFFICE O/P EST LOW 20 MIN: CPT | Performed by: NURSE PRACTITIONER

## 2023-01-10 PROCEDURE — 3079F DIAST BP 80-89 MM HG: CPT | Performed by: NURSE PRACTITIONER

## 2023-01-10 PROCEDURE — 3077F SYST BP >= 140 MM HG: CPT | Performed by: NURSE PRACTITIONER

## 2023-01-10 RX ORDER — CLOTRIMAZOLE AND BETAMETHASONE DIPROPIONATE 10; .64 MG/G; MG/G
1 CREAM TOPICAL 2 TIMES DAILY
Qty: 1 EACH | Refills: 0 | Status: SHIPPED | OUTPATIENT
Start: 2023-01-10 | End: 2023-01-16

## 2023-01-10 RX ORDER — FLUCONAZOLE 150 MG/1
150 TABLET ORAL ONCE
Qty: 2 TABLET | Refills: 0 | Status: SHIPPED | OUTPATIENT
Start: 2023-01-10 | End: 2023-01-10

## 2023-01-11 LAB
C TRACH DNA SPEC QL NAA+PROBE: NEGATIVE
N GONORRHOEA DNA SPEC QL NAA+PROBE: NEGATIVE

## 2023-01-11 RX ORDER — DULAGLUTIDE 3 MG/.5ML
3 INJECTION, SOLUTION SUBCUTANEOUS WEEKLY
Qty: 2 ML | Refills: 0 | Status: CANCELLED | OUTPATIENT
Start: 2023-01-11

## 2023-01-12 LAB
GENITAL VAGINOSIS SCREEN: NEGATIVE
TRICHOMONAS SCREEN: NEGATIVE

## 2023-01-16 ENCOUNTER — TELEPHONE (OUTPATIENT)
Dept: OBGYN CLINIC | Facility: CLINIC | Age: 58
End: 2023-01-16

## 2023-01-16 DIAGNOSIS — N76.0 VAGINITIS AND VULVOVAGINITIS: ICD-10-CM

## 2023-01-16 DIAGNOSIS — L29.2 VULVAR ITCHING: Primary | ICD-10-CM

## 2023-01-16 RX ORDER — CLOTRIMAZOLE AND BETAMETHASONE DIPROPIONATE 10; .64 MG/G; MG/G
1 CREAM TOPICAL 2 TIMES DAILY
Qty: 1 EACH | Refills: 0 | Status: SHIPPED | OUTPATIENT
Start: 2023-01-16 | End: 2023-01-17

## 2023-01-16 RX ORDER — DULAGLUTIDE 1.5 MG/.5ML
1.5 INJECTION, SOLUTION SUBCUTANEOUS WEEKLY
Qty: 6 ML | Refills: 0 | Status: SHIPPED | OUTPATIENT
Start: 2023-01-16

## 2023-01-16 NOTE — TELEPHONE ENCOUNTER
Patient name and  verified. Patient last seen in office on 1/10/2023 with [address] for vaginal itching. Patient is still complaining of vaginal itching. Patient states she completed Diflucan but never received Lotrisone ointment. Upon chart review Lotrisone was sent in as a \"phone in order. \" will resend Lotrisone ointment. Routing to [address] for recommendations.

## 2023-01-17 RX ORDER — FLUCONAZOLE 150 MG/1
150 TABLET ORAL ONCE
Qty: 2 TABLET | Refills: 0 | Status: SHIPPED | OUTPATIENT
Start: 2023-01-17 | End: 2023-01-17

## 2023-01-18 NOTE — TELEPHONE ENCOUNTER
Telephone encounter placed, pt picked up prescription at the pharmacy despite not being covered by insurance. Her vulvar vaginal itching continues and keeps her up at night. She had a positive test for vaginal candidiasis. Discussed attempting treatment again with oral antifungal with the addition of the topical cream. If treatment continues to fail to resolve symptoms, discussed that it would be best to be seen in the clinic for further evaluation. Pt verbalized understanding.

## 2023-01-19 DIAGNOSIS — E11.65 UNCONTROLLED TYPE 2 DIABETES MELLITUS WITH HYPERGLYCEMIA (HCC): ICD-10-CM

## 2023-01-20 RX ORDER — INSULIN GLARGINE 100 [IU]/ML
INJECTION, SOLUTION SUBCUTANEOUS
Qty: 63 ML | Refills: 0 | Status: SHIPPED | OUTPATIENT
Start: 2023-01-20

## 2023-01-26 ENCOUNTER — OFFICE VISIT (OUTPATIENT)
Dept: OBGYN CLINIC | Facility: CLINIC | Age: 58
End: 2023-01-26

## 2023-01-26 VITALS
WEIGHT: 230 LBS | DIASTOLIC BLOOD PRESSURE: 81 MMHG | BODY MASS INDEX: 38.32 KG/M2 | SYSTOLIC BLOOD PRESSURE: 151 MMHG | HEIGHT: 65 IN

## 2023-01-26 DIAGNOSIS — L29.2 VULVAR ITCHING: Primary | ICD-10-CM

## 2023-01-26 DIAGNOSIS — N89.8 VAGINAL ITCHING: ICD-10-CM

## 2023-01-26 PROCEDURE — 3077F SYST BP >= 140 MM HG: CPT | Performed by: NURSE PRACTITIONER

## 2023-01-26 PROCEDURE — 3079F DIAST BP 80-89 MM HG: CPT | Performed by: NURSE PRACTITIONER

## 2023-01-26 PROCEDURE — 3008F BODY MASS INDEX DOCD: CPT | Performed by: NURSE PRACTITIONER

## 2023-01-26 PROCEDURE — 99213 OFFICE O/P EST LOW 20 MIN: CPT | Performed by: NURSE PRACTITIONER

## 2023-01-26 RX ORDER — CLOBETASOL PROPIONATE 0.5 MG/G
1 CREAM TOPICAL 2 TIMES DAILY
Qty: 60 G | Refills: 0 | Status: SHIPPED | OUTPATIENT
Start: 2023-01-26 | End: 2023-02-09

## 2023-02-06 RX ORDER — ERTUGLIFLOZIN 15 MG/1
TABLET, FILM COATED ORAL
Qty: 90 TABLET | Refills: 0 | Status: SHIPPED | OUTPATIENT
Start: 2023-02-06

## 2023-02-06 NOTE — TELEPHONE ENCOUNTER
LOV: 10/24/22    RTC:3months    F/U:No FU/LMTCB     Pending Monthly Supply: orders pending, please approve if appropriate.

## 2023-02-11 ENCOUNTER — HOSPITAL ENCOUNTER (EMERGENCY)
Facility: HOSPITAL | Age: 58
Discharge: HOME | End: 2023-02-11
Attending: EMERGENCY MEDICINE
Payer: MEDICAID

## 2023-02-11 VITALS
OXYGEN SATURATION: 98 % | TEMPERATURE: 98 F | RESPIRATION RATE: 22 BRPM | WEIGHT: 230 LBS | HEART RATE: 105 BPM | SYSTOLIC BLOOD PRESSURE: 191 MMHG | BODY MASS INDEX: 38.32 KG/M2 | DIASTOLIC BLOOD PRESSURE: 81 MMHG | HEIGHT: 65 IN

## 2023-02-11 DIAGNOSIS — B37.2 CANDIDAL SKIN INFECTION: Primary | ICD-10-CM

## 2023-02-11 PROCEDURE — 99283 EMERGENCY DEPT VISIT LOW MDM: CPT

## 2023-02-11 RX ORDER — FLUCONAZOLE 100 MG/1
100 TABLET ORAL DAILY
Qty: 4 TABLET | Refills: 0 | Status: SHIPPED | OUTPATIENT
Start: 2023-02-11 | End: 2023-02-15

## 2023-02-11 RX ORDER — CLOTRIMAZOLE 1 %
1 CREAM (GRAM) TOPICAL 2 TIMES DAILY
Qty: 28 G | Refills: 0 | Status: SHIPPED | OUTPATIENT
Start: 2023-02-11 | End: 2023-02-16

## 2023-02-11 NOTE — ED INITIAL ASSESSMENT (HPI)
Patient presents generalized rash: itching, burning that stated weeks ago. Worse in vagina and buttock area. Per patient saw gyne a few weeks ago and was told it was a  Yeast infection but patient states its not that.      Patient concerned for scabies

## 2023-02-15 ENCOUNTER — OFFICE VISIT (OUTPATIENT)
Dept: OBGYN CLINIC | Facility: CLINIC | Age: 58
End: 2023-02-15

## 2023-02-15 VITALS
DIASTOLIC BLOOD PRESSURE: 75 MMHG | SYSTOLIC BLOOD PRESSURE: 141 MMHG | HEIGHT: 65 IN | WEIGHT: 231 LBS | BODY MASS INDEX: 38.49 KG/M2

## 2023-02-15 DIAGNOSIS — L29.2 VULVAR ITCHING: Primary | ICD-10-CM

## 2023-02-15 PROCEDURE — 3077F SYST BP >= 140 MM HG: CPT | Performed by: NURSE PRACTITIONER

## 2023-02-15 PROCEDURE — 3008F BODY MASS INDEX DOCD: CPT | Performed by: NURSE PRACTITIONER

## 2023-02-15 PROCEDURE — 3078F DIAST BP <80 MM HG: CPT | Performed by: NURSE PRACTITIONER

## 2023-02-15 PROCEDURE — 99212 OFFICE O/P EST SF 10 MIN: CPT | Performed by: NURSE PRACTITIONER

## 2023-02-16 ENCOUNTER — OFFICE VISIT (OUTPATIENT)
Dept: FAMILY MEDICINE CLINIC | Facility: CLINIC | Age: 58
End: 2023-02-16

## 2023-02-16 ENCOUNTER — LAB ENCOUNTER (OUTPATIENT)
Dept: LAB | Age: 58
End: 2023-02-16
Attending: FAMILY MEDICINE
Payer: MEDICAID

## 2023-02-16 VITALS
TEMPERATURE: 97 F | HEIGHT: 65 IN | BODY MASS INDEX: 38.32 KG/M2 | DIASTOLIC BLOOD PRESSURE: 67 MMHG | SYSTOLIC BLOOD PRESSURE: 120 MMHG | HEART RATE: 108 BPM | WEIGHT: 230 LBS

## 2023-02-16 DIAGNOSIS — L25.9 CONTACT DERMATITIS, UNSPECIFIED CONTACT DERMATITIS TYPE, UNSPECIFIED TRIGGER: Primary | ICD-10-CM

## 2023-02-16 DIAGNOSIS — I10 ESSENTIAL HYPERTENSION: ICD-10-CM

## 2023-02-16 DIAGNOSIS — F32.A DEPRESSIVE DISORDER: ICD-10-CM

## 2023-02-16 DIAGNOSIS — Z79.4 UNCONTROLLED DIABETES MELLITUS WITH HYPERGLYCEMIA, WITH LONG-TERM CURRENT USE OF INSULIN (HCC): ICD-10-CM

## 2023-02-16 DIAGNOSIS — B37.2 CANDIDAL DERMATITIS: ICD-10-CM

## 2023-02-16 DIAGNOSIS — F43.81 PROLONGED GRIEF DISORDER: ICD-10-CM

## 2023-02-16 DIAGNOSIS — E11.65 UNCONTROLLED DIABETES MELLITUS WITH HYPERGLYCEMIA, WITH LONG-TERM CURRENT USE OF INSULIN (HCC): ICD-10-CM

## 2023-02-16 DIAGNOSIS — F41.9 ANXIETY: ICD-10-CM

## 2023-02-16 LAB
EST. AVERAGE GLUCOSE BLD GHB EST-MCNC: 295 MG/DL (ref 68–126)
HBA1C MFR BLD: 11.9 % (ref ?–5.7)

## 2023-02-16 PROCEDURE — 36415 COLL VENOUS BLD VENIPUNCTURE: CPT

## 2023-02-16 PROCEDURE — 3046F HEMOGLOBIN A1C LEVEL >9.0%: CPT

## 2023-02-16 PROCEDURE — 99214 OFFICE O/P EST MOD 30 MIN: CPT | Performed by: FAMILY MEDICINE

## 2023-02-16 PROCEDURE — 83036 HEMOGLOBIN GLYCOSYLATED A1C: CPT

## 2023-02-16 PROCEDURE — 3074F SYST BP LT 130 MM HG: CPT | Performed by: FAMILY MEDICINE

## 2023-02-16 PROCEDURE — 3008F BODY MASS INDEX DOCD: CPT | Performed by: FAMILY MEDICINE

## 2023-02-16 PROCEDURE — 3078F DIAST BP <80 MM HG: CPT | Performed by: FAMILY MEDICINE

## 2023-02-16 RX ORDER — TRIAMCINOLONE ACETONIDE 1 MG/G
CREAM TOPICAL 2 TIMES DAILY PRN
Qty: 60 G | Refills: 0 | Status: SHIPPED | OUTPATIENT
Start: 2023-02-16

## 2023-02-16 RX ORDER — CLOTRIMAZOLE 1 %
1 CREAM (GRAM) TOPICAL 2 TIMES DAILY
Qty: 28 G | Refills: 0 | Status: SHIPPED | OUTPATIENT
Start: 2023-02-16

## 2023-02-16 RX ORDER — LISINOPRIL 40 MG/1
40 TABLET ORAL DAILY
Qty: 90 TABLET | Refills: 3 | Status: SHIPPED | OUTPATIENT
Start: 2023-02-16

## 2023-02-17 ENCOUNTER — OFFICE VISIT (OUTPATIENT)
Dept: OBGYN CLINIC | Facility: CLINIC | Age: 58
End: 2023-02-17

## 2023-02-17 VITALS
BODY MASS INDEX: 38.32 KG/M2 | SYSTOLIC BLOOD PRESSURE: 127 MMHG | DIASTOLIC BLOOD PRESSURE: 71 MMHG | HEIGHT: 65 IN | WEIGHT: 230 LBS

## 2023-02-17 DIAGNOSIS — N76.0 VAGINITIS AND VULVOVAGINITIS: Primary | ICD-10-CM

## 2023-02-17 PROCEDURE — 3078F DIAST BP <80 MM HG: CPT | Performed by: NURSE PRACTITIONER

## 2023-02-17 PROCEDURE — 3008F BODY MASS INDEX DOCD: CPT | Performed by: NURSE PRACTITIONER

## 2023-02-17 PROCEDURE — 56605 BIOPSY OF VULVA/PERINEUM: CPT | Performed by: NURSE PRACTITIONER

## 2023-02-17 PROCEDURE — 3074F SYST BP LT 130 MM HG: CPT | Performed by: NURSE PRACTITIONER

## 2023-02-22 ENCOUNTER — TELEPHONE (OUTPATIENT)
Dept: OBGYN CLINIC | Facility: CLINIC | Age: 58
End: 2023-02-22

## 2023-02-22 DIAGNOSIS — L30.9 DERMATITIS: Primary | ICD-10-CM

## 2023-02-22 NOTE — TELEPHONE ENCOUNTER
Pt Name and  verified. Pt is upset and would like a call regarding her biopsy. Pt advised that [address] is currently seeing patients and this RN will route her message now. Pt RENETTA.

## 2023-02-22 NOTE — TELEPHONE ENCOUNTER
Telephone encounter review placed to review vulvar biopsy results that should chronic inflammation, negative for malignancy. Still has severe itching in the area. Encouraged oatmeal baths, low does steroid cream, non drowsy antihistamine like Zyrtec and to see the dermatologist, referral placed. Pt verbalized understanding and questions answered.

## 2023-02-24 ENCOUNTER — OFFICE VISIT (OUTPATIENT)
Dept: DERMATOLOGY CLINIC | Facility: CLINIC | Age: 58
End: 2023-02-24

## 2023-02-24 DIAGNOSIS — L20.84 INTRINSIC ECZEMA: Primary | ICD-10-CM

## 2023-02-24 PROCEDURE — 99203 OFFICE O/P NEW LOW 30 MIN: CPT | Performed by: PHYSICIAN ASSISTANT

## 2023-02-24 RX ORDER — DOXYCYCLINE HYCLATE 100 MG/1
100 CAPSULE ORAL 2 TIMES DAILY
Qty: 28 CAPSULE | Refills: 0 | Status: SHIPPED | OUTPATIENT
Start: 2023-02-24

## 2023-02-24 RX ORDER — CLOBETASOL PROPIONATE 0.5 MG/G
1 OINTMENT TOPICAL DAILY PRN
Qty: 60 G | Refills: 2 | Status: SHIPPED | OUTPATIENT
Start: 2023-02-24

## 2023-02-24 RX ORDER — FLUCONAZOLE 100 MG/1
100 TABLET ORAL DAILY
Qty: 14 TABLET | Refills: 0 | Status: SHIPPED | OUTPATIENT
Start: 2023-02-24

## 2023-02-24 RX ORDER — TACROLIMUS 1 MG/G
1 OINTMENT TOPICAL 2 TIMES DAILY
Qty: 60 G | Refills: 3 | Status: SHIPPED | OUTPATIENT
Start: 2023-02-24

## 2023-03-09 ENCOUNTER — OFFICE VISIT (OUTPATIENT)
Dept: DERMATOLOGY CLINIC | Facility: CLINIC | Age: 58
End: 2023-03-09

## 2023-03-09 DIAGNOSIS — L30.9 DERMATITIS: Primary | ICD-10-CM

## 2023-03-09 PROCEDURE — 99213 OFFICE O/P EST LOW 20 MIN: CPT | Performed by: PHYSICIAN ASSISTANT

## 2023-03-11 ENCOUNTER — APPOINTMENT (OUTPATIENT)
Dept: GENERAL RADIOLOGY | Facility: HOSPITAL | Age: 58
End: 2023-03-11
Attending: EMERGENCY MEDICINE
Payer: MEDICAID

## 2023-03-11 ENCOUNTER — APPOINTMENT (OUTPATIENT)
Dept: ULTRASOUND IMAGING | Facility: HOSPITAL | Age: 58
End: 2023-03-11
Attending: EMERGENCY MEDICINE
Payer: MEDICAID

## 2023-03-11 ENCOUNTER — APPOINTMENT (OUTPATIENT)
Dept: CT IMAGING | Facility: HOSPITAL | Age: 58
End: 2023-03-11
Attending: EMERGENCY MEDICINE
Payer: MEDICAID

## 2023-03-11 ENCOUNTER — HOSPITAL ENCOUNTER (INPATIENT)
Facility: HOSPITAL | Age: 58
LOS: 11 days | Discharge: HOME | End: 2023-03-22
Attending: EMERGENCY MEDICINE | Admitting: INTERNAL MEDICINE
Payer: MEDICAID

## 2023-03-11 DIAGNOSIS — E11.65 UNCONTROLLED TYPE 2 DIABETES MELLITUS WITH HYPERGLYCEMIA (HCC): ICD-10-CM

## 2023-03-11 DIAGNOSIS — J18.9 COMMUNITY ACQUIRED PNEUMONIA, UNSPECIFIED LATERALITY: ICD-10-CM

## 2023-03-11 DIAGNOSIS — E66.9 OBESITY (BMI 30-39.9): ICD-10-CM

## 2023-03-11 DIAGNOSIS — J96.01 ACUTE RESPIRATORY FAILURE WITH HYPOXIA (HCC): Primary | ICD-10-CM

## 2023-03-11 DIAGNOSIS — E11.42 DIABETIC POLYNEUROPATHY ASSOCIATED WITH TYPE 2 DIABETES MELLITUS (HCC): ICD-10-CM

## 2023-03-11 LAB
ADENOVIRUS PCR:: NOT DETECTED
ALBUMIN SERPL-MCNC: 2.9 G/DL (ref 3.4–5)
ALP LIVER SERPL-CCNC: 30 U/L
ALT SERPL-CCNC: 22 U/L
ANION GAP SERPL CALC-SCNC: 9 MMOL/L (ref 0–18)
AST SERPL-CCNC: 17 U/L (ref 15–37)
B PARAPERT DNA SPEC QL NAA+PROBE: NOT DETECTED
B PERT DNA SPEC QL NAA+PROBE: NOT DETECTED
BILIRUB DIRECT SERPL-MCNC: 0.1 MG/DL (ref 0–0.2)
BILIRUB SERPL-MCNC: 0.4 MG/DL (ref 0.1–2)
BILIRUB UR QL: NEGATIVE
BUN BLD-MCNC: 39 MG/DL (ref 7–18)
BUN/CREAT SERPL: 25.8 (ref 10–20)
C PNEUM DNA SPEC QL NAA+PROBE: NOT DETECTED
CALCIUM BLD-MCNC: 8.8 MG/DL (ref 8.5–10.1)
CHLORIDE SERPL-SCNC: 98 MMOL/L (ref 98–112)
CO2 SERPL-SCNC: 21 MMOL/L (ref 21–32)
COLOR UR: YELLOW
CORONAVIRUS 229E PCR:: NOT DETECTED
CORONAVIRUS HKU1 PCR:: NOT DETECTED
CORONAVIRUS NL63 PCR:: NOT DETECTED
CORONAVIRUS OC43 PCR:: NOT DETECTED
CREAT BLD-MCNC: 1.51 MG/DL
FLUAV RNA SPEC QL NAA+PROBE: NOT DETECTED
FLUBV RNA SPEC QL NAA+PROBE: NOT DETECTED
GFR SERPLBLD BASED ON 1.73 SQ M-ARVRAT: 40 ML/MIN/1.73M2 (ref 60–?)
GLUCOSE BLD-MCNC: 95 MG/DL (ref 70–99)
GLUCOSE BLDC GLUCOMTR-MCNC: 56 MG/DL (ref 70–99)
GLUCOSE BLDC GLUCOMTR-MCNC: 91 MG/DL (ref 70–99)
GLUCOSE UR-MCNC: 1000 MG/DL
HGB UR QL STRIP.AUTO: NEGATIVE
KETONES UR-MCNC: NEGATIVE MG/DL
LACTATE SERPL-SCNC: 0.8 MMOL/L (ref 0.4–2)
LEUKOCYTE ESTERASE UR QL STRIP.AUTO: 250
LIPASE SERPL-CCNC: 103 U/L (ref 73–393)
LIPASE SERPL-CCNC: 25 U/L (ref 13–75)
MAGNESIUM SERPL-MCNC: 1.4 MG/DL (ref 1.6–2.6)
METAPNEUMOVIRUS PCR:: NOT DETECTED
MYCOPLASMA PNEUMONIA PCR:: NOT DETECTED
NITRITE UR QL STRIP.AUTO: NEGATIVE
OSMOLALITY SERPL CALC.SUM OF ELEC: 275 MOSM/KG (ref 275–295)
PARAINFLUENZA 1 PCR:: NOT DETECTED
PARAINFLUENZA 2 PCR:: NOT DETECTED
PARAINFLUENZA 3 PCR:: NOT DETECTED
PARAINFLUENZA 4 PCR:: NOT DETECTED
PH UR: 5 [PH] (ref 5–8)
POTASSIUM SERPL-SCNC: 4.3 MMOL/L (ref 3.5–5.1)
PROCALCITONIN SERPL-MCNC: 2.57 NG/ML (ref ?–0.16)
PROT SERPL-MCNC: 6.9 G/DL (ref 6.4–8.2)
PROT UR-MCNC: 30 MG/DL
RHINOVIRUS/ENTERO PCR:: NOT DETECTED
RSV RNA SPEC QL NAA+PROBE: NOT DETECTED
SARS-COV-2 RNA NPH QL NAA+NON-PROBE: NOT DETECTED
SARS-COV-2 RNA RESP QL NAA+PROBE: NOT DETECTED
SODIUM SERPL-SCNC: 128 MMOL/L (ref 136–145)
SP GR UR STRIP: 1.02 (ref 1–1.03)
UROBILINOGEN UR STRIP-ACNC: NORMAL
YEAST UR QL: PRESENT /HPF

## 2023-03-11 PROCEDURE — 71260 CT THORAX DX C+: CPT | Performed by: EMERGENCY MEDICINE

## 2023-03-11 PROCEDURE — 99223 1ST HOSP IP/OBS HIGH 75: CPT | Performed by: INTERNAL MEDICINE

## 2023-03-11 PROCEDURE — 71045 X-RAY EXAM CHEST 1 VIEW: CPT | Performed by: EMERGENCY MEDICINE

## 2023-03-11 PROCEDURE — 76705 ECHO EXAM OF ABDOMEN: CPT | Performed by: EMERGENCY MEDICINE

## 2023-03-11 RX ORDER — ASPIRIN 81 MG/1
81 TABLET, CHEWABLE ORAL DAILY
Status: DISCONTINUED | OUTPATIENT
Start: 2023-03-12 | End: 2023-03-22

## 2023-03-11 RX ORDER — DEXTROSE MONOHYDRATE 25 G/50ML
50 INJECTION, SOLUTION INTRAVENOUS
Status: DISCONTINUED | OUTPATIENT
Start: 2023-03-11 | End: 2023-03-22

## 2023-03-11 RX ORDER — MAGNESIUM SULFATE HEPTAHYDRATE 40 MG/ML
2 INJECTION, SOLUTION INTRAVENOUS ONCE
Status: COMPLETED | OUTPATIENT
Start: 2023-03-11 | End: 2023-03-11

## 2023-03-11 RX ORDER — NICOTINE POLACRILEX 4 MG
30 LOZENGE BUCCAL
Status: DISCONTINUED | OUTPATIENT
Start: 2023-03-11 | End: 2023-03-22

## 2023-03-11 RX ORDER — ONDANSETRON 2 MG/ML
4 INJECTION INTRAMUSCULAR; INTRAVENOUS EVERY 6 HOURS PRN
Status: DISCONTINUED | OUTPATIENT
Start: 2023-03-11 | End: 2023-03-22

## 2023-03-11 RX ORDER — SODIUM PHOSPHATE, DIBASIC AND SODIUM PHOSPHATE, MONOBASIC 7; 19 G/133ML; G/133ML
1 ENEMA RECTAL ONCE AS NEEDED
Status: COMPLETED | OUTPATIENT
Start: 2023-03-11 | End: 2023-03-13

## 2023-03-11 RX ORDER — MORPHINE SULFATE 4 MG/ML
4 INJECTION, SOLUTION INTRAMUSCULAR; INTRAVENOUS ONCE
Status: COMPLETED | OUTPATIENT
Start: 2023-03-11 | End: 2023-03-11

## 2023-03-11 RX ORDER — ACETAMINOPHEN 325 MG/1
650 TABLET ORAL EVERY 4 HOURS PRN
Status: DISCONTINUED | OUTPATIENT
Start: 2023-03-11 | End: 2023-03-22

## 2023-03-11 RX ORDER — GABAPENTIN 100 MG/1
100 CAPSULE ORAL 2 TIMES DAILY
Status: DISCONTINUED | OUTPATIENT
Start: 2023-03-11 | End: 2023-03-22

## 2023-03-11 RX ORDER — HYDROCODONE BITARTRATE AND ACETAMINOPHEN 5; 325 MG/1; MG/1
1 TABLET ORAL EVERY 4 HOURS PRN
Status: DISCONTINUED | OUTPATIENT
Start: 2023-03-11 | End: 2023-03-22

## 2023-03-11 RX ORDER — HEPARIN SODIUM 5000 [USP'U]/ML
5000 INJECTION, SOLUTION INTRAVENOUS; SUBCUTANEOUS EVERY 8 HOURS SCHEDULED
Status: DISCONTINUED | OUTPATIENT
Start: 2023-03-11 | End: 2023-03-22

## 2023-03-11 RX ORDER — BISACODYL 10 MG
10 SUPPOSITORY, RECTAL RECTAL
Status: DISCONTINUED | OUTPATIENT
Start: 2023-03-11 | End: 2023-03-22

## 2023-03-11 RX ORDER — SENNOSIDES 8.6 MG
17.2 TABLET ORAL NIGHTLY PRN
Status: DISCONTINUED | OUTPATIENT
Start: 2023-03-11 | End: 2023-03-22

## 2023-03-11 RX ORDER — POLYETHYLENE GLYCOL 3350 17 G/17G
17 POWDER, FOR SOLUTION ORAL DAILY PRN
Status: DISCONTINUED | OUTPATIENT
Start: 2023-03-11 | End: 2023-03-22

## 2023-03-11 RX ORDER — HYDROCODONE BITARTRATE AND ACETAMINOPHEN 5; 325 MG/1; MG/1
2 TABLET ORAL EVERY 4 HOURS PRN
Status: DISCONTINUED | OUTPATIENT
Start: 2023-03-11 | End: 2023-03-22

## 2023-03-11 RX ORDER — ATORVASTATIN CALCIUM 20 MG/1
20 TABLET, FILM COATED ORAL NIGHTLY
Status: DISCONTINUED | OUTPATIENT
Start: 2023-03-11 | End: 2023-03-22

## 2023-03-11 RX ORDER — NICOTINE POLACRILEX 4 MG
15 LOZENGE BUCCAL
Status: DISCONTINUED | OUTPATIENT
Start: 2023-03-11 | End: 2023-03-22

## 2023-03-11 RX ORDER — PROCHLORPERAZINE EDISYLATE 5 MG/ML
5 INJECTION INTRAMUSCULAR; INTRAVENOUS EVERY 8 HOURS PRN
Status: DISCONTINUED | OUTPATIENT
Start: 2023-03-11 | End: 2023-03-22

## 2023-03-11 RX ORDER — FLUTICASONE PROPIONATE 50 MCG
2 SPRAY, SUSPENSION (ML) NASAL DAILY
Status: DISCONTINUED | OUTPATIENT
Start: 2023-03-11 | End: 2023-03-22

## 2023-03-11 RX ORDER — SODIUM CHLORIDE 9 MG/ML
INJECTION, SOLUTION INTRAVENOUS CONTINUOUS
Status: DISCONTINUED | OUTPATIENT
Start: 2023-03-11 | End: 2023-03-13

## 2023-03-11 RX ORDER — ONDANSETRON 2 MG/ML
4 INJECTION INTRAMUSCULAR; INTRAVENOUS ONCE
Status: COMPLETED | OUTPATIENT
Start: 2023-03-11 | End: 2023-03-11

## 2023-03-11 NOTE — ED QUICK NOTES
Orders for admission, patient is aware of plan and ready to go upstairs. Any questions, please call ED RN Nany at extension [phone].      Patient Covid vaccination status: Fully vaccinated     COVID Test Ordered in ED: Rapid SARS-CoV-2 by PCR    COVID Suspicion at Admission: N/A    Running Infusions:  Zithromax @ 250mLhr    Mental Status/LOC at time of transport: A&OX4    Other pertinent information:   CIWA score: N/A   NIH score:  N/A

## 2023-03-11 NOTE — ED INITIAL ASSESSMENT (HPI)
Pt presents for fatigue for the last 5 days, generalized abd pain x3 days. Pt reports nausea, reports vomiting x1 today. Pt reports fevers for the last few days. Pt reports seen at [facility] yesterday for same symptoms with negative work up.

## 2023-03-12 LAB
ALBUMIN SERPL-MCNC: 2.8 G/DL (ref 3.4–5)
ALBUMIN/GLOB SERPL: 0.6 {RATIO} (ref 1–2)
ALP LIVER SERPL-CCNC: 33 U/L
ALT SERPL-CCNC: 23 U/L
ANION GAP SERPL CALC-SCNC: 8 MMOL/L (ref 0–18)
AST SERPL-CCNC: 21 U/L (ref 15–37)
BASOPHILS # BLD AUTO: 0.05 X10(3) UL (ref 0–0.2)
BASOPHILS NFR BLD AUTO: 0.4 %
BILIRUB SERPL-MCNC: 0.3 MG/DL (ref 0.1–2)
BUN BLD-MCNC: 40 MG/DL (ref 7–18)
BUN/CREAT SERPL: 26.1 (ref 10–20)
CALCIUM BLD-MCNC: 8.2 MG/DL (ref 8.5–10.1)
CHLORIDE SERPL-SCNC: 99 MMOL/L (ref 98–112)
CO2 SERPL-SCNC: 22 MMOL/L (ref 21–32)
CREAT BLD-MCNC: 1.53 MG/DL
DEPRECATED RDW RBC AUTO: 45.1 FL (ref 35.1–46.3)
EOSINOPHIL # BLD AUTO: 1.4 X10(3) UL (ref 0–0.7)
EOSINOPHIL NFR BLD AUTO: 10.8 %
ERYTHROCYTE [DISTWIDTH] IN BLOOD BY AUTOMATED COUNT: 13.9 % (ref 11–15)
GFR SERPLBLD BASED ON 1.73 SQ M-ARVRAT: 39 ML/MIN/1.73M2 (ref 60–?)
GLOBULIN PLAS-MCNC: 4.4 G/DL (ref 2.8–4.4)
GLUCOSE BLD-MCNC: 89 MG/DL (ref 70–99)
GLUCOSE BLDC GLUCOMTR-MCNC: 122 MG/DL (ref 70–99)
GLUCOSE BLDC GLUCOMTR-MCNC: 127 MG/DL (ref 70–99)
GLUCOSE BLDC GLUCOMTR-MCNC: 77 MG/DL (ref 70–99)
GLUCOSE BLDC GLUCOMTR-MCNC: 93 MG/DL (ref 70–99)
GLUCOSE BLDC GLUCOMTR-MCNC: 95 MG/DL (ref 70–99)
GLUCOSE BLDC GLUCOMTR-MCNC: 95 MG/DL (ref 70–99)
GLUCOSE BLDC GLUCOMTR-MCNC: 96 MG/DL (ref 70–99)
HCT VFR BLD AUTO: 31.7 %
HGB BLD-MCNC: 10.3 G/DL
IMM GRANULOCYTES # BLD AUTO: 0.17 X10(3) UL (ref 0–1)
IMM GRANULOCYTES NFR BLD: 1.3 %
LYMPHOCYTES # BLD AUTO: 1.25 X10(3) UL (ref 1–4)
LYMPHOCYTES NFR BLD AUTO: 9.6 %
MAGNESIUM SERPL-MCNC: 2.1 MG/DL (ref 1.6–2.6)
MCH RBC QN AUTO: 28.8 PG (ref 26–34)
MCHC RBC AUTO-ENTMCNC: 32.5 G/DL (ref 31–37)
MCV RBC AUTO: 88.5 FL
MONOCYTES # BLD AUTO: 0.98 X10(3) UL (ref 0.1–1)
MONOCYTES NFR BLD AUTO: 7.5 %
NEUTROPHILS # BLD AUTO: 9.17 X10 (3) UL (ref 1.5–7.7)
NEUTROPHILS # BLD AUTO: 9.17 X10(3) UL (ref 1.5–7.7)
NEUTROPHILS NFR BLD AUTO: 70.4 %
OSMOLALITY SERPL CALC.SUM OF ELEC: 277 MOSM/KG (ref 275–295)
PLATELET # BLD AUTO: 236 10(3)UL (ref 150–450)
POTASSIUM SERPL-SCNC: 4.7 MMOL/L (ref 3.5–5.1)
PROT SERPL-MCNC: 7.2 G/DL (ref 6.4–8.2)
RBC # BLD AUTO: 3.58 X10(6)UL
SODIUM SERPL-SCNC: 129 MMOL/L (ref 136–145)
WBC # BLD AUTO: 13 X10(3) UL (ref 4–11)

## 2023-03-12 PROCEDURE — 5A09357 ASSISTANCE WITH RESPIRATORY VENTILATION, LESS THAN 24 CONSECUTIVE HOURS, CONTINUOUS POSITIVE AIRWAY PRESSURE: ICD-10-PCS | Performed by: INTERNAL MEDICINE

## 2023-03-12 PROCEDURE — 99223 1ST HOSP IP/OBS HIGH 75: CPT | Performed by: INTERNAL MEDICINE

## 2023-03-12 PROCEDURE — 99233 SBSQ HOSP IP/OBS HIGH 50: CPT | Performed by: INTERNAL MEDICINE

## 2023-03-12 PROCEDURE — 5A0935A ASSISTANCE WITH RESPIRATORY VENTILATION, LESS THAN 24 CONSECUTIVE HOURS, HIGH NASAL FLOW/VELOCITY: ICD-10-PCS | Performed by: INTERNAL MEDICINE

## 2023-03-12 RX ORDER — IPRATROPIUM BROMIDE AND ALBUTEROL SULFATE 2.5; .5 MG/3ML; MG/3ML
3 SOLUTION RESPIRATORY (INHALATION) EVERY 6 HOURS PRN
Status: DISCONTINUED | OUTPATIENT
Start: 2023-03-12 | End: 2023-03-22

## 2023-03-12 RX ORDER — ECHINACEA PURPUREA EXTRACT 125 MG
1 TABLET ORAL
Status: DISCONTINUED | OUTPATIENT
Start: 2023-03-12 | End: 2023-03-22

## 2023-03-12 RX ORDER — MAGNESIUM OXIDE 400 MG/1
800 TABLET ORAL ONCE
Status: COMPLETED | OUTPATIENT
Start: 2023-03-12 | End: 2023-03-12

## 2023-03-12 NOTE — PLAN OF CARE
Patient is progressing well, vital signs stable, A&Ox4. Complaining of some pain, relief with prn medicine and cooperating with nursing care. Bed alarm on and using call light appropriately. _________________________________________________________  Problem: Patient Centered Care  Goal: Patient preferences are identified and integrated in the patient's plan of care  Description: Interventions:  - What would you like us to know as we care for you? From home with spouse  - Provide timely, complete, and accurate information to patient/family  - Incorporate patient and family knowledge, values, beliefs, and cultural backgrounds into the planning and delivery of care  - Encourage patient/family to participate in care and decision-making at the level they choose  - Honor patient and family perspectives and choices  Outcome: Progressing     Problem: Diabetes/Glucose Control  Goal: Glucose maintained within prescribed range  Description: INTERVENTIONS:  - Monitor Blood Glucose as ordered  - Assess for signs and symptoms of hyperglycemia and hypoglycemia  - Administer ordered medications to maintain glucose within target range  - Assess barriers to adequate nutritional intake and initiate nutrition consult as needed  - Instruct patient on self management of diabetes  Outcome: Progressing     Problem: Patient/Family Goals  Goal: Patient/Family Long Term Goal  Description: Patient's Long Term Goal: Go home    Interventions:  - Regain respiratory stability by way of orders.  - See additional Care Plan goals for specific interventions  Outcome: Progressing  Goal: Patient/Family Short Term Goal  Description: Patient's Short Term Goal: Get some sleep while in the hospital.    Interventions:   - Non-pharmacological measures taken to help promote quality sleep.   - See additional Care Plan goals for specific interventions  Outcome: Progressing

## 2023-03-12 NOTE — PLAN OF CARE
Patient has safety precautions in place bed in the lowest position, bed alarm on, and call light within reach. Continue to monitor patient with intentional nursing rounds. Patient is to have a 2D ECHO. Problem: Patient Centered Care  Goal: Patient preferences are identified and integrated in the patient's plan of care  Description: Interventions:  - What would you like us to know as we care for you? From home with spouse  - Provide timely, complete, and accurate information to patient/family  - Incorporate patient and family knowledge, values, beliefs, and cultural backgrounds into the planning and delivery of care  - Encourage patient/family to participate in care and decision-making at the level they choose  - Honor patient and family perspectives and choices  Outcome: Progressing     Problem: Diabetes/Glucose Control  Goal: Glucose maintained within prescribed range  Description: INTERVENTIONS:  - Monitor Blood Glucose as ordered  - Assess for signs and symptoms of hyperglycemia and hypoglycemia  - Administer ordered medications to maintain glucose within target range  - Assess barriers to adequate nutritional intake and initiate nutrition consult as needed  - Instruct patient on self management of diabetes  Outcome: Progressing     Problem: Patient/Family Goals  Goal: Patient/Family Long Term Goal  Description: Patient's Long Term Goal: Go home    Interventions:  - Regain respiratory stability by way of orders.  - See additional Care Plan goals for specific interventions  Outcome: Progressing  Goal: Patient/Family Short Term Goal  Description: Patient's Short Term Goal: Get some sleep while in the hospital.    Interventions:   - Non-pharmacological measures taken to help promote quality sleep.   - See additional Care Plan goals for specific interventions  Outcome: Progressing

## 2023-03-13 ENCOUNTER — APPOINTMENT (OUTPATIENT)
Dept: CV DIAGNOSTICS | Facility: HOSPITAL | Age: 58
End: 2023-03-13
Attending: INTERNAL MEDICINE
Payer: MEDICAID

## 2023-03-13 ENCOUNTER — APPOINTMENT (OUTPATIENT)
Dept: GENERAL RADIOLOGY | Facility: HOSPITAL | Age: 58
End: 2023-03-13
Attending: INTERNAL MEDICINE
Payer: MEDICAID

## 2023-03-13 LAB
ALBUMIN SERPL-MCNC: 2.3 G/DL (ref 3.4–5)
ALBUMIN/GLOB SERPL: 0.6 {RATIO} (ref 1–2)
ALP LIVER SERPL-CCNC: 38 U/L
ALT SERPL-CCNC: 21 U/L
ANION GAP SERPL CALC-SCNC: 6 MMOL/L (ref 0–18)
AST SERPL-CCNC: 20 U/L (ref 15–37)
BASOPHILS # BLD AUTO: 0.03 X10(3) UL (ref 0–0.2)
BASOPHILS # BLD AUTO: 0.04 X10(3) UL (ref 0–0.2)
BASOPHILS NFR BLD AUTO: 0.3 %
BASOPHILS NFR BLD AUTO: 0.3 %
BILIRUB SERPL-MCNC: 0.3 MG/DL (ref 0.1–2)
BUN BLD-MCNC: 45 MG/DL (ref 7–18)
BUN/CREAT SERPL: 28 (ref 10–20)
CALCIUM BLD-MCNC: 7.6 MG/DL (ref 8.5–10.1)
CHLORIDE SERPL-SCNC: 100 MMOL/L (ref 98–112)
CO2 SERPL-SCNC: 21 MMOL/L (ref 21–32)
CREAT BLD-MCNC: 1.61 MG/DL
DEPRECATED RDW RBC AUTO: 43.8 FL (ref 35.1–46.3)
DEPRECATED RDW RBC AUTO: 44.4 FL (ref 35.1–46.3)
EOSINOPHIL # BLD AUTO: 0.5 X10(3) UL (ref 0–0.7)
EOSINOPHIL # BLD AUTO: 1.29 X10(3) UL (ref 0–0.7)
EOSINOPHIL NFR BLD AUTO: 13.9 %
EOSINOPHIL NFR BLD AUTO: 4.2 %
ERYTHROCYTE [DISTWIDTH] IN BLOOD BY AUTOMATED COUNT: 13.8 % (ref 11–15)
ERYTHROCYTE [DISTWIDTH] IN BLOOD BY AUTOMATED COUNT: 13.9 % (ref 11–15)
GFR SERPLBLD BASED ON 1.73 SQ M-ARVRAT: 37 ML/MIN/1.73M2 (ref 60–?)
GLOBULIN PLAS-MCNC: 4.1 G/DL (ref 2.8–4.4)
GLUCOSE BLD-MCNC: 190 MG/DL (ref 70–99)
GLUCOSE BLDC GLUCOMTR-MCNC: 182 MG/DL (ref 70–99)
GLUCOSE BLDC GLUCOMTR-MCNC: 220 MG/DL (ref 70–99)
GLUCOSE BLDC GLUCOMTR-MCNC: 264 MG/DL (ref 70–99)
HCT VFR BLD AUTO: 28.1 %
HCT VFR BLD AUTO: 30.3 %
HGB BLD-MCNC: 10 G/DL
HGB BLD-MCNC: 9.3 G/DL
IMM GRANULOCYTES # BLD AUTO: 0.11 X10(3) UL (ref 0–1)
IMM GRANULOCYTES # BLD AUTO: 0.21 X10(3) UL (ref 0–1)
IMM GRANULOCYTES NFR BLD: 1.2 %
IMM GRANULOCYTES NFR BLD: 1.8 %
L PNEUMO AG UR QL: NEGATIVE
LYMPHOCYTES # BLD AUTO: 1.09 X10(3) UL (ref 1–4)
LYMPHOCYTES # BLD AUTO: 1.35 X10(3) UL (ref 1–4)
LYMPHOCYTES NFR BLD AUTO: 11.5 %
LYMPHOCYTES NFR BLD AUTO: 11.7 %
MAGNESIUM SERPL-MCNC: 2 MG/DL (ref 1.6–2.6)
MCH RBC QN AUTO: 28.8 PG (ref 26–34)
MCH RBC QN AUTO: 29 PG (ref 26–34)
MCHC RBC AUTO-ENTMCNC: 33 G/DL (ref 31–37)
MCHC RBC AUTO-ENTMCNC: 33.1 G/DL (ref 31–37)
MCV RBC AUTO: 87 FL
MCV RBC AUTO: 87.8 FL
MONOCYTES # BLD AUTO: 0.56 X10(3) UL (ref 0.1–1)
MONOCYTES # BLD AUTO: 1.14 X10(3) UL (ref 0.1–1)
MONOCYTES NFR BLD AUTO: 6 %
MONOCYTES NFR BLD AUTO: 9.7 %
NEUTROPHILS # BLD AUTO: 6.2 X10 (3) UL (ref 1.5–7.7)
NEUTROPHILS # BLD AUTO: 6.2 X10(3) UL (ref 1.5–7.7)
NEUTROPHILS # BLD AUTO: 8.55 X10 (3) UL (ref 1.5–7.7)
NEUTROPHILS # BLD AUTO: 8.55 X10(3) UL (ref 1.5–7.7)
NEUTROPHILS NFR BLD AUTO: 66.9 %
NEUTROPHILS NFR BLD AUTO: 72.5 %
NT-PROBNP SERPL-MCNC: 5197 PG/ML (ref ?–125)
OSMOLALITY SERPL CALC.SUM OF ELEC: 281 MOSM/KG (ref 275–295)
PLATELET # BLD AUTO: 206 10(3)UL (ref 150–450)
PLATELET # BLD AUTO: 225 10(3)UL (ref 150–450)
POTASSIUM SERPL-SCNC: 4.8 MMOL/L (ref 3.5–5.1)
PROT SERPL-MCNC: 6.4 G/DL (ref 6.4–8.2)
RBC # BLD AUTO: 3.23 X10(6)UL
RBC # BLD AUTO: 3.45 X10(6)UL
SODIUM SERPL-SCNC: 127 MMOL/L (ref 136–145)
STREP PNEUMO ANTIGEN, URINE: NEGATIVE
WBC # BLD AUTO: 11.8 X10(3) UL (ref 4–11)
WBC # BLD AUTO: 9.3 X10(3) UL (ref 4–11)

## 2023-03-13 PROCEDURE — 93306 TTE W/DOPPLER COMPLETE: CPT | Performed by: INTERNAL MEDICINE

## 2023-03-13 PROCEDURE — 99291 CRITICAL CARE FIRST HOUR: CPT | Performed by: INTERNAL MEDICINE

## 2023-03-13 PROCEDURE — 71045 X-RAY EXAM CHEST 1 VIEW: CPT | Performed by: INTERNAL MEDICINE

## 2023-03-13 RX ORDER — MAGNESIUM HYDROXIDE/ALUMINUM HYDROXICE/SIMETHICONE 120; 1200; 1200 MG/30ML; MG/30ML; MG/30ML
30 SUSPENSION ORAL EVERY 6 HOURS PRN
Status: DISCONTINUED | OUTPATIENT
Start: 2023-03-13 | End: 2023-03-22

## 2023-03-13 RX ORDER — FUROSEMIDE 10 MG/ML
40 INJECTION INTRAMUSCULAR; INTRAVENOUS ONCE
Status: COMPLETED | OUTPATIENT
Start: 2023-03-13 | End: 2023-03-13

## 2023-03-13 RX ORDER — FLUCONAZOLE 150 MG/1
150 TABLET ORAL DAILY
Status: DISCONTINUED | OUTPATIENT
Start: 2023-03-13 | End: 2023-03-13

## 2023-03-13 RX ORDER — IPRATROPIUM BROMIDE AND ALBUTEROL SULFATE 2.5; .5 MG/3ML; MG/3ML
3 SOLUTION RESPIRATORY (INHALATION)
Status: DISCONTINUED | OUTPATIENT
Start: 2023-03-13 | End: 2023-03-16

## 2023-03-13 RX ORDER — FLUCONAZOLE 150 MG/1
150 TABLET ORAL
Status: DISCONTINUED | OUTPATIENT
Start: 2023-03-13 | End: 2023-03-17

## 2023-03-13 NOTE — PROGRESS NOTES
Asked by RN to evaluate vaginal discharge  Appears white and thick, vaginal walls and surrounding skin are red and inflammed.    Patient c/o burning, no open sores noted  Prior UA shows +yeast    Vaginal swab ordered  Will start po diflucan per pharmacy dosing recs  Discussed with dr Andrey Leal

## 2023-03-13 NOTE — PLAN OF CARE
Pt rec'd from [facility] d/t increasing oxygen needs. Initially placed on a vapotherm, but it was a struggle to keep sats above 90. Lung sounds with crackles bilaterally. Notified Dr. Wing Duque rec'd for lasix IV. Good urine output noted. Order rec'd for Martell catheter because pt desatted when on bedpan. 1L output noted with martell. RT was able to talk pt into wearing bipap,  O2 sats are now significantly improved. VSS. Care endorsed to [facility].

## 2023-03-13 NOTE — PROGRESS NOTES
03/13/23 1427   VISIT TYPE   OT Inpatient Visit Type (Documentation Required) Attempted Evaluation  (RN requested to hold therapy due to respiratory distress.)         OT order was received, chart review was completed; however, RN recommended to hold therapy due to respiratory distress. Pt is with PMHX significant for HTN and CKD, who was admitted due to acute respiratory failure, possible pneumonia and hyponatremia. Hold OT at this time due to respiratory distress.    Thank you for your referral.      Columba Herbert, OTR/L  [address]

## 2023-03-13 NOTE — PROGRESS NOTES
Physical Therapy Defer Note    Orders received and chart reviewed. Attempted to see patient for Physical Therapy services. Patient is not appropriate for therapy at this time secondary to per RN, patient remains in respiratory distress, not medically appropriate. Decision made to defer therapy following a discussion with the RN.        03/13/23 1422   VISIT TYPE   PT Inpatient Visit Type (Documentation Required) Attempted Evaluation  (respiratory distress)       Will re-schedule visit as patient is medically able to participate.     Sophie Olivarez, PT, DPT  [facility]  Inpatient Rehabilitation  Physical Therapy  [phone]

## 2023-03-14 ENCOUNTER — APPOINTMENT (OUTPATIENT)
Dept: GENERAL RADIOLOGY | Facility: HOSPITAL | Age: 58
End: 2023-03-14
Attending: INTERNAL MEDICINE
Payer: MEDICAID

## 2023-03-14 LAB
ANION GAP SERPL CALC-SCNC: 9 MMOL/L (ref 0–18)
BASOPHILS # BLD AUTO: 0.06 X10(3) UL (ref 0–0.2)
BASOPHILS NFR BLD AUTO: 0.6 %
BUN BLD-MCNC: 47 MG/DL (ref 7–18)
BUN/CREAT SERPL: 33.6 (ref 10–20)
CALCIUM BLD-MCNC: 8.3 MG/DL (ref 8.5–10.1)
CHLORIDE SERPL-SCNC: 103 MMOL/L (ref 98–112)
CO2 SERPL-SCNC: 22 MMOL/L (ref 21–32)
CREAT BLD-MCNC: 1.4 MG/DL
DEPRECATED RDW RBC AUTO: 45.1 FL (ref 35.1–46.3)
EOSINOPHIL # BLD AUTO: 0.3 X10(3) UL (ref 0–0.7)
EOSINOPHIL NFR BLD AUTO: 2.9 %
ERYTHROCYTE [DISTWIDTH] IN BLOOD BY AUTOMATED COUNT: 14.2 % (ref 11–15)
GFR SERPLBLD BASED ON 1.73 SQ M-ARVRAT: 44 ML/MIN/1.73M2 (ref 60–?)
GLUCOSE BLD-MCNC: 255 MG/DL (ref 70–99)
GLUCOSE BLDC GLUCOMTR-MCNC: 206 MG/DL (ref 70–99)
GLUCOSE BLDC GLUCOMTR-MCNC: 210 MG/DL (ref 70–99)
GLUCOSE BLDC GLUCOMTR-MCNC: 243 MG/DL (ref 70–99)
GLUCOSE BLDC GLUCOMTR-MCNC: 296 MG/DL (ref 70–99)
GLUCOSE BLDC GLUCOMTR-MCNC: 299 MG/DL (ref 70–99)
HCT VFR BLD AUTO: 26.7 %
HGB BLD-MCNC: 8.6 G/DL
IMM GRANULOCYTES # BLD AUTO: 0.25 X10(3) UL (ref 0–1)
IMM GRANULOCYTES NFR BLD: 2.4 %
LYMPHOCYTES # BLD AUTO: 1.17 X10(3) UL (ref 1–4)
LYMPHOCYTES NFR BLD AUTO: 11.2 %
MCH RBC QN AUTO: 28.1 PG (ref 26–34)
MCHC RBC AUTO-ENTMCNC: 32.2 G/DL (ref 31–37)
MCV RBC AUTO: 87.3 FL
MONOCYTES # BLD AUTO: 1.08 X10(3) UL (ref 0.1–1)
MONOCYTES NFR BLD AUTO: 10.3 %
NEUTROPHILS # BLD AUTO: 7.61 X10 (3) UL (ref 1.5–7.7)
NEUTROPHILS # BLD AUTO: 7.61 X10(3) UL (ref 1.5–7.7)
NEUTROPHILS NFR BLD AUTO: 72.6 %
OSMOLALITY SERPL CALC.SUM OF ELEC: 299 MOSM/KG (ref 275–295)
PLATELET # BLD AUTO: 264 10(3)UL (ref 150–450)
POTASSIUM SERPL-SCNC: 5.1 MMOL/L (ref 3.5–5.1)
RBC # BLD AUTO: 3.06 X10(6)UL
SODIUM SERPL-SCNC: 134 MMOL/L (ref 136–145)
WBC # BLD AUTO: 10.5 X10(3) UL (ref 4–11)

## 2023-03-14 PROCEDURE — 71045 X-RAY EXAM CHEST 1 VIEW: CPT | Performed by: INTERNAL MEDICINE

## 2023-03-14 PROCEDURE — 99233 SBSQ HOSP IP/OBS HIGH 50: CPT | Performed by: INTERNAL MEDICINE

## 2023-03-14 RX ORDER — FUROSEMIDE 10 MG/ML
40 INJECTION INTRAMUSCULAR; INTRAVENOUS ONCE
Status: COMPLETED | OUTPATIENT
Start: 2023-03-14 | End: 2023-03-14

## 2023-03-14 NOTE — PLAN OF CARE
Problem: RESPIRATORY - ADULT  Goal: Achieves optimal ventilation and oxygenation  Description: INTERVENTIONS:  - Assess for changes in respiratory status  - Assess for changes in mentation and behavior  - Position to facilitate oxygenation and minimize respiratory effort  - Oxygen supplementation based on oxygen saturation or ABGs  - Provide Smoking Cessation handout, if applicable  - Encourage broncho-pulmonary hygiene including cough, deep breathe, Incentive Spirometry  - Assess the need for suctioning and perform as needed  - Assess and instruct to report SOB or any respiratory difficulty  - Respiratory Therapy support as indicated  - Manage/alleviate anxiety  - Monitor for signs/symptoms of CO2 retention  Outcome: Progressing     Received patient with continuous Airvo 60L/100%, given ordered Q4 neb therapy and QID EZPAP therapy, decreased fio2 90%, tolerating well with the change, spo2 91-93% auscultated crackles/diminished breath sound throughout the lungs        03/14/23 1602   Oxygen Utilization   O2 Device High flow/High humidity   O2 Flow Rate (L/min) 60 L/min   FiO2 (%) 90 %   SpO2 92 %   Humidity High   [place] Filled

## 2023-03-14 NOTE — CM/SW NOTE
03/14/23 1300   CM/SW Referral Data   Referral Source Social Work (self-referral)   Reason for Referral Discharge planning   Informant Patient;Spouse/Significant Other   Medical Hx   Does patient have an established PCP? Yes   Patient Info   Patient's Current Mental Status at Time of Assessment Alert;Oriented   Patient's [address]   Number of Levels in Home 1   Number of Stair in Home 0   Patient lives with Spouse/Significant other   Patient Status Prior to Admission   Independent with ADLs and Mobility Yes   Discharge Needs   Anticipated D/C needs Medical equipment;Home health care   Choice of Post-Acute Provider   Informed patient of right to choose their preferred provider Yes   List of appropriate post-acute services provided to patient/family with quality data No - Declined list   Information given to Patient;Spouse/Significant other   Patient declines recommended services Yes     SW initiated self referral for DC planning. Therapy worked with pt and provided rec for [facility]. Pt presents from home with spouse. Ranch style home. No DME. No prior services. Pt discussed rec for [facility] at [place]. Pt declined. Pt agreeable to OP therapy. PLAN: pending medical course & O2 plan - home with OP therapy  - declining HH  Need:  - MD to provide OP therapy scripts at . Nad Jarem 22 remains available for support and/or discharge planning. Please do not hesitate to call/chat SW if further DC needs arise.     Faby MILLER, [place], [place]   Ext [phone]

## 2023-03-14 NOTE — PROGRESS NOTES
03/14/23 0920   VISIT TYPE   OT Inpatient Visit Type (Documentation Required) Attempted Evaluation  (RN recommended to attempt initial eval at 11:00 am today.)         OT order was received, chart review was completed. RN recommended to hold off therapy this am and wait until 11:00 am. This therapist will re-attempt at 11:00 am today, as pt is able and schedule permits.       Lita Wright, OTR/L  [address]

## 2023-03-15 ENCOUNTER — APPOINTMENT (OUTPATIENT)
Dept: GENERAL RADIOLOGY | Facility: HOSPITAL | Age: 58
End: 2023-03-15
Attending: INTERNAL MEDICINE
Payer: MEDICAID

## 2023-03-15 LAB
ANION GAP SERPL CALC-SCNC: 8 MMOL/L (ref 0–18)
BASOPHILS # BLD: 0.11 X10(3) UL (ref 0–0.2)
BASOPHILS NFR BLD: 1 %
BUN BLD-MCNC: 40 MG/DL (ref 7–18)
BUN/CREAT SERPL: 38.1 (ref 10–20)
CALCIUM BLD-MCNC: 8.4 MG/DL (ref 8.5–10.1)
CHLORIDE SERPL-SCNC: 102 MMOL/L (ref 98–112)
CO2 SERPL-SCNC: 26 MMOL/L (ref 21–32)
CREAT BLD-MCNC: 1.05 MG/DL
DEPRECATED RDW RBC AUTO: 45.2 FL (ref 35.1–46.3)
EOSINOPHIL # BLD: 0.23 X10(3) UL (ref 0–0.7)
EOSINOPHIL NFR BLD: 2 %
ERYTHROCYTE [DISTWIDTH] IN BLOOD BY AUTOMATED COUNT: 14.1 % (ref 11–15)
GFR SERPLBLD BASED ON 1.73 SQ M-ARVRAT: 62 ML/MIN/1.73M2 (ref 60–?)
GLUCOSE BLD-MCNC: 172 MG/DL (ref 70–99)
GLUCOSE BLDC GLUCOMTR-MCNC: 163 MG/DL (ref 70–99)
GLUCOSE BLDC GLUCOMTR-MCNC: 239 MG/DL (ref 70–99)
GLUCOSE BLDC GLUCOMTR-MCNC: 346 MG/DL (ref 70–99)
GLUCOSE BLDC GLUCOMTR-MCNC: 475 MG/DL (ref 70–99)
HCT VFR BLD AUTO: 26.2 %
HGB BLD-MCNC: 8.6 G/DL
LYMPHOCYTES NFR BLD: 0.9 X10(3) UL (ref 1–4)
LYMPHOCYTES NFR BLD: 8 %
MCH RBC QN AUTO: 28.4 PG (ref 26–34)
MCHC RBC AUTO-ENTMCNC: 32.8 G/DL (ref 31–37)
MCV RBC AUTO: 86.5 FL
METAMYELOCYTES # BLD: 0.11 X10(3) UL
METAMYELOCYTES NFR BLD: 1 %
MONOCYTES # BLD: 0.79 X10(3) UL (ref 0.1–1)
MONOCYTES NFR BLD: 7 %
MORPHOLOGY: NORMAL
NEUTROPHILS # BLD AUTO: 7.55 X10 (3) UL (ref 1.5–7.7)
NEUTROPHILS NFR BLD: 73 %
NEUTS BAND NFR BLD: 8 %
NEUTS HYPERSEG # BLD: 9.15 X10(3) UL (ref 1.5–7.7)
OSMOLALITY SERPL CALC.SUM OF ELEC: 296 MOSM/KG (ref 275–295)
PLATELET # BLD AUTO: 352 10(3)UL (ref 150–450)
PLATELET MORPHOLOGY: NORMAL
POTASSIUM SERPL-SCNC: 4.5 MMOL/L (ref 3.5–5.1)
RBC # BLD AUTO: 3.03 X10(6)UL
SODIUM SERPL-SCNC: 136 MMOL/L (ref 136–145)
TOTAL CELLS COUNTED BLD: 100
TRICHOMONAS SCREEN: NEGATIVE
WBC # BLD AUTO: 11.3 X10(3) UL (ref 4–11)

## 2023-03-15 PROCEDURE — 99233 SBSQ HOSP IP/OBS HIGH 50: CPT | Performed by: INTERNAL MEDICINE

## 2023-03-15 PROCEDURE — 71045 X-RAY EXAM CHEST 1 VIEW: CPT | Performed by: INTERNAL MEDICINE

## 2023-03-15 PROCEDURE — 99233 SBSQ HOSP IP/OBS HIGH 50: CPT | Performed by: HOSPITALIST

## 2023-03-15 RX ORDER — AZITHROMYCIN 250 MG/1
500 TABLET, FILM COATED ORAL EVERY 24 HOURS
Status: DISCONTINUED | OUTPATIENT
Start: 2023-03-15 | End: 2023-03-19

## 2023-03-15 RX ORDER — METHYLPREDNISOLONE SODIUM SUCCINATE 125 MG/2ML
60 INJECTION, POWDER, LYOPHILIZED, FOR SOLUTION INTRAMUSCULAR; INTRAVENOUS EVERY 8 HOURS
Status: DISCONTINUED | OUTPATIENT
Start: 2023-03-15 | End: 2023-03-19

## 2023-03-15 RX ORDER — FUROSEMIDE 10 MG/ML
20 INJECTION INTRAMUSCULAR; INTRAVENOUS
Status: DISCONTINUED | OUTPATIENT
Start: 2023-03-15 | End: 2023-03-22

## 2023-03-15 NOTE — PROGRESS NOTES
[facility] Pharmacy Note:  Renal Dose Adjustment for Meropenem (John Libel)    Grant Valenzuela is a 61year old patient who has been prescribed meropenem 500 mg every 12 hrs for treatment of pneumonia. The estimated creatinine clearance is 52.6 mL/min (A) (based on SCr of 1.05 mg/dL (H)). .    The dose has been adjusted to meropenem 500 every 8 hrs per hospital renal dose adjustment protocol. Pharmacy will follow and adjust dose as warranted for additional indication and/or renal function changes.     Thank you,    Celina Acosta, PharmD, BCPS  Phone [phone]

## 2023-03-16 LAB
ANION GAP SERPL CALC-SCNC: 8 MMOL/L (ref 0–18)
BASOPHILS # BLD: 0 X10(3) UL (ref 0–0.2)
BASOPHILS NFR BLD: 0 %
BUN BLD-MCNC: 48 MG/DL (ref 7–18)
BUN/CREAT SERPL: 47.1 (ref 10–20)
CALCIUM BLD-MCNC: 9.2 MG/DL (ref 8.5–10.1)
CHLORIDE SERPL-SCNC: 99 MMOL/L (ref 98–112)
CO2 SERPL-SCNC: 26 MMOL/L (ref 21–32)
CREAT BLD-MCNC: 1.02 MG/DL
DEPRECATED RDW RBC AUTO: 43.9 FL (ref 35.1–46.3)
EOSINOPHIL # BLD: 0 X10(3) UL (ref 0–0.7)
EOSINOPHIL NFR BLD: 0 %
ERYTHROCYTE [DISTWIDTH] IN BLOOD BY AUTOMATED COUNT: 13.8 % (ref 11–15)
GFR SERPLBLD BASED ON 1.73 SQ M-ARVRAT: 64 ML/MIN/1.73M2 (ref 60–?)
GLUCOSE BLD-MCNC: 326 MG/DL (ref 70–99)
GLUCOSE BLDC GLUCOMTR-MCNC: 332 MG/DL (ref 70–99)
GLUCOSE BLDC GLUCOMTR-MCNC: 360 MG/DL (ref 70–99)
GLUCOSE BLDC GLUCOMTR-MCNC: 376 MG/DL (ref 70–99)
GLUCOSE BLDC GLUCOMTR-MCNC: 391 MG/DL (ref 70–99)
GLUCOSE BLDC GLUCOMTR-MCNC: 482 MG/DL (ref 70–99)
HCT VFR BLD AUTO: 28 %
HGB BLD-MCNC: 9.1 G/DL
LYMPHOCYTES NFR BLD: 1.4 X10(3) UL (ref 1–4)
LYMPHOCYTES NFR BLD: 14 %
MAGNESIUM SERPL-MCNC: 2.5 MG/DL (ref 1.6–2.6)
MCH RBC QN AUTO: 28.2 PG (ref 26–34)
MCHC RBC AUTO-ENTMCNC: 32.5 G/DL (ref 31–37)
MCV RBC AUTO: 86.7 FL
METAMYELOCYTES # BLD: 0.5 X10(3) UL
METAMYELOCYTES NFR BLD: 5 %
MONOCYTES # BLD: 0.2 X10(3) UL (ref 0.1–1)
MONOCYTES NFR BLD: 2 %
NEUTROPHILS # BLD AUTO: 7.48 X10 (3) UL (ref 1.5–7.7)
NEUTROPHILS NFR BLD: 75 %
NEUTS BAND NFR BLD: 4 %
NEUTS HYPERSEG # BLD: 7.9 X10(3) UL (ref 1.5–7.7)
OSMOLALITY SERPL CALC.SUM OF ELEC: 301 MOSM/KG (ref 275–295)
PHOSPHATE SERPL-MCNC: 2.8 MG/DL (ref 2.5–4.9)
PLATELET # BLD AUTO: 428 10(3)UL (ref 150–450)
PLATELET MORPHOLOGY: NORMAL
POTASSIUM SERPL-SCNC: 5 MMOL/L (ref 3.5–5.1)
RBC # BLD AUTO: 3.23 X10(6)UL
SODIUM SERPL-SCNC: 133 MMOL/L (ref 136–145)
TOTAL CELLS COUNTED BLD: 100
TOXIC GRANULES BLD QL SMEAR: PRESENT
WBC # BLD AUTO: 10 X10(3) UL (ref 4–11)

## 2023-03-16 PROCEDURE — 99233 SBSQ HOSP IP/OBS HIGH 50: CPT | Performed by: INTERNAL MEDICINE

## 2023-03-16 PROCEDURE — 99233 SBSQ HOSP IP/OBS HIGH 50: CPT | Performed by: HOSPITALIST

## 2023-03-16 RX ORDER — AMLODIPINE BESYLATE 5 MG/1
5 TABLET ORAL DAILY
Status: DISCONTINUED | OUTPATIENT
Start: 2023-03-16 | End: 2023-03-19

## 2023-03-16 RX ORDER — SODIUM CHLORIDE 9 MG/ML
INJECTION, SOLUTION INTRAVENOUS CONTINUOUS
Status: DISCONTINUED | OUTPATIENT
Start: 2023-03-16 | End: 2023-03-20

## 2023-03-16 RX ORDER — IPRATROPIUM BROMIDE AND ALBUTEROL SULFATE 2.5; .5 MG/3ML; MG/3ML
3 SOLUTION RESPIRATORY (INHALATION)
Status: DISCONTINUED | OUTPATIENT
Start: 2023-03-17 | End: 2023-03-22

## 2023-03-16 NOTE — PLAN OF CARE
Pt AOx4, able to follow commands and move all extremities. Received pt on Vapotherm 70% FiO2, 60 L, did not titrate overnight. Pt's evening blood sugar was 475, MD notified. Pt given 10 units Levemir and 16 units Novolog as one-time dose. Pt's AM blood work showed blood sugar of 326. Consider altering pt's insulin coverage, endorsed to day shift RN. Pt slightly hypertensive overnight with BP into 160s. Bed locked in lowest position, safety maintained. SCDs on overnight, heparin q8h for DVT/VE prophylaxis. Problem: CARDIOVASCULAR - ADULT  Goal: Maintains optimal cardiac output and hemodynamic stability  Description: INTERVENTIONS:  - Monitor vital signs, rhythm, and trends  - Monitor for bleeding, hypotension and signs of decreased cardiac output  - Evaluate effectiveness of vasoactive medications to optimize hemodynamic stability  - Monitor arterial and/or venous puncture sites for bleeding and/or hematoma  - Assess quality of pulses, skin color and temperature  - Assess for signs of decreased coronary artery perfusion - ex.  Angina  - Evaluate fluid balance, assess for edema, trend weights  Outcome: Progressing     Problem: RESPIRATORY - ADULT  Goal: Achieves optimal ventilation and oxygenation  Description: INTERVENTIONS:  - Assess for changes in respiratory status  - Assess for changes in mentation and behavior  - Position to facilitate oxygenation and minimize respiratory effort  - Oxygen supplementation based on oxygen saturation or ABGs  - Provide Smoking Cessation handout, if applicable  - Encourage broncho-pulmonary hygiene including cough, deep breathe, Incentive Spirometry  - Assess the need for suctioning and perform as needed  - Assess and instruct to report SOB or any respiratory difficulty  - Respiratory Therapy support as indicated  - Manage/alleviate anxiety  - Monitor for signs/symptoms of CO2 retention  Outcome: Progressing     Problem: Diabetes/Glucose Control  Goal: Glucose maintained within prescribed range  Description: INTERVENTIONS:  - Monitor Blood Glucose as ordered  - Assess for signs and symptoms of hyperglycemia and hypoglycemia  - Administer ordered medications to maintain glucose within target range  - Assess barriers to adequate nutritional intake and initiate nutrition consult as needed  - Instruct patient on self management of diabetes  Outcome: Not Progressing

## 2023-03-16 NOTE — PROGRESS NOTES
Patient is on Airvo 2 heated high flow nasal cannula.     03/16/23 0853   Oxygen Utilization   $ RT Standby Charge (per 15 min) 2   $ [facility] HF Adult Cannula Daily Yes   O2 Device High flow/High humidity   O2 Flow Rate (L/min) 60 L/min   FiO2 (%) (S)  60 %   SpO2 99 %   Humidity High   [place] 3/4 Full

## 2023-03-16 NOTE — PROGRESS NOTES
Received pt on the following airvo settings:    03/16/23 0342   Oxygen Utilization   O2 Device High flow/High humidity   O2 Flow Rate (L/min) 60 L/min   FiO2 (%) 70 %   SpO2 92 %     No weaning attempted per bedside SpO2. Nebs given as scheduled. No tachypnea observed overnight. RT to continue to monitor.

## 2023-03-17 ENCOUNTER — TELEPHONE (OUTPATIENT)
Dept: ENDOCRINOLOGY CLINIC | Facility: CLINIC | Age: 58
End: 2023-03-17

## 2023-03-17 LAB
ANION GAP SERPL CALC-SCNC: 7 MMOL/L (ref 0–18)
BASOPHILS # BLD: 0 X10(3) UL (ref 0–0.2)
BASOPHILS NFR BLD: 0 %
BUN BLD-MCNC: 54 MG/DL (ref 7–18)
BUN/CREAT SERPL: 59.3 (ref 10–20)
CALCIUM BLD-MCNC: 9.1 MG/DL (ref 8.5–10.1)
CHLORIDE SERPL-SCNC: 101 MMOL/L (ref 98–112)
CO2 SERPL-SCNC: 29 MMOL/L (ref 21–32)
CREAT BLD-MCNC: 0.91 MG/DL
DEPRECATED RDW RBC AUTO: 43.7 FL (ref 35.1–46.3)
EOSINOPHIL # BLD: 0 X10(3) UL (ref 0–0.7)
EOSINOPHIL NFR BLD: 0 %
ERYTHROCYTE [DISTWIDTH] IN BLOOD BY AUTOMATED COUNT: 13.9 % (ref 11–15)
GFR SERPLBLD BASED ON 1.73 SQ M-ARVRAT: 73 ML/MIN/1.73M2 (ref 60–?)
GLUCOSE BLD-MCNC: 162 MG/DL (ref 70–99)
GLUCOSE BLDC GLUCOMTR-MCNC: 130 MG/DL (ref 70–99)
GLUCOSE BLDC GLUCOMTR-MCNC: 141 MG/DL (ref 70–99)
GLUCOSE BLDC GLUCOMTR-MCNC: 147 MG/DL (ref 70–99)
GLUCOSE BLDC GLUCOMTR-MCNC: 150 MG/DL (ref 70–99)
GLUCOSE BLDC GLUCOMTR-MCNC: 155 MG/DL (ref 70–99)
GLUCOSE BLDC GLUCOMTR-MCNC: 155 MG/DL (ref 70–99)
GLUCOSE BLDC GLUCOMTR-MCNC: 157 MG/DL (ref 70–99)
GLUCOSE BLDC GLUCOMTR-MCNC: 158 MG/DL (ref 70–99)
GLUCOSE BLDC GLUCOMTR-MCNC: 168 MG/DL (ref 70–99)
GLUCOSE BLDC GLUCOMTR-MCNC: 169 MG/DL (ref 70–99)
GLUCOSE BLDC GLUCOMTR-MCNC: 170 MG/DL (ref 70–99)
GLUCOSE BLDC GLUCOMTR-MCNC: 176 MG/DL (ref 70–99)
GLUCOSE BLDC GLUCOMTR-MCNC: 179 MG/DL (ref 70–99)
GLUCOSE BLDC GLUCOMTR-MCNC: 187 MG/DL (ref 70–99)
GLUCOSE BLDC GLUCOMTR-MCNC: 187 MG/DL (ref 70–99)
GLUCOSE BLDC GLUCOMTR-MCNC: 188 MG/DL (ref 70–99)
GLUCOSE BLDC GLUCOMTR-MCNC: 190 MG/DL (ref 70–99)
GLUCOSE BLDC GLUCOMTR-MCNC: 193 MG/DL (ref 70–99)
GLUCOSE BLDC GLUCOMTR-MCNC: 200 MG/DL (ref 70–99)
GLUCOSE BLDC GLUCOMTR-MCNC: 214 MG/DL (ref 70–99)
GLUCOSE BLDC GLUCOMTR-MCNC: 221 MG/DL (ref 70–99)
GLUCOSE BLDC GLUCOMTR-MCNC: 225 MG/DL (ref 70–99)
GLUCOSE BLDC GLUCOMTR-MCNC: 279 MG/DL (ref 70–99)
GLUCOSE BLDC GLUCOMTR-MCNC: 282 MG/DL (ref 70–99)
HCT VFR BLD AUTO: 27.8 %
HGB BLD-MCNC: 9 G/DL
LYMPHOCYTES NFR BLD: 1.27 X10(3) UL (ref 1–4)
LYMPHOCYTES NFR BLD: 9 %
MAGNESIUM SERPL-MCNC: 2.3 MG/DL (ref 1.6–2.6)
MCH RBC QN AUTO: 27.7 PG (ref 26–34)
MCHC RBC AUTO-ENTMCNC: 32.4 G/DL (ref 31–37)
MCV RBC AUTO: 85.5 FL
METAMYELOCYTES # BLD: 0.38 X10(3) UL
METAMYELOCYTES NFR BLD: 3 %
MONOCYTES # BLD: 1.27 X10(3) UL (ref 0.1–1)
MONOCYTES NFR BLD: 10 %
MORPHOLOGY: NORMAL
NEUTROPHILS # BLD AUTO: 9.28 X10 (3) UL (ref 1.5–7.7)
NEUTROPHILS NFR BLD: 72 %
NEUTS BAND NFR BLD: 5 %
NEUTS HYPERSEG # BLD: 9.78 X10(3) UL (ref 1.5–7.7)
OSMOLALITY SERPL CALC.SUM OF ELEC: 302 MOSM/KG (ref 275–295)
PHOSPHATE SERPL-MCNC: 2.6 MG/DL (ref 2.5–4.9)
PLATELET # BLD AUTO: 474 10(3)UL (ref 150–450)
PLATELET MORPHOLOGY: NORMAL
POTASSIUM SERPL-SCNC: 4.5 MMOL/L (ref 3.5–5.1)
RBC # BLD AUTO: 3.25 X10(6)UL
SODIUM SERPL-SCNC: 137 MMOL/L (ref 136–145)
TOTAL CELLS COUNTED BLD: 100
VARIANT LYMPHS NFR BLD MANUAL: 1 %
WBC # BLD AUTO: 12.7 X10(3) UL (ref 4–11)

## 2023-03-17 PROCEDURE — 99233 SBSQ HOSP IP/OBS HIGH 50: CPT | Performed by: INTERNAL MEDICINE

## 2023-03-17 PROCEDURE — 99233 SBSQ HOSP IP/OBS HIGH 50: CPT | Performed by: HOSPITALIST

## 2023-03-17 PROCEDURE — 99222 1ST HOSP IP/OBS MODERATE 55: CPT | Performed by: INTERNAL MEDICINE

## 2023-03-17 RX ORDER — FLUCONAZOLE 150 MG/1
150 TABLET ORAL DAILY
Status: COMPLETED | OUTPATIENT
Start: 2023-03-17 | End: 2023-03-19

## 2023-03-17 RX ORDER — CLOTRIMAZOLE 1 %
1 CREAM (GRAM) TOPICAL 2 TIMES DAILY
Status: DISCONTINUED | OUTPATIENT
Start: 2023-03-17 | End: 2023-03-22

## 2023-03-17 NOTE — PROGRESS NOTES
Patient examined this am  C/O persistent vaginal redness and itching sensation  No noticeable discharge like a few days ago, but redness persistent on labial folds as well as bilateral groin area.  Patient does state she thinks it is a little better but would like some cream     Added antifungal cream to regimen and changed oral diflucan to daily for 4 more days    Jonh LYNCH

## 2023-03-18 ENCOUNTER — APPOINTMENT (OUTPATIENT)
Dept: GENERAL RADIOLOGY | Facility: HOSPITAL | Age: 58
End: 2023-03-18
Attending: INTERNAL MEDICINE
Payer: MEDICAID

## 2023-03-18 LAB
ANION GAP SERPL CALC-SCNC: 4 MMOL/L (ref 0–18)
BASOPHILS # BLD: 0 X10(3) UL (ref 0–0.2)
BASOPHILS NFR BLD: 0 %
BUN BLD-MCNC: 56 MG/DL (ref 7–18)
BUN/CREAT SERPL: 58.3 (ref 10–20)
CALCIUM BLD-MCNC: 8.8 MG/DL (ref 8.5–10.1)
CHLORIDE SERPL-SCNC: 105 MMOL/L (ref 98–112)
CO2 SERPL-SCNC: 30 MMOL/L (ref 21–32)
CREAT BLD-MCNC: 0.96 MG/DL
DEPRECATED RDW RBC AUTO: 45.4 FL (ref 35.1–46.3)
EOSINOPHIL # BLD: 0 X10(3) UL (ref 0–0.7)
EOSINOPHIL NFR BLD: 0 %
ERYTHROCYTE [DISTWIDTH] IN BLOOD BY AUTOMATED COUNT: 13.9 % (ref 11–15)
GFR SERPLBLD BASED ON 1.73 SQ M-ARVRAT: 69 ML/MIN/1.73M2 (ref 60–?)
GLUCOSE BLD-MCNC: 125 MG/DL (ref 70–99)
GLUCOSE BLDC GLUCOMTR-MCNC: 114 MG/DL (ref 70–99)
GLUCOSE BLDC GLUCOMTR-MCNC: 118 MG/DL (ref 70–99)
GLUCOSE BLDC GLUCOMTR-MCNC: 121 MG/DL (ref 70–99)
GLUCOSE BLDC GLUCOMTR-MCNC: 123 MG/DL (ref 70–99)
GLUCOSE BLDC GLUCOMTR-MCNC: 124 MG/DL (ref 70–99)
GLUCOSE BLDC GLUCOMTR-MCNC: 125 MG/DL (ref 70–99)
GLUCOSE BLDC GLUCOMTR-MCNC: 130 MG/DL (ref 70–99)
GLUCOSE BLDC GLUCOMTR-MCNC: 132 MG/DL (ref 70–99)
GLUCOSE BLDC GLUCOMTR-MCNC: 146 MG/DL (ref 70–99)
GLUCOSE BLDC GLUCOMTR-MCNC: 146 MG/DL (ref 70–99)
GLUCOSE BLDC GLUCOMTR-MCNC: 150 MG/DL (ref 70–99)
GLUCOSE BLDC GLUCOMTR-MCNC: 158 MG/DL (ref 70–99)
GLUCOSE BLDC GLUCOMTR-MCNC: 162 MG/DL (ref 70–99)
GLUCOSE BLDC GLUCOMTR-MCNC: 177 MG/DL (ref 70–99)
GLUCOSE BLDC GLUCOMTR-MCNC: 182 MG/DL (ref 70–99)
GLUCOSE BLDC GLUCOMTR-MCNC: 193 MG/DL (ref 70–99)
GLUCOSE BLDC GLUCOMTR-MCNC: 196 MG/DL (ref 70–99)
GLUCOSE BLDC GLUCOMTR-MCNC: 201 MG/DL (ref 70–99)
GLUCOSE BLDC GLUCOMTR-MCNC: 201 MG/DL (ref 70–99)
GLUCOSE BLDC GLUCOMTR-MCNC: 202 MG/DL (ref 70–99)
GLUCOSE BLDC GLUCOMTR-MCNC: 202 MG/DL (ref 70–99)
GLUCOSE BLDC GLUCOMTR-MCNC: 221 MG/DL (ref 70–99)
GLUCOSE BLDC GLUCOMTR-MCNC: 227 MG/DL (ref 70–99)
GLUCOSE BLDC GLUCOMTR-MCNC: 237 MG/DL (ref 70–99)
HCT VFR BLD AUTO: 29.2 %
HGB BLD-MCNC: 9.1 G/DL
LYMPHOCYTES NFR BLD: 0.56 X10(3) UL (ref 1–4)
LYMPHOCYTES NFR BLD: 4 %
MAGNESIUM SERPL-MCNC: 1.9 MG/DL (ref 1.6–2.6)
MCH RBC QN AUTO: 27.7 PG (ref 26–34)
MCHC RBC AUTO-ENTMCNC: 31.2 G/DL (ref 31–37)
MCV RBC AUTO: 88.8 FL
METAMYELOCYTES # BLD: 0.56 X10(3) UL
METAMYELOCYTES NFR BLD: 4 %
MONOCYTES # BLD: 0.83 X10(3) UL (ref 0.1–1)
MONOCYTES NFR BLD: 6 %
NEUTROPHILS # BLD AUTO: 10.43 X10 (3) UL (ref 1.5–7.7)
NEUTROPHILS NFR BLD: 83 %
NEUTS BAND NFR BLD: 3 %
NEUTS HYPERSEG # BLD: 11.95 X10(3) UL (ref 1.5–7.7)
OSMOLALITY SERPL CALC.SUM OF ELEC: 305 MOSM/KG (ref 275–295)
PHOSPHATE SERPL-MCNC: 2.5 MG/DL (ref 2.5–4.9)
PLATELET # BLD AUTO: 519 10(3)UL (ref 150–450)
PLATELET MORPHOLOGY: NORMAL
POTASSIUM SERPL-SCNC: 4.4 MMOL/L (ref 3.5–5.1)
RBC # BLD AUTO: 3.29 X10(6)UL
SODIUM SERPL-SCNC: 139 MMOL/L (ref 136–145)
TOTAL CELLS COUNTED BLD: 100
TOXIC GRANULES BLD QL SMEAR: PRESENT
WBC # BLD AUTO: 13.9 X10(3) UL (ref 4–11)

## 2023-03-18 PROCEDURE — 99233 SBSQ HOSP IP/OBS HIGH 50: CPT | Performed by: INTERNAL MEDICINE

## 2023-03-18 PROCEDURE — 99232 SBSQ HOSP IP/OBS MODERATE 35: CPT | Performed by: INTERNAL MEDICINE

## 2023-03-18 PROCEDURE — 99233 SBSQ HOSP IP/OBS HIGH 50: CPT | Performed by: HOSPITALIST

## 2023-03-18 PROCEDURE — 71045 X-RAY EXAM CHEST 1 VIEW: CPT | Performed by: INTERNAL MEDICINE

## 2023-03-18 NOTE — PLAN OF CARE
Pt AOx4, following commands and able to move all extremities. Pt remains on Vapotherm, 50L, 45% FiO2, no adjustments made overnight. Insulin gtt remained on overnight. Per Dr. Gary Fofana, insulin gtt will remain on today. MD also increased pt's scheduled Novolog. Norco given once at beginning of shift for back/generalized pain. Bed locked in lowest position, safety maintained. Problem: Diabetes/Glucose Control  Goal: Glucose maintained within prescribed range  Description: INTERVENTIONS:  - Monitor Blood Glucose as ordered  - Assess for signs and symptoms of hyperglycemia and hypoglycemia  - Administer ordered medications to maintain glucose within target range  - Assess barriers to adequate nutritional intake and initiate nutrition consult as needed  - Instruct patient on self management of diabetes  Outcome: Progressing     Problem: CARDIOVASCULAR - ADULT  Goal: Maintains optimal cardiac output and hemodynamic stability  Description: INTERVENTIONS:  - Monitor vital signs, rhythm, and trends  - Monitor for bleeding, hypotension and signs of decreased cardiac output  - Evaluate effectiveness of vasoactive medications to optimize hemodynamic stability  - Monitor arterial and/or venous puncture sites for bleeding and/or hematoma  - Assess quality of pulses, skin color and temperature  - Assess for signs of decreased coronary artery perfusion - ex.  Angina  - Evaluate fluid balance, assess for edema, trend weights  Outcome: Progressing     Problem: RESPIRATORY - ADULT  Goal: Achieves optimal ventilation and oxygenation  Description: INTERVENTIONS:  - Assess for changes in respiratory status  - Assess for changes in mentation and behavior  - Position to facilitate oxygenation and minimize respiratory effort  - Oxygen supplementation based on oxygen saturation or ABGs  - Provide Smoking Cessation handout, if applicable  - Encourage broncho-pulmonary hygiene including cough, deep breathe, Incentive Spirometry  - Assess the need for suctioning and perform as needed  - Assess and instruct to report SOB or any respiratory difficulty  - Respiratory Therapy support as indicated  - Manage/alleviate anxiety  - Monitor for signs/symptoms of CO2 retention  Outcome: Progressing     Problem: METABOLIC/FLUID AND ELECTROLYTES - ADULT  Goal: Glucose maintained within prescribed range  Description: INTERVENTIONS:  - Monitor Blood Glucose as ordered  - Assess for signs and symptoms of hyperglycemia and hypoglycemia  - Administer ordered medications to maintain glucose within target range  - Assess barriers to adequate nutritional intake and initiate nutrition consult as needed  - Instruct patient on self management of diabetes  Outcome: Progressing     Problem: MUSCULOSKELETAL - ADULT  Goal: Return mobility to safest level of function  Description: INTERVENTIONS:  - Assess patient stability and activity tolerance for standing, transferring and ambulating w/ or w/o assistive devices  - Assist with transfers and ambulation using safe patient handling equipment as needed  - Ensure adequate protection for wounds/incisions during mobilization  - Obtain PT/OT consults as needed  - Advance activity as appropriate  - Communicate ordered activity level and limitations with patient/family  Outcome: Progressing

## 2023-03-19 LAB
ANION GAP SERPL CALC-SCNC: 7 MMOL/L (ref 0–18)
BASOPHILS # BLD: 0 X10(3) UL (ref 0–0.2)
BASOPHILS NFR BLD: 0 %
BUN BLD-MCNC: 46 MG/DL (ref 7–18)
BUN/CREAT SERPL: 54.8 (ref 10–20)
CALCIUM BLD-MCNC: 8 MG/DL (ref 8.5–10.1)
CHLORIDE SERPL-SCNC: 106 MMOL/L (ref 98–112)
CO2 SERPL-SCNC: 29 MMOL/L (ref 21–32)
CREAT BLD-MCNC: 0.84 MG/DL
DEPRECATED RDW RBC AUTO: 45 FL (ref 35.1–46.3)
EOSINOPHIL # BLD: 0 X10(3) UL (ref 0–0.7)
EOSINOPHIL NFR BLD: 0 %
ERYTHROCYTE [DISTWIDTH] IN BLOOD BY AUTOMATED COUNT: 13.9 % (ref 11–15)
GFR SERPLBLD BASED ON 1.73 SQ M-ARVRAT: 80 ML/MIN/1.73M2 (ref 60–?)
GLUCOSE BLD-MCNC: 127 MG/DL (ref 70–99)
GLUCOSE BLDC GLUCOMTR-MCNC: 113 MG/DL (ref 70–99)
GLUCOSE BLDC GLUCOMTR-MCNC: 115 MG/DL (ref 70–99)
GLUCOSE BLDC GLUCOMTR-MCNC: 115 MG/DL (ref 70–99)
GLUCOSE BLDC GLUCOMTR-MCNC: 124 MG/DL (ref 70–99)
GLUCOSE BLDC GLUCOMTR-MCNC: 134 MG/DL (ref 70–99)
GLUCOSE BLDC GLUCOMTR-MCNC: 142 MG/DL (ref 70–99)
GLUCOSE BLDC GLUCOMTR-MCNC: 146 MG/DL (ref 70–99)
GLUCOSE BLDC GLUCOMTR-MCNC: 152 MG/DL (ref 70–99)
GLUCOSE BLDC GLUCOMTR-MCNC: 156 MG/DL (ref 70–99)
GLUCOSE BLDC GLUCOMTR-MCNC: 156 MG/DL (ref 70–99)
GLUCOSE BLDC GLUCOMTR-MCNC: 157 MG/DL (ref 70–99)
GLUCOSE BLDC GLUCOMTR-MCNC: 159 MG/DL (ref 70–99)
GLUCOSE BLDC GLUCOMTR-MCNC: 164 MG/DL (ref 70–99)
GLUCOSE BLDC GLUCOMTR-MCNC: 164 MG/DL (ref 70–99)
GLUCOSE BLDC GLUCOMTR-MCNC: 178 MG/DL (ref 70–99)
GLUCOSE BLDC GLUCOMTR-MCNC: 181 MG/DL (ref 70–99)
GLUCOSE BLDC GLUCOMTR-MCNC: 189 MG/DL (ref 70–99)
GLUCOSE BLDC GLUCOMTR-MCNC: 212 MG/DL (ref 70–99)
GLUCOSE BLDC GLUCOMTR-MCNC: 220 MG/DL (ref 70–99)
HCT VFR BLD AUTO: 27.7 %
HGB BLD-MCNC: 8.8 G/DL
LYMPHOCYTES NFR BLD: 1.25 X10(3) UL (ref 1–4)
LYMPHOCYTES NFR BLD: 9 %
MAGNESIUM SERPL-MCNC: 1.7 MG/DL (ref 1.6–2.6)
MCH RBC QN AUTO: 28.2 PG (ref 26–34)
MCHC RBC AUTO-ENTMCNC: 31.8 G/DL (ref 31–37)
MCV RBC AUTO: 88.8 FL
METAMYELOCYTES # BLD: 0.42 X10(3) UL
METAMYELOCYTES NFR BLD: 3 %
MONOCYTES # BLD: 0.97 X10(3) UL (ref 0.1–1)
MONOCYTES NFR BLD: 7 %
MORPHOLOGY: NORMAL
NEUTROPHILS # BLD AUTO: 10.55 X10 (3) UL (ref 1.5–7.7)
NEUTROPHILS NFR BLD: 76 %
NEUTS BAND NFR BLD: 5 %
NEUTS HYPERSEG # BLD: 11.26 X10(3) UL (ref 1.5–7.7)
OSMOLALITY SERPL CALC.SUM OF ELEC: 307 MOSM/KG (ref 275–295)
PHOSPHATE SERPL-MCNC: 3.3 MG/DL (ref 2.5–4.9)
PLATELET # BLD AUTO: 482 10(3)UL (ref 150–450)
PLATELET MORPHOLOGY: NORMAL
POTASSIUM SERPL-SCNC: 4.2 MMOL/L (ref 3.5–5.1)
RBC # BLD AUTO: 3.12 X10(6)UL
SODIUM SERPL-SCNC: 142 MMOL/L (ref 136–145)
TOTAL CELLS COUNTED BLD: 100
WBC # BLD AUTO: 13.9 X10(3) UL (ref 4–11)

## 2023-03-19 PROCEDURE — 99233 SBSQ HOSP IP/OBS HIGH 50: CPT | Performed by: HOSPITALIST

## 2023-03-19 PROCEDURE — 99232 SBSQ HOSP IP/OBS MODERATE 35: CPT | Performed by: INTERNAL MEDICINE

## 2023-03-19 PROCEDURE — 99233 SBSQ HOSP IP/OBS HIGH 50: CPT | Performed by: INTERNAL MEDICINE

## 2023-03-19 RX ORDER — MAGNESIUM SULFATE HEPTAHYDRATE 40 MG/ML
2 INJECTION, SOLUTION INTRAVENOUS ONCE
Status: COMPLETED | OUTPATIENT
Start: 2023-03-19 | End: 2023-03-19

## 2023-03-19 RX ORDER — METHYLPREDNISOLONE SODIUM SUCCINATE 40 MG/ML
40 INJECTION, POWDER, LYOPHILIZED, FOR SOLUTION INTRAMUSCULAR; INTRAVENOUS EVERY 12 HOURS
Status: DISCONTINUED | OUTPATIENT
Start: 2023-03-19 | End: 2023-03-20

## 2023-03-19 RX ORDER — HYDRALAZINE HYDROCHLORIDE 20 MG/ML
10 INJECTION INTRAMUSCULAR; INTRAVENOUS EVERY 6 HOURS PRN
Status: DISCONTINUED | OUTPATIENT
Start: 2023-03-19 | End: 2023-03-22

## 2023-03-19 RX ORDER — AMLODIPINE BESYLATE 10 MG/1
10 TABLET ORAL DAILY
Status: DISCONTINUED | OUTPATIENT
Start: 2023-03-20 | End: 2023-03-19

## 2023-03-19 RX ORDER — AMLODIPINE BESYLATE 10 MG/1
10 TABLET ORAL DAILY
Status: DISCONTINUED | OUTPATIENT
Start: 2023-03-19 | End: 2023-03-22

## 2023-03-19 NOTE — PROGRESS NOTES
As of yesterday patient was weaned off from Airvo to high flow nasal cannula.     03/18/23 1240   Oxygen Utilization   $ RT Standby Charge (per 15 min) 1   $ Oxygen in use?  Yes   O2 Device High flow nasal cannula   O2 Flow Rate (L/min) (S)  15 L/min   SpO2 92 %   Humidity Bubbler   Reservoir Filled

## 2023-03-20 LAB
ANION GAP SERPL CALC-SCNC: 7 MMOL/L (ref 0–18)
BASOPHILS # BLD: 0 X10(3) UL (ref 0–0.2)
BASOPHILS NFR BLD: 0 %
BUN BLD-MCNC: 43 MG/DL (ref 7–18)
BUN/CREAT SERPL: 55.1 (ref 10–20)
CALCIUM BLD-MCNC: 8.5 MG/DL (ref 8.5–10.1)
CHLORIDE SERPL-SCNC: 107 MMOL/L (ref 98–112)
CO2 SERPL-SCNC: 28 MMOL/L (ref 21–32)
CREAT BLD-MCNC: 0.78 MG/DL
DEPRECATED RDW RBC AUTO: 45.3 FL (ref 35.1–46.3)
EOSINOPHIL # BLD: 0 X10(3) UL (ref 0–0.7)
EOSINOPHIL NFR BLD: 0 %
ERYTHROCYTE [DISTWIDTH] IN BLOOD BY AUTOMATED COUNT: 13.9 % (ref 11–15)
GFR SERPLBLD BASED ON 1.73 SQ M-ARVRAT: 88 ML/MIN/1.73M2 (ref 60–?)
GLUCOSE BLD-MCNC: 122 MG/DL (ref 70–99)
GLUCOSE BLDC GLUCOMTR-MCNC: 106 MG/DL (ref 70–99)
GLUCOSE BLDC GLUCOMTR-MCNC: 118 MG/DL (ref 70–99)
GLUCOSE BLDC GLUCOMTR-MCNC: 123 MG/DL (ref 70–99)
GLUCOSE BLDC GLUCOMTR-MCNC: 125 MG/DL (ref 70–99)
GLUCOSE BLDC GLUCOMTR-MCNC: 137 MG/DL (ref 70–99)
GLUCOSE BLDC GLUCOMTR-MCNC: 140 MG/DL (ref 70–99)
GLUCOSE BLDC GLUCOMTR-MCNC: 150 MG/DL (ref 70–99)
GLUCOSE BLDC GLUCOMTR-MCNC: 169 MG/DL (ref 70–99)
GLUCOSE BLDC GLUCOMTR-MCNC: 176 MG/DL (ref 70–99)
GLUCOSE BLDC GLUCOMTR-MCNC: 224 MG/DL (ref 70–99)
GLUCOSE BLDC GLUCOMTR-MCNC: 237 MG/DL (ref 70–99)
GLUCOSE BLDC GLUCOMTR-MCNC: 253 MG/DL (ref 70–99)
GLUCOSE BLDC GLUCOMTR-MCNC: 93 MG/DL (ref 70–99)
GLUCOSE BLDC GLUCOMTR-MCNC: 95 MG/DL (ref 70–99)
HCT VFR BLD AUTO: 30.1 %
HGB BLD-MCNC: 9.4 G/DL
LYMPHOCYTES NFR BLD: 1.33 X10(3) UL (ref 1–4)
LYMPHOCYTES NFR BLD: 9 %
MAGNESIUM SERPL-MCNC: 2 MG/DL (ref 1.6–2.6)
MCH RBC QN AUTO: 27.9 PG (ref 26–34)
MCHC RBC AUTO-ENTMCNC: 31.2 G/DL (ref 31–37)
MCV RBC AUTO: 89.3 FL
MONOCYTES # BLD: 0.74 X10(3) UL (ref 0.1–1)
MONOCYTES NFR BLD: 5 %
MORPHOLOGY: NORMAL
NEUTROPHILS # BLD AUTO: 11.78 X10 (3) UL (ref 1.5–7.7)
NEUTROPHILS NFR BLD: 80 %
NEUTS BAND NFR BLD: 6 %
NEUTS HYPERSEG # BLD: 12.73 X10(3) UL (ref 1.5–7.7)
OSMOLALITY SERPL CALC.SUM OF ELEC: 306 MOSM/KG (ref 275–295)
PHOSPHATE SERPL-MCNC: 2.1 MG/DL (ref 2.5–4.9)
PLATELET # BLD AUTO: 502 10(3)UL (ref 150–450)
PLATELET MORPHOLOGY: NORMAL
POTASSIUM SERPL-SCNC: 4.2 MMOL/L (ref 3.5–5.1)
RBC # BLD AUTO: 3.37 X10(6)UL
SODIUM SERPL-SCNC: 142 MMOL/L (ref 136–145)
TOTAL CELLS COUNTED BLD: 100
WBC # BLD AUTO: 14.8 X10(3) UL (ref 4–11)

## 2023-03-20 PROCEDURE — 99233 SBSQ HOSP IP/OBS HIGH 50: CPT | Performed by: HOSPITALIST

## 2023-03-20 PROCEDURE — 99232 SBSQ HOSP IP/OBS MODERATE 35: CPT | Performed by: INTERNAL MEDICINE

## 2023-03-20 PROCEDURE — 99233 SBSQ HOSP IP/OBS HIGH 50: CPT | Performed by: INTERNAL MEDICINE

## 2023-03-20 RX ORDER — METHYLPREDNISOLONE SODIUM SUCCINATE 40 MG/ML
40 INJECTION, POWDER, LYOPHILIZED, FOR SOLUTION INTRAMUSCULAR; INTRAVENOUS DAILY
Status: DISCONTINUED | OUTPATIENT
Start: 2023-03-21 | End: 2023-03-21

## 2023-03-20 NOTE — PLAN OF CARE
Problem: Diabetes/Glucose Control  Goal: Glucose maintained within prescribed range  Description: INTERVENTIONS:  - Monitor Blood Glucose as ordered  - Assess for signs and symptoms of hyperglycemia and hypoglycemia  - Administer ordered medications to maintain glucose within target range  - Assess barriers to adequate nutritional intake and initiate nutrition consult as needed  - Instruct patient on self management of diabetes  Outcome: Progressing     Problem: CARDIOVASCULAR - ADULT  Goal: Maintains optimal cardiac output and hemodynamic stability  Description: INTERVENTIONS:  - Monitor vital signs, rhythm, and trends  - Monitor for bleeding, hypotension and signs of decreased cardiac output  - Evaluate effectiveness of vasoactive medications to optimize hemodynamic stability  - Monitor arterial and/or venous puncture sites for bleeding and/or hematoma  - Assess quality of pulses, skin color and temperature  - Assess for signs of decreased coronary artery perfusion - ex.  Angina  - Evaluate fluid balance, assess for edema, trend weights  Outcome: Progressing  Goal: Absence of cardiac arrhythmias or at baseline  Description: INTERVENTIONS:  - Continuous cardiac monitoring, monitor vital signs, obtain 12 lead EKG if indicated  - Evaluate effectiveness of antiarrhythmic and heart rate control medications as ordered  - Initiate emergency measures for life threatening arrhythmias  - Monitor electrolytes and administer replacement therapy as ordered  Outcome: Progressing     Problem: RESPIRATORY - ADULT  Goal: Achieves optimal ventilation and oxygenation  Description: INTERVENTIONS:  - Assess for changes in respiratory status  - Assess for changes in mentation and behavior  - Position to facilitate oxygenation and minimize respiratory effort  - Oxygen supplementation based on oxygen saturation or ABGs  - Provide Smoking Cessation handout, if applicable  - Encourage broncho-pulmonary hygiene including cough, deep breathe, Incentive Spirometry  - Assess the need for suctioning and perform as needed  - Assess and instruct to report SOB or any respiratory difficulty  - Respiratory Therapy support as indicated  - Manage/alleviate anxiety  - Monitor for signs/symptoms of CO2 retention  Outcome: Progressing     Problem: GASTROINTESTINAL - ADULT  Goal: Minimal or absence of nausea and vomiting  Description: INTERVENTIONS:  - Maintain adequate hydration with IV or PO as ordered and tolerated  - Nasogastric tube to low intermittent suction as ordered  - Evaluate effectiveness of ordered antiemetic medications  - Provide nonpharmacologic comfort measures as appropriate  - Advance diet as tolerated, if ordered  - Obtain nutritional consult as needed  - Evaluate fluid balance  Outcome: Progressing     Problem: METABOLIC/FLUID AND ELECTROLYTES - ADULT  Goal: Glucose maintained within prescribed range  Description: INTERVENTIONS:  - Monitor Blood Glucose as ordered  - Assess for signs and symptoms of hyperglycemia and hypoglycemia  - Administer ordered medications to maintain glucose within target range  - Assess barriers to adequate nutritional intake and initiate nutrition consult as needed  - Instruct patient on self management of diabetes  Outcome: Progressing     Problem: SKIN/TISSUE INTEGRITY - ADULT  Goal: Skin integrity remains intact  Description: INTERVENTIONS  - Assess and document risk factors for pressure ulcer development  - Assess and document skin integrity  - Monitor for areas of redness and/or skin breakdown  - Initiate interventions, skin care algorithm/standards of care as needed  Outcome: Progressing     Problem: MUSCULOSKELETAL - ADULT  Goal: Return mobility to safest level of function  Description: INTERVENTIONS:  - Assess patient stability and activity tolerance for standing, transferring and ambulating w/ or w/o assistive devices  - Assist with transfers and ambulation using safe patient handling equipment as needed  - Ensure adequate protection for wounds/incisions during mobilization  - Obtain PT/OT consults as needed  - Advance activity as appropriate  - Communicate ordered activity level and limitations with patient/family  Outcome: Progressing     Problem: Impaired Activities of Daily Living  Goal: Achieve highest/safest level of independence in self care  Description: Interventions:  - Assess ability and encourage patient to participate in ADLs to maximize function  - Promote sitting position while performing ADLs such as feeding, grooming, and bathing  - Educate and encourage patient/family in tolerated functional activity level and precautions during self-care    Outcome: Progressing

## 2023-03-21 ENCOUNTER — APPOINTMENT (OUTPATIENT)
Dept: GENERAL RADIOLOGY | Facility: HOSPITAL | Age: 58
End: 2023-03-21
Attending: INTERNAL MEDICINE
Payer: MEDICAID

## 2023-03-21 LAB
ANION GAP SERPL CALC-SCNC: 4 MMOL/L (ref 0–18)
BASOPHILS # BLD: 0 X10(3) UL (ref 0–0.2)
BASOPHILS NFR BLD: 0 %
BUN BLD-MCNC: 43 MG/DL (ref 7–18)
BUN/CREAT SERPL: 58.1 (ref 10–20)
CALCIUM BLD-MCNC: 8.2 MG/DL (ref 8.5–10.1)
CHLORIDE SERPL-SCNC: 107 MMOL/L (ref 98–112)
CO2 SERPL-SCNC: 30 MMOL/L (ref 21–32)
CREAT BLD-MCNC: 0.74 MG/DL
DEPRECATED RDW RBC AUTO: 46.6 FL (ref 35.1–46.3)
EOSINOPHIL # BLD: 0 X10(3) UL (ref 0–0.7)
EOSINOPHIL NFR BLD: 0 %
ERYTHROCYTE [DISTWIDTH] IN BLOOD BY AUTOMATED COUNT: 14.2 % (ref 11–15)
GFR SERPLBLD BASED ON 1.73 SQ M-ARVRAT: 94 ML/MIN/1.73M2 (ref 60–?)
GLUCOSE BLD-MCNC: 127 MG/DL (ref 70–99)
GLUCOSE BLDC GLUCOMTR-MCNC: 120 MG/DL (ref 70–99)
GLUCOSE BLDC GLUCOMTR-MCNC: 120 MG/DL (ref 70–99)
GLUCOSE BLDC GLUCOMTR-MCNC: 146 MG/DL (ref 70–99)
GLUCOSE BLDC GLUCOMTR-MCNC: 177 MG/DL (ref 70–99)
GLUCOSE BLDC GLUCOMTR-MCNC: 179 MG/DL (ref 70–99)
GLUCOSE BLDC GLUCOMTR-MCNC: 188 MG/DL (ref 70–99)
HCT VFR BLD AUTO: 30.6 %
HGB BLD-MCNC: 9.4 G/DL
LYMPHOCYTES NFR BLD: 0.7 X10(3) UL (ref 1–4)
LYMPHOCYTES NFR BLD: 6 %
MAGNESIUM SERPL-MCNC: 1.9 MG/DL (ref 1.6–2.6)
MCH RBC QN AUTO: 27.7 PG (ref 26–34)
MCHC RBC AUTO-ENTMCNC: 30.7 G/DL (ref 31–37)
MCV RBC AUTO: 90.3 FL
MONOCYTES # BLD: 0.7 X10(3) UL (ref 0.1–1)
MONOCYTES NFR BLD: 6 %
MORPHOLOGY: NORMAL
NEUTROPHILS # BLD AUTO: 9.01 X10 (3) UL (ref 1.5–7.7)
NEUTROPHILS NFR BLD: 86 %
NEUTS BAND NFR BLD: 2 %
NEUTS HYPERSEG # BLD: 10.21 X10(3) UL (ref 1.5–7.7)
NT-PROBNP SERPL-MCNC: 932 PG/ML (ref ?–125)
OSMOLALITY SERPL CALC.SUM OF ELEC: 304 MOSM/KG (ref 275–295)
PHOSPHATE SERPL-MCNC: 2.3 MG/DL (ref 2.5–4.9)
PLATELET # BLD AUTO: 498 10(3)UL (ref 150–450)
PLATELET MORPHOLOGY: NORMAL
POTASSIUM SERPL-SCNC: 5 MMOL/L (ref 3.5–5.1)
PROCALCITONIN SERPL-MCNC: 0.04 NG/ML (ref ?–0.16)
RBC # BLD AUTO: 3.39 X10(6)UL
SODIUM SERPL-SCNC: 141 MMOL/L (ref 136–145)
TOTAL CELLS COUNTED BLD: 100
WBC # BLD AUTO: 11.6 X10(3) UL (ref 4–11)

## 2023-03-21 PROCEDURE — 99232 SBSQ HOSP IP/OBS MODERATE 35: CPT | Performed by: INTERNAL MEDICINE

## 2023-03-21 PROCEDURE — 99233 SBSQ HOSP IP/OBS HIGH 50: CPT | Performed by: HOSPITALIST

## 2023-03-21 PROCEDURE — 99233 SBSQ HOSP IP/OBS HIGH 50: CPT | Performed by: INTERNAL MEDICINE

## 2023-03-21 PROCEDURE — 71046 X-RAY EXAM CHEST 2 VIEWS: CPT | Performed by: INTERNAL MEDICINE

## 2023-03-21 RX ORDER — HYDRALAZINE HYDROCHLORIDE 50 MG/1
50 TABLET, FILM COATED ORAL 2 TIMES DAILY
Status: DISCONTINUED | OUTPATIENT
Start: 2023-03-21 | End: 2023-03-22

## 2023-03-21 NOTE — RESPIRATORY THERAPY NOTE
Pt refused autoCPAP for tonight. Pt stated they would not tolerate a mask so nasal prongs were attempted. Pt ultimately did not tolerate. Detail Level: Detailed Render Post-Care Instructions In Note?: no Post-Care Instructions: I reviewed with the patient in detail post-care instructions. Patient is to wear sunprotection, and avoid picking at any of the treated lesions. Pt may apply Vaseline to crusted or scabbing areas. Number Of Freeze-Thaw Cycles: 2 freeze-thaw cycles Consent: The patient's consent was obtained including but not limited to risks of crusting, scabbing, blistering, scarring, darker or lighter pigmentary change, recurrence, incomplete removal and infection. Duration Of Freeze Thaw-Cycle (Seconds): 10

## 2023-03-22 ENCOUNTER — TELEPHONE (OUTPATIENT)
Dept: ENDOCRINOLOGY CLINIC | Facility: CLINIC | Age: 58
End: 2023-03-22

## 2023-03-22 VITALS
HEART RATE: 93 BPM | RESPIRATION RATE: 17 BRPM | OXYGEN SATURATION: 98 % | BODY MASS INDEX: 39.92 KG/M2 | DIASTOLIC BLOOD PRESSURE: 63 MMHG | HEIGHT: 65 IN | WEIGHT: 239.63 LBS | TEMPERATURE: 98 F | SYSTOLIC BLOOD PRESSURE: 143 MMHG

## 2023-03-22 LAB
ANION GAP SERPL CALC-SCNC: 4 MMOL/L (ref 0–18)
BASOPHILS # BLD AUTO: 0.02 X10(3) UL (ref 0–0.2)
BASOPHILS NFR BLD AUTO: 0.2 %
BUN BLD-MCNC: 39 MG/DL (ref 7–18)
BUN/CREAT SERPL: 56.5 (ref 10–20)
CALCIUM BLD-MCNC: 8.3 MG/DL (ref 8.5–10.1)
CHLORIDE SERPL-SCNC: 103 MMOL/L (ref 98–112)
CO2 SERPL-SCNC: 32 MMOL/L (ref 21–32)
CREAT BLD-MCNC: 0.69 MG/DL
DEPRECATED RDW RBC AUTO: 45.2 FL (ref 35.1–46.3)
EOSINOPHIL # BLD AUTO: 0.05 X10(3) UL (ref 0–0.7)
EOSINOPHIL NFR BLD AUTO: 0.5 %
ERYTHROCYTE [DISTWIDTH] IN BLOOD BY AUTOMATED COUNT: 13.9 % (ref 11–15)
GFR SERPLBLD BASED ON 1.73 SQ M-ARVRAT: 101 ML/MIN/1.73M2 (ref 60–?)
GLUCOSE BLD-MCNC: 121 MG/DL (ref 70–99)
GLUCOSE BLDC GLUCOMTR-MCNC: 265 MG/DL (ref 70–99)
GLUCOSE BLDC GLUCOMTR-MCNC: 99 MG/DL (ref 70–99)
HCT VFR BLD AUTO: 28.4 %
HGB BLD-MCNC: 9.1 G/DL
IMM GRANULOCYTES # BLD AUTO: 0.19 X10(3) UL (ref 0–1)
IMM GRANULOCYTES NFR BLD: 2 %
LYMPHOCYTES # BLD AUTO: 2.01 X10(3) UL (ref 1–4)
LYMPHOCYTES NFR BLD AUTO: 21.3 %
MAGNESIUM SERPL-MCNC: 1.7 MG/DL (ref 1.6–2.6)
MCH RBC QN AUTO: 28.6 PG (ref 26–34)
MCHC RBC AUTO-ENTMCNC: 32 G/DL (ref 31–37)
MCV RBC AUTO: 89.3 FL
MONOCYTES # BLD AUTO: 0.88 X10(3) UL (ref 0.1–1)
MONOCYTES NFR BLD AUTO: 9.3 %
NEUTROPHILS # BLD AUTO: 6.28 X10 (3) UL (ref 1.5–7.7)
NEUTROPHILS # BLD AUTO: 6.28 X10(3) UL (ref 1.5–7.7)
NEUTROPHILS NFR BLD AUTO: 66.7 %
OSMOLALITY SERPL CALC.SUM OF ELEC: 299 MOSM/KG (ref 275–295)
PHOSPHATE SERPL-MCNC: 2.6 MG/DL (ref 2.5–4.9)
PLATELET # BLD AUTO: 412 10(3)UL (ref 150–450)
POTASSIUM SERPL-SCNC: 4.3 MMOL/L (ref 3.5–5.1)
RBC # BLD AUTO: 3.18 X10(6)UL
SODIUM SERPL-SCNC: 139 MMOL/L (ref 136–145)
WBC # BLD AUTO: 9.4 X10(3) UL (ref 4–11)

## 2023-03-22 PROCEDURE — 99233 SBSQ HOSP IP/OBS HIGH 50: CPT | Performed by: INTERNAL MEDICINE

## 2023-03-22 PROCEDURE — 99232 SBSQ HOSP IP/OBS MODERATE 35: CPT | Performed by: INTERNAL MEDICINE

## 2023-03-22 PROCEDURE — 99239 HOSP IP/OBS DSCHRG MGMT >30: CPT | Performed by: INTERNAL MEDICINE

## 2023-03-22 RX ORDER — INSULIN GLARGINE 100 [IU]/ML
30 INJECTION, SOLUTION SUBCUTANEOUS NIGHTLY
Qty: 63 ML | Refills: 0 | Status: SHIPPED | COMMUNITY
Start: 2023-03-22

## 2023-03-22 RX ORDER — HYDRALAZINE HYDROCHLORIDE 50 MG/1
50 TABLET, FILM COATED ORAL 2 TIMES DAILY
Qty: 60 TABLET | Refills: 0 | Status: SHIPPED | OUTPATIENT
Start: 2023-03-22 | End: 2023-03-24

## 2023-03-22 RX ORDER — PREDNISONE 10 MG/1
TABLET ORAL
Qty: 9 TABLET | Refills: 0 | Status: SHIPPED | OUTPATIENT
Start: 2023-03-23 | End: 2023-03-29

## 2023-03-22 RX ORDER — MAGNESIUM OXIDE 400 MG/1
400 TABLET ORAL ONCE
Status: COMPLETED | OUTPATIENT
Start: 2023-03-22 | End: 2023-03-22

## 2023-03-22 RX ORDER — IPRATROPIUM BROMIDE AND ALBUTEROL SULFATE 2.5; .5 MG/3ML; MG/3ML
3 SOLUTION RESPIRATORY (INHALATION) 2 TIMES DAILY
Status: DISCONTINUED | OUTPATIENT
Start: 2023-03-22 | End: 2023-03-22

## 2023-03-22 RX ORDER — INSULIN LISPRO 100 [IU]/ML
INJECTION, SOLUTION INTRAVENOUS; SUBCUTANEOUS
Qty: 3 ML | Refills: 0 | Status: SHIPPED | OUTPATIENT
Start: 2023-03-22 | End: 2023-03-24

## 2023-03-22 NOTE — TELEPHONE ENCOUNTER
Endo staff: please call patient and offer Fu with Andrew Shepard tmrw  She is being discharged today   Andrew Shepard can see her at 9:30 am tmrw    Thanks

## 2023-03-22 NOTE — TELEPHONE ENCOUNTER
Called patient, no answer, left VM  No number for hospital contact was provided.    PSRs to assist scheduling Patient is 18 years or older

## 2023-03-23 ENCOUNTER — OFFICE VISIT (OUTPATIENT)
Dept: ENDOCRINOLOGY CLINIC | Facility: CLINIC | Age: 58
End: 2023-03-23

## 2023-03-23 ENCOUNTER — PATIENT OUTREACH (OUTPATIENT)
Dept: CASE MANAGEMENT | Age: 58
End: 2023-03-23

## 2023-03-23 VITALS
BODY MASS INDEX: 38 KG/M2 | HEART RATE: 95 BPM | SYSTOLIC BLOOD PRESSURE: 139 MMHG | WEIGHT: 227 LBS | DIASTOLIC BLOOD PRESSURE: 71 MMHG

## 2023-03-23 DIAGNOSIS — Z02.9 ENCOUNTERS FOR ADMINISTRATIVE PURPOSE: ICD-10-CM

## 2023-03-23 DIAGNOSIS — E11.65 UNCONTROLLED TYPE 2 DIABETES MELLITUS WITH HYPERGLYCEMIA (HCC): Primary | ICD-10-CM

## 2023-03-23 LAB
GLUCOSE BLOOD: 308
TEST STRIP LOT #: NORMAL NUMERIC

## 2023-03-23 PROCEDURE — 99214 OFFICE O/P EST MOD 30 MIN: CPT

## 2023-03-23 PROCEDURE — 3078F DIAST BP <80 MM HG: CPT

## 2023-03-23 PROCEDURE — 82947 ASSAY GLUCOSE BLOOD QUANT: CPT

## 2023-03-23 PROCEDURE — 3075F SYST BP GE 130 - 139MM HG: CPT

## 2023-03-23 RX ORDER — DULAGLUTIDE 4.5 MG/.5ML
4.5 INJECTION, SOLUTION SUBCUTANEOUS WEEKLY
Qty: 2 ML | Refills: 4 | Status: SHIPPED | OUTPATIENT
Start: 2023-03-23

## 2023-03-23 NOTE — PROGRESS NOTES
NCMs/w patient who states that she is doing well and has no issues. She states that she will speak with PCP at tomorrow's appt. NC closing encounter.

## 2023-03-23 NOTE — PATIENT INSTRUCTIONS
Continue Basaglar 35 units subcutaneous BID    Continue Humalog- 12 units three times daily for the next 3 days (until 3/25/23) then decrease to 10 units three times daily with meals. Restart Trulicity 9.4WM subcutaneous weekly       Call the office if sugars are still >200's or if any sugars <80. Return to see me LOENA Rodas in 1 month and then with Dr. Reza Urbina in 3-4 months    At your next visit with me we will see if we can switch your medications back to the previous regimen.

## 2023-03-24 ENCOUNTER — OFFICE VISIT (OUTPATIENT)
Dept: FAMILY MEDICINE CLINIC | Facility: CLINIC | Age: 58
End: 2023-03-24

## 2023-03-24 VITALS
HEIGHT: 65 IN | BODY MASS INDEX: 37.15 KG/M2 | TEMPERATURE: 97 F | DIASTOLIC BLOOD PRESSURE: 70 MMHG | SYSTOLIC BLOOD PRESSURE: 120 MMHG | WEIGHT: 223 LBS | HEART RATE: 88 BPM

## 2023-03-24 DIAGNOSIS — Z79.4 UNCONTROLLED DIABETES MELLITUS WITH HYPERGLYCEMIA, WITH LONG-TERM CURRENT USE OF INSULIN (HCC): ICD-10-CM

## 2023-03-24 DIAGNOSIS — J18.9 COMMUNITY ACQUIRED PNEUMONIA, UNSPECIFIED LATERALITY: Primary | ICD-10-CM

## 2023-03-24 DIAGNOSIS — I10 ESSENTIAL HYPERTENSION: ICD-10-CM

## 2023-03-24 DIAGNOSIS — J96.01 ACUTE RESPIRATORY FAILURE WITH HYPOXIA (HCC): ICD-10-CM

## 2023-03-24 DIAGNOSIS — E11.65 UNCONTROLLED DIABETES MELLITUS WITH HYPERGLYCEMIA, WITH LONG-TERM CURRENT USE OF INSULIN (HCC): ICD-10-CM

## 2023-03-24 PROCEDURE — 3074F SYST BP LT 130 MM HG: CPT | Performed by: FAMILY MEDICINE

## 2023-03-24 PROCEDURE — 99214 OFFICE O/P EST MOD 30 MIN: CPT | Performed by: FAMILY MEDICINE

## 2023-03-24 PROCEDURE — 3008F BODY MASS INDEX DOCD: CPT | Performed by: FAMILY MEDICINE

## 2023-03-24 PROCEDURE — 3078F DIAST BP <80 MM HG: CPT | Performed by: FAMILY MEDICINE

## 2023-03-24 RX ORDER — AMLODIPINE BESYLATE 10 MG/1
TABLET ORAL
Qty: 90 TABLET | Refills: 3 | Status: SHIPPED | OUTPATIENT
Start: 2023-03-24

## 2023-03-24 RX ORDER — HYDRALAZINE HYDROCHLORIDE 50 MG/1
50 TABLET, FILM COATED ORAL 2 TIMES DAILY
Qty: 180 TABLET | Refills: 1 | Status: SHIPPED | OUTPATIENT
Start: 2023-03-24 | End: 2023-04-23

## 2023-03-24 RX ORDER — MONTELUKAST SODIUM 10 MG/1
TABLET ORAL
COMMUNITY
Start: 2023-03-19

## 2023-03-24 RX ORDER — ONDANSETRON 4 MG/1
TABLET, ORALLY DISINTEGRATING ORAL
COMMUNITY
Start: 2023-03-09

## 2023-04-30 NOTE — TELEPHONE ENCOUNTER
Refill passed per [place] [facility], [facility] protocol.   Rx listed as external/Pt reported    Requested Prescriptions   Pending Prescriptions Disp Refills    MONTELUKAST 10 MG Oral Tab [Pharmacy Med Name: MONTELUKAST 10MG TABLETS] 90 tablet 0     Sig: TAKE 1 TABLET(10 MG) BY MOUTH EVERY NIGHT       Asthma & COPD Medication Protocol Passed - 4/29/2023  5:57 AM        Passed - In person appointment or virtual visit in the past 6 mos or appointment in next 3 mos     Recent Outpatient Visits              1 month ago Community acquired pneumonia, unspecified laterality    [address] [facility], [address], [place], [place]    Office Visit    1 month ago Uncontrolled type 2 diabetes mellitus with hyperglycemia [facility])    [address] [facility] [place]    Office Visit    1 month ago Dermatitis    [address], [address] [facility] J[place], [place]    Office Visit    2 months ago [address], [address] [place] [address] J[place] PA-C    Office Visit    2 months ago Vaginitis and vulvovaginitis    [address], [address] [facility], [address] [address], [facility] 148, APRN    Office Visit          Future Appointments         Provider Department Appt Notes    In 1 month Vani Harris MD [address], [address] [facility]                     Recent Outpatient Visits              1 month ago Community acquired pneumonia, unspecified laterality    [address] [facility], [address], [place], [place]    Office Visit    1 month ago Uncontrolled type 2 diabetes mellitus with hyperglycemia [facility])    [address], [address] [facility] [place]    Office Visit    1 month ago Dermatitis    [facility] [place], [place]    Office Visit    2 months ago [place]insic [place]    Office Visit    2 months ago Vaginitis and vulvovaginitis    [facility], [address] [place] [address] [place]    Office Visit            Future Appointments         Provider Department Appt Notes    In 1 month MD Ander Denny Hunds[place]gyden

## 2023-05-02 RX ORDER — MONTELUKAST SODIUM 10 MG/1
10 TABLET ORAL NIGHTLY
Qty: 90 TABLET | Refills: 0 | Status: SHIPPED | OUTPATIENT
Start: 2023-05-02

## 2023-05-09 ENCOUNTER — TELEPHONE (OUTPATIENT)
Dept: ENDOCRINOLOGY CLINIC | Facility: CLINIC | Age: 58
End: 2023-05-09

## 2023-05-09 DIAGNOSIS — E11.65 UNCONTROLLED TYPE 2 DIABETES MELLITUS WITH HYPERGLYCEMIA (HCC): ICD-10-CM

## 2023-05-09 RX ORDER — INSULIN GLARGINE 100 [IU]/ML
30 INJECTION, SOLUTION SUBCUTANEOUS NIGHTLY
Qty: 63 ML | Refills: 0 | Status: CANCELLED | OUTPATIENT
Start: 2023-05-09

## 2023-05-09 RX ORDER — DULAGLUTIDE 4.5 MG/.5ML
4.5 INJECTION, SOLUTION SUBCUTANEOUS WEEKLY
Qty: 2 ML | Refills: 4 | Status: CANCELLED | OUTPATIENT
Start: 2023-05-09

## 2023-05-09 NOTE — TELEPHONE ENCOUNTER
Called patient, no answer, left VM - call to confirm medication regimen     LOV: 3/23/23  RTC: 6/23/23

## 2023-05-09 NOTE — TELEPHONE ENCOUNTER
Pt states she is out of all her Dm medications - not just the quick pen - asking if she can get refills sent today

## 2023-05-10 RX ORDER — INSULIN GLARGINE 100 [IU]/ML
INJECTION, SOLUTION SUBCUTANEOUS
Qty: 63 ML | Refills: 0 | Status: SHIPPED | OUTPATIENT
Start: 2023-05-10

## 2023-05-18 RX ORDER — INSULIN LISPRO 100 [IU]/ML
INJECTION, SOLUTION INTRAVENOUS; SUBCUTANEOUS
Qty: 3 ML | Refills: 0 | Status: SHIPPED | OUTPATIENT
Start: 2023-05-18

## 2023-05-22 ENCOUNTER — OFFICE VISIT (OUTPATIENT)
Dept: FAMILY MEDICINE CLINIC | Facility: CLINIC | Age: 58
End: 2023-05-22

## 2023-05-22 ENCOUNTER — LAB ENCOUNTER (OUTPATIENT)
Dept: LAB | Age: 58
End: 2023-05-22
Attending: FAMILY MEDICINE
Payer: MEDICAID

## 2023-05-22 VITALS
DIASTOLIC BLOOD PRESSURE: 80 MMHG | WEIGHT: 226 LBS | HEIGHT: 65 IN | HEART RATE: 91 BPM | TEMPERATURE: 97 F | SYSTOLIC BLOOD PRESSURE: 134 MMHG | BODY MASS INDEX: 37.65 KG/M2

## 2023-05-22 DIAGNOSIS — E11.65 UNCONTROLLED DIABETES MELLITUS WITH HYPERGLYCEMIA, WITH LONG-TERM CURRENT USE OF INSULIN (HCC): ICD-10-CM

## 2023-05-22 DIAGNOSIS — Z23 NEED FOR VACCINATION: ICD-10-CM

## 2023-05-22 DIAGNOSIS — E11.42 DIABETIC POLYNEUROPATHY ASSOCIATED WITH TYPE 2 DIABETES MELLITUS (HCC): ICD-10-CM

## 2023-05-22 DIAGNOSIS — Z79.4 UNCONTROLLED DIABETES MELLITUS WITH HYPERGLYCEMIA, WITH LONG-TERM CURRENT USE OF INSULIN (HCC): ICD-10-CM

## 2023-05-22 DIAGNOSIS — Z79.4 UNCONTROLLED DIABETES MELLITUS WITH HYPERGLYCEMIA, WITH LONG-TERM CURRENT USE OF INSULIN (HCC): Primary | ICD-10-CM

## 2023-05-22 DIAGNOSIS — E11.65 UNCONTROLLED DIABETES MELLITUS WITH HYPERGLYCEMIA, WITH LONG-TERM CURRENT USE OF INSULIN (HCC): Primary | ICD-10-CM

## 2023-05-22 DIAGNOSIS — Z12.31 VISIT FOR SCREENING MAMMOGRAM: ICD-10-CM

## 2023-05-22 DIAGNOSIS — I10 ESSENTIAL HYPERTENSION: ICD-10-CM

## 2023-05-22 DIAGNOSIS — Z12.11 SCREENING FOR COLON CANCER: ICD-10-CM

## 2023-05-22 DIAGNOSIS — G62.9 AXONAL POLYNEUROPATHY: ICD-10-CM

## 2023-05-22 LAB
ANION GAP SERPL CALC-SCNC: 8 MMOL/L (ref 0–18)
BASOPHILS # BLD AUTO: 0.11 X10(3) UL (ref 0–0.2)
BASOPHILS NFR BLD AUTO: 1.1 %
BUN BLD-MCNC: 47 MG/DL (ref 7–18)
BUN/CREAT SERPL: 27 (ref 10–20)
CALCIUM BLD-MCNC: 10.4 MG/DL (ref 8.5–10.1)
CHLORIDE SERPL-SCNC: 104 MMOL/L (ref 98–112)
CO2 SERPL-SCNC: 23 MMOL/L (ref 21–32)
CREAT BLD-MCNC: 1.74 MG/DL
DEPRECATED RDW RBC AUTO: 44.5 FL (ref 35.1–46.3)
EOSINOPHIL # BLD AUTO: 0.38 X10(3) UL (ref 0–0.7)
EOSINOPHIL NFR BLD AUTO: 3.9 %
ERYTHROCYTE [DISTWIDTH] IN BLOOD BY AUTOMATED COUNT: 13.5 % (ref 11–15)
EST. AVERAGE GLUCOSE BLD GHB EST-MCNC: 206 MG/DL (ref 68–126)
FASTING STATUS PATIENT QL REPORTED: NO
GFR SERPLBLD BASED ON 1.73 SQ M-ARVRAT: 33 ML/MIN/1.73M2 (ref 60–?)
GLUCOSE BLD-MCNC: 224 MG/DL (ref 70–99)
HBA1C MFR BLD: 8.8 % (ref ?–5.7)
HCT VFR BLD AUTO: 34.8 %
HGB BLD-MCNC: 11.4 G/DL
IMM GRANULOCYTES # BLD AUTO: 0.07 X10(3) UL (ref 0–1)
IMM GRANULOCYTES NFR BLD: 0.7 %
LYMPHOCYTES # BLD AUTO: 2.38 X10(3) UL (ref 1–4)
LYMPHOCYTES NFR BLD AUTO: 24.2 %
MCH RBC QN AUTO: 29 PG (ref 26–34)
MCHC RBC AUTO-ENTMCNC: 32.8 G/DL (ref 31–37)
MCV RBC AUTO: 88.5 FL
MONOCYTES # BLD AUTO: 0.66 X10(3) UL (ref 0.1–1)
MONOCYTES NFR BLD AUTO: 6.7 %
NEUTROPHILS # BLD AUTO: 6.22 X10 (3) UL (ref 1.5–7.7)
NEUTROPHILS # BLD AUTO: 6.22 X10(3) UL (ref 1.5–7.7)
NEUTROPHILS NFR BLD AUTO: 63.4 %
OSMOLALITY SERPL CALC.SUM OF ELEC: 299 MOSM/KG (ref 275–295)
PLATELET # BLD AUTO: 305 10(3)UL (ref 150–450)
POTASSIUM SERPL-SCNC: 5.5 MMOL/L (ref 3.5–5.1)
RBC # BLD AUTO: 3.93 X10(6)UL
SODIUM SERPL-SCNC: 135 MMOL/L (ref 136–145)
WBC # BLD AUTO: 9.8 X10(3) UL (ref 4–11)

## 2023-05-22 PROCEDURE — 85025 COMPLETE CBC W/AUTO DIFF WBC: CPT

## 2023-05-22 PROCEDURE — 83036 HEMOGLOBIN GLYCOSYLATED A1C: CPT

## 2023-05-22 PROCEDURE — 90750 HZV VACC RECOMBINANT IM: CPT | Performed by: FAMILY MEDICINE

## 2023-05-22 PROCEDURE — 3008F BODY MASS INDEX DOCD: CPT | Performed by: FAMILY MEDICINE

## 2023-05-22 PROCEDURE — 3075F SYST BP GE 130 - 139MM HG: CPT | Performed by: FAMILY MEDICINE

## 2023-05-22 PROCEDURE — 80048 BASIC METABOLIC PNL TOTAL CA: CPT

## 2023-05-22 PROCEDURE — 3079F DIAST BP 80-89 MM HG: CPT | Performed by: FAMILY MEDICINE

## 2023-05-22 PROCEDURE — 36415 COLL VENOUS BLD VENIPUNCTURE: CPT

## 2023-05-22 PROCEDURE — 90471 IMMUNIZATION ADMIN: CPT | Performed by: FAMILY MEDICINE

## 2023-05-22 PROCEDURE — 99214 OFFICE O/P EST MOD 30 MIN: CPT | Performed by: FAMILY MEDICINE

## 2023-05-22 PROCEDURE — 3052F HG A1C>EQUAL 8.0%<EQUAL 9.0%: CPT | Performed by: INTERNAL MEDICINE

## 2023-05-22 RX ORDER — LISINOPRIL 40 MG/1
40 TABLET ORAL DAILY
Qty: 90 TABLET | Refills: 2 | Status: SHIPPED | OUTPATIENT
Start: 2023-05-22

## 2023-05-22 RX ORDER — GABAPENTIN 300 MG/1
300 CAPSULE ORAL 2 TIMES DAILY
Qty: 180 CAPSULE | Refills: 1 | Status: SHIPPED | OUTPATIENT
Start: 2023-05-22

## 2023-05-31 NOTE — TELEPHONE ENCOUNTER
Called patient to follow up on below. States she was waiting to refill her metformin ER, steglatro and metformin since she has not taken them in months. RN reviewed LOV note from 3/23/23 with patient and informed her the medications she mentioned are currently on hold until she follows up with Dr. Jennyfer Farfan to see if she will go back to previous regimen before hospitalization. Pt confirmed with RN she's taking long acting 35 units BID, insulin lispro 10 units TID and trulicity 4.5 mg weekly. RN repeated to patient to stay on these medications for now until appointment with Dr. Jennyfer Farfan.   Pt voiced understanding     Eren Singh    Future Appointments   Date Time Provider Kathryn Chew   6/23/2023 12:00 PM Masha Dc MD [facility]

## 2023-06-23 ENCOUNTER — TELEPHONE (OUTPATIENT)
Dept: ENDOCRINOLOGY CLINIC | Facility: CLINIC | Age: 58
End: 2023-06-23

## 2023-06-23 ENCOUNTER — OFFICE VISIT (OUTPATIENT)
Dept: ENDOCRINOLOGY CLINIC | Facility: CLINIC | Age: 58
End: 2023-06-23

## 2023-06-23 VITALS
HEIGHT: 65 IN | WEIGHT: 232 LBS | SYSTOLIC BLOOD PRESSURE: 132 MMHG | DIASTOLIC BLOOD PRESSURE: 62 MMHG | BODY MASS INDEX: 38.65 KG/M2 | HEART RATE: 90 BPM

## 2023-06-23 DIAGNOSIS — E11.65 UNCONTROLLED TYPE 2 DIABETES MELLITUS WITH HYPERGLYCEMIA (HCC): Primary | ICD-10-CM

## 2023-06-23 LAB
CARTRIDGE LOT#: ABNORMAL NUMERIC
GLUCOSE BLOOD: 289
HEMOGLOBIN A1C: 9.3 % (ref 4.3–5.6)
TEST STRIP LOT #: NORMAL NUMERIC

## 2023-06-23 PROCEDURE — 3046F HEMOGLOBIN A1C LEVEL >9.0%: CPT | Performed by: INTERNAL MEDICINE

## 2023-06-23 PROCEDURE — 83036 HEMOGLOBIN GLYCOSYLATED A1C: CPT | Performed by: INTERNAL MEDICINE

## 2023-06-23 PROCEDURE — 3075F SYST BP GE 130 - 139MM HG: CPT | Performed by: INTERNAL MEDICINE

## 2023-06-23 PROCEDURE — 82947 ASSAY GLUCOSE BLOOD QUANT: CPT | Performed by: INTERNAL MEDICINE

## 2023-06-23 PROCEDURE — 3008F BODY MASS INDEX DOCD: CPT | Performed by: INTERNAL MEDICINE

## 2023-06-23 PROCEDURE — 99214 OFFICE O/P EST MOD 30 MIN: CPT | Performed by: INTERNAL MEDICINE

## 2023-06-23 PROCEDURE — 3078F DIAST BP <80 MM HG: CPT | Performed by: INTERNAL MEDICINE

## 2023-06-23 RX ORDER — DULAGLUTIDE 4.5 MG/.5ML
4.5 INJECTION, SOLUTION SUBCUTANEOUS WEEKLY
Qty: 2 ML | Refills: 2 | Status: SHIPPED | OUTPATIENT
Start: 2023-06-23

## 2023-06-23 RX ORDER — METFORMIN HYDROCHLORIDE 500 MG/1
500 TABLET, EXTENDED RELEASE ORAL
Qty: 90 TABLET | Refills: 1 | Status: SHIPPED | OUTPATIENT
Start: 2023-06-23

## 2023-06-23 RX ORDER — DULAGLUTIDE 4.5 MG/.5ML
4.5 INJECTION, SOLUTION SUBCUTANEOUS 3 TIMES DAILY
Qty: 12 EACH | Refills: 1 | Status: SHIPPED | OUTPATIENT
Start: 2023-06-23 | End: 2023-06-23

## 2023-06-23 NOTE — PATIENT INSTRUCTIONS
CHANGE Humalog 10 units subcutaneous with breakfast and dinner    INCREASE Trulicity to 3.8YW subcutaneous weekly     START Farxiga 10mg daily     RESTART Metformin 500mg once daily     CONTINUE basaglar 30 units subcutaneous QAM; 35 units subcutaneous bedtime

## 2023-07-08 RX ORDER — INSULIN LISPRO 100 [IU]/ML
10 INJECTION, SOLUTION INTRAVENOUS; SUBCUTANEOUS 2 TIMES DAILY
Qty: 9 ML | Refills: 0 | Status: SHIPPED | OUTPATIENT
Start: 2023-07-08

## 2023-08-02 ENCOUNTER — TELEPHONE (OUTPATIENT)
Dept: FAMILY MEDICINE CLINIC | Facility: CLINIC | Age: 58
End: 2023-08-02

## 2023-08-02 DIAGNOSIS — Z23 NEED FOR VACCINATION: Primary | ICD-10-CM

## 2023-08-02 NOTE — TELEPHONE ENCOUNTER
Patient is due for Mammogram, DM EE. Mailed out referral/order for patient to schedule apt. Apt set for colon screening consult 8/3/23 and DM with Dr. Kenji Fox 9/25/23    Referrals (if applicable)  Orders Placed This Encounter      OPHTHALMOLOGY - INTERNAL          Order Comments:              ++++++++++(EYE DOCTOR)+++++++++                            Call [phone].           Referral Priority:Routine          Referral Type:OFFICE VISIT          Referred to Skylar Miramontes MD          Requested Specialty:OPHTHALMOLOGY          Number of Visits Requested:3    Order Date: May 22, 2023    Expected Date: 5/22/2023    Authorizing Provider: Rosalba Mina DO  Procedure: ERICH SCREENING BILAT (MGL=[id number]) [id number] ([id number])

## 2023-08-02 NOTE — TELEPHONE ENCOUNTER
Talked to patient told her message below understood and appointment made, gave her also Tel# of Ophthalmology Dr Gabbie Jarrett [phone] and Graciela [phone] to schedule her Mammogram.

## 2023-08-02 NOTE — TELEPHONE ENCOUNTER
Patient is due for Second shingles vaccine please assist with nurse visit     Please provide eye doctor info to set up apt and Mammogram

## 2023-08-09 ENCOUNTER — TELEPHONE (OUTPATIENT)
Dept: ENDOCRINOLOGY CLINIC | Facility: CLINIC | Age: 58
End: 2023-08-09

## 2023-08-09 DIAGNOSIS — R30.0 DYSURIA: Primary | ICD-10-CM

## 2023-08-09 RX ORDER — INSULIN LISPRO 100 [IU]/ML
INJECTION, SOLUTION INTRAVENOUS; SUBCUTANEOUS
Qty: 9 ML | Refills: 0 | Status: SHIPPED | OUTPATIENT
Start: 2023-08-09

## 2023-08-09 RX ORDER — FLUCONAZOLE 150 MG/1
150 TABLET ORAL ONCE
Qty: 1 TABLET | Refills: 0 | Status: SHIPPED | OUTPATIENT
Start: 2023-08-09 | End: 2023-08-09

## 2023-08-09 NOTE — TELEPHONE ENCOUNTER
Patient is calling states that she had to stop the following medication due to cause her yeast infections and itching on her legs. She would like to know if it was ok to stop.  Needs something for the yeast infections     Farxiga 10mg

## 2023-08-09 NOTE — TELEPHONE ENCOUNTER
She should continue to hold [place] until Dr. Virgene Ganser returns to office. Sent prescription for Diflucan 150mg one tablet one time for yeast infection symptoms but also she is reporting symptoms of UTI. I have placed order for her to have urinalysis with culture completed. Is she able to go to the lab today or tomorrow? If the rash on her legs and feet does not resolve in the next 1-2 days after being off [place] recommend she be seen in UC. Continue to monitor blood sugars. IF they start to increase persistently >180 in the next few days she should contact us so her other medications can be adjusted accordingly.

## 2023-08-09 NOTE — TELEPHONE ENCOUNTER
Spoke to patient regarding Sarah's instructions below. Patient verbalized understanding. Patient aware to  anti fungal medication and get UA done. Order placed and prescription sent. Patient will contact clinic if blood sugar levels are persistently above 180. Will await further instruction.

## 2023-08-09 NOTE — TELEPHONE ENCOUNTER
Dr. Shira Jarrett to patient who stated that she is having a reaction to [place]. Patient stated  yeast infection started about two weeks after starting medication (6/23/23) and infection persists. She stated that her feet and legs are also itching severely - rash. She stated that this also started around the same time. Patient last took [place] yesterday morning - she held today due to symptoms. She stated that she is experiencing itching and burning while urinating and is having difficulty urinating. Patient denies shortness of breath, chest and throat tightness. Hyperglycemia     Onset of hyperglycemia:     BG levels (please print out CGM and/or pump report if patient is wearing one): Patient stated blood sugars have been good - better on Trulicity. She stated her BG fasting are usually in the 140-180's.   8/8 189 fasting; 201 after dinner  Today BG fasting 145    Symptoms (RN only: N/V, abdominal pain and/or radiating to the back, blurry vision, increased urinary frequency, blurred vision, CP, SOB): Denies symptoms of hyperglycemia    Pattern of hyperglycemia: N/a    Steroid therapy: None    Acute Illness: None    Change in Diet: None    List DM Medications/Compliance:  Trulicy 4.5 mg weekly  Metformin  mg daily  Humalog 10 units twice daily with biggest meals  Basaglar 30 units AM, 35 units PM  Farxiga 10 mg

## 2023-08-10 ENCOUNTER — LAB ENCOUNTER (OUTPATIENT)
Dept: LAB | Age: 58
End: 2023-08-10
Attending: FAMILY MEDICINE
Payer: MEDICAID

## 2023-08-10 DIAGNOSIS — R30.0 DYSURIA: ICD-10-CM

## 2023-08-10 DIAGNOSIS — N28.9 RENAL INSUFFICIENCY: ICD-10-CM

## 2023-08-10 LAB
ALBUMIN SERPL-MCNC: 3.8 G/DL (ref 3.4–5)
ALBUMIN/GLOB SERPL: 1 {RATIO} (ref 1–2)
ALP LIVER SERPL-CCNC: 72 U/L
ALT SERPL-CCNC: 25 U/L
ANION GAP SERPL CALC-SCNC: 5 MMOL/L (ref 0–18)
AST SERPL-CCNC: 14 U/L (ref 15–37)
BILIRUB SERPL-MCNC: 0.3 MG/DL (ref 0.1–2)
BILIRUB UR QL STRIP.AUTO: NEGATIVE
BUN BLD-MCNC: 45 MG/DL (ref 7–18)
CALCIUM BLD-MCNC: 9.2 MG/DL (ref 8.5–10.1)
CHLORIDE SERPL-SCNC: 102 MMOL/L (ref 98–112)
CO2 SERPL-SCNC: 28 MMOL/L (ref 21–32)
COLOR UR AUTO: YELLOW
CREAT BLD-MCNC: 1.89 MG/DL
EGFRCR SERPLBLD CKD-EPI 2021: 30 ML/MIN/1.73M2 (ref 60–?)
FASTING STATUS PATIENT QL REPORTED: NO
GLOBULIN PLAS-MCNC: 3.7 G/DL (ref 2.8–4.4)
GLUCOSE BLD-MCNC: 314 MG/DL (ref 70–99)
GLUCOSE UR STRIP.AUTO-MCNC: >=1000 MG/DL
KETONES UR STRIP.AUTO-MCNC: NEGATIVE MG/DL
NITRITE UR QL STRIP.AUTO: POSITIVE
OSMOLALITY SERPL CALC.SUM OF ELEC: 304 MOSM/KG (ref 275–295)
PH UR STRIP.AUTO: 5.5 [PH] (ref 5–8)
POTASSIUM SERPL-SCNC: 4.8 MMOL/L (ref 3.5–5.1)
PROT SERPL-MCNC: 7.5 G/DL (ref 6.4–8.2)
PROT UR STRIP.AUTO-MCNC: >=300 MG/DL
SODIUM SERPL-SCNC: 135 MMOL/L (ref 136–145)
SP GR UR STRIP.AUTO: 1.02 (ref 1–1.03)
UROBILINOGEN UR STRIP.AUTO-MCNC: 0.2 MG/DL
WBC #/AREA URNS AUTO: >50 /HPF
WBC CLUMPS UR QL AUTO: PRESENT /HPF

## 2023-08-10 PROCEDURE — 87077 CULTURE AEROBIC IDENTIFY: CPT

## 2023-08-10 PROCEDURE — 36415 COLL VENOUS BLD VENIPUNCTURE: CPT

## 2023-08-10 PROCEDURE — 81001 URINALYSIS AUTO W/SCOPE: CPT

## 2023-08-10 PROCEDURE — 87186 SC STD MICRODIL/AGAR DIL: CPT

## 2023-08-10 PROCEDURE — 81015 MICROSCOPIC EXAM OF URINE: CPT

## 2023-08-10 PROCEDURE — 80053 COMPREHEN METABOLIC PANEL: CPT

## 2023-08-10 PROCEDURE — 87086 URINE CULTURE/COLONY COUNT: CPT

## 2023-08-10 RX ORDER — DAPAGLIFLOZIN 10 MG/1
TABLET, FILM COATED ORAL
COMMUNITY
Start: 2023-07-31

## 2023-08-10 RX ORDER — SULFAMETHOXAZOLE AND TRIMETHOPRIM 400; 80 MG/1; MG/1
1 TABLET ORAL 2 TIMES DAILY
Qty: 10 TABLET | Refills: 0 | Status: SHIPPED | OUTPATIENT
Start: 2023-08-10 | End: 2023-08-15

## 2023-08-10 NOTE — TELEPHONE ENCOUNTER
I received the results of her urinalysis which does look positive for urinary tract infection. I have sent a prescription for an antibiotic to her pharmacy. I am awaiting the results of her culture which should come back tomorrow. We may need to change the antibiotic based on culture results but we will let her know if this is the case.

## 2023-08-10 NOTE — TELEPHONE ENCOUNTER
Patient contacted (Name and  of pt verified). All results and recommendations reviewed. Patient verbalizes understanding, denies further questions and agrees with plan of care. Understands we will contact her if antibiotic needs to be changed. Reports no prior allergy to sulfa drugs.

## 2023-08-10 NOTE — TELEPHONE ENCOUNTER
I called the patient. Informed her of Dr. Alejandro Llanos instructions below. Note made on MAR that medication is on hold. She states she took the diflucan and still is experiencing a lot of itching. She submitted her UA sample this morning. To await results.

## 2023-08-10 NOTE — TELEPHONE ENCOUNTER
OK, noted agree with plan per APN. Please ask her to hold [place] until I see her in September. Thanks.

## 2023-08-14 ENCOUNTER — OFFICE VISIT (OUTPATIENT)
Dept: FAMILY MEDICINE CLINIC | Facility: CLINIC | Age: 58
End: 2023-08-14

## 2023-08-14 ENCOUNTER — LAB ENCOUNTER (OUTPATIENT)
Dept: LAB | Age: 58
End: 2023-08-14
Attending: FAMILY MEDICINE
Payer: MEDICAID

## 2023-08-14 VITALS
SYSTOLIC BLOOD PRESSURE: 130 MMHG | WEIGHT: 234 LBS | BODY MASS INDEX: 38.99 KG/M2 | HEART RATE: 84 BPM | DIASTOLIC BLOOD PRESSURE: 65 MMHG | TEMPERATURE: 97 F | HEIGHT: 65 IN

## 2023-08-14 DIAGNOSIS — Z23 IMMUNIZATION DUE: ICD-10-CM

## 2023-08-14 DIAGNOSIS — Z00.00 ADULT GENERAL MEDICAL EXAM: ICD-10-CM

## 2023-08-14 DIAGNOSIS — Z12.31 VISIT FOR SCREENING MAMMOGRAM: ICD-10-CM

## 2023-08-14 DIAGNOSIS — E11.65 UNCONTROLLED DIABETES MELLITUS WITH HYPERGLYCEMIA, WITH LONG-TERM CURRENT USE OF INSULIN (HCC): ICD-10-CM

## 2023-08-14 DIAGNOSIS — Z12.11 SCREENING FOR COLON CANCER: ICD-10-CM

## 2023-08-14 DIAGNOSIS — G62.9 AXONAL POLYNEUROPATHY: ICD-10-CM

## 2023-08-14 DIAGNOSIS — R80.9 MICROALBUMINURIA DUE TO TYPE 2 DIABETES MELLITUS: ICD-10-CM

## 2023-08-14 DIAGNOSIS — L29.9 GENERALIZED PRURITUS: ICD-10-CM

## 2023-08-14 DIAGNOSIS — E11.42 DIABETIC POLYNEUROPATHY ASSOCIATED WITH TYPE 2 DIABETES MELLITUS (HCC): ICD-10-CM

## 2023-08-14 DIAGNOSIS — Z12.4 CERVICAL CANCER SCREENING: ICD-10-CM

## 2023-08-14 DIAGNOSIS — I10 ESSENTIAL HYPERTENSION: ICD-10-CM

## 2023-08-14 DIAGNOSIS — Z79.4 UNCONTROLLED DIABETES MELLITUS WITH HYPERGLYCEMIA, WITH LONG-TERM CURRENT USE OF INSULIN (HCC): ICD-10-CM

## 2023-08-14 DIAGNOSIS — Z00.00 ADULT GENERAL MEDICAL EXAM: Primary | ICD-10-CM

## 2023-08-14 DIAGNOSIS — E78.2 MIXED HYPERLIPIDEMIA: ICD-10-CM

## 2023-08-14 DIAGNOSIS — E11.29 MICROALBUMINURIA DUE TO TYPE 2 DIABETES MELLITUS: ICD-10-CM

## 2023-08-14 LAB
ALBUMIN SERPL-MCNC: 3.8 G/DL (ref 3.4–5)
ALBUMIN/GLOB SERPL: 1.1 {RATIO} (ref 1–2)
ALP LIVER SERPL-CCNC: 69 U/L
ALT SERPL-CCNC: 27 U/L
ANION GAP SERPL CALC-SCNC: 6 MMOL/L (ref 0–18)
AST SERPL-CCNC: 19 U/L (ref 15–37)
BASOPHILS # BLD AUTO: 0.08 X10(3) UL (ref 0–0.2)
BASOPHILS NFR BLD AUTO: 0.9 %
BILIRUB SERPL-MCNC: 0.2 MG/DL (ref 0.1–2)
BUN BLD-MCNC: 37 MG/DL (ref 7–18)
CALCIUM BLD-MCNC: 9.8 MG/DL (ref 8.5–10.1)
CHLORIDE SERPL-SCNC: 103 MMOL/L (ref 98–112)
CHOLEST SERPL-MCNC: 118 MG/DL (ref ?–200)
CO2 SERPL-SCNC: 27 MMOL/L (ref 21–32)
CREAT BLD-MCNC: 2.07 MG/DL
CREAT UR-SCNC: 106 MG/DL
EGFRCR SERPLBLD CKD-EPI 2021: 27 ML/MIN/1.73M2 (ref 60–?)
EOSINOPHIL # BLD AUTO: 1.02 X10(3) UL (ref 0–0.7)
EOSINOPHIL NFR BLD AUTO: 10.8 %
ERYTHROCYTE [DISTWIDTH] IN BLOOD BY AUTOMATED COUNT: 12.9 %
FASTING PATIENT LIPID ANSWER: YES
FASTING STATUS PATIENT QL REPORTED: YES
GLOBULIN PLAS-MCNC: 3.5 G/DL (ref 2.8–4.4)
GLUCOSE BLD-MCNC: 163 MG/DL (ref 70–99)
HCT VFR BLD AUTO: 33.7 %
HDLC SERPL-MCNC: 41 MG/DL (ref 40–59)
HGB BLD-MCNC: 11.2 G/DL
IMM GRANULOCYTES # BLD AUTO: 0.18 X10(3) UL (ref 0–1)
IMM GRANULOCYTES NFR BLD: 1.9 %
LDLC SERPL CALC-MCNC: 57 MG/DL (ref ?–100)
LYMPHOCYTES # BLD AUTO: 2.77 X10(3) UL (ref 1–4)
LYMPHOCYTES NFR BLD AUTO: 29.4 %
MCH RBC QN AUTO: 29.6 PG (ref 26–34)
MCHC RBC AUTO-ENTMCNC: 33.2 G/DL (ref 31–37)
MCV RBC AUTO: 88.9 FL
MICROALBUMIN UR-MCNC: 14.5 MG/DL
MICROALBUMIN/CREAT 24H UR-RTO: 136.8 UG/MG (ref ?–30)
MONOCYTES # BLD AUTO: 0.67 X10(3) UL (ref 0.1–1)
MONOCYTES NFR BLD AUTO: 7.1 %
NEUTROPHILS # BLD AUTO: 4.69 X10 (3) UL (ref 1.5–7.7)
NEUTROPHILS # BLD AUTO: 4.69 X10(3) UL (ref 1.5–7.7)
NEUTROPHILS NFR BLD AUTO: 49.9 %
NONHDLC SERPL-MCNC: 77 MG/DL (ref ?–130)
OSMOLALITY SERPL CALC.SUM OF ELEC: 294 MOSM/KG (ref 275–295)
PLATELET # BLD AUTO: 269 10(3)UL (ref 150–450)
POTASSIUM SERPL-SCNC: 5.3 MMOL/L (ref 3.5–5.1)
PROT SERPL-MCNC: 7.3 G/DL (ref 6.4–8.2)
RBC # BLD AUTO: 3.79 X10(6)UL
SODIUM SERPL-SCNC: 136 MMOL/L (ref 136–145)
TRIGL SERPL-MCNC: 110 MG/DL (ref 30–149)
TSI SER-ACNC: 1.73 MIU/ML (ref 0.36–3.74)
VLDLC SERPL CALC-MCNC: 16 MG/DL (ref 0–30)
WBC # BLD AUTO: 9.4 X10(3) UL (ref 4–11)

## 2023-08-14 PROCEDURE — 83036 HEMOGLOBIN GLYCOSYLATED A1C: CPT

## 2023-08-14 PROCEDURE — 82570 ASSAY OF URINE CREATININE: CPT

## 2023-08-14 PROCEDURE — 82043 UR ALBUMIN QUANTITATIVE: CPT

## 2023-08-14 PROCEDURE — 85025 COMPLETE CBC W/AUTO DIFF WBC: CPT

## 2023-08-14 PROCEDURE — 80053 COMPREHEN METABOLIC PANEL: CPT

## 2023-08-14 PROCEDURE — 84443 ASSAY THYROID STIM HORMONE: CPT

## 2023-08-14 PROCEDURE — 3046F HEMOGLOBIN A1C LEVEL >9.0%: CPT | Performed by: FAMILY MEDICINE

## 2023-08-14 PROCEDURE — 80061 LIPID PANEL: CPT

## 2023-08-14 PROCEDURE — 36415 COLL VENOUS BLD VENIPUNCTURE: CPT

## 2023-08-14 RX ORDER — GABAPENTIN 300 MG/1
300 CAPSULE ORAL 2 TIMES DAILY
Qty: 180 CAPSULE | Refills: 3 | Status: SHIPPED | OUTPATIENT
Start: 2023-08-14

## 2023-08-16 DIAGNOSIS — E11.65 UNCONTROLLED TYPE 2 DIABETES MELLITUS WITH HYPERGLYCEMIA (HCC): ICD-10-CM

## 2023-08-16 LAB
EST. AVERAGE GLUCOSE BLD GHB EST-MCNC: 220 MG/DL (ref 68–126)
HBA1C MFR BLD: 9.3 % (ref ?–5.7)

## 2023-08-16 RX ORDER — INSULIN GLARGINE 100 [IU]/ML
INJECTION, SOLUTION SUBCUTANEOUS
Qty: 63 ML | Refills: 0 | Status: SHIPPED | OUTPATIENT
Start: 2023-08-16

## 2023-08-21 RX ORDER — MONTELUKAST SODIUM 10 MG/1
10 TABLET ORAL NIGHTLY
Qty: 90 TABLET | Refills: 3 | Status: SHIPPED | OUTPATIENT
Start: 2023-08-21

## 2023-08-21 RX ORDER — LANCETS 30 GAUGE
1 EACH MISCELLANEOUS 3 TIMES DAILY
Qty: 400 EACH | Refills: 5 | OUTPATIENT
Start: 2023-08-21

## 2023-08-21 RX ORDER — DULAGLUTIDE 4.5 MG/.5ML
0.5 INJECTION, SOLUTION SUBCUTANEOUS WEEKLY
Qty: 2 ML | Refills: 2 | Status: SHIPPED | OUTPATIENT
Start: 2023-08-21

## 2023-08-21 NOTE — TELEPHONE ENCOUNTER
Refill passed per [facility] [place] protocol.     Requested Prescriptions   Pending Prescriptions Disp Refills    MONTELUKAST 10 MG Oral Tab [Pharmacy Med Name: MONTELUKAST 10MG TABLETS] 90 tablet 0     Sig: TAKE 1 TABLET(10 MG) BY MOUTH EVERY NIGHT       Asthma & COPD Medication Protocol Passed - 8/19/2023 10:05 AM        Passed - In person appointment or virtual visit in the past 6 mos or appointment in next 3 mos     Recent Outpatient Visits              1 week ago Adult general medical exam    [address], [address], [place], [place]    Office Visit    1 month ago Uncontrolled type 2 diabetes mellitus with hyperglycemia ([facility] [address]    [employer], [address] [place] [address], Marty Langford MD    Office Visit    3 months ago Uncontrolled diabetes mellitus with hyperglycemia, with long-term current use of insulin [facility]    "[employer] [address], [address], [place], [place]    Office Visit    5 months ago Community acquired pneumonia, unspecified laterality    [facility], [address], [address], [place], [place]    Office Visit    5 months ago Uncontrolled type 2 diabetes mellitus with hyperglycemia ([facility] [address]    [employer], [address] [place] [address], Yossi Santos APRN    Office Visit          Future Appointments         Provider Department Appt Notes    In 1 week EC NATACHA MORALES RN [address], [place] 2nd [place] see comm \"policy informed\"    In 1 month Terri Hinkle MD [email or web address], [address] [place] [address] 3 mos - ok per [address] [facility] Does not apply Jenifer Med Name: ONE TOUCH DELICA PLUS 42U LANCETS] 400 each 5     Sig: USE THREE TIMES DAILY AS DIRECTED       Diabetic Supplies Protocol Passed - 8/19/2023 10:05 AM        Passed - In person appointment or virtual visit in the past 12 mos or appointment in next 3 mos Recent Outpatient Visits              1 week ago Adult general medical exam    [address], [address], [place], DO    Office Visit    1 month ago Uncontrolled type 2 diabetes mellitus with hyperglycemia [facility])    [address], [address] [facility] Marty Langford MD    Office Visit    3 months ago Uncontrolled diabetes mellitus with hyperglycemia, with long-term current use of insulin ([facility] [address] [facility]    [address], [address], [address], [place], DO    Office Visit    5 months ago Community acquired pneumonia, unspecified laterality    [address], [address], [place], [place]    Office Visit    5 months ago Uncontrolled type 2 diabetes mellitus with hyperglycemia [facility])    [address], [address] [facility], [place] Jarrett Narkvng, APRN    Office Visit          Future Appointments         Provider Department Appt Notes    In 1 week EC NATACHA MORALES RN [address] [facility], [place] 2nd [place] see comm \"policy informed\"    In 1 month Ethel Munoz MD [address], [address] [facility] 3 [facility]                  Future Appointments         Provider Department Appt Notes    In 1 week EC NATACHA MORALES RN [address] [facility], [place] [address] [place] see comm \"policy informed\"    In 1 month Ethel Munoz MD [address], [address] [facility] 3 [facility]          Recent Outpatient Visits              1 week ago Adult general medical exam    [address], [address], [address], [place], DO    Office Visit    1 month ago Uncontrolled type 2 diabetes mellitus with hyperglycemia [facility])    Lakeshia Smith MD    Office Visit    3 months ago Uncontrolled diabetes mellitus with hyperglycemia, with long-term current use of insulin ([facility] [address] [facility] Jing Maldonado, [place] Elly Olszewski, DO    Office Visit    5 months ago Community acquired pneumonia, unspecified laterality    [facility], [address], [address], [place], DO    Office Visit    5 months ago Uncontrolled type 2 diabetes mellitus with hyperglycemia [facility])    [address], [address] [facility], [place], [address] [place] [address], SYED Willett    Office Visit

## 2023-08-22 ENCOUNTER — TELEPHONE (OUTPATIENT)
Facility: CLINIC | Age: 58
End: 2023-08-22

## 2023-08-22 NOTE — TELEPHONE ENCOUNTER
Current Outpatient Medications   Medication Sig Dispense Refill     True Plus Pen Needles [id number]  Use to inject insulin four times daily as directed

## 2023-08-23 RX ORDER — PEN NEEDLE, DIABETIC 32GX 5/32"
NEEDLE, DISPOSABLE MISCELLANEOUS
Qty: 400 EACH | Refills: 1 | Status: SHIPPED | OUTPATIENT
Start: 2023-08-23

## 2023-10-16 ENCOUNTER — NURSE TRIAGE (OUTPATIENT)
Dept: FAMILY MEDICINE CLINIC | Facility: CLINIC | Age: 58
End: 2023-10-16

## 2023-10-16 NOTE — TELEPHONE ENCOUNTER
Patient states she is having symptoms of peptic ulcer, requesting possible medication scripts from Dr. Greg Rosado to treat.

## 2023-10-16 NOTE — TELEPHONE ENCOUNTER
Reason for Disposition   Patient wants to be seen    Protocols used: Abdominal Pain - Upper-A-[place]  Action Requested: Summary for Provider     []  Critical Lab, Recommendations Needed  [] Need Additional Advice  []   FYI    []   Need Orders  [] Need Medications Sent to Pharmacy  []  Other     SUMMARY: pt states that she was treated for +Hpylori 6months ago while in [address] [place] [address]. She has run out of all of her meds and now is symptomatic. Advised and scheduled appt.      Reason for call: Acute and Gastro-esophageal Reflux  Onset: Data Unavailable

## 2023-10-18 ENCOUNTER — TELEMEDICINE (OUTPATIENT)
Dept: FAMILY MEDICINE CLINIC | Facility: CLINIC | Age: 58
End: 2023-10-18
Payer: MEDICAID

## 2023-10-18 DIAGNOSIS — K21.9 GASTROESOPHAGEAL REFLUX DISEASE WITHOUT ESOPHAGITIS: Primary | ICD-10-CM

## 2023-10-18 RX ORDER — OMEPRAZOLE 40 MG/1
40 CAPSULE, DELAYED RELEASE ORAL DAILY
Qty: 90 CAPSULE | Refills: 1 | Status: SHIPPED | OUTPATIENT
Start: 2023-10-18

## 2023-10-18 NOTE — PROGRESS NOTES
HPI    Virtual Telephone/Video Check-In    Tosin Rivero verbally consents to a Virtual/Telephone Check-In visit on 10/18/23. Patient has been referred to the [facility] website at [email or web address] to review the yearly Consent to Treat document. Patient understands and accepts financial responsibility for any deductible, co-insurance and/or co-pays associated with this service. Duration of the service: 6 minutes      Summary of topics discussed:     Patient presents for concerns of reflux symptoms. Unable to sleep at night due to symptoms. Is not eating any greasy or spicy foods. Was treated for H Pylori last year. Review of Systems   Gastrointestinal:         Reflux        There were no vitals filed for this visit. There is no height or weight on file to calculate BMI. Colorectal Cancer Screening Never done  Pap Smear Never done  Diabetes Care Dilated Eye Exam due on 02/09/2018  Mammogram due on 06/12/2020  COVID-19 Vaccine(3 - 2023-24 season) due on 09/01/2023  DTaP,Tdap,and Td Vaccines(2 - Td or Tdap) due on 09/04/2023  Influenza Vaccine(1) due on 10/01/2023  Diabetes Care A1C due on 11/14/2023  Diabetes Care: GFR due on 08/14/2024  Diabetes Care: Microalb/Creat Ratio due on 08/14/2024  Annual Physical due on 08/14/2024  Annual Depression Screening Completed  Diabetes Care Foot Exam (Annual) Completed  Pneumococcal Vaccine: Birth to 64yrs Completed  Zoster Vaccines Completed    Patient's last menstrual period was 07/20/2013.     Past Medical History:   Diagnosis Date    Acute carpal tunnel syndrome of right wrist 04/11/2019    Right endoscopic carpal tunnel release    Carpal tunnel syndrome, left 05/23/2019    Chronic back pain     CKD (chronic kidney disease)     Diastolic dysfunction     Grade I    Essential hypertension     High blood pressure     High cholesterol     Hyperlipidemia     Lipid screening 04/02/2007    per NG    Lipoma     surgical excision age 24    Macular degeneration of left eye     Neuropathy     Obesity (BMI 30-39. 9)     Osteoarthritis     Renal disorder     Type II or unspecified type diabetes mellitus without mention of complication, not stated as uncontrolled     diabetes 24 yrs       .   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    Lipoma removal      Wrist arthroscop,release xvers lig Right 2019    Right endoscopic carpal tunnel release    Wrist arthroscop,release xvers lig Left 2019    Left endoscopic carpal tunnel release       Family History   Problem Relation Age of Onset    Heart Disorder Father     Stroke Father     Diabetes Mother        Social History    Socioeconomic History      Marital status:       Spouse name: Not on file      Number of children: Not on file      Years of education: Not on file      Highest education level: Not on file    Occupational History      Not on file    Tobacco Use      Smoking status: Former        Packs/day: 0.50        Years: 15.00        Additional pack years: 0.00        Total pack years: 7.50        Types: Cigarettes        Quit date: 2016        Years since quittin.7        Passive exposure: Past      Smokeless tobacco: Never    Vaping Use      Vaping Use: Never used    Substance and Sexual Activity      Alcohol use: No        Alcohol/week: 0.0 standard drinks of alcohol      Drug use: No      Sexual activity: Not on file    Other Topics      Concerns:        Grew up on a farm: Not Asked        History of tanning: Not Asked        Outdoor occupation: Not Asked        Breast feeding: No        Reaction to local anesthetic: No        Pt has a pacemaker: No        Pt has a defibrillator: No    Social History Narrative      Lives with       Homemaker    Social Determinants of Health  Financial Resource Strain: Not on file  Food Insecurity: Not on file  Transportation Needs: Not on file  Physical Activity: Not on file  Stress: Not on file  Social Connections: Not on file  Housing Stability: Not on file    Current Outpatient Medications   Medication Sig Dispense Refill    Omeprazole 40 MG Oral Capsule Delayed Release Take 1 capsule (40 mg total) by mouth daily. 90 capsule 1    atorvastatin 20 MG Oral Tab Take 1 tablet (20 mg total) by mouth nightly. 90 tablet 3    fluticasone propionate 50 MCG/ACT Nasal Suspension 2 sprays by Nasal route daily. 3 each 3    Insulin Pen Needle (BD PEN NEEDLE DAMIAN U/F) 32G X 4 MM Does not apply Misc Inject 4 times per day 400 each 1    montelukast 10 MG Oral Tab Take 1 tablet (10 mg total) by mouth nightly. 90 tablet 3    Dulaglutide (TRULICITY) 4.5 FI/7.4TS Subcutaneous Solution Pen-injector Inject 0.5 mL into the skin once a week. 2 mL 2    insulin glargine (BASAGLAR KWIKPEN) 100 UNIT/ML Subcutaneous Solution Pen-injector Inject 45 units twice daily 63 mL 0    gabapentin 300 MG Oral Cap Take 1 capsule (300 mg total) by mouth 2 (two) times daily. 180 capsule 3    FARXIGA 10 MG Oral Tab       INSULIN LISPRO, 1 UNIT DIAL, 100 UNIT/ML Subcutaneous Solution Pen-injector INJECT 10 UNITS INTO THE SKIN EVERY MORNING AND 10 UNITS BEFORE BEDTIME 9 mL 0    lisinopril 40 MG Oral Tab Take 1 tablet (40 mg total) by mouth daily. 90 tablet 2    ondansetron 4 MG Oral Tablet Dispersible       amLODIPine 10 MG Oral Tab TAKE 1 TABLET BY MOUTH EVERY DAY ALONG WITH 40 MG 90 tablet 3    clobetasol 0.05 % External Ointment Apply 1 Application. topically daily as needed. 60 g 2    tacrolimus (PROTOPIC) 0.1 % External Ointment Apply 1 Application. topically 2 (two) times daily. 60 g 3    clotrimazole 1 % External Cream Apply 1 Application. topically 2 (two) times daily. Apply to the groin area as needed 28 g 0    triamcinolone 0.1 % External Cream Apply topically 2 (two) times daily as needed. Apply to your hands and wrist and forearm as needed.  60 g 0    acetaminophen 500 MG Oral Tab Take 1 tablet (500 mg total) by mouth every 6 (six) hours as needed for Pain. aspirin 81 MG Oral Chew Tab CSW ONE T D  6       Allergies:    Latex                   HIVES  Penicillins             HIVES    Physical Exam  Constitutional:       General: She is not in acute distress. Appearance: Normal appearance. She is not ill-appearing. Pulmonary:      Effort: No respiratory distress. Neurological:      Mental Status: She is alert and oriented to person, place, and time. Psychiatric:         Mood and Affect: Mood normal.         Behavior: Behavior normal.          Assessment and Plan:  Problem List Items Addressed This Visit    None  Visit Diagnoses       Gastroesophageal reflux disease without esophagitis    -  Primary    Relevant Medications    Omeprazole 40 MG Oral Capsule Delayed Release           Will treat with omeprazole. If still symptomatic in the next 1-2 weeks, to follow up for repeat H Pylori test.  Understanding verbalized. Discussed plan of care with patient and patient is in agreement. All questions answered. Patient to call with questions or concerns. Encouraged to sign up for My Chart if not already registered.

## 2023-10-19 RX ORDER — INSULIN LISPRO 100 [IU]/ML
10 INJECTION, SOLUTION INTRAVENOUS; SUBCUTANEOUS 2 TIMES DAILY
Qty: 9 ML | Refills: 1 | Status: SHIPPED | OUTPATIENT
Start: 2023-10-19

## 2023-10-21 NOTE — LETTER
AUTHORIZATION FOR SURGICAL OPERATION OR OTHER PROCEDURE    1. I hereby authorize SARAH Voss, and [facility] staff assigned to my case to perform the following operation and/or procedure at the [facility]:      Vulvar Biopsy      2. My physician has explained the nature and purpose of the operation or other procedure, possible alternative methods of treatment, the risks involved, and the possibility of complication to me. I acknowledge that no guarantee has been made as to the result that may be obtained. 3.  I recognize that, during the course of this operation, or other procedure, unforseen conditions may necessitate additional or different procedure than those listed above. I, therefore, further authorize and request that the above named physician, his/her physician assistants or designees perform such procedures as are, in his/her professional opinion, necessary and desirable. 4.  Any tissue or organs removed in the operation or other procedure may be disposed of by and at the discretion of the [facility] and [facility]. 5.  I understand that in the event of a medical emergency, I will be transported by local paramedics to [facility] or other hospital emergency department. 6.  I certify that I have read and fully understand the above consent to operation and/or other procedure. 7.  I acknowledge that my physician has explained sedation/analgesia administration to me including the risks and benefits. I consent to the administration of sedation/analgesia as may be necessary or desirable in the judgement of my physician. Witness signature: ___________________________________________________ Date:  02/15/2023                  Time:  ________ A. M.  P.M.        Patient Name:  ______________________________________________________  (please print)      Patient signature:  ___________________________________________________             Relationship to Patient:           []  Parent    Responsible person                          []  Spouse  In case of minor or                    [] Other  _____________   Incompetent name:  __________________________________________________                               (please print)      _____________      Responsible person  In case of minor or  Incompetent signature:  _______________________________________________    Statement of Physician  My signature below affirms that prior to the time of the procedure, I have explained to the patient and/or his/her guardian, the risks and benefits involved in the proposed treatment and any reasonable alternative to the proposed treatment. I have also explained the risks and benefits involved in the refusal of the proposed treatment and have answered the patient's questions.                         Date: 02/15/2023  Provider                      Signature:  __________________________________________________________       Time:  ___________ KATIE RAMIREZ show

## 2023-11-16 ENCOUNTER — NURSE TRIAGE (OUTPATIENT)
Dept: FAMILY MEDICINE CLINIC | Facility: CLINIC | Age: 58
End: 2023-11-16

## 2023-11-16 ENCOUNTER — OFFICE VISIT (OUTPATIENT)
Dept: INTERNAL MEDICINE CLINIC | Facility: CLINIC | Age: 58
End: 2023-11-16

## 2023-11-16 VITALS
HEART RATE: 94 BPM | DIASTOLIC BLOOD PRESSURE: 74 MMHG | BODY MASS INDEX: 40.65 KG/M2 | WEIGHT: 244 LBS | SYSTOLIC BLOOD PRESSURE: 149 MMHG | HEIGHT: 65 IN

## 2023-11-16 DIAGNOSIS — R30.0 DYSURIA: Primary | ICD-10-CM

## 2023-11-16 DIAGNOSIS — Z23 FLU VACCINE NEED: ICD-10-CM

## 2023-11-16 PROCEDURE — 3008F BODY MASS INDEX DOCD: CPT | Performed by: STUDENT IN AN ORGANIZED HEALTH CARE EDUCATION/TRAINING PROGRAM

## 2023-11-16 PROCEDURE — 90471 IMMUNIZATION ADMIN: CPT | Performed by: STUDENT IN AN ORGANIZED HEALTH CARE EDUCATION/TRAINING PROGRAM

## 2023-11-16 PROCEDURE — 90686 IIV4 VACC NO PRSV 0.5 ML IM: CPT | Performed by: STUDENT IN AN ORGANIZED HEALTH CARE EDUCATION/TRAINING PROGRAM

## 2023-11-16 PROCEDURE — 99202 OFFICE O/P NEW SF 15 MIN: CPT | Performed by: STUDENT IN AN ORGANIZED HEALTH CARE EDUCATION/TRAINING PROGRAM

## 2023-11-16 PROCEDURE — 3078F DIAST BP <80 MM HG: CPT | Performed by: STUDENT IN AN ORGANIZED HEALTH CARE EDUCATION/TRAINING PROGRAM

## 2023-11-16 PROCEDURE — 3077F SYST BP >= 140 MM HG: CPT | Performed by: STUDENT IN AN ORGANIZED HEALTH CARE EDUCATION/TRAINING PROGRAM

## 2023-11-16 RX ORDER — CIPROFLOXACIN 500 MG/1
500 TABLET, FILM COATED ORAL 2 TIMES DAILY
Qty: 20 TABLET | Refills: 0 | Status: SHIPPED | OUTPATIENT
Start: 2023-11-16 | End: 2023-11-26

## 2023-11-16 NOTE — TELEPHONE ENCOUNTER
Please reply to pool: EM RN TRIAGE  Action Requested: Summary for Provider     []  Critical Lab, Recommendations Needed  [] Need Additional Advice  [x]   FYI    []   Need Orders  [] Need Medications Sent to Pharmacy  []  Other     SUMMARY: Patient contacts clinic reporting urinary frequency and burning with urination x 2 weeks. Pelvic pressure and flank pain present. Urine is dark and malodorous. Denies fever but does feel achy. Nausea and vomiting present due to GERD and PUD, unchanged. Denies chest pain, shortness of breath, dizziness or lightheadedness. She is pushing fluids. Acute visit booked today.      Reason for call: Acute  Onset: Data Unavailable                       Reason for Disposition   Side (flank) or lower back pain present    Protocols used: Urinary Symptoms-A-[place]

## 2023-12-18 DIAGNOSIS — L25.9 CONTACT DERMATITIS, UNSPECIFIED CONTACT DERMATITIS TYPE, UNSPECIFIED TRIGGER: ICD-10-CM

## 2023-12-18 DIAGNOSIS — E11.65 UNCONTROLLED TYPE 2 DIABETES MELLITUS WITH HYPERGLYCEMIA (HCC): ICD-10-CM

## 2023-12-19 RX ORDER — TRIAMCINOLONE ACETONIDE 1 MG/G
1 CREAM TOPICAL 2 TIMES DAILY PRN
Qty: 60 G | Refills: 0 | Status: SHIPPED | OUTPATIENT
Start: 2023-12-19

## 2023-12-19 RX ORDER — INSULIN GLARGINE 100 [IU]/ML
INJECTION, SOLUTION SUBCUTANEOUS
Qty: 63 ML | Refills: 0 | Status: SHIPPED | OUTPATIENT
Start: 2023-12-19

## 2023-12-19 RX ORDER — DULAGLUTIDE 4.5 MG/.5ML
0.5 INJECTION, SOLUTION SUBCUTANEOUS WEEKLY
Qty: 2 ML | Refills: 2 | Status: SHIPPED | OUTPATIENT
Start: 2023-12-19

## 2023-12-19 NOTE — TELEPHONE ENCOUNTER
No protocol for requested medication.   Please advise on refill request.      Requested Prescriptions     Pending Prescriptions Disp Refills    TRIAMCINOLONE 0.1 % External Cream [Pharmacy Med Name: TRIAMCINOLONE 0.1% CREAM  30GM] 60 g 0     Sig: APPLY TOPICALLY TO THE AFFECTED AREA TWICE DAILY AS NEEDED      Recent Visits  Date Type Provider Dept   08/14/23 Office Visit Janneth Rocha, DO [facility]   05/22/23 Office Visit Janneth Aj, DO [facility]   03/24/23 Office Visit Janneth Aj, DO [facility]   02/16/23 Office Visit Janneth Aj, DO [facility]   09/19/22 Office Visit Janneth Aj, DO [facility]   Showing recent visits within past 540 days with a meds authorizing provider and meeting all other requirements  Future Appointments  Date Type Provider Dept   12/22/23 Appointment Janneth Aj, DO [facility]   Showing future appointments within next 150 days with a meds authorizing provider and meeting all other requirements     Requested Prescriptions   Pending Prescriptions Disp Refills    TRIAMCINOLONE 0.1 % External Cream [Pharmacy Med Name: TRIAMCINOLONE 0.1% CREAM  30GM] 60 g 0     Sig: APPLY TOPICALLY TO THE AFFECTED AREA TWICE DAILY AS NEEDED       There is no refill protocol information for this order         Future Appointments         Provider Department Appt Notes    In 3 days Janneth Rocha, DO [facility] Cal[facility] f/u on meds    In 1 month Charolett Simmonds, MD [address] [facility], [facility], [place] 3 mos - (Policy Informed)           Recent Outpatient Visits              1 month ago [address] [facility], Ting Louise MD    Office Visit    2 months ago Gastroesophageal reflux disease without esophagitis    [facility], [address]    4 months ago Adult general medical exam    [address] [facility] Subhash Benítez, [place],     Office Visit    5 months ago Uncontrolled type 2 diabetes mellitus with hyperglycemia [facility])    Krystyna Hernandez, [address] [facility], [place]    Office Visit    7 months ago Uncontrolled diabetes mellitus with hyperglycemia, with long-term current use of insulin [facility])    Krystyna Hernandez [address], [address], [place], [place]    Office Visit

## 2023-12-22 ENCOUNTER — LAB ENCOUNTER (OUTPATIENT)
Dept: LAB | Age: 58
End: 2023-12-22
Attending: FAMILY MEDICINE
Payer: MEDICAID

## 2023-12-22 ENCOUNTER — OFFICE VISIT (OUTPATIENT)
Dept: FAMILY MEDICINE CLINIC | Facility: CLINIC | Age: 58
End: 2023-12-22

## 2023-12-22 VITALS
HEART RATE: 91 BPM | WEIGHT: 238.63 LBS | SYSTOLIC BLOOD PRESSURE: 111 MMHG | DIASTOLIC BLOOD PRESSURE: 63 MMHG | HEIGHT: 65 IN | TEMPERATURE: 97 F | BODY MASS INDEX: 39.76 KG/M2

## 2023-12-22 DIAGNOSIS — E11.65 UNCONTROLLED DIABETES MELLITUS WITH HYPERGLYCEMIA, WITH LONG-TERM CURRENT USE OF INSULIN (HCC): ICD-10-CM

## 2023-12-22 DIAGNOSIS — Z12.11 SCREENING FOR COLON CANCER: ICD-10-CM

## 2023-12-22 DIAGNOSIS — L30.9 ECZEMA, UNSPECIFIED TYPE: ICD-10-CM

## 2023-12-22 DIAGNOSIS — R80.9 MICROALBUMINURIA DUE TO TYPE 2 DIABETES MELLITUS  (HCC): ICD-10-CM

## 2023-12-22 DIAGNOSIS — Z23 NEED FOR VACCINATION: ICD-10-CM

## 2023-12-22 DIAGNOSIS — L20.9 ATOPIC DERMATITIS, UNSPECIFIED TYPE: ICD-10-CM

## 2023-12-22 DIAGNOSIS — Z79.4 UNCONTROLLED DIABETES MELLITUS WITH HYPERGLYCEMIA, WITH LONG-TERM CURRENT USE OF INSULIN (HCC): ICD-10-CM

## 2023-12-22 DIAGNOSIS — E11.29 MICROALBUMINURIA DUE TO TYPE 2 DIABETES MELLITUS  (HCC): ICD-10-CM

## 2023-12-22 DIAGNOSIS — E11.65 UNCONTROLLED DIABETES MELLITUS WITH HYPERGLYCEMIA, WITH LONG-TERM CURRENT USE OF INSULIN (HCC): Primary | ICD-10-CM

## 2023-12-22 DIAGNOSIS — Z79.4 UNCONTROLLED DIABETES MELLITUS WITH HYPERGLYCEMIA, WITH LONG-TERM CURRENT USE OF INSULIN (HCC): Primary | ICD-10-CM

## 2023-12-22 LAB
ANION GAP SERPL CALC-SCNC: 9 MMOL/L (ref 0–18)
BUN BLD-MCNC: 28 MG/DL (ref 9–23)
BUN/CREAT SERPL: 21.5 (ref 10–20)
CALCIUM BLD-MCNC: 9.5 MG/DL (ref 8.7–10.4)
CHLORIDE SERPL-SCNC: 101 MMOL/L (ref 98–112)
CO2 SERPL-SCNC: 27 MMOL/L (ref 21–32)
CREAT BLD-MCNC: 1.3 MG/DL
EGFRCR SERPLBLD CKD-EPI 2021: 47 ML/MIN/1.73M2 (ref 60–?)
EST. AVERAGE GLUCOSE BLD GHB EST-MCNC: 246 MG/DL (ref 68–126)
FASTING STATUS PATIENT QL REPORTED: YES
GLUCOSE BLD-MCNC: 206 MG/DL (ref 70–99)
HBA1C MFR BLD: 10.2 % (ref ?–5.7)
OSMOLALITY SERPL CALC.SUM OF ELEC: 295 MOSM/KG (ref 275–295)
POTASSIUM SERPL-SCNC: 5.3 MMOL/L (ref 3.5–5.1)
SODIUM SERPL-SCNC: 137 MMOL/L (ref 136–145)

## 2023-12-22 PROCEDURE — 99214 OFFICE O/P EST MOD 30 MIN: CPT | Performed by: FAMILY MEDICINE

## 2023-12-22 PROCEDURE — 90715 TDAP VACCINE 7 YRS/> IM: CPT | Performed by: FAMILY MEDICINE

## 2023-12-22 PROCEDURE — 90471 IMMUNIZATION ADMIN: CPT | Performed by: FAMILY MEDICINE

## 2023-12-22 PROCEDURE — 83036 HEMOGLOBIN GLYCOSYLATED A1C: CPT

## 2023-12-22 PROCEDURE — 3078F DIAST BP <80 MM HG: CPT | Performed by: FAMILY MEDICINE

## 2023-12-22 PROCEDURE — 36415 COLL VENOUS BLD VENIPUNCTURE: CPT

## 2023-12-22 PROCEDURE — 3008F BODY MASS INDEX DOCD: CPT | Performed by: FAMILY MEDICINE

## 2023-12-22 PROCEDURE — 3074F SYST BP LT 130 MM HG: CPT | Performed by: FAMILY MEDICINE

## 2023-12-22 PROCEDURE — 80048 BASIC METABOLIC PNL TOTAL CA: CPT

## 2023-12-22 RX ORDER — METFORMIN HYDROCHLORIDE 500 MG/1
TABLET, EXTENDED RELEASE ORAL
COMMUNITY
Start: 2023-11-15

## 2023-12-23 DIAGNOSIS — R79.89 ELEVATED SERUM CREATININE: ICD-10-CM

## 2023-12-23 DIAGNOSIS — E11.65 UNCONTROLLED DIABETES MELLITUS WITH HYPERGLYCEMIA, WITH LONG-TERM CURRENT USE OF INSULIN (HCC): Primary | ICD-10-CM

## 2023-12-23 DIAGNOSIS — E87.5 HYPERKALEMIA: ICD-10-CM

## 2023-12-23 DIAGNOSIS — Z79.4 UNCONTROLLED DIABETES MELLITUS WITH HYPERGLYCEMIA, WITH LONG-TERM CURRENT USE OF INSULIN (HCC): Primary | ICD-10-CM

## 2024-01-04 ENCOUNTER — OFFICE VISIT (OUTPATIENT)
Dept: DERMATOLOGY CLINIC | Facility: CLINIC | Age: 59
End: 2024-01-04

## 2024-01-04 DIAGNOSIS — L30.9 DERMATITIS: Primary | ICD-10-CM

## 2024-01-04 PROCEDURE — 99213 OFFICE O/P EST LOW 20 MIN: CPT | Performed by: PHYSICIAN ASSISTANT

## 2024-01-04 NOTE — PROGRESS NOTES
HPI:    Patient ID: Zenia David is a 58 year old female.    Patient presents for follow-up on eczema. States she was using clobetasol and protopic but doesn't think they are working. States it would burn when applying topicals. Patient has had a bad flare up in the groin area since November. No draining or tenderness noted. Hx of allergies to medications noted. She is staying up at night due to the itching. Feels the creams are not working. Has noticed more lesion on her legs as well.         Review of Systems   Constitutional:  Negative for chills and fever.   Musculoskeletal:  Negative for arthralgias and myalgias.   Skin:  Positive for rash. Negative for color change and wound.            Current Outpatient Medications   Medication Sig Dispense Refill    Dulaglutide (TRULICITY) 4.5 MG/0.5ML Subcutaneous Solution Pen-injector Inject 0.5 mL into the skin once a week. 2 mL 2    insulin glargine (BASAGLAR KWIKPEN) 100 UNIT/ML Subcutaneous Solution Pen-injector ADMINISTER 35 UNITS UNDER THE SKIN TWICE DAILY 63 mL 0    Insulin Lispro, 1 Unit Dial, 100 UNIT/ML Subcutaneous Solution Pen-injector Inject 10 Units into the skin in the morning and 10 Units before bedtime. 9 mL 1    atorvastatin 20 MG Oral Tab Take 1 tablet (20 mg total) by mouth nightly. 90 tablet 3    Insulin Pen Needle (BD PEN NEEDLE DAMIAN U/F) 32G X 4 MM Does not apply Misc Inject 4 times per day 400 each 1    lisinopril 40 MG Oral Tab Take 1 tablet (40 mg total) by mouth daily. 90 tablet 2    amLODIPine 10 MG Oral Tab TAKE 1 TABLET BY MOUTH EVERY DAY ALONG WITH 40 MG 90 tablet 3    aspirin 81 MG Oral Chew Tab CSW ONE T D  6    metFORMIN  MG Oral Tablet 24 Hr  (Patient not taking: Reported on 12/22/2023)      triamcinolone 0.1 % External Cream Apply 1 Application topically 2 (two) times daily as needed. (Patient not taking: Reported on 12/22/2023) 60 g 0    Omeprazole 40 MG Oral Capsule Delayed Release Take 1 capsule (40 mg total) by mouth daily.  (Patient not taking: Reported on 12/22/2023) 90 capsule 1    fluticasone propionate 50 MCG/ACT Nasal Suspension 2 sprays by Nasal route daily. (Patient not taking: Reported on 12/22/2023) 3 each 3    montelukast 10 MG Oral Tab Take 1 tablet (10 mg total) by mouth nightly. (Patient not taking: Reported on 12/22/2023) 90 tablet 3    gabapentin 300 MG Oral Cap Take 1 capsule (300 mg total) by mouth 2 (two) times daily. (Patient not taking: Reported on 12/22/2023) 180 capsule 3    FARXIGA 10 MG Oral Tab  (Patient not taking: Reported on 11/16/2023)      ondansetron 4 MG Oral Tablet Dispersible  (Patient not taking: Reported on 12/22/2023)      clobetasol 0.05 % External Ointment Apply 1 Application. topically daily as needed. (Patient not taking: Reported on 12/22/2023) 60 g 2    tacrolimus (PROTOPIC) 0.1 % External Ointment Apply 1 Application. topically 2 (two) times daily. (Patient not taking: Reported on 12/22/2023) 60 g 3    clotrimazole 1 % External Cream Apply 1 Application. topically 2 (two) times daily. Apply to the groin area as needed (Patient not taking: Reported on 12/22/2023) 28 g 0    acetaminophen 500 MG Oral Tab Take 1 tablet (500 mg total) by mouth every 6 (six) hours as needed for Pain. (Patient not taking: Reported on 12/22/2023)       Allergies:  Allergies   Allergen Reactions    Latex HIVES    Penicillins HIVES      LMP 07/20/2013   There is no height or weight on file to calculate BMI.  PHYSICAL EXAM:   Physical Exam  Constitutional:       General: She is not in acute distress.     Appearance: Normal appearance.   Skin:     General: Skin is warm and dry.      Findings: Rash present.      Comments: Eczematous areas noted on the groin, and lower legs. No draining or tenderness noted. Scaling noted with erythema.    Neurological:      Mental Status: She is alert and oriented to person, place, and time.                          ASSESSMENT/PLAN:   1. Dermatitis  -After discussion with patient, advised  the following:  -Will consult with dermatologist to determine next steps.   -Continue topicals for now.   -may need to consider oral prednisone vs biologic vs other topicals.   -To call or follow-up with worsening symptoms or concerns.   -Pt was agreeable to plan and will comply with discussion above.     No orders of the defined types were placed in this encounter.      Meds This Visit:  Requested Prescriptions      No prescriptions requested or ordered in this encounter       Imaging & Referrals:  None         ID#[id number]

## 2024-01-05 ENCOUNTER — TELEPHONE (OUTPATIENT)
Dept: DERMATOLOGY CLINIC | Facility: CLINIC | Age: 59
End: 2024-01-05

## 2024-01-05 NOTE — TELEPHONE ENCOUNTER
----- Message from Miranda Mcgrath MD sent at 1/4/2024  6:14 PM CST -----  Mitchell Echeverria,  I would suggest antihistamines at night and increase to bid - I only saw the montelukast- and patch testing- the original path would support a diagnosis of allergic contact dermatitis.  No blisters, but could check bp180/230 Ag's.  I would think ifnot better wendy no allergic component that Dupixent may be the better option.  K    ----- Message -----  From: Juwan Jaffe PA-C  Sent: 1/4/2024   1:18 PM CST  To: MD Dr. Alcides John,     I have been seeing this patient for dermatitis in the groin which I think can be classified as eczema. She was been given clobetasol and protopic and has extensive itching at night. Hard to sleep. Has spread to her legs as well. I'm not sure if we can try opzeulra on her since its mostly the groin area. Oral prednisone vs biologic? No draining or tenderness noted. Pictures in media.     Would greatly appreciate your help.     Thanks,   Juwan

## 2024-01-05 NOTE — TELEPHONE ENCOUNTER
Please see below the recommendations from [facility]. She can take xyzal daily at night for itching and then increase this to 2 times per day. Continue with clobetasol and protopic. She wants the patient to complete patch testing as well. Please have her schedule this. After patch testing when we can consider dupixent as an option.

## 2024-01-08 RX ORDER — INSULIN LISPRO 100 [IU]/ML
10 INJECTION, SOLUTION INTRAVENOUS; SUBCUTANEOUS 3 TIMES DAILY
Qty: 15 ML | Refills: 1 | Status: SHIPPED | OUTPATIENT
Start: 2024-01-08

## 2024-01-08 NOTE — TELEPHONE ENCOUNTER
LOV: 6/23/23     Next office visit: 2/7/24     Last filled: 10/19/24     Order pended and routed to provider

## 2024-01-15 ENCOUNTER — NURSE ONLY (OUTPATIENT)
Dept: DERMATOLOGY CLINIC | Facility: CLINIC | Age: 59
End: 2024-01-15

## 2024-01-17 ENCOUNTER — NURSE ONLY (OUTPATIENT)
Dept: DERMATOLOGY CLINIC | Facility: CLINIC | Age: 59
End: 2024-01-17

## 2024-01-19 ENCOUNTER — OFFICE VISIT (OUTPATIENT)
Dept: DERMATOLOGY CLINIC | Facility: CLINIC | Age: 59
End: 2024-01-19

## 2024-01-19 DIAGNOSIS — L20.84 INTRINSIC ECZEMA: Primary | ICD-10-CM

## 2024-01-19 PROCEDURE — 99213 OFFICE O/P EST LOW 20 MIN: CPT | Performed by: PHYSICIAN ASSISTANT

## 2024-01-19 NOTE — PROGRESS NOTES
HPI:    Patient ID: Zenia David is a 58 year old female.    Patient presents for follow-up on patch testing. She is only reacting to blue dye. No draining or tenderness noted. Has noted there is improvement with the clobetasol and protopic. Not as much itching. She will flare once monthly with the eczema. No new products noted. Hard to sleep at times. Hx of allergies to medications noted. Mostly located to vulvar area.         Review of Systems   Constitutional:  Negative for chills and fever.   Musculoskeletal:  Negative for arthralgias and myalgias.   Skin:  Positive for rash. Negative for color change and wound.            Current Outpatient Medications   Medication Sig Dispense Refill    Insulin Lispro, 1 Unit Dial, 100 UNIT/ML Subcutaneous Solution Pen-injector Inject 10 Units into the skin in the morning, at noon, and at bedtime. 15 mL 1    metFORMIN  MG Oral Tablet 24 Hr       Dulaglutide (TRULICITY) 4.5 MG/0.5ML Subcutaneous Solution Pen-injector Inject 0.5 mL into the skin once a week. 2 mL 2    triamcinolone 0.1 % External Cream Apply 1 Application topically 2 (two) times daily as needed. 60 g 0    insulin glargine (BASAGLAR KWIKPEN) 100 UNIT/ML Subcutaneous Solution Pen-injector ADMINISTER 35 UNITS UNDER THE SKIN TWICE DAILY 63 mL 0    Omeprazole 40 MG Oral Capsule Delayed Release Take 1 capsule (40 mg total) by mouth daily. 90 capsule 1    atorvastatin 20 MG Oral Tab Take 1 tablet (20 mg total) by mouth nightly. 90 tablet 3    fluticasone propionate 50 MCG/ACT Nasal Suspension 2 sprays by Nasal route daily. 3 each 3    Insulin Pen Needle (BD PEN NEEDLE DAMIAN U/F) 32G X 4 MM Does not apply Misc Inject 4 times per day 400 each 1    montelukast 10 MG Oral Tab Take 1 tablet (10 mg total) by mouth nightly. 90 tablet 3    gabapentin 300 MG Oral Cap Take 1 capsule (300 mg total) by mouth 2 (two) times daily. 180 capsule 3    FARXIGA 10 MG Oral Tab       lisinopril 40 MG Oral Tab Take 1 tablet (40 mg  total) by mouth daily. 90 tablet 2    ondansetron 4 MG Oral Tablet Dispersible       amLODIPine 10 MG Oral Tab TAKE 1 TABLET BY MOUTH EVERY DAY ALONG WITH 40 MG 90 tablet 3    clobetasol 0.05 % External Ointment Apply 1 Application. topically daily as needed. 60 g 2    tacrolimus (PROTOPIC) 0.1 % External Ointment Apply 1 Application. topically 2 (two) times daily. 60 g 3    clotrimazole 1 % External Cream Apply 1 Application. topically 2 (two) times daily. Apply to the groin area as needed 28 g 0    acetaminophen 500 MG Oral Tab Take 1 tablet (500 mg total) by mouth every 6 (six) hours as needed for Pain.      aspirin 81 MG Oral Chew Tab CSW ONE T D  6     Allergies:  Allergies   Allergen Reactions    Latex HIVES    Penicillins HIVES      LMP 07/20/2013   There is no height or weight on file to calculate BMI.  PHYSICAL EXAM:   Physical Exam  Constitutional:       General: She is not in acute distress.     Appearance: Normal appearance.   Skin:     General: Skin is warm and dry.      Findings: Rash present.      Comments: Eczematous areas noted on the groin area, but improved. No draining or tenderness noted. Slight erythema and scaling noted.    Neurological:      Mental Status: She is alert and oriented to person, place, and time.                ASSESSMENT/PLAN:   1. Intrinsic eczema  -After discussion with patient, advised the following:  -Only reaction to patch testing was blue dye   -Continue with clobetasol 2 times per day for another 2 weeks  -Then go down to once daily   -Continue with protopic daily 2 times per day   -Can take antihistamiens for itching  -Started paperwork for dupixent  -TO call or follow-up with worsening symptoms or concerns.   -Pt was agreeable to plan and will comply with discussion above.         No orders of the defined types were placed in this encounter.      Meds This Visit:  Requested Prescriptions      No prescriptions requested or ordered in this encounter       Imaging &  Referrals:  None         ID#[id number]

## 2024-01-22 ENCOUNTER — TELEPHONE (OUTPATIENT)
Dept: DERMATOLOGY CLINIC | Facility: CLINIC | Age: 59
End: 2024-01-22

## 2024-01-22 NOTE — TELEPHONE ENCOUNTER
Pt to start Dupixent.  Forms completed and faxed to both [facility] and [facility].  Awaiting determination.

## 2024-02-07 ENCOUNTER — TELEPHONE (OUTPATIENT)
Dept: ENDOCRINOLOGY CLINIC | Facility: CLINIC | Age: 59
End: 2024-02-07

## 2024-02-07 ENCOUNTER — OFFICE VISIT (OUTPATIENT)
Dept: ENDOCRINOLOGY CLINIC | Facility: CLINIC | Age: 59
End: 2024-02-07

## 2024-02-07 VITALS
WEIGHT: 240 LBS | DIASTOLIC BLOOD PRESSURE: 80 MMHG | HEART RATE: 98 BPM | BODY MASS INDEX: 40 KG/M2 | SYSTOLIC BLOOD PRESSURE: 151 MMHG

## 2024-02-07 DIAGNOSIS — E11.65 UNCONTROLLED TYPE 2 DIABETES MELLITUS WITH HYPERGLYCEMIA (HCC): Primary | ICD-10-CM

## 2024-02-07 LAB
CARTRIDGE LOT#: ABNORMAL NUMERIC
HEMOGLOBIN A1C: 10.3 % (ref 4.3–5.6)

## 2024-02-07 PROCEDURE — 83036 HEMOGLOBIN GLYCOSYLATED A1C: CPT | Performed by: INTERNAL MEDICINE

## 2024-02-07 PROCEDURE — 99214 OFFICE O/P EST MOD 30 MIN: CPT | Performed by: INTERNAL MEDICINE

## 2024-02-07 RX ORDER — BLOOD SUGAR DIAGNOSTIC
STRIP MISCELLANEOUS
Qty: 300 STRIP | Refills: 1 | Status: SHIPPED | OUTPATIENT
Start: 2024-02-07

## 2024-02-07 RX ORDER — BLOOD-GLUCOSE METER
1 EACH MISCELLANEOUS DAILY
Qty: 1 KIT | Refills: 0 | Status: SHIPPED | OUTPATIENT
Start: 2024-02-07

## 2024-02-07 RX ORDER — LANCETS 33 GAUGE
1 EACH MISCELLANEOUS 3 TIMES DAILY
Qty: 300 EACH | Refills: 1 | Status: SHIPPED | OUTPATIENT
Start: 2024-02-07

## 2024-02-07 RX ORDER — ACYCLOVIR 400 MG/1
1 TABLET ORAL
Qty: 3 EACH | Refills: 11 | Status: SHIPPED | OUTPATIENT
Start: 2024-02-07

## 2024-02-07 NOTE — PROGRESS NOTES
Name: Zenia David  Date: 2/7/2024    HISTORY OF PRESENT ILLNESS   Zenia David is a 58 year old female who presents for hospital follow up for diabetes mellitus diagnosed 23 years ago, transitioned to insulin therapy 15 years ago.      Prior HbA, C or glycohemoglobin were 9.9% 5/2017; 10.3% 8/2017; 9.5% 2/2018; 12.7% 1/2019; 8.9% 3/2019; 7.1% 7/2019; 8.1% 10/2019; 7.0% 1/2020; 7.8% 5/2020; 10.3% 1/2022; 8.9% 4/2022; 11.7% 10/2022; 11.9% 2/2023; 9.3% 6/2023; 10.3% POC Today     Dietary compliance: Poor; significant snacking at night and cheating on pasta/rice, chips, no soda     Exercise: No -->is walking with cane but limited with hip pain and neuropathy    Polyuria/polydipsia: No  Blurred vision: No    Episodes of hypoglycemia: No   Blood Glucose:  Checking 2 times per day      Current Meds:   Basaglar 48 units subcutaneous BID   Humalog 18 units subcutaneous BID with meals   Trulicity 4.5mg subcutaneous weekly     Metformin d/c'd due to CKD  Farxiga d/c'd due to recurrent yeast infections     REVIEW OF SYSTEMS  Eyes: Diabetic retinopathy present: Yes, diabetic cataract            Most recent visit to eye doctor in last 12 months: Yes    CV: Cardiovascular disease present: No         Hypertension present: Yes         Hyperlipidemia present: Yes         Peripheral Vascular Disease present: No    : Nephropathy present: No    Neuro: Neuropathy present: Yes    Skin: Infection or ulceration: No    Osteoporosis: No    Thyroid disease: No      Medications:     Current Outpatient Medications:     Insulin Lispro, 1 Unit Dial, 100 UNIT/ML Subcutaneous Solution Pen-injector, Inject 10 Units into the skin in the morning, at noon, and at bedtime., Disp: 15 mL, Rfl: 1    Dulaglutide (TRULICITY) 4.5 MG/0.5ML Subcutaneous Solution Pen-injector, Inject 0.5 mL into the skin once a week., Disp: 2 mL, Rfl: 2    triamcinolone 0.1 % External Cream, Apply 1 Application topically 2 (two) times daily as needed., Disp: 60 g, Rfl:  0    insulin glargine (BASAGLAR KWIKPEN) 100 UNIT/ML Subcutaneous Solution Pen-injector, ADMINISTER 35 UNITS UNDER THE SKIN TWICE DAILY, Disp: 63 mL, Rfl: 0    Omeprazole 40 MG Oral Capsule Delayed Release, Take 1 capsule (40 mg total) by mouth daily., Disp: 90 capsule, Rfl: 1    atorvastatin 20 MG Oral Tab, Take 1 tablet (20 mg total) by mouth nightly., Disp: 90 tablet, Rfl: 3    fluticasone propionate 50 MCG/ACT Nasal Suspension, 2 sprays by Nasal route daily., Disp: 3 each, Rfl: 3    Insulin Pen Needle (BD PEN NEEDLE DAMIAN U/F) 32G X 4 MM Does not apply Misc, Inject 4 times per day, Disp: 400 each, Rfl: 1    montelukast 10 MG Oral Tab, Take 1 tablet (10 mg total) by mouth nightly., Disp: 90 tablet, Rfl: 3    gabapentin 300 MG Oral Cap, Take 1 capsule (300 mg total) by mouth 2 (two) times daily., Disp: 180 capsule, Rfl: 3    lisinopril 40 MG Oral Tab, Take 1 tablet (40 mg total) by mouth daily., Disp: 90 tablet, Rfl: 2    ondansetron 4 MG Oral Tablet Dispersible, , Disp: , Rfl:     amLODIPine 10 MG Oral Tab, TAKE 1 TABLET BY MOUTH EVERY DAY ALONG WITH 40 MG, Disp: 90 tablet, Rfl: 3    clobetasol 0.05 % External Ointment, Apply 1 Application. topically daily as needed., Disp: 60 g, Rfl: 2    tacrolimus (PROTOPIC) 0.1 % External Ointment, Apply 1 Application. topically 2 (two) times daily., Disp: 60 g, Rfl: 3    clotrimazole 1 % External Cream, Apply 1 Application. topically 2 (two) times daily. Apply to the groin area as needed, Disp: 28 g, Rfl: 0    acetaminophen 500 MG Oral Tab, Take 1 tablet (500 mg total) by mouth every 6 (six) hours as needed for Pain., Disp: , Rfl:     aspirin 81 MG Oral Chew Tab, CSW ONE T D, Disp: , Rfl: 6    metFORMIN  MG Oral Tablet 24 Hr, , Disp: , Rfl:     FARXIGA 10 MG Oral Tab, , Disp: , Rfl:      Allergies:   Allergies   Allergen Reactions    Latex HIVES    Penicillins HIVES       Social History:   Social History     Socioeconomic History    Marital status:    Tobacco  Use    Smoking status: Former     Packs/day: 0.50     Years: 15.00     Additional pack years: 0.00     Total pack years: 7.50     Types: Cigarettes     Quit date: 2016     Years since quittin.0     Passive exposure: Past    Smokeless tobacco: Never   Vaping Use    Vaping Use: Never used   Substance and Sexual Activity    Alcohol use: No     Alcohol/week: 0.0 standard drinks of alcohol    Drug use: No   Other Topics Concern    Breast feeding No    Reaction to local anesthetic No    Pt has a pacemaker No    Pt has a defibrillator No       Medical History:   Past Medical History:   Diagnosis Date    Acute carpal tunnel syndrome of right wrist 2019    Right endoscopic carpal tunnel release    Carpal tunnel syndrome, left 2019    Chronic back pain     CKD (chronic kidney disease)     Diastolic dysfunction     Grade I    Essential hypertension     High blood pressure     High cholesterol     Hyperlipidemia     Lipid screening 2007    per NG    Lipoma     surgical excision age 17    Macular degeneration of left eye     Neuropathy     Obesity (BMI 30-39.9)     Osteoarthritis     Renal disorder     Type II or unspecified type diabetes mellitus without mention of complication, not stated as uncontrolled     diabetes 24 yrs    Uncontrolled diabetes mellitus with hyperglycemia, with long-term current use of insulin ([facility]) 2017       Surgical history:   Past Surgical History:   Procedure Laterality Date    EYE SURGERY      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   Back: no kyphosis  Respiratory:  non-labored. no increased work of breathing.    Lymph Nodes:  No abnormal nodes noted  Skin:  normal moisture and skin texture  Hematologic:  no excessive bruising  Psychiatric:  oriented to time, self, and place    ASSESSMENT/PLAN:      1. Diabetes Mellitus Type 2, Uncontrolled  -Uncontrolled, HgA1c 9.3% -->improved but not at goal   -Discussed importance of glycemic control to prevent complications of diabetes  -Discussed complications of diabetes include retinopathy, neuropathy, nephropathy and cardiovascular disease  -Discussed importance of SBGM  -Discussed importance of low CHO diet, recommend 45gm per meal or 135gm per day  -Continue Basaglar 48 units subcutaneous QAM; 48 units subcutaneous at bedtime   -Continue Humalog 18 units subcutaneous 2 times per day   -Continue Trulicity 4.5mg subcutaneous weekly, reviewed side effects and risks vs benefits of medication.   -She did not tolerate SGLT-2 or Metformin therapy   -STart Dexcom CGM G7 during OV  -Discussed possible insulin pump - will review with Lisa next week   -LDL improved   -Abnormal foot exam performed 2/2023  -elevated urine microalbumin 8/2023   -Reviewed importance of SBGM- check sugars three times daily. Call clinic before next appt if sugars remain persistently >200 or <80. Verbalized understanding and instructions provided in writing  -UTD with optho     A total of 30 minutes was spent on obtaining history, reviewing pertinent labs, evaluating patient, providing multiple treatment options, reinforcing diet/exercise and compliance, and completing documentation.     RTC 4 months; 1 week with ITALO Schmitz MD   2/7/2024

## 2024-02-07 NOTE — TELEPHONE ENCOUNTER
Continuous Blood Gluc Sensor (DEXCOM G7 SENSOR) Does not apply Misc, 1 each Every 10 days. Use as directed every 10 days, Disp: 3 each, Rfl: 11    KEY:[id number]

## 2024-02-08 NOTE — TELEPHONE ENCOUNTER
Medication PA Requested:    DEXCOM G7 SENSOR                                                       CoverMyMeds Used:  Key: [id number]   Quantity: 9  Day Supply: 90  Si each Every 10 days   DX Code:        E11.65

## 2024-02-08 NOTE — TELEPHONE ENCOUNTER
Medication PA Requested:    DEXCOM G7 SENSOR                                                       CoverMyMeds Used: No  Key:   Quantity: 9  Day Supply: 90  Si each Every 10 days   DX Code:        E11.65         EPA submitted, LOV 24 and 24  Awaiting determination

## 2024-02-12 ENCOUNTER — TELEPHONE (OUTPATIENT)
Dept: FAMILY MEDICINE CLINIC | Facility: CLINIC | Age: 59
End: 2024-02-12

## 2024-02-12 DIAGNOSIS — E11.65 UNCONTROLLED TYPE 2 DIABETES MELLITUS WITH HYPERGLYCEMIA (HCC): ICD-10-CM

## 2024-02-12 DIAGNOSIS — I10 ESSENTIAL HYPERTENSION: ICD-10-CM

## 2024-02-12 RX ORDER — METFORMIN HYDROCHLORIDE 500 MG/1
500 TABLET, EXTENDED RELEASE ORAL
Qty: 90 TABLET | Refills: 1 | Status: SHIPPED | OUTPATIENT
Start: 2024-02-12

## 2024-02-13 NOTE — TELEPHONE ENCOUNTER
Please review; protocol failed/No Protocol  Requested Prescriptions   Pending Prescriptions Disp Refills    LISINOPRIL 40 MG Oral Tab [Pharmacy Med Name: LISINOPRIL 40MG TABLETS] 90 tablet 2     Sig: TAKE 1 TABLET(40 MG) BY MOUTH DAILY       Hypertension Medications Protocol Failed - 2/12/2024  5:57 AM        Failed - Last BP reading less than 140/90     BP Readings from Last 1 Encounters:   02/07/24 151/80               Failed - EGFRCR or GFRNAA > 50     GFR Evaluation  EGFRCR: 47 , resulted on 12/22/2023          Passed - CMP or BMP in past 12 months        Passed - In person appointment or virtual visit in the past 12 mos or appointment in next 3 mos     Recent Outpatient Visits              6 days ago Uncontrolled type 2 diabetes mellitus with hyperglycemia (HCC)    [facility] Mary Schmitz MD    Office Visit    3 weeks ago [facility]    Office Visit    3 weeks ago     [facility] [place]    Nurse Only    4 weeks ago     [facility] [place]    Nurse Only    1 month ago Dermatitis    [facility]    [facility] Visit          Future Appointments         Provider Department Appt Notes    In 6 days [facility] ** Dr MONROE: Dexcom G7  Download    In 3 weeks Kathryn Mcfarland APRN [facility] colon ca screening - refused TCS    In 4 months Mary Schmitz MD [facility] 4 mo f/u                  Future Appointments         Provider Department Appt Notes    In 6 days [facility] ** Dr MONROE: Dexcom G7  Download    In 3 weeks Kathryn Mcfarland APRN [facility]  [facility] [place] colon ca screening - refused TCS    In 4 months Mary Schmitz MD [facility] 4 mo f/u          Recent Outpatient Visits              6 days ago Uncontrolled type 2 diabetes mellitus with hyperglycemia (HCC)    [facility] Mary Schmitz MD    Office Visit    3 weeks ago [facility]insic [place]    [facility] [place]    Office Visit    3 weeks ago     [facility] [place]    Nurse Only    4 weeks ago     [facility] [place]    Nurse Only    1 month ago Dermatitis    [facility] Juwan Hernandez PA-C    Office Visit

## 2024-02-14 ENCOUNTER — TELEPHONE (OUTPATIENT)
Dept: ENDOCRINOLOGY CLINIC | Facility: CLINIC | Age: 59
End: 2024-02-14

## 2024-02-14 RX ORDER — LISINOPRIL 40 MG/1
40 TABLET ORAL DAILY
Qty: 90 TABLET | Refills: 3 | Status: SHIPPED | OUTPATIENT
Start: 2024-02-14

## 2024-02-14 NOTE — TELEPHONE ENCOUNTER
I do not see that she has made an appointment with nephrology yet due to her kidney function.  Can we please make sure she does this.  She should also be seen in the next 2 to 3 weeks by me.  Please make her an appointment.   No

## 2024-02-14 NOTE — TELEPHONE ENCOUNTER
Called pharmacy and RN was informed patient picked up the onetouch verio machine, lancets.  They need the strips.  Per chart review, the strip got held in the system.  RN released prescription.    15:12 - Called pharmacy and was informed strips went through and working on it.  RN called patient to give an update, no answer, RN left a detailed message regarding update.

## 2024-02-20 ENCOUNTER — TELEPHONE (OUTPATIENT)
Dept: FAMILY MEDICINE CLINIC | Facility: CLINIC | Age: 59
End: 2024-02-20

## 2024-02-20 ENCOUNTER — OFFICE VISIT (OUTPATIENT)
Dept: FAMILY MEDICINE CLINIC | Facility: CLINIC | Age: 59
End: 2024-02-20

## 2024-02-20 ENCOUNTER — LAB ENCOUNTER (OUTPATIENT)
Dept: LAB | Age: 59
End: 2024-02-20
Attending: FAMILY MEDICINE
Payer: MEDICAID

## 2024-02-20 VITALS
BODY MASS INDEX: 39.65 KG/M2 | SYSTOLIC BLOOD PRESSURE: 122 MMHG | HEART RATE: 83 BPM | HEIGHT: 65 IN | WEIGHT: 238 LBS | DIASTOLIC BLOOD PRESSURE: 70 MMHG | TEMPERATURE: 97 F

## 2024-02-20 DIAGNOSIS — G89.29 CHRONIC PAIN OF BOTH KNEES: ICD-10-CM

## 2024-02-20 DIAGNOSIS — E11.65 UNCONTROLLED DIABETES MELLITUS WITH HYPERGLYCEMIA, WITH LONG-TERM CURRENT USE OF INSULIN (HCC): ICD-10-CM

## 2024-02-20 DIAGNOSIS — Z12.31 VISIT FOR SCREENING MAMMOGRAM: ICD-10-CM

## 2024-02-20 DIAGNOSIS — Z12.4 CERVICAL CANCER SCREENING: ICD-10-CM

## 2024-02-20 DIAGNOSIS — M17.0 PRIMARY OSTEOARTHRITIS OF BOTH KNEES: ICD-10-CM

## 2024-02-20 DIAGNOSIS — E87.5 HYPERKALEMIA: ICD-10-CM

## 2024-02-20 DIAGNOSIS — Z79.4 UNCONTROLLED DIABETES MELLITUS WITH HYPERGLYCEMIA, WITH LONG-TERM CURRENT USE OF INSULIN (HCC): ICD-10-CM

## 2024-02-20 DIAGNOSIS — Z79.4 UNCONTROLLED DIABETES MELLITUS WITH HYPERGLYCEMIA, WITH LONG-TERM CURRENT USE OF INSULIN (HCC): Primary | ICD-10-CM

## 2024-02-20 DIAGNOSIS — M25.562 CHRONIC PAIN OF BOTH KNEES: ICD-10-CM

## 2024-02-20 DIAGNOSIS — M25.561 CHRONIC PAIN OF BOTH KNEES: ICD-10-CM

## 2024-02-20 DIAGNOSIS — E11.42 DIABETIC POLYNEUROPATHY ASSOCIATED WITH TYPE 2 DIABETES MELLITUS (HCC): ICD-10-CM

## 2024-02-20 DIAGNOSIS — E11.65 UNCONTROLLED DIABETES MELLITUS WITH HYPERGLYCEMIA, WITH LONG-TERM CURRENT USE OF INSULIN (HCC): Primary | ICD-10-CM

## 2024-02-20 LAB
ALBUMIN SERPL-MCNC: 4.6 G/DL (ref 3.2–4.8)
ALBUMIN/GLOB SERPL: 1.4 {RATIO} (ref 1–2)
ALP LIVER SERPL-CCNC: 85 U/L
ALT SERPL-CCNC: 24 U/L
ANION GAP SERPL CALC-SCNC: 8 MMOL/L (ref 0–18)
AST SERPL-CCNC: 20 U/L (ref ?–34)
BASOPHILS # BLD AUTO: 0.11 X10(3) UL (ref 0–0.2)
BASOPHILS NFR BLD AUTO: 1 %
BILIRUB SERPL-MCNC: 0.3 MG/DL (ref 0.3–1.2)
BUN BLD-MCNC: 42 MG/DL (ref 9–23)
BUN/CREAT SERPL: 30.4 (ref 10–20)
CALCIUM BLD-MCNC: 10.3 MG/DL (ref 8.7–10.4)
CHLORIDE SERPL-SCNC: 107 MMOL/L (ref 98–112)
CO2 SERPL-SCNC: 24 MMOL/L (ref 21–32)
CREAT BLD-MCNC: 1.38 MG/DL
CREAT UR-SCNC: 78.3 MG/DL
DEPRECATED RDW RBC AUTO: 40.8 FL (ref 35.1–46.3)
EGFRCR SERPLBLD CKD-EPI 2021: 44 ML/MIN/1.73M2 (ref 60–?)
EOSINOPHIL # BLD AUTO: 0.61 X10(3) UL (ref 0–0.7)
EOSINOPHIL NFR BLD AUTO: 5.6 %
ERYTHROCYTE [DISTWIDTH] IN BLOOD BY AUTOMATED COUNT: 12.5 % (ref 11–15)
FASTING STATUS PATIENT QL REPORTED: YES
GLOBULIN PLAS-MCNC: 3.4 G/DL (ref 2.8–4.4)
GLUCOSE BLD-MCNC: 52 MG/DL (ref 70–99)
HCT VFR BLD AUTO: 35.5 %
HGB BLD-MCNC: 11.3 G/DL
IMM GRANULOCYTES # BLD AUTO: 0.04 X10(3) UL (ref 0–1)
IMM GRANULOCYTES NFR BLD: 0.4 %
LYMPHOCYTES # BLD AUTO: 4.05 X10(3) UL (ref 1–4)
LYMPHOCYTES NFR BLD AUTO: 37.3 %
MCH RBC QN AUTO: 28.5 PG (ref 26–34)
MCHC RBC AUTO-ENTMCNC: 31.8 G/DL (ref 31–37)
MCV RBC AUTO: 89.4 FL
MICROALBUMIN UR-MCNC: 24.3 MG/DL
MICROALBUMIN/CREAT 24H UR-RTO: 310.3 UG/MG (ref ?–30)
MONOCYTES # BLD AUTO: 0.79 X10(3) UL (ref 0.1–1)
MONOCYTES NFR BLD AUTO: 7.3 %
NEUTROPHILS # BLD AUTO: 5.27 X10 (3) UL (ref 1.5–7.7)
NEUTROPHILS # BLD AUTO: 5.27 X10(3) UL (ref 1.5–7.7)
NEUTROPHILS NFR BLD AUTO: 48.4 %
OSMOLALITY SERPL CALC.SUM OF ELEC: 296 MOSM/KG (ref 275–295)
PLATELET # BLD AUTO: 357 10(3)UL (ref 150–450)
POTASSIUM SERPL-SCNC: 6 MMOL/L (ref 3.5–5.1)
PROT SERPL-MCNC: 8 G/DL (ref 5.7–8.2)
RBC # BLD AUTO: 3.97 X10(6)UL
SODIUM SERPL-SCNC: 139 MMOL/L (ref 136–145)
WBC # BLD AUTO: 10.9 X10(3) UL (ref 4–11)

## 2024-02-20 PROCEDURE — 85025 COMPLETE CBC W/AUTO DIFF WBC: CPT

## 2024-02-20 PROCEDURE — 82570 ASSAY OF URINE CREATININE: CPT

## 2024-02-20 PROCEDURE — 99214 OFFICE O/P EST MOD 30 MIN: CPT | Performed by: FAMILY MEDICINE

## 2024-02-20 PROCEDURE — 36415 COLL VENOUS BLD VENIPUNCTURE: CPT

## 2024-02-20 PROCEDURE — 82043 UR ALBUMIN QUANTITATIVE: CPT

## 2024-02-20 PROCEDURE — 80053 COMPREHEN METABOLIC PANEL: CPT

## 2024-02-20 RX ORDER — FUROSEMIDE 20 MG/1
20 TABLET ORAL 2 TIMES DAILY
Qty: 60 TABLET | Refills: 0 | Status: SHIPPED | OUTPATIENT
Start: 2024-02-20 | End: 2025-02-14

## 2024-02-20 RX ORDER — LIDOCAINE 50 MG/G
1 PATCH TOPICAL EVERY 24 HOURS
Qty: 60 PATCH | Refills: 1 | Status: SHIPPED | OUTPATIENT
Start: 2024-02-20

## 2024-02-20 NOTE — TELEPHONE ENCOUNTER
Michael shah from [place] lab with critical  Potassium  of 6.0  and Glucose of 52      Please advise and thank you.  Please reply to pool: EM RN TRIAGE      Collected 2/20/2024  8:41 AM       Status: Final result       Dx: Uncontrolled diabetes mellitus with h...    0 Result Notes       1  Topic      Component  Ref Range & Units 2/20/24  8:41 AM   Glucose  70 - 99 mg/dL 52 Low    Sodium  136 - 145 mmol/L 139   Potassium  3.5 - 5.1 mmol/L 6.0 High Panic    Chloride  98 - 112 mmol/L 107   CO2  21.0 - 32.0 mmol/L 24.0   Anion Gap  0 - 18 mmol/L 8   BUN  9 - 23 mg/dL 42 High    Creatinine  0.55 - 1.02 mg/dL 1.38 High    BUN/CREA Ratio  10.0 - 20.0 30.4 High    Calcium, Total  8.7 - 10.4 mg/dL 10.3   Calculated Osmolality  275 - 295 mOsm/kg 296 High    eGFR-Cr  >=60 mL/min/1.73m2 44 Low    ALT  10 - 49 U/L 24   AST  <=34 U/L 20   Alkaline Phosphatase  46 - 118 U/L 85   Bilirubin, Total  0.3 - 1.2 mg/dL 0.3   Total Protein  5.7 - 8.2 g/dL 8.0   Albumin  3.2 - 4.8 g/dL 4.6   Globulin  2.8 - 4.4 g/dL 3.4   A/G Ratio  1.0 - 2.0 1.4   Patient Fasting for CMP? Yes

## 2024-02-20 NOTE — PROGRESS NOTES
Patient ID: Zenia David is a 58 year old female.    HPI  Chief Complaint   Patient presents with    Diabetes     Follow up      Has dexcom. Insurance wont cover sensors. Pump.   She has a Dexcom on her arm which she states worked wonderful but her insurance is giving her issues with the sensors and they are not covering it so she is going back to sticking her fingers which is very painful for her.  She states that Dr. Schmitz spoke to her about the insulin pump and she wanted my opinion.  I told her I think it would be a wonderful idea and it would make her life much easier and get her diabetes better controlled.  She agrees and I will send a telephone message to Dr. Schmitz letting her know to have her staff proceed with getting an insulin pump for patient as well as the Dexcom sensors for now.    Patient does have a gastroenterology appointment in March for a colonoscopy.  She states she has been so busy that she forgot about the mammogram so she needed a new order which I gave her.  She also still needs to see a ophthalmologist and I did a referral to Dr Vera.      The other big issue for her she states her knees are terribly painful.  She has arthritis.  She uses a cane to walk.  She states she limps otherwise.  She states she has been using lidocaine patches that were prescribed for her on each knee and they make quite a difference.  She has not seen orthopedics but would like to.  She states she recently got a knee x-ray through [facility] but I cannot see from where but she states she does have the disc and will take this to the orthopedic specialist.    Wt Readings from Last 6 Encounters:   02/20/24 238 lb (108 kg)   02/07/24 240 lb (108.9 kg)   12/22/23 238 lb 9.6 oz (108.2 kg)   11/16/23 244 lb (110.7 kg)   08/14/23 234 lb (106.1 kg)   06/23/23 232 lb (105.2 kg)       BMI Readings from Last 6 Encounters:   02/20/24 39.61 kg/m²   02/07/24 39.94 kg/m²   12/22/23 39.71 kg/m²   11/16/23 40.60 kg/m²    08/14/23 38.94 kg/m²   06/23/23 38.61 kg/m²       BP Readings from Last 6 Encounters:   02/20/24 154/84   02/07/24 151/80   12/22/23 111/63   11/16/23 149/74   08/14/23 130/65   06/23/23 132/62         Review of Systems  No chest pain or shortness of breath.      Medical History:      Past Medical History:   Diagnosis Date    Acute carpal tunnel syndrome of right wrist 04/11/2019    Right endoscopic carpal tunnel release    Carpal tunnel syndrome, left 05/23/2019    Chronic back pain     CKD (chronic kidney disease)     Diastolic dysfunction     Grade I    Essential hypertension     High blood pressure     High cholesterol     Hyperlipidemia     Lipid screening 04/02/2007    per NG    Lipoma     surgical excision age 17    Macular degeneration of left eye     Neuropathy     Obesity (BMI 30-39.9)     Osteoarthritis     Renal disorder     Type II or unspecified type diabetes mellitus without mention of complication, not stated as uncontrolled     diabetes 24 yrs    Uncontrolled diabetes mellitus with hyperglycemia, with long-term current use of insulin ([facility]) 03/13/2017       Past Surgical History:   Procedure Laterality Date    EYE SURGERY      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 90 tablet 3    metFORMIN  MG Oral Tablet 24 Hr Take 1 tablet (500 mg total) by mouth daily with breakfast. 90 tablet 1    Glucose Blood (ONETOUCH VERIO) In Vitro Strip Check 3 times per day 300 strip 1    Lancets (ONETOUCH DELICA PLUS [id number]) Does not apply Misc 1 Device in the morning, at noon, and at bedtime. Check 3 times per day 300 each 1    Blood Glucose Monitoring Suppl  (ONETOUCH VERIO) w/Device Does not apply Kit 1 Device daily. 1 kit 0    Continuous Blood Gluc Sensor (DEXCOM G7 SENSOR) Does not apply Misc 1 each Every 10 days. Use as directed every 10 days 3 each 11    Insulin Lispro, 1 Unit Dial, 100 UNIT/ML Subcutaneous Solution Pen-injector Inject 10 Units into the skin in the morning, at noon, and at bedtime. 15 mL 1    Dulaglutide (TRULICITY) 4.5 MG/0.5ML Subcutaneous Solution Pen-injector Inject 0.5 mL into the skin once a week. 2 mL 2    triamcinolone 0.1 % External Cream Apply 1 Application topically 2 (two) times daily as needed. 60 g 0    insulin glargine (BASAGLAR KWIKPEN) 100 UNIT/ML Subcutaneous Solution Pen-injector ADMINISTER 35 UNITS UNDER THE SKIN TWICE DAILY 63 mL 0    Omeprazole 40 MG Oral Capsule Delayed Release Take 1 capsule (40 mg total) by mouth daily. 90 capsule 1    atorvastatin 20 MG Oral Tab Take 1 tablet (20 mg total) by mouth nightly. 90 tablet 3    fluticasone propionate 50 MCG/ACT Nasal Suspension 2 sprays by Nasal route daily. 3 each 3    Insulin Pen Needle (BD PEN NEEDLE DAMIAN U/F) 32G X 4 MM Does not apply Misc Inject 4 times per day 400 each 1    montelukast 10 MG Oral Tab Take 1 tablet (10 mg total) by mouth nightly. 90 tablet 3    gabapentin 300 MG Oral Cap Take 1 capsule (300 mg total) by mouth 2 (two) times daily. 180 capsule 3    ondansetron 4 MG Oral Tablet Dispersible       amLODIPine 10 MG Oral Tab TAKE 1 TABLET BY MOUTH EVERY DAY ALONG WITH 40 MG 90 tablet 3    clobetasol 0.05 % External Ointment Apply 1 Application. topically daily as needed. 60 g 2    tacrolimus (PROTOPIC) 0.1 % External Ointment Apply 1 Application. topically 2 (two) times daily. 60 g 3    clotrimazole 1 % External Cream Apply 1 Application. topically 2 (two) times daily. Apply to the groin area as needed 28 g 0    acetaminophen 500 MG Oral Tab Take 1 tablet (500 mg total) by mouth every 6 (six) hours as needed for Pain.      aspirin 81 MG Oral Chew Tab CSW ONE T D   6     Allergies:  Allergies   Allergen Reactions    Latex HIVES    Penicillins HIVES        Physical Exam:       Physical Exam  Blood pressure 154/84, pulse 83, temperature 97.4 °F (36.3 °C), temperature source Temporal, height 5' 5\" (1.651 m), weight 238 lb (108 kg), last menstrual period 07/20/2013, not currently breastfeeding.  Physical Exam   Constitutional: Patient is oriented to person, place, and time. Patient appears well-developed and well-nourished. No distress.   HENT:   Head: Normocephalic and atraumatic.   Neck: Normal range of motion. Neck supple. No thyromegaly present.   Lymphadenopathy:     Has  no cervical adenopathy.   Cardiovascular: Normal rate, regular rhythm and no murmur heard.  Pulmonary/Chest: Effort normal and breath sounds normal. No respiratory distress.   Neurological: Patient is alert and oriented to person, place, and time.   Skin: Skin is warm and dry.   Psychiatric: Patient has a normal mood and affect.   Lower legs: No edema of the legs bilaterally.  Positive antalgic gait due to arthritis.  She needed the cane and also my help to get up onto the table.  Nursing note and vitals reviewed.    Vitals:    02/20/24 0811 02/20/24 0821   BP: 154/84 122/70   Pulse: 83    Temp: 97.4 °F (36.3 °C)    TempSrc: Temporal    Weight: 238 lb (108 kg)    Height: 5' 5\" (1.651 m)               Assessment/Plan:      Diagnoses and all orders for this visit:    Uncontrolled diabetes mellitus with hyperglycemia, with long-term current use of insulin (HCC)  -     CBC With Differential With Platelet; Future  -     Comp Metabolic Panel (14); Future  -     OPHTHALMOLOGY - INTERNAL  -     Microalb/Creat Ratio, Random Urine; Future  Labs have been ordered along with the ophthalmology referral.  Will send a message to Dr. Schmitz about the insulin pump and the sensors.  Diabetic polyneuropathy associated with type 2 diabetes mellitus (HCC)  Continue medications but the best thing to do is get this diabetes under  control.  Primary osteoarthritis of both knees  -     lidocaine 5 % External Patch; Place 1 patch onto the skin daily. 1 patch to each knee due to osteoarthritis pain that is constant.  -     ORTHOPEDIC - INTERNAL  She states that lidocaine patch helps considerably and I wrote for that but we will also have her see the orthopedic specialist  Chronic pain of both knees  -     lidocaine 5 % External Patch; Place 1 patch onto the skin daily. 1 patch to each knee due to osteoarthritis pain that is constant.  -     ORTHOPEDIC - INTERNAL    Cervical cancer screening  -     OBG - INTERNAL  She like to see a female gynecologist and I gave her in [place] in our office.  Visit for screening mammogram  -     [facility] 2D+3D SCREEN[facility] BILAT (CPT=77067/14569); Future  Reminded her important this is to get done.      Referrals (if applicable)  Orders Placed This Encounter   Procedures    OPHTHALMOLOGY - INTERNAL     ++++++++++(EYE DOCTOR)+++++++++    Call [phone].     Referral Priority:   Routine     Referral Type:   OFFICE VISIT     Referred to Provider:   Todd Vera MD     Requested Specialty:   OPHTHALMOLOGY     Number of Visits Requested:   3    OBG - INTERNAL     OB/GYN.     Referral Priority:   Routine     Referral Type:   OFFICE VISIT     Referred to Provider:   Aida Lowry PA-C     Requested Specialty:   OBSTETRICS & GYNECOLOGY     Number of Visits Requested:   3    ORTHOPEDIC - INTERNAL     Call [phone] for the [facility] Orthopedic physicians.     Referral Priority:   Routine     Referral Type:   OFFICE VISIT     Referred to Provider:   Joey Anguiano MD     Requested Specialty:   SURGERY, ORTHOPEDIC     Number of Visits Requested:   3         Follow up if symptoms persist.  Take medicine (if given) as prescribed.  Approach to treatment discussed and patient/family member understands and agrees to plan.     No follow-ups on file.        Tiago Amin DO  2/20/2024

## 2024-02-20 NOTE — PROGRESS NOTES
Pump Introduction    Start Time: 12:24pm  End Time: 1:00pm    Met with pt to review the different types of pumps on the market     Indications: Pt is T2D followed by Dr Schmitz. Pt reached out yesterday at PCP with hopes of transitioning to insulin pump. Appointment today to review    A1C: 10.2% (2024)    Current Regimen:  Basaglar 48 units BID  Humalog 18 units BID with meals  Trulicity 4.5mg/weekly    Reviewed functions of a pump:  - Basal  - Bolus  - Site change  - Warranty/Supplies  - S/S of pump failure  - Management of pump failure    Reviewed different pumps:  1) Tandem   - Tslim (300 units) and Mobi (200 units)  - Size/tubing  - Change every 3 days based on insulin needs  - Touchscreen  - Closed loop system that works with Dexcom  - Control IQ features: increasing/decreasing basal and autoboluses; target range   --> Activity (140 mg/dl) and sleep mode (110 mg/dl)  - Basal IQ features: suspending basal if suspected low blood sugar  - Basal: can increase by 0.001 u/hr  - Bolus: can increase by 0.01; have to enter in carbs to bolus  - Battery: rechargeable every 5-7 days  - Avoid in water (can do 30 mins in 3ft of water)  - Mobile Bolusing TConnect  - Normally supplied through Tandem or DME  - Tsimulator gerald    2) Omnipod (Omnipod 5)  - Size/tubeless  - Touchscreen PDM or can use limited compatible Android phone  - Can fill with 200 units  - Change every 72-80 hrs  - Closed loop with Dexcom   --> Phone should be compatible with Dexcom G6 gerald   --> Can use  but limited alerts (high, sensor )  - Works by predicting BG 60 minutes in the future  - Can administer tiny boluses/adjustment of basal every 5 minutes  - Can adjust target blood sugar value (110 mg/dl)  - Activity mode (150 mg/dl)  - Basal: can increase by 0.05 u/hr  - Bolus: can increase by 0.05  - Battery: plug in  every 1-2 days  - Waterproof  - Normally supplied through Pharmacy benefits (no warranty)  - Omnipod 5 simulator gerald  -  Dash: Same set up but is not closed loop with Dexcom    3) Ilet  - Size/tubing  - Can fill with 180 units  - Change every 3 days based on insulin needs  - Touchscreen  - Closed loop system with Dexcom   - Algorithm determines basal, correction, carb ratio and insulin time  - User needs to tell pump if eating small, normal, or larger meal in regards to carb aware  - Battery: charging stand (around 5 days)  - Avoid in water  - Ilet mobile gerald to share data with clinic  - Normally supplies through DME    4) Medtronic (780G)  - Size/tubing  - Can fill with 300 units  - Closed loop system with Gaurdian Sensor (many chose Dexcom/Shelley but not closed loop)  - Algorithm determines basal and correction and has meal time detection    - Basal: can increase by 0.025 u/hr  - Bolus: can increase by 0.025  - Battery: AA (2 weeks) or Alkaline (4 weeks)  - Avoid in water (can do 24 hrs in 12ft of water)  - Mobile gerald for alerts/alarms  - Normally supplied through Medtr[employer] or [employer]  - Minimed virtual gerald    Answered all questions/provided materials and pt will update the team on preference on pump    Plan:  - Pt would like to move forward with Ilet; refer to TE 2/21/2023    Follow up:  6/26/2024 Dr Ainsley Méndez  [facility]

## 2024-02-20 NOTE — TELEPHONE ENCOUNTER
Called and spoke with pt. Reviewed pumps. Offered appt as well. Scheduled tomorrow to review pumps.    Of note, also called pharmacy. Pharmacy was running old insurance. Went through now for $0. Let pt know

## 2024-02-20 NOTE — TELEPHONE ENCOUNTER
Mitchell Hernandez,    I just saw Zenia today and I saw that she had a visit with you recently.  She would like to proceed with the insulin pump and wanted me to let you know so your staff can start moving on this new insurance.  She also states that the Dexcom works for her but the insurance is giving her issues with the sensors so she has not been able to get them and instead is having to prick her fingers.  Can your staff facilitate this as well?    Tapan   Jojo Patrick(Attending)

## 2024-02-21 ENCOUNTER — NURSE ONLY (OUTPATIENT)
Dept: ENDOCRINOLOGY CLINIC | Facility: CLINIC | Age: 59
End: 2024-02-21

## 2024-02-21 ENCOUNTER — MED REC SCAN ONLY (OUTPATIENT)
Dept: ENDOCRINOLOGY CLINIC | Facility: CLINIC | Age: 59
End: 2024-02-21

## 2024-02-21 ENCOUNTER — TELEPHONE (OUTPATIENT)
Dept: ENDOCRINOLOGY CLINIC | Facility: CLINIC | Age: 59
End: 2024-02-21

## 2024-02-21 DIAGNOSIS — E11.65 UNCONTROLLED TYPE 2 DIABETES MELLITUS WITH HYPERGLYCEMIA (HCC): Primary | ICD-10-CM

## 2024-02-21 PROCEDURE — 99211 OFF/OP EST MAY X REQ PHY/QHP: CPT | Performed by: INTERNAL MEDICINE

## 2024-02-21 NOTE — TELEPHONE ENCOUNTER
Ilet paperwork (SMN/ketones, face sheet, 2 A1C, 2 OV) [facility]; placed in brown folder

## 2024-02-21 NOTE — TELEPHONE ENCOUNTER
Tapan FRIEDMAN our CDE saw her today and started ppw for the iLet pump.  This is a brand new pump that no longer requires carbohydrate counting so I think it will be a great option.  Thanks. Mary

## 2024-02-22 NOTE — TELEPHONE ENCOUNTER
Received fax from [employer] attached is a smn form for pump supplies, form placed in provider folder for review and signature.

## 2024-02-23 ENCOUNTER — LAB ENCOUNTER (OUTPATIENT)
Dept: LAB | Age: 59
End: 2024-02-23
Attending: FAMILY MEDICINE
Payer: MEDICAID

## 2024-02-23 DIAGNOSIS — E87.5 HYPERKALEMIA: ICD-10-CM

## 2024-02-23 LAB — POTASSIUM SERPL-SCNC: 5.8 MMOL/L (ref 3.5–5.1)

## 2024-02-23 PROCEDURE — 84132 ASSAY OF SERUM POTASSIUM: CPT

## 2024-02-23 PROCEDURE — 36415 COLL VENOUS BLD VENIPUNCTURE: CPT

## 2024-02-27 ENCOUNTER — OFFICE VISIT (OUTPATIENT)
Facility: CLINIC | Age: 59
End: 2024-02-27

## 2024-02-27 VITALS
SYSTOLIC BLOOD PRESSURE: 110 MMHG | BODY MASS INDEX: 39.99 KG/M2 | OXYGEN SATURATION: 97 % | DIASTOLIC BLOOD PRESSURE: 70 MMHG | HEIGHT: 65 IN | HEART RATE: 96 BPM | WEIGHT: 240 LBS

## 2024-02-27 DIAGNOSIS — N18.32 STAGE 3B CHRONIC KIDNEY DISEASE (HCC): Primary | ICD-10-CM

## 2024-02-27 DIAGNOSIS — E11.21 DIABETIC NEPHROPATHY ASSOCIATED WITH TYPE 2 DIABETES MELLITUS (HCC): ICD-10-CM

## 2024-02-27 DIAGNOSIS — E78.2 MIXED HYPERLIPIDEMIA: ICD-10-CM

## 2024-02-27 DIAGNOSIS — E87.5 HYPERKALEMIA: ICD-10-CM

## 2024-02-27 DIAGNOSIS — R80.1 PERSISTENT PROTEINURIA: ICD-10-CM

## 2024-02-27 DIAGNOSIS — I10 PRIMARY HYPERTENSION: ICD-10-CM

## 2024-02-27 PROCEDURE — 99204 OFFICE O/P NEW MOD 45 MIN: CPT | Performed by: INTERNAL MEDICINE

## 2024-02-27 NOTE — PROGRESS NOTES
Nephrology Consultation Note    Reason for Consult:   Chief Complaint   Patient presents with    Consult     Patient here for a Consult.    Chronic Kidney Disease        HPI:     58 year old female with past medical history of T2DM (dx 1995) complicated by retinopathy and neuropathy, HTN (dx 1995), HLD, CKD 3b, osteoarthritis (knees) is referred by Dr Amin for CKD 3b and hyperkalemia.    [id number]: Cr mostly 1-1.2  2020: Cr 1.28 5/7 2021: Cr 1.96 4/22 2022: Cr 1.21 1/2, 1.44 10/10  2023: Admitted in March (3/11-3/22). Cr 1.51 on admission and 0.69 at dc.   Cr 1.74 5/22, 1.89 8/10, increased to 2.07 8/14, improved to 1.3 12/22 2024: Cr 1.38 2/20    Denies NSAIDs use. Takes Tylenol for knee pain.     CT abd/pelvis (1/3/22) showed: \"stable mildly prominent extrarenal pelves, left greater than right, without maciel hydronephrosis or hydroureter. There is perinephric fat stranding bilaterally without perinephric fluid collection. There is a stable 1.1 cm cyst in the inferior left renal pole.\"    Hyperkalemia: Potassium increased to 6 2/20, repeat 5.8 2/23. Lisinopril stopped.     Proteinuria: Microalb/Cr 94 mg 1/8/19, 60 mg 4/22/21, 157 mg 10/10/22, 137 mg 8/14/23, 310 mg 2/20/24.     T2DM: Taking Lispro 10 units with meals, Basaglar 45 units BID, and Trulicity 4.5 mg weekly.   Did not tolerate Farxiga due to yeast infections.   Recent a1c 10.3 2/2024. a1c has been mostly 8-9 range since 2017  DM is complicated by retinopathy, neuropathy, and nephropathy.      HTN: Taking Amlodipine 10 mg daily. Lisinopril 40 mg stopped recently due to hyperkalemia. /70 today.      HLD: Chol 118, Trig 110, HDL 41, LDL 57 8/2023. Taking Lipitor 20 mg daily.      Anemia: Hb 11.3 2/2024.      Lower extremity edema: Has trace edema. Taking Furosemide 20 mg BID.       FHx:   Mother (D) - DM  Father (passed away in 40s) - CAD, CVA, alcohol use.   Sisters x 2 (D) - DM  No family history of CKD.      SHx: History of smoking, 1/2PPD x  15 yrs, quit . Does not drink alcohol currently.   . Has 4 sons and 2 daughters. Youngest son lives with her.       HISTORY:  Past Medical History:   Diagnosis Date    Acute carpal tunnel syndrome of right wrist 2019    Right endoscopic carpal tunnel release    Carpal tunnel syndrome, left 2019    Chronic back pain     CKD (chronic kidney disease) stage 3, GFR 30-59 ml/min (HCC)     Diabetes mellitus (HCC)     dx     Diastolic dysfunction     Grade I    Essential hypertension     dx     Hyperlipidemia     Lipoma     surgical excision age 17    Macular degeneration of left eye     Neuropathy     Obesity (BMI 30-39.9)     Osteoarthritis       Past Surgical History:   Procedure Laterality Date    EYE SURGERY      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    LIPOMA REMOVAL      WRIST ARTHROSCOP,RELEASE XVERS LIG Right 2019    Right endoscopic carpal tunnel release    WRIST ARTHROSCOP,RELEASE XVERS LIG Left 2019    Left endoscopic carpal tunnel release      Family History   Problem Relation Age of Onset    Heart Disorder Father     Stroke Father     Alcohol abuse Father     Diabetes Mother     Diabetes Sister     Diabetes Sister       Social History:   Social History     Socioeconomic History    Marital status:    Tobacco Use    Smoking status: Former     Packs/day: 0.50     Years: 15.00     Additional pack years: 0.00     Total pack years: 7.50     Types: Cigarettes     Quit date: 2016     Years since quittin.0     Passive exposure: Past    Smokeless tobacco: Never   Vaping Use    Vaping Use: Never used   Substance and Sexual Activity    Alcohol use: No     Alcohol/week: 0.0 standard drinks of alcohol    Drug use: No   Other Topics Concern    Breast feeding No    Reaction to local anesthetic No    Pt has a pacemaker No    Pt has a defibrillator No   Social History Narrative    Lives with     Homemaker        Medications (Active prior to today's  visit):  Current Outpatient Medications   Medication Sig Dispense Refill    furosemide 20 MG Oral Tab Take 1 tablet (20 mg total) by mouth 2 (two) times daily. To help with blood pressure but also to help with the elevated potassium. 60 tablet 0    Insulin Lispro, 1 Unit Dial, 100 UNIT/ML Subcutaneous Solution Pen-injector Inject 10 Units into the skin in the morning, at noon, and at bedtime. 15 mL 1    Dulaglutide (TRULICITY) 4.5 MG/0.5ML Subcutaneous Solution Pen-injector Inject 0.5 mL into the skin once a week. 2 mL 2    triamcinolone 0.1 % External Cream Apply 1 Application topically 2 (two) times daily as needed. 60 g 0    insulin glargine (BASAGLAR KWIKPEN) 100 UNIT/ML Subcutaneous Solution Pen-injector ADMINISTER 35 UNITS UNDER THE SKIN TWICE DAILY 63 mL 0    atorvastatin 20 MG Oral Tab Take 1 tablet (20 mg total) by mouth nightly. 90 tablet 3    fluticasone propionate 50 MCG/ACT Nasal Suspension 2 sprays by Nasal route daily. 3 each 3    gabapentin 300 MG Oral Cap Take 1 capsule (300 mg total) by mouth 2 (two) times daily. 180 capsule 3    amLODIPine 10 MG Oral Tab TAKE 1 TABLET BY MOUTH EVERY DAY ALONG WITH 40 MG 90 tablet 3    clobetasol 0.05 % External Ointment Apply 1 Application. topically daily as needed. 60 g 2    tacrolimus (PROTOPIC) 0.1 % External Ointment Apply 1 Application. topically 2 (two) times daily. 60 g 3    clotrimazole 1 % External Cream Apply 1 Application. topically 2 (two) times daily. Apply to the groin area as needed 28 g 0    acetaminophen 500 MG Oral Tab Take 1 tablet (500 mg total) by mouth every 6 (six) hours as needed for Pain.      aspirin 81 MG Oral Chew Tab CSW ONE T D  6    lidocaine 5 % External Patch Place 1 patch onto the skin daily. 1 patch to each knee due to osteoarthritis pain that is constant. 60 patch 1    lisinopril 40 MG Oral Tab Take 1 tablet (40 mg total) by mouth daily. (Patient not taking: Reported on 2/27/2024) 90 tablet 3    Glucose Blood (ONETOUCH VERIO)  In Vitro Strip Check 3 times per day 300 strip 1    Lancets (ONETOUCH DELICA PLUS [id number]) Does not apply Misc 1 Device in the morning, at noon, and at bedtime. Check 3 times per day 300 each 1    Blood Glucose Monitoring Suppl (ONETOUCH VERIO) w/Device Does not apply Kit 1 Device daily. 1 kit 0    Continuous Blood Gluc Sensor (DEXCOM G7 SENSOR) Does not apply Misc 1 each Every 10 days. Use as directed every 10 days 3 each 11    Insulin Pen Needle (BD PEN NEEDLE DAMIAN U/F) 32G X 4 MM Does not apply Misc Inject 4 times per day 400 each 1       Allergies:  Allergies   Allergen Reactions    Latex HIVES    Penicillins HIVES         ROS:     Review of Systems   Constitutional:  Negative for appetite change, chills, fatigue and fever.   HENT:  Negative for ear pain, rhinorrhea, sore throat and trouble swallowing.    Eyes:  Negative for pain and discharge.   Respiratory:  Negative for cough, chest tightness and shortness of breath.    Cardiovascular:  Negative for chest pain and leg swelling.   Gastrointestinal:  Positive for constipation. Negative for abdominal pain, diarrhea and vomiting.   Genitourinary:  Negative for decreased urine volume, dysuria and flank pain.   Musculoskeletal:  Positive for arthralgias (knees). Negative for back pain, gait problem and myalgias.   Skin:  Negative for rash.   Neurological:  Negative for dizziness, speech difficulty, weakness, numbness and headaches.   Psychiatric/Behavioral:  Negative for agitation and confusion.           Vitals:    02/27/24 0901   BP: 110/70   Pulse: 96       PHYSICAL EXAM:   Physical Exam  Constitutional:       Appearance: Normal appearance.   HENT:      Head: Normocephalic and atraumatic.      Nose: Nose normal.   Eyes:      General: No scleral icterus.  Cardiovascular:      Rate and Rhythm: Normal rate and regular rhythm.      Heart sounds: Normal heart sounds. No murmur heard.  Pulmonary:      Effort: Pulmonary effort is normal.      Breath sounds: Normal  breath sounds.   Abdominal:      General: Bowel sounds are normal. There is no distension.      Palpations: Abdomen is soft.   Musculoskeletal:         General: No tenderness. Normal range of motion.      Cervical back: Normal range of motion and neck supple.      Comments: Neg edema   Skin:     General: Skin is warm and dry.      Findings: No rash.   Neurological:      General: No focal deficit present.      Mental Status: She is alert and oriented to person, place, and time. Mental status is at baseline.   Psychiatric:         Mood and Affect: Mood normal.         Behavior: Behavior normal.         Thought Content: Thought content normal.             ASSESSMENT/PLAN:   Assessment   Encounter Diagnoses   Name Primary?    Stage 3b chronic kidney disease (HCC) Yes    Persistent proteinuria     Hyperkalemia     Diabetic nephropathy associated with type 2 diabetes mellitus (HCC)     Primary hypertension     Mixed hyperlipidemia        CKD 3b:   Likely due to diabetic nephropathy and hypertensive nephrosclerosis  Recent Cr 1.38 eGFR 44 2/20/24, at baseline.   Advised to avoid NSAIDs and take Tylenol/acetaminophen for pain.   Asked to restrict sodium to 2 grams per day and instructions on low protein diet given.   Advised tight control of DM and HTN to prevent complications including progressive CKD, CAD or CVA.    Proteinuria:   Likely due to diabetic nephropathy and hypertensive nephrosclerosis  Will restart low dose Lisinopril in the future.     Hyperkalemia:   Likely due to diet, Lisinopril and CKD  Instructions on low potassium diet given.   Off Lisinopril for now.     DM:   Uncontrolled controlled. Cont insulin per Endo.     HTN:   Well controlled. Cont meds.      HLD:   Stable. Cont meds.       Labs ordered before next appt.   Follow up in 2 months.        Orders This Visit:  Orders Placed This Encounter   Procedures    CBC With Differential With Platelet    Comp Metabolic Panel (14)    PTH, Intact    Vitamin D     Phosphorus    Microalb/Creat Ratio, Random Urine    Urinalysis, Routine       Meds This Visit:  Requested Prescriptions      No prescriptions requested or ordered in this encounter       Imaging & Referrals:  None       50 minutes spent in the care of the patient which included: reviewing prior records, obtaining history and examining patient, discussing lab results and treatment plan including diet, ordering labs, and documentation.      Rene Hawley MD  2/27/2024

## 2024-03-07 ENCOUNTER — LAB ENCOUNTER (OUTPATIENT)
Dept: LAB | Facility: HOSPITAL | Age: 59
End: 2024-03-07
Payer: MEDICAID

## 2024-03-07 ENCOUNTER — OFFICE VISIT (OUTPATIENT)
Facility: CLINIC | Age: 59
End: 2024-03-07

## 2024-03-07 VITALS
HEART RATE: 86 BPM | DIASTOLIC BLOOD PRESSURE: 70 MMHG | HEIGHT: 65 IN | WEIGHT: 243 LBS | BODY MASS INDEX: 40.48 KG/M2 | SYSTOLIC BLOOD PRESSURE: 145 MMHG

## 2024-03-07 DIAGNOSIS — A04.8 HELICOBACTER PYLORI INFECTION: ICD-10-CM

## 2024-03-07 DIAGNOSIS — Z12.11 COLON CANCER SCREENING: Primary | ICD-10-CM

## 2024-03-07 DIAGNOSIS — K59.00 CONSTIPATION, UNSPECIFIED CONSTIPATION TYPE: ICD-10-CM

## 2024-03-07 PROCEDURE — 99214 OFFICE O/P EST MOD 30 MIN: CPT

## 2024-03-07 PROCEDURE — 83013 H PYLORI (C-13) BREATH: CPT

## 2024-03-07 NOTE — PATIENT INSTRUCTIONS
-make appt with diabetic educator for appropriate diet    -for constipation:  Take daily to twice daily fiber supplement (such as metamcul, konsyl, fibercon)  Take daily miralax (osmotic laxative), 1 capful daily    -retest for H. Pylori in lab    -follow as needed if constipation not improving      1. Schedule colonoscopy with Dr. Peng, Dr. Ignacio or Dr. Simpson   Diagnosis: CRC screen, constipation  Sedation: MAC  Prep: split dose golytely    GI RNs - please reach out to Dr. Schmitz for insulin dosing prior to procedure  *HOLD trulicity for 1 week prior to procedure    .-Buy over the counter dulcolax laxative, and take one tablet daily for 3 days prior to drinking the bowel prep.   OR  -Buy over the counter miralax, mix one capful with water daily for 2 weeks prior to drinking the bowel prep.    *Do NOT to consume seeds, nuts, corn, popcorn or raw fruits/vegetables for 5 days prior to next colonoscopy. And start clear liquid diet the day prior (no solid food breakfast).      2.  bowel prep from pharmacy   You can pick the bowel prep up now and store in a cool, dry place in your home until your scheduled bowel prep start date.    3. MEDICATION CHANGES PRIOR TO PROCEDURE       A. 7 days BEFORE colonoscopy: HOLD Iron (ferrous sulfate/ ferrous gluconate) pills, herbal supplements, multivitamins, or weight loss/diet medications (i.e.Phentermine/Vyvanse)     B. 5 days BEFORE colonoscopy HOLD coumadin or plavix     C. 48 hours BEFORE colonoscopy HOLD xarelto or eliquis (with PCP approval)     D. 1 day BEFORE and day OF colonoscopy: HOLD diabetic medications  (insulin management per endocrine or PCP)     E. Continue ALL other medications as usual      F. DO NOT TAKE: Any form of alcohol, recreational drugs and any forms of erectile dysfunction medications 24 hours prior to procedure.    4. Read all bowel prep instructions carefully. Bowel prep instructions can also be found online at:   www.health.org/giprep     5. AVOID seeds, nuts, popcorn, raw fruits and vegetables for 5 days before procedure    6. If you start any NEW medication after your visit today, please notify us. Certain medications (like iron or weight loss medications) will need to be held before the procedure, or the procedure cannot be performed safely.

## 2024-03-07 NOTE — H&P
[facility] - Gastroenterology                                                                                                  Clinic History and Physical     Chief Complaint   Patient presents with    Colonoscopy Screening     No history.    Constipation     Once in a while.       Requesting physician or provider: Tiago Amin DO    HPI:   Zenia David is a 58 year old year-old female with medical history including diabetes, chronic kidney disease, hypertension, hyperlipidemia, obesity, osteoarthritis, and other comorbidities in table below, who presents for colon cancer screening evaluation.    #constipation  -bowel habits are bowel movement every 2-3 days for about 8 years. Stool is hard and frequent straining.   -she takes dulcolax about 1x per month  -drinks water throughout the day. She thinks she overeats carbs and does not eat the right things. Not eating a high fiber diet.     #H. Pylori   -treated for H. Pylori @ [facility] last year, she thinks she stopped antibiotics early due to feeling confused while taking medication  -was having belching, heartburn, epigastric pain which resolved after partial treatment    Patient is here today as a referral from her PCP for evaluation prior to undergoing colonoscopy for CRC screening. Patient denies any GI symptoms of nausea, vomiting, heartburn, dyspepsia, dysphagia, hematemesis, abdominal pain, diarrhea, hematochezia, or melena.  Additionally there is no weight loss and no reported chest pain or shortness of breath.     Pt is due for CRC screening. We discussed the available screening options for CRC such as FIT and cologuard. They are electing to pursue colonoscopy at this time.     Last colonoscopy: none   Last EGD: none     NSAIDS:aspirin 81mg   Tobacco: former 2016  Alcohol: none   Marijuana:none   Illicit drugs:none     FH GI malignancy: none     No history of adverse reaction to sedation  No DEVAN  No anticoagulants  No  pacemaker/defibrillator  No pain medications and/or sleep aides    Wt Readings from Last 6 Encounters:   24 243 lb (110.2 kg)   24 240 lb (108.9 kg)   24 238 lb (108 kg)   24 240 lb (108.9 kg)   23 238 lb 9.6 oz (108.2 kg)   23 244 lb (110.7 kg)          History, Medications, Allergies, ROS:      Past Medical History:   Diagnosis Date    Acute carpal tunnel syndrome of right wrist 2019    Right endoscopic carpal tunnel release    Carpal tunnel syndrome, left 2019    Chronic back pain     CKD (chronic kidney disease) stage 3, GFR 30-59 ml/min ([facility])     Diabetes mellitus (HCC)     dx     Diastolic dysfunction     Grade I    Essential hypertension     dx     Hyperlipidemia     Lipoma     surgical excision age 17    Macular degeneration of left eye     Neuropathy     Obesity (BMI 30-39.9)     Osteoarthritis       Past Surgical History:   Procedure Laterality Date    EYE SURGERY      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    LIPOMA REMOVAL      WRIST ARTHROSCOP,RELEASE XVERS LIG Right 2019    Right endoscopic carpal tunnel release    WRIST ARTHROSCOP,RELEASE XVERS LIG Left 2019    Left endoscopic carpal tunnel release      Family Hx:   Family History   Problem Relation Age of Onset    Heart Disorder Father     Stroke Father     Alcohol abuse Father     Diabetes Mother     Diabetes Sister     Diabetes Sister       Social History:   Social History     Socioeconomic History    Marital status:    Tobacco Use    Smoking status: Former     Packs/day: 0.50     Years: 15.00     Additional pack years: 0.00     Total pack years: 7.50     Types: Cigarettes     Quit date: 2016     Years since quittin.1     Passive exposure: Past    Smokeless tobacco: Never   Vaping Use    Vaping Use: Never used   Substance and Sexual Activity    Alcohol use: No     Alcohol/week: 0.0 standard drinks of alcohol    Drug use: No   Other Topics Concern    Breast feeding  No    Reaction to local anesthetic No    Pt has a pacemaker No    Pt has a defibrillator No   Social History Narrative    Lives with     Homemaker        Medications (Active prior to today's visit):  Current Outpatient Medications   Medication Sig Dispense Refill    polyethylene glycol, PEG 3350-KCl-NaBcb-NaCl-NaSulf, 236 g Oral Recon Soln Take 4,000 mL by mouth As Directed. Take 2,000 mL the night before your procedure and 2,000 mL the morning of your procedure. Take as directed by GI clinic. Okay to substitute for generic. 1 each 0    lidocaine 5 % External Patch Place 1 patch onto the skin daily. 1 patch to each knee due to osteoarthritis pain that is constant. 60 patch 1    furosemide 20 MG Oral Tab Take 1 tablet (20 mg total) by mouth 2 (two) times daily. To help with blood pressure but also to help with the elevated potassium. 60 tablet 0    lisinopril 40 MG Oral Tab Take 1 tablet (40 mg total) by mouth daily. 90 tablet 3    Glucose Blood (ONETOUCH VERIO) In Vitro Strip Check 3 times per day 300 strip 1    Lancets (ONETOUCH DELICA PLUS [id number]) Does not apply Misc 1 Device in the morning, at noon, and at bedtime. Check 3 times per day 300 each 1    Blood Glucose Monitoring Suppl (ONETOUCH VERIO) w/Device Does not apply Kit 1 Device daily. 1 kit 0    Continuous Blood Gluc Sensor (DEXCOM G7 SENSOR) Does not apply Misc 1 each Every 10 days. Use as directed every 10 days 3 each 11    Insulin Lispro, 1 Unit Dial, 100 UNIT/ML Subcutaneous Solution Pen-injector Inject 10 Units into the skin in the morning, at noon, and at bedtime. 15 mL 1    Dulaglutide (TRULICITY) 4.5 MG/0.5ML Subcutaneous Solution Pen-injector Inject 0.5 mL into the skin once a week. 2 mL 2    triamcinolone 0.1 % External Cream Apply 1 Application topically 2 (two) times daily as needed. 60 g 0    insulin glargine (BASAGLAR KWIKPEN) 100 UNIT/ML Subcutaneous Solution Pen-injector ADMINISTER 35 UNITS UNDER THE SKIN TWICE DAILY 63 mL 0     atorvastatin 20 MG Oral Tab Take 1 tablet (20 mg total) by mouth nightly. 90 tablet 3    fluticasone propionate 50 MCG/ACT Nasal Suspension 2 sprays by Nasal route daily. 3 each 3    Insulin Pen Needle (BD PEN NEEDLE DAMIAN U/F) 32G X 4 MM Does not apply Misc Inject 4 times per day 400 each 1    gabapentin 300 MG Oral Cap Take 1 capsule (300 mg total) by mouth 2 (two) times daily. 180 capsule 3    amLODIPine 10 MG Oral Tab TAKE 1 TABLET BY MOUTH EVERY DAY ALONG WITH 40 MG 90 tablet 3    clobetasol 0.05 % External Ointment Apply 1 Application. topically daily as needed. 60 g 2    tacrolimus (PROTOPIC) 0.1 % External Ointment Apply 1 Application. topically 2 (two) times daily. 60 g 3    clotrimazole 1 % External Cream Apply 1 Application. topically 2 (two) times daily. Apply to the groin area as needed 28 g 0    acetaminophen 500 MG Oral Tab Take 1 tablet (500 mg total) by mouth every 6 (six) hours as needed for Pain.      aspirin 81 MG Oral Chew Tab CSW ONE T D  6       Allergies:  Allergies   Allergen Reactions    Latex HIVES    Penicillins HIVES       ROS:   CONSTITUTIONAL: negative for fevers, chills, sweats  EYES Negative for scleral icterus or redness, and diplopia  HEENT: Negative for hoarseness  RESPIRATORY: Negative for cough and severe shortness of breath  CARDIOVASCULAR: Negative for crushing sub-sternal chest pain  GASTROINTESTINAL: See HPI  GENITOURINARY: Negative for dysuria  MUSCULOSKELETAL: Negative for arthralgias and myalgias  SKIN: Negative for jaundice, rash or pruritus  NEUROLOGICAL: Negative for dizziness and headaches  BEHAVIOR/PSYCH: Negative for psychotic behavior      PHYSICAL EXAM:   Blood pressure 145/70, pulse 86, height 5' 5\" (1.651 m), weight 243 lb (110.2 kg), last menstrual period 07/20/2013, not currently breastfeeding.    GEN: Alert, no acute distress, well-nourished   HEENT: anicteric sclera, neck supple, trachea midline, MMM, no palpable or tender neck or supraclavicular lymph  nodes  CV: RRR, the extremities are warm and well perfused   LUNGS: No increased work of breathing, CTAB  ABDOMEN: Soft, round, symmetrical, non-tender without distention or guarding. No scars or lesions. Aorta is without bruit or visible pulsation. Umbilicus is midline without herniation. Normoactive bowel sounds are present, No masses, hepatomegaly or splenomegaly noted. Exam limited by body habitus  MSK: No erythema, no warmth, no swelling of joints, 1+ pitting edema around ankles  SKIN: No jaundice, no erythema, no rashes, no lesions  HEMATOLOGIC: No bleeding, no bruising  NEURO: Alert and interactive, SAN  PSYCH: appropriate mood & affect    Labs/Imaging:     Patient's labs and imaging were reviewed and discussed with patient today.    .  ASSESSMENT/PLAN:   Zenia David is a 58 year old year-old female with medical history including diabetes, chronic kidney disease, hypertension, hyperlipidemia, obesity, osteoarthritis, and other comorbidities in table below, who presents for colon cancer screening evaluation.    #constipation  -bowel habits are bowel movement every 2-3 days for about 8 years. Stool is hard and frequent straining.   -she takes dulcolax about 1x per month  -drinks water throughout the day. She thinks she overeats carbs and does not eat the right things. Not eating a high fiber diet.     #H. Pylori   -treated for H. Pylori @ [facility] last year, she thinks she stopped antibiotics early due to feeling confused while taking medication  -was having belching, heartburn, epigastric pain which resolved after partial treatment    # Average Risk screening: patient is considered average risk for colon cancer (No family hx of colon cancer) and it is appropriate to proceed with screening colonoscopy. Patient is currently asymptomatic and denies diarrhea, hematochezia, thin-stools or weight loss. We discussed risks/benefits/alternatives to procedure, including CT colonography and stool testing,  they want to proceed with colonoscopy.  -Mild normocytic anemia, hgb 11.3, noted on lab work Feb 2024. Chart review similar hemoglobin since 2021    Recommend:  -make appt with diabetic educator for appropriate diet    -for constipation:  Take daily to twice daily fiber supplement (such as metamcul, konsyl, fibercon)  Take daily miralax (osmotic laxative), 1 capful daily    -retest for H. Pylori in lab    -follow as needed if constipation not improving    -Schedule colonoscopy with Dr. Peng, Dr. Ignacio or Dr. Simpson   Diagnosis: CRC screen, constipation  Sedation: MAC  Prep: split dose golytely    GI RNs - please reach out to Dr. Schmitz for insulin dosing prior to procedure  *HOLD trulicity for 1 week prior to procedure    .-Buy over the counter dulcolax laxative, and take one tablet daily for 3 days prior to drinking the bowel prep.   OR  -Buy over the counter miralax, mix one capful with water daily for 2 weeks prior to drinking the bowel prep.    *Do NOT to consume seeds, nuts, corn, popcorn or raw fruits/vegetables for 5 days prior to next colonoscopy. And start clear liquid diet the day prior (no solid food breakfast).    -Anti-platelets and anti-coagulants: N/A     Colonoscopy consent: I have discussed the risks, benefits, and alternatives to colonoscopy with the patient [who demonstrated understanding], including but not limited to the risks of bleeding, infection, pain, as well as the risks of anesthesia and perforation all leading to prolonged hospitalization, surgical intervention. I also specifically mentioned the miss rate of colonoscopy of 5-10% in the best of all circumstances. All questions were answered to the patient’s satisfaction. The patient elected to proceed with colonoscopy with intervention [i.e. polypectomy, etc.] as indicated.    Orders This Visit:  Orders Placed This Encounter   Procedures    Helicobacter Pylori Breath Test, Adult       Meds This Visit:  Requested Prescriptions      Signed Prescriptions Disp Refills    polyethylene glycol, PEG 3350-KCl-NaBcb-NaCl-NaSulf, 236 g Oral Recon Soln 1 each 0     Sig: Take 4,000 mL by mouth As Directed. Take 2,000 mL the night before your procedure and 2,000 mL the morning of your procedure. Take as directed by GI clinic. Okay to substitute for generic.       Imaging & Referrals:  DIABETIC EDUCATION - INTERNAL       SYED Valera    [facility] Gastroenterology  3/7/2024      The dictation was partially prepared using Dragon Medical voice recognition software. As a result, errors may occur. When identified, these errors have been corrected. While every attempt is made to correct errors during dictation, discrepancies may still exist.

## 2024-03-08 LAB — H PYLORI BREATH TEST: POSITIVE

## 2024-03-11 ENCOUNTER — TELEPHONE (OUTPATIENT)
Facility: CLINIC | Age: 59
End: 2024-03-11

## 2024-03-11 DIAGNOSIS — R11.0 NAUSEA: ICD-10-CM

## 2024-03-11 DIAGNOSIS — K59.00 CONSTIPATION, UNSPECIFIED CONSTIPATION TYPE: ICD-10-CM

## 2024-03-11 DIAGNOSIS — Z12.11 SCREENING FOR COLON CANCER: Primary | ICD-10-CM

## 2024-03-11 DIAGNOSIS — A04.8 HELICOBACTER PYLORI INFECTION: Primary | ICD-10-CM

## 2024-03-11 RX ORDER — TETRACYCLINE HYDROCHLORIDE 500 MG/1
500 CAPSULE ORAL 4 TIMES DAILY
Qty: 56 CAPSULE | Refills: 0 | Status: SHIPPED | OUTPATIENT
Start: 2024-03-11 | End: 2024-03-25

## 2024-03-11 RX ORDER — ONDANSETRON 4 MG/1
4 TABLET, ORALLY DISINTEGRATING ORAL EVERY 8 HOURS PRN
Qty: 30 TABLET | Refills: 0 | Status: SHIPPED | OUTPATIENT
Start: 2024-03-11

## 2024-03-11 RX ORDER — OMEPRAZOLE 20 MG/1
20 CAPSULE, DELAYED RELEASE ORAL
Qty: 28 CAPSULE | Refills: 0 | Status: SHIPPED | OUTPATIENT
Start: 2024-03-11 | End: 2024-03-25

## 2024-03-11 RX ORDER — METRONIDAZOLE 250 MG/1
250 TABLET ORAL 4 TIMES DAILY
Qty: 56 TABLET | Refills: 0 | Status: SHIPPED | OUTPATIENT
Start: 2024-03-11 | End: 2024-03-25

## 2024-03-11 RX ORDER — BISMUTH SUBSALICYLATE 262 MG/1
2 TABLET, CHEWABLE ORAL 4 TIMES DAILY
Qty: 112 TABLET | Refills: 0 | Status: SHIPPED | OUTPATIENT
Start: 2024-03-11 | End: 2024-03-25

## 2024-03-11 RX ORDER — DULAGLUTIDE 4.5 MG/.5ML
4.5 INJECTION, SOLUTION SUBCUTANEOUS WEEKLY
Qty: 2 ML | Refills: 2 | Status: SHIPPED | OUTPATIENT
Start: 2024-03-11

## 2024-03-11 NOTE — TELEPHONE ENCOUNTER
Scheduled for:  Colonoscopy 96424  Provider Name:    Date:  6/28/2024  Location:  [facility]  Sedation:  MAC  Time:  9:45 am ,(pt is aware to arrive at 8:45 am)  Prep: Golytely   Meds/Allergies Reconciled?: Kathryn/SYED reviewed.   Diagnosis with codes:  Screening for Colon Cancer Z12.11, Constipation K59.00  Was patient informed to call insurance with codes (Y/N):  Yes  Referral sent?:  Referral was sent at the time of electronic surgical scheduling.  [facility] or [facility] notified?:   I sent an electronic request to Endo Scheduling and received a confirmation today.  Medication Orders: Hold trulicuty 1 week prior to procedure.    [facility]: GI RNs - please reach out to Dr. Schmitz for insulin dosing prior to procedure      Further instructions given by staff:  I discussed the prep instructions with the patient which she verbally understood. Provided patient with prep instructions and cancellation policy at the time of office visit.

## 2024-03-11 NOTE — TELEPHONE ENCOUNTER
GI RN,    Patient is scheduled for Colonoscopy on 6/28/2024.    GI RNs - please reach out to Dr. Schmitz for insulin dosing prior to procedure.    Thanks!

## 2024-03-12 ENCOUNTER — TELEPHONE (OUTPATIENT)
Dept: ENDOCRINOLOGY CLINIC | Facility: CLINIC | Age: 59
End: 2024-03-12

## 2024-03-12 ENCOUNTER — TELEPHONE (OUTPATIENT)
Facility: CLINIC | Age: 59
End: 2024-03-12

## 2024-03-12 NOTE — TELEPHONE ENCOUNTER
Refill Request for medication(s):     Last Office Visit: 1/19/24     Last Refill:    Pharmacy, Dosage verified:     Condition Update (if applicable):     Rx pended and sent to provider for approval, please advise. Thank You!

## 2024-03-12 NOTE — TELEPHONE ENCOUNTER
----- Message from SYED Valera sent at 3/11/2024  3:41 PM CDT -----  GI RNs - please reach out to patient in 8 weeks for a repeat H. Pylori breath test for eradication. Test is ordered.     H. Pylori positive  Reports H. Pylori positive around October 2022. Patient does not recall treatment. She thought she was only given a PPI. During our clinic visit she expressed she thought she was treated with abx but stopped early because of side effects. Patient has pcn allergy, unsure if she tolerates amoxicillin.     I called patient to discuss abx treatment options. Agreed to quad therapy. Advised her to eat during medication, and take prescribed nausea med as needed.     SYED Valera

## 2024-03-12 NOTE — TELEPHONE ENCOUNTER
Received approval letter from [facility] for     Approved 2/27/24-12/27/24  Request  ID- [id number]    Units approved - 10     Sent to scanning.

## 2024-03-13 ENCOUNTER — TELEPHONE (OUTPATIENT)
Dept: ENDOCRINOLOGY CLINIC | Facility: CLINIC | Age: 59
End: 2024-03-13

## 2024-03-13 RX ORDER — ACYCLOVIR 400 MG/1
1 TABLET ORAL
Qty: 3 EACH | Refills: 4 | Status: SHIPPED | OUTPATIENT
Start: 2024-03-13

## 2024-03-13 RX ORDER — INSULIN LISPRO 100 [IU]/ML
INJECTION, SOLUTION INTRAVENOUS; SUBCUTANEOUS
Qty: 50 ML | Refills: 3 | Status: SHIPPED | OUTPATIENT
Start: 2024-03-13

## 2024-03-13 NOTE — TELEPHONE ENCOUNTER
Received update from Claudine from [facility]:    \"Good Morning Ladies,    I confirmed with Zenia David ([birth year]) that we will complete the training on 3/19/24 at 9:30AM.     It looks like I have all the paperwork required, the only thing would be if she needs a vial of insulin or not.     Thanks\"        Pended Humalog 150 units daily and Pt already on Dexcom G7 and PA approved    Current Regimen  Basaglar 48 units BID  Humalog 18 units BID  Trulicity 4.5mg/weekly

## 2024-03-13 NOTE — TELEPHONE ENCOUNTER
Pt contacted, confirmed name, and . Pt verbalizes understanding, and denies further questions.   Went over teaching via phone.

## 2024-03-13 NOTE — TELEPHONE ENCOUNTER
Medication PA Requested:       Dexcom G7 Sensors                                                     CoverMyMeds Used:  Key:  Quantity: 3  Day Supply: 30 days  Sig: Change sensor every 10 days  DX Code:  E11.9                                   CPT code (if applicable):   Case Number/Pending Ref#:

## 2024-03-14 RX ORDER — DUPILUMAB 300 MG/2ML
INJECTION, SOLUTION SUBCUTANEOUS
Qty: 4 ML | Refills: 0 | OUTPATIENT
Start: 2024-03-14

## 2024-03-15 NOTE — TELEPHONE ENCOUNTER
Refill passed per [facility] protocol. Requested Prescriptions   Pending Prescriptions Disp Refills    MONTELUKAST 10 MG Oral Tab [Pharmacy Med Name: MONTELUKAST 10MG TABLETS] 90 tablet 1     Sig: TAKE 1 TABLET(10 MG) BY MOUTH EVERY NIGHT        Asthma & COPD Medication Protocol Passed - 1/11/2022  5:56 AM        Passed - Appointment in past 6 or next 3 months             Recent Outpatient Visits              1 month ago Chronic pain of left knee    [facility], [address], [address], [place], [place]    Office Visit    3 months ago Uncontrolled type 2 diabetes mellitus with hyperglycemia [facility])    [facility] Endocrinology Kameron Mota MD    Office Visit    8 months ago [address] [place] [address], [address], [address], [place], [place]    Office Visit    11 months ago Uncontrolled type 2 diabetes mellitus with kidney complication, with long-term current use of insulin [facility])    [facility], [address], [address], [place], [place]    Office Visit    1 year ago Uncontrolled type 2 diabetes mellitus with complication, with long-term current use of insulin [facility])    [facility] Endocrinology Kameron Mota MD    Virtual Phone E/M           Future Appointments         Provider Department Appt Notes    Today [facility] [place] to reschedule appt. Provider not available at that time. mcps 1/10/22.  LS ---- DM/Policy informed    In 1 month iTago Amin, [address] 3 month f/u
pain, neck/pain, chest

## 2024-03-19 ENCOUNTER — TELEPHONE (OUTPATIENT)
Dept: ENDOCRINOLOGY CLINIC | Facility: CLINIC | Age: 59
End: 2024-03-19

## 2024-03-19 ENCOUNTER — TELEPHONE (OUTPATIENT)
Dept: FAMILY MEDICINE CLINIC | Facility: CLINIC | Age: 59
End: 2024-03-19

## 2024-03-19 NOTE — TELEPHONE ENCOUNTER
Received message from Mely GRULLON RN at pump training today (switched to next week):  \"IDALIA Knutson indicates she is going low around 3 am  she takes basaglar twice per day.  Can you or someone reach out to her-if it should be changed\"      Basaglar 48 units BID  Humalog 18 units BID with meals  Trulicity 4.5mg/weekly  (Dexcom G7 with )    LVOM 1:05pm and [employer] message sent

## 2024-03-26 ENCOUNTER — TELEPHONE (OUTPATIENT)
Dept: ENDOCRINOLOGY CLINIC | Facility: CLINIC | Age: 59
End: 2024-03-26

## 2024-03-26 NOTE — TELEPHONE ENCOUNTER
Current Outpatient Medications   Medication Sig Dispense Refill    Dulaglutide (TRULICITY) 4.5 MG/0.5ML Subcutaneous Solution Pen-injector Inject 4.5 mg into the skin once a week. 2 mL 2     On backorder

## 2024-04-08 RX ORDER — INSULIN LISPRO 200 [IU]/ML
INJECTION, SOLUTION SUBCUTANEOUS
Qty: 60 ML | Refills: 1 | Status: SHIPPED | OUTPATIENT
Start: 2024-04-08

## 2024-04-08 NOTE — TELEPHONE ENCOUNTER
Dr. Schmitz, ([facility])  Message from patient regarding iLet pump:  my blood sugars have been too high the iLet it is not dosing enough insulin for me and plus I ran out of supplies. I called for them. They said they were going to send it out Monday today so I think it’ll be a couple days before it gets to me, but it has been very high so I started therapy with insulin until my supplies come in I think the iLet is not set correctly thank you. Have a nice day.     Message from iLet Diabetes Specialist, Claudine cristina 4/2/24:  Zenia started the iLet last week Tuesday, her SN is [id number] à if you haven't already you can add her into your HCP portal to view the reports.    Overall Zenia is doing quite well.  She has found it to be fairly simple to announce meals and has been working on consistency with that.  Her time in range is over 55% and she is not having any low glucoses.  She was a bit frustrated with her glucose being higher and I'm not sure where her glucose time in range was prior to starting.  She is changing her infusion site every 2 days so she may need to get an updated prescription for changing her site more often, to ensure she doesn't run out of supplies.      Since she is using over 100 units of insulin per day, sometimes providers have prescribed U-200 insulin in the iLet.  It is off label but could be an option if she continues to need higher amounts.  Another option can sometimes be a single shot of basal insulin daily just to help reduce overall needs via the iLet and allow for less frequent site changes if that becomes an issue for her.  Of note- if she switched to a U200 insulin, she MUST factory reset the iLet and have it adapt to her again due to risks of hypoglycemia.    Overall, Zenia seems pretty happy with the transition, the iLet will continue to adapt to her as well, so it just might not be there yet.  It can give up to 24 units in a single meal announcement and it isn't up to that amount yet.       Please let me know if you or Dr. Schmitz have any questions on the report.      We are waiting to hear from [employer] about setting up HCP portal  Thanks

## 2024-04-18 ENCOUNTER — OFFICE VISIT (OUTPATIENT)
Dept: FAMILY MEDICINE CLINIC | Facility: CLINIC | Age: 59
End: 2024-04-18
Payer: MEDICAID

## 2024-04-18 ENCOUNTER — TELEPHONE (OUTPATIENT)
Dept: ENDOCRINOLOGY CLINIC | Facility: CLINIC | Age: 59
End: 2024-04-18

## 2024-04-18 ENCOUNTER — HOSPITAL ENCOUNTER (OUTPATIENT)
Dept: GENERAL RADIOLOGY | Age: 59
Discharge: HOME | End: 2024-04-18
Attending: FAMILY MEDICINE
Payer: MEDICAID

## 2024-04-18 VITALS
HEIGHT: 65 IN | TEMPERATURE: 97 F | DIASTOLIC BLOOD PRESSURE: 70 MMHG | HEART RATE: 92 BPM | BODY MASS INDEX: 40.79 KG/M2 | WEIGHT: 244.81 LBS | SYSTOLIC BLOOD PRESSURE: 154 MMHG

## 2024-04-18 DIAGNOSIS — M17.0 PRIMARY OSTEOARTHRITIS OF BOTH KNEES: ICD-10-CM

## 2024-04-18 DIAGNOSIS — I10 ESSENTIAL HYPERTENSION: ICD-10-CM

## 2024-04-18 DIAGNOSIS — M25.562 CHRONIC PAIN OF BOTH KNEES: ICD-10-CM

## 2024-04-18 DIAGNOSIS — E87.5 HYPERKALEMIA: ICD-10-CM

## 2024-04-18 DIAGNOSIS — G89.29 CHRONIC PAIN OF BOTH KNEES: ICD-10-CM

## 2024-04-18 DIAGNOSIS — E11.65 UNCONTROLLED TYPE 2 DIABETES MELLITUS WITH HYPERGLYCEMIA (HCC): Primary | ICD-10-CM

## 2024-04-18 DIAGNOSIS — Z12.11 SCREENING FOR COLON CANCER: ICD-10-CM

## 2024-04-18 DIAGNOSIS — R60.0 EDEMA OF BOTH LOWER LEGS: ICD-10-CM

## 2024-04-18 DIAGNOSIS — M25.561 CHRONIC PAIN OF BOTH KNEES: ICD-10-CM

## 2024-04-18 DIAGNOSIS — M17.0 PRIMARY OSTEOARTHRITIS OF BOTH KNEES: Primary | ICD-10-CM

## 2024-04-18 DIAGNOSIS — M11.269 CHONDROCALCINOSIS OF KNEE, UNSPECIFIED LATERALITY: Chronic | ICD-10-CM

## 2024-04-18 PROCEDURE — 73562 X-RAY EXAM OF KNEE 3: CPT | Performed by: FAMILY MEDICINE

## 2024-04-18 PROCEDURE — G8510 SCR DEP NEG, NO PLAN REQD: HCPCS | Performed by: FAMILY MEDICINE

## 2024-04-18 PROCEDURE — 99215 OFFICE O/P EST HI 40 MIN: CPT | Performed by: FAMILY MEDICINE

## 2024-04-18 RX ORDER — FUROSEMIDE 40 MG/1
40 TABLET ORAL 2 TIMES DAILY
Qty: 180 TABLET | Refills: 1 | Status: SHIPPED | OUTPATIENT
Start: 2024-04-18 | End: 2024-10-15

## 2024-04-18 RX ORDER — DUPILUMAB 300 MG/2ML
INJECTION, SOLUTION SUBCUTANEOUS
COMMUNITY
Start: 2024-04-01

## 2024-04-18 NOTE — TELEPHONE ENCOUNTER
Received iLet report.  Can you contact patient?  Did she change to the Humalog U200?  Are the glucose levels improved? Thank you.

## 2024-04-18 NOTE — PROGRESS NOTES
Patient ID: Zenia David is a 59 year old female.    HPI  Chief Complaint   Patient presents with    Leg Swelling     Bilateral - has gotten worse within the last month     Medication Follow-Up     Discuss furosemide - lidocaine patch, Lisinopril     Hypertension     Last seen by me on 2/20/2024.    Pt c/o bilateral lower leg edema that worsened last month; points to just above the knees and down to her feet. She states the edema has improved at this time; however, states it worsens as the day progresses which also keeps her up at night. Hx osteoarthritis in knees bilaterally and has antalgic gait at this time. She hasn't followed up with an orthopedic for this. She states her left knee pain is worse than right. Her most recent Potassium level was 6.0. Pt has been compliant with Furosemide BID. She recently followed up with Dr. Hawley, nephrologist; I reviewed the note. Pt is scheduled to follow up with Dr. Hawley on 4/24/2024. She is taking Amlodipine regularly; I discussed this with her.  She was told to hold her ACE inhibitor.    Pt will follow up with her endocrinologist in June 2024. She is also scheduled for a colonoscopy in June 2024.     Wt Readings from Last 6 Encounters:   04/18/24 244 lb 12.8 oz   03/07/24 243 lb   02/27/24 240 lb   02/20/24 238 lb   02/07/24 240 lb   12/22/23 238 lb 9.6 oz       BMI Readings from Last 6 Encounters:   04/18/24 40.74 kg/m²   03/07/24 40.44 kg/m²   02/27/24 39.94 kg/m²   02/20/24 39.61 kg/m²   02/07/24 39.94 kg/m²   12/22/23 39.71 kg/m²       BP Readings from Last 6 Encounters:   04/18/24 154/70   03/07/24 145/70   02/27/24 110/70   02/20/24 122/70   02/07/24 151/80   12/22/23 111/63         Review of Systems   Respiratory:  Negative for shortness of breath.    Cardiovascular:  Positive for leg swelling. Negative for chest pain.   Musculoskeletal:  Positive for gait problem.           Medical History:      Past Medical History:    Acute carpal tunnel syndrome of right  wrist    Right endoscopic carpal tunnel release    Carpal tunnel syndrome, left    Chronic back pain    CKD (chronic kidney disease) stage 3, GFR 30-59 ml/min ([facility])    Diabetes mellitus (HCC)    dx 1995    Diastolic dysfunction    Grade I    Essential hypertension    dx 1995    Hyperlipidemia    Lipoma    surgical excision age 17    Macular degeneration of left eye    Neuropathy    Obesity (BMI 30-39.9)    Osteoarthritis       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 Use as directed every 10 days 3 each 4    ondansetron 4 MG Oral Tablet Dispersible Take 1 tablet (4 mg total) by mouth every 8 (eight) hours as needed for Nausea. 30 tablet 0    polyethylene glycol, PEG 3350-KCl-NaBcb-NaCl-NaSulf, 236 g Oral Recon Soln Take 4,000 mL by mouth As Directed. Take 2,000 mL the night before your procedure and 2,000 mL the morning of your procedure. Take as directed by GI clinic. Okay to substitute for generic. 1 each 0    furosemide 20 MG Oral Tab Take 1 tablet (20 mg total) by mouth 2 (two) times daily. To help with blood pressure but also to help with the elevated potassium. 60 tablet 0    Glucose Blood (ONETOUCH VERIO) In Vitro Strip Check 3 times per day 300 strip 1    Lancets (ONETOUCH DELICA PLUS [id number]) Does not apply Misc 1 Device in the morning, at noon, and at bedtime.  Check 3 times per day 300 each 1    Blood Glucose Monitoring Suppl (ONETOUCH VERIO) w/Device Does not apply Kit 1 Device daily. 1 kit 0    Continuous Blood Gluc Sensor (DEXCOM G7 SENSOR) Does not apply Misc 1 each Every 10 days. Use as directed every 10 days 3 each 11    Insulin Lispro, 1 Unit Dial, 100 UNIT/ML Subcutaneous Solution Pen-injector Inject 10 Units into the skin in the morning, at noon, and at bedtime. 15 mL 1    triamcinolone 0.1 % External Cream Apply 1 Application topically 2 (two) times daily as needed. 60 g 0    atorvastatin 20 MG Oral Tab Take 1 tablet (20 mg total) by mouth nightly. 90 tablet 3    fluticasone propionate 50 MCG/ACT Nasal Suspension 2 sprays by Nasal route daily. 3 each 3    Insulin Pen Needle (BD PEN NEEDLE DAMIAN U/F) 32G X 4 MM Does not apply Misc Inject 4 times per day 400 each 1    gabapentin 300 MG Oral Cap Take 1 capsule (300 mg total) by mouth 2 (two) times daily. 180 capsule 3    amLODIPine 10 MG Oral Tab TAKE 1 TABLET BY MOUTH EVERY DAY ALONG WITH 40 MG 90 tablet 3    clobetasol 0.05 % External Ointment Apply 1 Application. topically daily as needed. 60 g 2    tacrolimus (PROTOPIC) 0.1 % External Ointment Apply 1 Application. topically 2 (two) times daily. 60 g 3    clotrimazole 1 % External Cream Apply 1 Application. topically 2 (two) times daily. Apply to the groin area as needed 28 g 0    acetaminophen 500 MG Oral Tab Take 1 tablet (500 mg total) by mouth every 6 (six) hours as needed for Pain.      aspirin 81 MG Oral Chew Tab CSW ONE T D  6    insulin lispro (HUMALOG) 100 UNIT/ML Injection Solution Inject 150 units daily via insulin pump (Patient not taking: Reported on 4/18/2024) 50 mL 3    Dulaglutide (TRULICITY) 4.5 MG/0.5ML Subcutaneous Solution Pen-injector Inject 4.5 mg into the skin once a week. (Patient not taking: Reported on 4/18/2024) 2 mL 2    lidocaine 5 % External Patch Place 1 patch onto the skin daily. 1 patch to each knee due to osteoarthritis pain that  is constant. (Patient not taking: Reported on 4/18/2024) 60 patch 1    lisinopril 40 MG Oral Tab Take 1 tablet (40 mg total) by mouth daily. (Patient not taking: Reported on 4/18/2024) 90 tablet 3    insulin glargine (BASAGLAR KWIKPEN) 100 UNIT/ML Subcutaneous Solution Pen-injector ADMINISTER 35 UNITS UNDER THE SKIN TWICE DAILY (Patient not taking: Reported on 4/18/2024) 63 mL 0     Allergies:  Allergies   Allergen Reactions    Latex HIVES    Norvasc [Amlodipine] SWELLING    Penicillins HIVES        Physical Exam:       Physical Exam  Blood pressure 159/85, pulse 92, temperature 97 °F (36.1 °C), height 5' 5\" (1.651 m), weight 244 lb 12.8 oz, last menstrual period 07/20/2013, not currently breastfeeding.    Vitals:    04/18/24 0841 04/18/24 0902   BP: 159/85 154/70   Pulse: 92    Temp: 97 °F (36.1 °C)    Weight: 244 lb 12.8 oz    Height: 5' 5\" (1.651 m)        Physical Exam   Constitutional: Patient is oriented to person, place, and time. Patient appears well-developed and well-nourished. No distress. Antalgic gait.   Head: Normocephalic.   Eyes: Conjunctivae and EOM are normal.   Cardiovascular: Normal rate, regular rhythm and normal heart sounds.    Pulmonary/Chest: Effort normal and breath sounds normal. No respiratory distress.   Neurological: Patient is alert and oriented to person, place, and time.   Skin: Skin is warm.   Psychiatry: Normal mood and affect.  Lower legs: 1+ pretibial edema of distal half of the legs bilaterally.  No cellulitis.  No calf tenderness.                                             KNEE EXAM    KNEE EXAM: LEFT  RIGHT   Range of Motion EXT LAG = 0   FLEX = 150 (Degrees) EXT LAG = 0   FLEX = 150 (Degrees)        Effusion Mild Mild/   Swelling None None   Erythema None None   Atrophy None None        Strength        Quadriceps 5/5 5/5      Hamstrings 5/5 5/5        Joint line tenderness        Medial Positive  Positive       Lateral Positive  Positive       Pes anserinus None None       Patellar tendon None None        Sola's Medial Negative Negative   Sola's Lateral Negative Negative         Stability Testing        Anterior Drawer Negative Negative      Posterior Drawer Negative Negative      Lachman Negative Negative      Pivot Shift Negative Negative                Varus Stress        0 Degrees Negative Negative      30 Degrees Negative Negative        Valgus Stress         0 Degrees Negative Negative      30 Degrees Negative Negative        Patellar apprehension Negative Negative   Patellofemoral pain Positive  Positive        Medial facet pain Positive  Positive        Lateral facet pain Positive  Positive    Patellofemoral Crepitation Negative Positive    Hip Motion Normal Normal   Gait Antalgic Intact         Vitals reviewed.           Assessment/Plan:      Diagnoses and all orders for this visit:    Primary osteoarthritis of both knees  -     XR KNEE (3 VIEWS), RIGHT (CPT=73562); Future  -     XR KNEE (3 VIEWS), LEFT (CPT=73562); Future  XR KNEE (3 VIEWS), RIGHT (CPT=73562)    Result Date: 4/18/2024  CONCLUSION:   Mild patellofemoral joint osteoarthritis with small suprapatellar knee effusion.  Chondrocalcinosis of the knee joint.    Dictated by (CST): Fiorella Guillen MD on 4/18/2024 at 10:10 AM     Finalized by (CST): Fiorella Guillen MD on 4/18/2024 at 10:11 AM          XR KNEE (3 VIEWS), LEFT (CPT=73562)    Result Date: 4/18/2024  CONCLUSION:  Mild tricompartmental knee osteoarthritis with chondrocalcinosis, similar to 2021.    Dictated by (CST): Fiorella Guillen MD on 4/18/2024 at 10:09 AM     Finalized by (CST): Fiorella Guillen MD on 4/18/2024 at 10:10 AM           I think she may benefit from a steroid injection into the knees due to the chondrocalcinosis.  The arthritis is mild in nature.  May be the chondrocalcinosis that is causing her the significant discomfort.  Screening for colon cancer  She has an appointment with gastroenterology already for colon cancer  screening  Chronic pain of both knees  -     XR KNEE (3 VIEWS), RIGHT (CPT=73562); Future  -     XR KNEE (3 VIEWS), LEFT (CPT=73562); Future    Hyperkalemia  -     furosemide 40 MG Oral Tab; Take 1 tablet (40 mg total) by mouth 2 (two) times daily. To help with blood pressure and edema of the legs.  -     NEPHROLOGY - INTERNAL  We are going to bump up the furosemide to see if we can get the blood pressure down and the edema.  Essential hypertension  -     NEPHROLOGY - INTERNAL  Make sure to see nephrology again.  We are still going to hold off on an ACE inhibitor due to the history of elevated potassium  Edema of both lower legs  -     NEPHROLOGY - INTERNAL    Chondrocalcinosis of knee, unspecified laterality  Will review the test results with her and have her come back for a steroid injection.      Referrals (if applicable)  Orders Placed This Encounter   Procedures    NEPHROLOGY - INTERNAL     If she is booked you can ask to see one of her partners. Nephrology department phone number is [phone].    I had to take her off the Norvasc 10 mg as her edema was terrible and very painful for her.  Her skin was shiny on the shins.  I bumped up her Lasix to 40 mg twice a day that held off on any potassium supplementation as she has had a history of hyperkalemia.  Can you recheck blood pressure.  If it continues to be high I am going to need your input on what else to possibly put her on.  She also has diabetes and we had to take her off the ACE inhibitor due to the hyperkalemia.     Referral Priority:   Routine     Referral Type:   OFFICE VISIT     Referred to Provider:   Rene Hawley MD     Requested Specialty:   NEPHROLOGY     Number of Visits Requested:   3       Follow up if symptoms persist.  Take medicine (if given) as prescribed.  Approach to treatment discussed and patient/family member understands and agrees to plan.     Return in about 1 month (around 5/18/2024) for High Blood Pressure followup.    There  are no Patient Instructions on file for this visit.    Elana Solo    4/18/2024    By signing my name below, I, Elana Solo,  attest that this documentation has been prepared under the direction and in the presence of Tiago Amni DO.   Electronically Signed: Elana Solo, 4/18/2024, 8:20 AM.      I, Tiago Amin DO,  personally performed the services described in this documentation. All medical record entries made by the scribe were at my direction and in my presence.  I have reviewed the chart and discharge instructions (if applicable) and agree that the record reflects my personal performance and is accurate and complete.  Tiaog Amin DO, 4/19/2024, 4:22 PM

## 2024-04-19 RX ORDER — DULAGLUTIDE 1.5 MG/.5ML
1.5 INJECTION, SOLUTION SUBCUTANEOUS WEEKLY
Qty: 12 EACH | Refills: 1 | Status: SHIPPED | OUTPATIENT
Start: 2024-04-19

## 2024-04-19 RX ORDER — INSULIN GLARGINE 100 [IU]/ML
10 INJECTION, SOLUTION SUBCUTANEOUS NIGHTLY
Qty: 15 ML | Refills: 0 | Status: SHIPPED | OUTPATIENT
Start: 2024-04-19

## 2024-04-19 NOTE — TELEPHONE ENCOUNTER
Spoke with patient. She changed to U200 but states her BG are very high. It was 300 this morning.  States her BG has gotten higher and higher since using the pump.    Ilet report printed and placed in your folder.

## 2024-04-19 NOTE — TELEPHONE ENCOUNTER
Called patient to discuss new iLet pump - restart pump with U200 insulin.      Also adjust iLet to lowest glucose setting.      Restart Trulicity 1.5mg subcutaneous weekly    Start Basaglar 10 units subcutaneous daily for one week

## 2024-04-22 NOTE — TELEPHONE ENCOUNTER
Care Management Note    Situation:   Outreach for enrollment.      Actions Taken:   CM outreach for enrollment call #1 - no response; left voice message asking for call back     Program plan:   Pending enrollment      Ok, noted.   Agree with plan below per AM.

## 2024-04-24 ENCOUNTER — OFFICE VISIT (OUTPATIENT)
Dept: NEPHROLOGY | Facility: CLINIC | Age: 59
End: 2024-04-24
Payer: MEDICAID

## 2024-04-24 VITALS
DIASTOLIC BLOOD PRESSURE: 60 MMHG | HEART RATE: 88 BPM | BODY MASS INDEX: 38.82 KG/M2 | WEIGHT: 233 LBS | HEIGHT: 65 IN | SYSTOLIC BLOOD PRESSURE: 110 MMHG

## 2024-04-24 DIAGNOSIS — N18.32 STAGE 3B CHRONIC KIDNEY DISEASE (HCC): Primary | ICD-10-CM

## 2024-04-24 DIAGNOSIS — I10 PRIMARY HYPERTENSION: ICD-10-CM

## 2024-04-24 DIAGNOSIS — E11.21 DIABETIC NEPHROPATHY ASSOCIATED WITH TYPE 2 DIABETES MELLITUS (HCC): ICD-10-CM

## 2024-04-24 DIAGNOSIS — E87.5 HYPERKALEMIA: ICD-10-CM

## 2024-04-24 DIAGNOSIS — R80.1 PERSISTENT PROTEINURIA: ICD-10-CM

## 2024-04-24 DIAGNOSIS — E78.2 MIXED HYPERLIPIDEMIA: ICD-10-CM

## 2024-04-24 PROCEDURE — 99214 OFFICE O/P EST MOD 30 MIN: CPT | Performed by: INTERNAL MEDICINE

## 2024-04-24 NOTE — PROGRESS NOTES
Chief Complaint   Patient presents with    Follow - Up       Nephrology Progress Note     59 year old female with past medical history of T2DM (dx 1995) complicated by retinopathy and neuropathy, HTN (dx 1995), HLD, CKD 3b, osteoarthritis (knees) is here for follow up of CKD 3b and hyperkalemia.     [id number]: Cr mostly 1-1.2  2020: Cr 1.28 5/7 2021: Cr 1.96 4/22 2022: Cr 1.21 1/2, 1.44 10/10  2023: Admitted in March (3/11-3/22). Cr 1.51 on admission and 0.69 at [facility].   Cr 1.74 5/22, 1.89 8/10, increased to 2.07 8/14, improved to 1.3 12/22 2024: Cr 1.38 2/20  No recent labs done     Denies NSAIDs use. Takes Tylenol for knee pain.      CT abd/pelvis (1/3/22) showed: \"stable mildly prominent extrarenal pelves, left greater than right, without maciel hydronephrosis or hydroureter. There is perinephric fat stranding bilaterally without perinephric fluid collection. There is a stable 1.1 cm cyst in the inferior left renal pole.\"     Hyperkalemia: Potassium increased to 6 2/20, repeat 5.8 2/23. Lisinopril stopped.      Proteinuria: Microalb/Cr 94 mg 1/8/19, 60 mg 4/22/21, 157 mg 10/10/22, 137 mg 8/14/23, 310 mg 2/20/24.      T2DM: She had an insulin pump placed about 4 weeks ago. BG are improving.   Taking Basaglar 10 units daily, and Trulicity 1.5 mg weekly.   Did not tolerate Farxiga due to yeast infections.   Recent a1c 10.3 2/2024. a1c has been mostly 8-9 range since 2017  DM is complicated by retinopathy, neuropathy, and nephropathy.    Following with Dr Schmitz.      HTN: /60 today without meds.   Off Amlodipine due to swelling and Lisinopril due to hyperkalemia.      HLD: Chol 118, Trig 110, HDL 41, LDL 57 8/2023. Taking Lipitor 20 mg daily.       Anemia: Hb 11.3 2/2024.      Lower extremity edema: Recently worsened. Amlodipine stopped and Furosemide increased. Now only has trace-1+ edema. Taking Furosemide 40 mg BID.         FHx:   Mother (D) - DM  Father (passed away in 40s) - CAD, CVA, alcohol use.   Sisters  x 2 (D) - DM  No family history of CKD.      SHx: History of smoking, 1/2PPD x 15 yrs, quit 2016. Does not drink alcohol currently.   . Has 4 sons and 2 daughters. Youngest son lives with her.       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 12 each 1    insulin glargine (BASAGLAR KWIKPEN) 100 UNIT/ML Subcutaneous Solution Pen-injector Inject 10 Units into the skin nightly. 15 mL 0    DUPIXENT 300 MG/2ML Subcutaneous Solution Pen-injector       furosemide 40 MG Oral Tab Take 1 tablet (40 mg total) by mouth 2 (two) times daily. To help with blood pressure and edema of the legs. 180 tablet 1    Insulin Lispro (HUMALOG KWIKPEN) 200 UNIT/ML Subcutaneous Solution Pen-injector Inject 150 units subcutaneous daily via insulin pump 60 mL 1    Continuous Blood Gluc Sensor (DEXCOM G7 SENSOR) Does not apply Misc 1 each Every 10 days. Use as directed every 10 days 3 each 4    insulin lispro (HUMALOG) 100 UNIT/ML Injection  Solution Inject 150 units daily via insulin pump 50 mL 3    ondansetron 4 MG Oral Tablet Dispersible Take 1 tablet (4 mg total) by mouth every 8 (eight) hours as needed for Nausea. 30 tablet 0    Glucose Blood (ONETOUCH VERIO) In Vitro Strip Check 3 times per day 300 strip 1    Lancets (ONETOUCH DELICA PLUS [id number]) Does not apply Misc 1 Device in the morning, at noon, and at bedtime. Check 3 times per day 300 each 1    Blood Glucose Monitoring Suppl (ONETOUCH VERIO) w/Device Does not apply Kit 1 Device daily. 1 kit 0    Continuous Blood Gluc Sensor (DEXCOM G7 SENSOR) Does not apply Misc 1 each Every 10 days. Use as directed every 10 days 3 each 11    Insulin Lispro, 1 Unit Dial, 100 UNIT/ML Subcutaneous Solution Pen-injector Inject 10 Units into the skin in the morning, at noon, and at bedtime. 15 mL 1    triamcinolone 0.1 % External Cream Apply 1 Application topically 2 (two) times daily as needed. 60 g 0    insulin glargine (BASAGLAR KWIKPEN) 100 UNIT/ML Subcutaneous Solution Pen-injector ADMINISTER 35 UNITS UNDER THE SKIN TWICE DAILY 63 mL 0    atorvastatin 20 MG Oral Tab Take 1 tablet (20 mg total) by mouth nightly. 90 tablet 3    fluticasone propionate 50 MCG/ACT Nasal Suspension 2 sprays by Nasal route daily. 3 each 3    Insulin Pen Needle (BD PEN NEEDLE DAMIAN U/F) 32G X 4 MM Does not apply Misc Inject 4 times per day 400 each 1    gabapentin 300 MG Oral Cap Take 1 capsule (300 mg total) by mouth 2 (two) times daily. 180 capsule 3    clobetasol 0.05 % External Ointment Apply 1 Application. topically daily as needed. 60 g 2    tacrolimus (PROTOPIC) 0.1 % External Ointment Apply 1 Application. topically 2 (two) times daily. 60 g 3    clotrimazole 1 % External Cream Apply 1 Application. topically 2 (two) times daily. Apply to the groin area as needed 28 g 0    acetaminophen 500 MG Oral Tab Take 1 tablet (500 mg total) by mouth every 6 (six) hours as needed for Pain.      aspirin 81 MG Oral Chew Tab CSW ONE T D   6    Dulaglutide (TRULICITY) 4.5 MG/0.5ML Subcutaneous Solution Pen-injector Inject 4.5 mg into the skin once a week. (Patient not taking: Reported on 4/18/2024) 2 mL 2    polyethylene glycol, PEG 3350-KCl-NaBcb-NaCl-NaSulf, 236 g Oral Recon Soln Take 4,000 mL by mouth As Directed. Take 2,000 mL the night before your procedure and 2,000 mL the morning of your procedure. Take as directed by GI clinic. Okay to substitute for generic. (Patient not taking: Reported on 4/24/2024) 1 each 0    lidocaine 5 % External Patch Place 1 patch onto the skin daily. 1 patch to each knee due to osteoarthritis pain that is constant. (Patient not taking: Reported on 4/18/2024) 60 patch 1    lisinopril 40 MG Oral Tab Take 1 tablet (40 mg total) by mouth daily. (Patient not taking: Reported on 4/18/2024) 90 tablet 3       Allergies:  Allergies   Allergen Reactions    Latex HIVES    Norvasc [Amlodipine] SWELLING    Penicillins HIVES         ROS:     Review of Systems   Constitutional:  Negative for chills, fatigue and fever.   Respiratory:  Negative for cough and shortness of breath.    Cardiovascular:  Negative for chest pain and leg swelling.   Gastrointestinal:  Positive for constipation. Negative for abdominal pain, diarrhea, nausea and vomiting.   Genitourinary:  Negative for difficulty urinating, dysuria and flank pain.   Musculoskeletal:  Positive for arthralgias (knees).          Vitals:    04/24/24 1446   BP: 110/60   Pulse: 88       PHYSICAL EXAM:   Physical Exam  Constitutional:       Appearance: Normal appearance. She is obese.   Cardiovascular:      Rate and Rhythm: Normal rate and regular rhythm.      Heart sounds: Normal heart sounds.   Pulmonary:      Effort: Pulmonary effort is normal.      Breath sounds: Normal breath sounds.   Musculoskeletal:         General: Normal range of motion.      Right lower leg: Edema (1+) present.      Left lower leg: Edema (trace) present.   Skin:     General: Skin is warm and dry.    Neurological:      General: No focal deficit present.      Mental Status: She is alert and oriented to person, place, and time.   Psychiatric:         Mood and Affect: Mood normal.         Behavior: Behavior normal.             ASSESSMENT/PLAN:   Assessment   Encounter Diagnoses   Name Primary?    Stage 3b chronic kidney disease (HCC) Yes    Persistent proteinuria     Hyperkalemia     Diabetic nephropathy associated with type 2 diabetes mellitus (HCC)     Primary hypertension     Mixed hyperlipidemia        CKD 3b:   Likely due to diabetic nephropathy and hypertensive nephrosclerosis  Recent Cr 1.38 eGFR 44 2/20/24, at baseline.   Advised to avoid NSAIDs and take Tylenol/acetaminophen for pain.   Asked to restrict sodium to 2 grams per day and instructions on low protein diet given.   Advised tight control of DM and HTN to prevent complications including progressive CKD, CAD or CVA.     Proteinuria:   Likely due to diabetic nephropathy and hypertensive nephrosclerosis  Off Lisinopril due to hyperkalemia.      Hyperkalemia:   Likely due to diet, Lisinopril and CKD  Following low potassium diet.   Off Lisinopril.      DM:   Uncontrolled. Improving with insulin pump. Cont insulin per Endo.      HTN:   Controlled without meds.     HLD:   Stable. Cont meds.     Lower extremity edema:   Edema is improved. Asked to decrease Furosemide 40 mg to daily.       Labs already ordered. Asked to do it soon.       Follow up in 4 months.            Orders This Visit:  No orders of the defined types were placed in this encounter.      Meds This Visit:  Requested Prescriptions      No prescriptions requested or ordered in this encounter       Imaging & Referrals:  None       30 minutes spent in the care of the patient which included: reviewing prior records, obtaining history and examining patient, discussing lab results and treatment plan, and documentation.      Rene Hawley MD  4/24/2024

## 2024-05-10 ENCOUNTER — LAB ENCOUNTER (OUTPATIENT)
Dept: LAB | Age: 59
End: 2024-05-10
Attending: INTERNAL MEDICINE

## 2024-05-10 DIAGNOSIS — E11.21 DIABETIC NEPHROPATHY ASSOCIATED WITH TYPE 2 DIABETES MELLITUS (HCC): ICD-10-CM

## 2024-05-10 DIAGNOSIS — N18.32 STAGE 3B CHRONIC KIDNEY DISEASE (HCC): ICD-10-CM

## 2024-05-10 DIAGNOSIS — R80.1 PERSISTENT PROTEINURIA: ICD-10-CM

## 2024-05-10 DIAGNOSIS — E87.5 HYPERKALEMIA: ICD-10-CM

## 2024-05-10 LAB
ALBUMIN SERPL-MCNC: 4.2 G/DL (ref 3.2–4.8)
ALBUMIN/GLOB SERPL: 1.4 {RATIO} (ref 1–2)
ALP LIVER SERPL-CCNC: 104 U/L
ALT SERPL-CCNC: 36 U/L
ANION GAP SERPL CALC-SCNC: 7 MMOL/L (ref 0–18)
AST SERPL-CCNC: 26 U/L (ref ?–34)
BASOPHILS # BLD AUTO: 0.12 X10(3) UL (ref 0–0.2)
BASOPHILS NFR BLD AUTO: 1.1 %
BILIRUB SERPL-MCNC: 0.2 MG/DL (ref 0.2–1.1)
BILIRUB UR QL: NEGATIVE
BUN BLD-MCNC: 40 MG/DL (ref 9–23)
BUN/CREAT SERPL: 30.5 (ref 10–20)
CALCIUM BLD-MCNC: 9.3 MG/DL (ref 8.7–10.4)
CHLORIDE SERPL-SCNC: 105 MMOL/L (ref 98–112)
CLARITY UR: CLEAR
CO2 SERPL-SCNC: 29 MMOL/L (ref 21–32)
CREAT BLD-MCNC: 1.31 MG/DL
CREAT UR-SCNC: 21.3 MG/DL
DEPRECATED RDW RBC AUTO: 48.1 FL (ref 35.1–46.3)
EGFRCR SERPLBLD CKD-EPI 2021: 47 ML/MIN/1.73M2 (ref 60–?)
EOSINOPHIL # BLD AUTO: 0.79 X10(3) UL (ref 0–0.7)
EOSINOPHIL NFR BLD AUTO: 7.1 %
ERYTHROCYTE [DISTWIDTH] IN BLOOD BY AUTOMATED COUNT: 14.2 % (ref 11–15)
FASTING STATUS PATIENT QL REPORTED: NO
GLOBULIN PLAS-MCNC: 3 G/DL (ref 2–3.5)
GLUCOSE BLD-MCNC: 127 MG/DL (ref 70–99)
GLUCOSE UR-MCNC: NORMAL MG/DL
HCT VFR BLD AUTO: 32.8 %
HGB BLD-MCNC: 10.9 G/DL
HGB UR QL STRIP.AUTO: NEGATIVE
IMM GRANULOCYTES # BLD AUTO: 0.09 X10(3) UL (ref 0–1)
IMM GRANULOCYTES NFR BLD: 0.8 %
KETONES UR-MCNC: NEGATIVE MG/DL
LEUKOCYTE ESTERASE UR QL STRIP.AUTO: 250
LYMPHOCYTES # BLD AUTO: 3.11 X10(3) UL (ref 1–4)
LYMPHOCYTES NFR BLD AUTO: 28.1 %
MCH RBC QN AUTO: 30.6 PG (ref 26–34)
MCHC RBC AUTO-ENTMCNC: 33.2 G/DL (ref 31–37)
MCV RBC AUTO: 92.1 FL
MICROALBUMIN UR-MCNC: 18 MG/DL
MICROALBUMIN/CREAT 24H UR-RTO: 845.1 UG/MG (ref ?–30)
MONOCYTES # BLD AUTO: 0.91 X10(3) UL (ref 0.1–1)
MONOCYTES NFR BLD AUTO: 8.2 %
NEUTROPHILS # BLD AUTO: 6.06 X10 (3) UL (ref 1.5–7.7)
NEUTROPHILS # BLD AUTO: 6.06 X10(3) UL (ref 1.5–7.7)
NEUTROPHILS NFR BLD AUTO: 54.7 %
NITRITE UR QL STRIP.AUTO: NEGATIVE
OSMOLALITY SERPL CALC.SUM OF ELEC: 303 MOSM/KG (ref 275–295)
PH UR: 6.5 [PH] (ref 5–8)
PHOSPHATE SERPL-MCNC: 2.7 MG/DL (ref 2.4–5.1)
PLATELET # BLD AUTO: 282 10(3)UL (ref 150–450)
POTASSIUM SERPL-SCNC: 4.8 MMOL/L (ref 3.5–5.1)
PROT SERPL-MCNC: 7.2 G/DL (ref 5.7–8.2)
PROT UR-MCNC: 20 MG/DL
PTH-INTACT SERPL-MCNC: 83.2 PG/ML (ref 18.5–88)
RBC # BLD AUTO: 3.56 X10(6)UL
SODIUM SERPL-SCNC: 141 MMOL/L (ref 136–145)
SP GR UR STRIP: 1.01 (ref 1–1.03)
UROBILINOGEN UR STRIP-ACNC: NORMAL
VIT D+METAB SERPL-MCNC: 37.5 NG/ML (ref 30–100)
WBC # BLD AUTO: 11.1 X10(3) UL (ref 4–11)

## 2024-05-10 PROCEDURE — 82043 UR ALBUMIN QUANTITATIVE: CPT

## 2024-05-10 PROCEDURE — 82570 ASSAY OF URINE CREATININE: CPT

## 2024-05-10 PROCEDURE — 84100 ASSAY OF PHOSPHORUS: CPT

## 2024-05-10 PROCEDURE — 36415 COLL VENOUS BLD VENIPUNCTURE: CPT

## 2024-05-10 PROCEDURE — 82306 VITAMIN D 25 HYDROXY: CPT

## 2024-05-10 PROCEDURE — 85025 COMPLETE CBC W/AUTO DIFF WBC: CPT

## 2024-05-10 PROCEDURE — 80053 COMPREHEN METABOLIC PANEL: CPT

## 2024-05-10 PROCEDURE — 81001 URINALYSIS AUTO W/SCOPE: CPT

## 2024-05-10 PROCEDURE — 83970 ASSAY OF PARATHORMONE: CPT

## 2024-05-15 ENCOUNTER — TELEPHONE (OUTPATIENT)
Dept: NEPHROLOGY | Facility: CLINIC | Age: 59
End: 2024-05-15

## 2024-05-15 DIAGNOSIS — R80.1 PERSISTENT PROTEINURIA: ICD-10-CM

## 2024-05-15 DIAGNOSIS — N18.32 STAGE 3B CHRONIC KIDNEY DISEASE (HCC): Primary | ICD-10-CM

## 2024-05-15 DIAGNOSIS — E87.5 HYPERKALEMIA: ICD-10-CM

## 2024-05-16 ENCOUNTER — OFFICE VISIT (OUTPATIENT)
Dept: FAMILY MEDICINE CLINIC | Facility: CLINIC | Age: 59
End: 2024-05-16

## 2024-05-16 VITALS
HEIGHT: 65 IN | HEART RATE: 80 BPM | TEMPERATURE: 97 F | BODY MASS INDEX: 40.38 KG/M2 | WEIGHT: 242.38 LBS | SYSTOLIC BLOOD PRESSURE: 158 MMHG | DIASTOLIC BLOOD PRESSURE: 78 MMHG

## 2024-05-16 DIAGNOSIS — E87.5 HYPERKALEMIA: ICD-10-CM

## 2024-05-16 DIAGNOSIS — I10 ESSENTIAL HYPERTENSION: Primary | ICD-10-CM

## 2024-05-16 DIAGNOSIS — R60.0 EDEMA OF BOTH LEGS: ICD-10-CM

## 2024-05-16 PROCEDURE — 99214 OFFICE O/P EST MOD 30 MIN: CPT | Performed by: FAMILY MEDICINE

## 2024-05-16 RX ORDER — LOSARTAN POTASSIUM 100 MG/1
100 TABLET ORAL DAILY
Qty: 90 TABLET | Refills: 1 | Status: SHIPPED | OUTPATIENT
Start: 2024-05-16

## 2024-05-16 NOTE — PROGRESS NOTES
Patient ID: Zenia David is a 59 year old female.    HPI  Chief Complaint   Patient presents with    Blood Pressure     Follow up      Last seen by me on 4/18/2024.    Pt is not taking any HTN medications as directed per me due to hyperkalemia.  Pt regularly follows up with a nephrologist; was told to change the furosemide to once daily for leg edema.  She states the leg swelling is much milder than before. I discussed this with her.  She was told that her kidney function is now normal and she needs to get back on blood pressure medications.  Patient states she has no chest pain or trouble breathing.  She has endocrinology appointment June 26 due to diabetes.      Wt Readings from Last 6 Encounters:   05/16/24 242 lb 6.4 oz   04/24/24 233 lb   04/18/24 244 lb 12.8 oz   03/07/24 243 lb   02/27/24 240 lb   02/20/24 238 lb       BMI Readings from Last 6 Encounters:   05/16/24 40.34 kg/m²   04/24/24 38.77 kg/m²   04/18/24 40.74 kg/m²   03/07/24 40.44 kg/m²   02/27/24 39.94 kg/m²   02/20/24 39.61 kg/m²       BP Readings from Last 6 Encounters:   05/16/24 158/78   04/24/24 110/60   04/18/24 154/70   03/07/24 145/70   02/27/24 110/70   02/20/24 122/70         Review of Systems   Respiratory:  Negative for shortness of breath.    Cardiovascular:  Negative for chest pain.           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 15 mL 0    DUPIXENT 300 MG/2ML Subcutaneous Solution Pen-injector       furosemide 40 MG Oral Tab Take 1 tablet (40 mg total) by mouth 2 (two) times daily. To help with blood pressure and edema of the legs. 180 tablet 1    Insulin Lispro (HUMALOG KWIKPEN) 200 UNIT/ML Subcutaneous Solution Pen-injector Inject 150 units subcutaneous daily via insulin pump 60 mL 1    Continuous Blood Gluc Sensor (DEXCOM G7 SENSOR) Does not apply Misc 1 each Every 10 days. Use as directed every 10 days 3 each 4    insulin lispro (HUMALOG) 100 UNIT/ML Injection Solution Inject 150 units daily via insulin pump 50 mL 3    ondansetron 4 MG Oral Tablet Dispersible Take 1 tablet (4 mg total) by mouth every 8 (eight) hours as needed for Nausea. 30 tablet 0    Glucose Blood (ONETOUCH VERIO) In Vitro Strip Check 3 times per day 300 strip 1    Lancets (ONETOUCH DELICA PLUS ABCIFR39O) Does not apply Misc 1 Device in the morning, at noon, and at bedtime. Check 3 times per day 300 each 1    Blood Glucose Monitoring Suppl (ONETOUCH VERIO) w/Device Does not apply Kit 1 Device daily. 1 kit 0    Continuous Blood Gluc Sensor (DEXCOM G7 SENSOR) Does not apply Misc 1 each Every 10 days. Use as directed every 10 days 3 each 11    Insulin Lispro, 1 Unit Dial, 100 UNIT/ML Subcutaneous Solution Pen-injector Inject 10 Units into the skin in the morning, at noon, and at bedtime. 15 mL 1    triamcinolone 0.1 % External Cream Apply 1 Application topically 2 (two) times daily as needed. 60 g 0    insulin glargine (BASAGLAR KWIKPEN) 100 UNIT/ML Subcutaneous Solution Pen-injector  ADMINISTER 35 UNITS UNDER THE SKIN TWICE DAILY 63 mL 0    atorvastatin 20 MG Oral Tab Take 1 tablet (20 mg total) by mouth nightly. 90 tablet 3    fluticasone propionate 50 MCG/ACT Nasal Suspension 2 sprays by Nasal route daily. 3 each 3    Insulin Pen Needle (BD PEN NEEDLE DAMIAN U/F) 32G X 4 MM Does not apply Misc Inject 4 times per day 400 each 1    gabapentin 300 MG Oral Cap Take 1 capsule (300 mg total) by mouth 2 (two) times daily. 180 capsule 3    clobetasol 0.05 % External Ointment Apply 1 Application. topically daily as needed. 60 g 2    tacrolimus (PROTOPIC) 0.1 % External Ointment Apply 1 Application. topically 2 (two) times daily. 60 g 3    clotrimazole 1 % External Cream Apply 1 Application. topically 2 (two) times daily. Apply to the groin area as needed 28 g 0    acetaminophen 500 MG Oral Tab Take 1 tablet (500 mg total) by mouth every 6 (six) hours as needed for Pain.      aspirin 81 MG Oral Chew Tab CSW ONE T D  6    Dulaglutide (TRULICITY) 1.5 MG/0.5ML Subcutaneous Solution Pen-injector Inject 1.5 mg into the skin once a week. (Patient not taking: Reported on 5/16/2024) 12 each 1     Allergies:  Allergies   Allergen Reactions    Latex HIVES    Norvasc [Amlodipine] SWELLING    Penicillins HIVES        Physical Exam:       Physical Exam  Blood pressure (!) 178/80, pulse 80, temperature 97.3 °F (36.3 °C), height 5' 5\" (1.651 m), weight 242 lb 6.4 oz, last menstrual period 07/20/2013, not currently breastfeeding.    Vitals:    05/16/24 1251 05/16/24 1307   BP: (!) 178/80 158/78   Pulse: 80    Temp: 97.3 °F (36.3 °C)    Weight: 242 lb 6.4 oz    Height: 5' 5\" (1.651 m)        Physical Exam   Constitutional: Patient is oriented to person, place, and time. Patient appears well-developed and well-nourished. No distress.   Head: Normocephalic.   Eyes: Conjunctivae and EOM are normal.   Neck: Normal range of motion. No thyromegaly present.   Cardiovascular: Normal rate, regular rhythm and normal heart sounds.     Pulmonary/Chest: Effort normal and breath sounds normal. No respiratory distress.   Lymphadenopathy: Patient has no cervical adenopathy.  Neurological: Patient is alert and oriented to person, place, and time.   Skin: Skin is warm.   Psychiatry: Normal mood and affect.  Lower legs: 1+ edema of the legs on the right. Trace pretibial edema on the left.  No ulcerations or cellulitis.    Vitals reviewed.           Assessment/Plan:      Diagnoses and all orders for this visit:    Essential hypertension  -     losartan 100 MG Oral Tab; Take 1 tablet (100 mg total) by mouth daily. For blood pressure.  -     Basic Metabolic Panel (8); Future  Lets add losartan 100 mg and see how she does.  She is diabetic so she would benefit from an ARB but we will have to watch the potassium.  In 2 weeks she has been to do a BMP for me.  Hyperkalemia  -     Basic Metabolic Panel (8); Future    Edema of both legs  Continue the furosemide per nephrology.      Referrals (if applicable)  No orders of the defined types were placed in this encounter.      Follow up if symptoms persist.  Take medicine (if given) as prescribed.  Approach to treatment discussed and patient/family member understands and agrees to plan.     No follow-ups on file.    There are no Patient Instructions on file for this visit.    Elana Solo    5/16/2024    By signing my name below, IElana,  attest that this documentation has been prepared under the direction and in the presence of Tiago Amin DO.   Electronically Signed: Elana Solo, 5/16/2024, 12:39 PM.    I, Tiago Amin DO,  personally performed the services described in this documentation. All medical record entries made by the scribe were at my direction and in my presence.  I have reviewed the chart and discharge instructions (if applicable) and agree that the record reflects my personal performance and is accurate and complete.  Tiago Amin DO, 5/16/2024, 4:49 PM

## 2024-05-31 ENCOUNTER — TELEPHONE (OUTPATIENT)
Dept: ENDOCRINOLOGY CLINIC | Facility: CLINIC | Age: 59
End: 2024-05-31

## 2024-05-31 DIAGNOSIS — E11.65 UNCONTROLLED TYPE 2 DIABETES MELLITUS WITH HYPERGLYCEMIA (HCC): Primary | ICD-10-CM

## 2024-05-31 NOTE — TELEPHONE ENCOUNTER
Patient requesting new prescription for syringes.  Please call - patient is out of syringes.  Thank you.

## 2024-06-01 NOTE — TELEPHONE ENCOUNTER
Dr Schmitz    Please advise on all diabetic (oral & insulin) adjustment orders based on the following diet modifications prior to procedure(s):Colonoscopy 06/28/2024    Day before the procedure(s), patient will be on clear liquid diet only after breakfast.    Thank you

## 2024-06-03 NOTE — TELEPHONE ENCOUNTER
Refill Request for medication(s):     Last Office Visit: 1/19/2024    Last Refill: 5/22/24 per [facility]    Pharmacy, Dosage verified: yes    Condition Update (if applicable):     Rx pended and sent to provider for approval, please advise. Thank You!

## 2024-06-03 NOTE — TELEPHONE ENCOUNTER
Ok to continue pump during prep.  Remove pump at midnight before procedure.  Restart after procedure. Thanks.

## 2024-06-04 RX ORDER — DUPILUMAB 300 MG/2ML
300 INJECTION, SOLUTION SUBCUTANEOUS
Qty: 4 ML | Refills: 0 | Status: SHIPPED | OUTPATIENT
Start: 2024-06-04

## 2024-06-06 ENCOUNTER — TELEPHONE (OUTPATIENT)
Facility: CLINIC | Age: 59
End: 2024-06-06

## 2024-06-06 NOTE — TELEPHONE ENCOUNTER
1st,overdue reminder letter mailed out to patient   Labs order  Helicobacter Pylori Breath Test, Adult (Order #[id number]) on 3/11/24

## 2024-06-10 RX ORDER — INSULIN LISPRO 200 [IU]/ML
INJECTION, SOLUTION SUBCUTANEOUS
Qty: 60 ML | Refills: 1 | Status: SHIPPED | OUTPATIENT
Start: 2024-06-10 | End: 2024-06-11

## 2024-06-10 NOTE — TELEPHONE ENCOUNTER
[employer]asvir insulin pump supplies: I called [facility] [phone].  Spoke with representative [employer]. Currently she is receiving QTY 10 infusion sets and 10 cartridges per 30 days. Currently their order paperwork does not specify how often she is to change the sets. So she is dispensed the standard which is every 3 days. Her insurance will cover up to 2 days changing.     Dr. Schmitz, Ok to update order with DME for patient to change sets every 2 days?     Pharmacy not open yet. Will call later today to check on U-200 insulin and dexcom sensors.

## 2024-06-10 NOTE — TELEPHONE ENCOUNTER
Spoke to rep at [facility] at number [phone]. Needs additional supply letter to change order for infusion set to  be changed every 2 days. Waiting for fax from [facility] services       Per [facility], Dexcom g7 sensors too soon per insurance, ready until the 6/12.  U200 insulin needs refill to be sent. Rx sent per protocol.    LVM for patient to call back,  message sent to notifiy her of refills.        Passed for U-200 insulin - Endocrine refill protocol for rapid acting, regular, intermediate, and mixed insulin:      Protocol Criteria:  Appointment with Endocrinology completed in the last 6 months or scheduled in the next 3 months     Verify appointment has been completed or scheduled in the appropriate timeline. If so can send a 90 day supply with 1 refill.   Verify A1C has been completed in the last 6 months and is <8.5%    -May substitute prescriptions for Novolog and Humalog unless documented allergy (pens and vials) at the same dose and concentration per insurance preference and provider protocol.   -May substitute prescriptions for Novolin R and Humulin R unless documented allergy (pens and vials) at the same dose and concentration per insurance preference and provider protocol.   -May substitute prescriptions for Novolin N and Humulin N unless documented allergy (pens and vials) at the same dose and concentration per insurance preference and provider protocol.   -May substitute prescriptions for Humulin and Novolin 70/30 insulin unless documented allergy at the same dose and concentration per insurance preference and provider protocol.        Last completed office visit: 2/7/24  Next scheduled Follow up:   6/26/2024 10:45 AM Mary Schmitz MD [facility]      Last A1C result: 10.3% done 2/7/2024.

## 2024-06-11 DIAGNOSIS — E11.65 UNCONTROLLED TYPE 2 DIABETES MELLITUS WITH HYPERGLYCEMIA (HCC): ICD-10-CM

## 2024-06-11 NOTE — TELEPHONE ENCOUNTER
Endocrine refill protocol for basal insulins     Protocol Criteria:pass  - Appointment with Endocrinology completed in the last 6 months or scheduled in the next 3 months    - A1c result completed in the last 6 months and is <8.5%     Verify appointment has been completed or scheduled in the appropriate timeline. If so can send a 90 day supply with 1 refill per provider protocol.    Verify A1c has been completed within the last 6 months and is below 8.5%     Last completed office visit:02/07/24  Next scheduled Follow up: 06/26/24     Last A1c result: Last A1c value was 10.3% done 2/7/2024.

## 2024-06-11 NOTE — TELEPHONE ENCOUNTER
Refill request    Current Outpatient Medications   Medication Sig Dispense Refill    Insulin Lispro (HUMALOG KWIKPEN) 200 UNIT/ML Subcutaneous Solution Pen-injector Inject 150 units subcutaneous daily via insulin pump 60 mL 1

## 2024-06-12 RX ORDER — INSULIN LISPRO 200 [IU]/ML
INJECTION, SOLUTION SUBCUTANEOUS
Qty: 60 ML | Refills: 1 | Status: SHIPPED | OUTPATIENT
Start: 2024-06-12

## 2024-06-12 NOTE — TELEPHONE ENCOUNTER
I do not see the fax from [facility]. Called the DME company to check status. Confirmed they have not sent the fax yet. They will send it now.     Called [employer]. The patient did fill her dexcom sensor prescription. She is eligible to fill 4 pens of the Humalog U200 pens today and can fill the rest of her prescription in a few days. They will prepare the 4 pens for  today.     Called the patient for a condition update. Left message to call back. Left details of update above.

## 2024-06-12 NOTE — TELEPHONE ENCOUNTER
Ok, noted.  Glucose levels are at goal.  Lets continue current dose of insulin. We can connect her dexcom when she comes into the office in 2 weeks.  Thanks.

## 2024-06-12 NOTE — TELEPHONE ENCOUNTER
I spoke with Zenia for a condition update. She states her blood sugars are much improved. 130-200 during the day. Sometimes at night she will get an alarm that she is at 95 and sugar is trending down, she so she will have some OJ at that time. No sugars less than 70.     Currently she is on MDI.   Basaglar 65 units BID  U100 lispro    - Breakfast 16 units + CF    - lunch 35 units + CF    - dinner 35 units + CF      CF = 1: 25 >150    She was told by [facility] that she should receive a pump supply shipment today. However we are still working on paperwork to update her order to change set every 2 days. I will email Claudine the [facility] rep to contact her and trouble shoot why she is losing her site so often and why we cannot see her data on line.     Patient is now back on the dexcom G7. She does not have the clarity gerald. I tried coaching her through how to download clarity, log in, and share data, however she is not able to download the gerald. She is open to going into the [facility] to see if they are able to connect her to the clinic. Or come into the office.     She will contact [employer] regarding her U-200 supply and let us know if she is not able to fill.

## 2024-06-12 NOTE — TELEPHONE ENCOUNTER
Called and notified the patient of below message from Dr. Schmitz. I emailed [employer] Chow, to contact the patient. Will await DME paperwork.

## 2024-06-14 NOTE — TELEPHONE ENCOUNTER
IDALIA Schmitz update on patient below. She is back on her ilet pump but using U100 insulin. Report placed in your folder. Patient informed that she may  U200 insulin from her pharmacy today.     DME paperwork has still not been received. I called [facility]. Confirmed they have still not sent us the fax. It needs to be confirmed by customer service. They did send her a 1 month supply of pumps supplies delivered on 6/12/24 for changing every 3 days. Insurance will not allow to change to every 2 day changes until July. They will send the paperwork today for changing order frequency but it will not go in effect until July.     Called the patient. Confirmed she received her pump supplies for this month. She is back on the ilet pump but using U100 insulin inside. She states sugars are very good and stable. No sugars less than 70 or persistently >250. She states she is not able to fill her U200 insulin at [facility]. She is not sure why. This is contradictory to my previous conversation with her pharmacist earlier this week.     Called [facility]. There is no issue with her order. They have 4 pens of the U200 insulin ready for  with $0 copay. The rest of the order she can fill in 2 weeks.     [employer] update sent to the patient.     Also called and spoke with lobo Chow. She will reach out to the patient today to troubleshoot why she is losing her site so much. I called customer service with [employer]. The patient's serial number was incorrect. Updated and we can now see her data.

## 2024-06-26 ENCOUNTER — OFFICE VISIT (OUTPATIENT)
Dept: ENDOCRINOLOGY CLINIC | Facility: CLINIC | Age: 59
End: 2024-06-26

## 2024-06-26 ENCOUNTER — LAB ENCOUNTER (OUTPATIENT)
Dept: LAB | Age: 59
End: 2024-06-26
Attending: FAMILY MEDICINE

## 2024-06-26 ENCOUNTER — TELEPHONE (OUTPATIENT)
Dept: FAMILY MEDICINE CLINIC | Facility: CLINIC | Age: 59
End: 2024-06-26

## 2024-06-26 VITALS
DIASTOLIC BLOOD PRESSURE: 83 MMHG | BODY MASS INDEX: 40 KG/M2 | SYSTOLIC BLOOD PRESSURE: 153 MMHG | WEIGHT: 242 LBS | HEART RATE: 80 BPM

## 2024-06-26 DIAGNOSIS — E11.65 UNCONTROLLED TYPE 2 DIABETES MELLITUS WITH HYPERGLYCEMIA (HCC): Primary | ICD-10-CM

## 2024-06-26 DIAGNOSIS — I10 ESSENTIAL HYPERTENSION: ICD-10-CM

## 2024-06-26 DIAGNOSIS — A04.8 HELICOBACTER PYLORI INFECTION: ICD-10-CM

## 2024-06-26 DIAGNOSIS — E11.65 UNCONTROLLED TYPE 2 DIABETES MELLITUS WITH HYPERGLYCEMIA (HCC): ICD-10-CM

## 2024-06-26 DIAGNOSIS — E87.5 HYPERKALEMIA: ICD-10-CM

## 2024-06-26 LAB
ANION GAP SERPL CALC-SCNC: 7 MMOL/L (ref 0–18)
BUN BLD-MCNC: 46 MG/DL (ref 9–23)
BUN/CREAT SERPL: 35.7 (ref 10–20)
CALCIUM BLD-MCNC: 9.8 MG/DL (ref 8.7–10.4)
CHLORIDE SERPL-SCNC: 107 MMOL/L (ref 98–112)
CHOLEST SERPL-MCNC: 133 MG/DL (ref ?–200)
CO2 SERPL-SCNC: 26 MMOL/L (ref 21–32)
CREAT BLD-MCNC: 1.29 MG/DL
EGFRCR SERPLBLD CKD-EPI 2021: 48 ML/MIN/1.73M2 (ref 60–?)
FASTING PATIENT LIPID ANSWER: YES
FASTING STATUS PATIENT QL REPORTED: YES
GLUCOSE BLD-MCNC: 100 MG/DL (ref 70–99)
GLUCOSE BLOOD: 100
HDLC SERPL-MCNC: 39 MG/DL (ref 40–59)
HEMOGLOBIN A1C: 7.2 % (ref 4.3–5.6)
LDLC SERPL CALC-MCNC: 74 MG/DL (ref ?–100)
NONHDLC SERPL-MCNC: 94 MG/DL (ref ?–130)
OSMOLALITY SERPL CALC.SUM OF ELEC: 302 MOSM/KG (ref 275–295)
POTASSIUM SERPL-SCNC: 5.1 MMOL/L (ref 3.5–5.1)
SODIUM SERPL-SCNC: 140 MMOL/L (ref 136–145)
TEST STRIP LOT #: NORMAL NUMERIC
TRIGL SERPL-MCNC: 111 MG/DL (ref 30–149)
TSI SER-ACNC: 2.08 MIU/ML (ref 0.55–4.78)
VLDLC SERPL CALC-MCNC: 17 MG/DL (ref 0–30)

## 2024-06-26 PROCEDURE — 83013 H PYLORI (C-13) BREATH: CPT

## 2024-06-26 PROCEDURE — 84443 ASSAY THYROID STIM HORMONE: CPT

## 2024-06-26 PROCEDURE — 99214 OFFICE O/P EST MOD 30 MIN: CPT | Performed by: INTERNAL MEDICINE

## 2024-06-26 PROCEDURE — 80061 LIPID PANEL: CPT

## 2024-06-26 PROCEDURE — 36415 COLL VENOUS BLD VENIPUNCTURE: CPT

## 2024-06-26 PROCEDURE — 82947 ASSAY GLUCOSE BLOOD QUANT: CPT | Performed by: INTERNAL MEDICINE

## 2024-06-26 PROCEDURE — 80048 BASIC METABOLIC PNL TOTAL CA: CPT

## 2024-06-26 PROCEDURE — 83036 HEMOGLOBIN GLYCOSYLATED A1C: CPT | Performed by: INTERNAL MEDICINE

## 2024-06-26 NOTE — PROGRESS NOTES
Name: Zenia David  Date: 6/26/2024    HISTORY OF PRESENT ILLNESS   Zenia David is a 59 year old female who presents for hospital follow up for diabetes mellitus diagnosed 23 years ago, transitioned to insulin therapy 15 years ago.      Prior HbA, C or glycohemoglobin were 9.9% 5/2017; 10.3% 8/2017; 9.5% 2/2018; 12.7% 1/2019; 8.9% 3/2019; 7.1% 7/2019; 8.1% 10/2019; 7.0% 1/2020; 7.8% 5/2020; 10.3% 1/2022; 8.9% 4/2022; 11.7% 10/2022; 11.9% 2/2023; 9.3% 6/2023; 10.3% 2/2024; 7.2% POC Today     Dietary compliance: Poor; significant snacking at night and cheating on pasta/rice, chips, no soda     Exercise: No -->is walking with cane but limited with hip pain and neuropathy    Polyuria/polydipsia: No  Blurred vision: No    Episodes of hypoglycemia: No   Blood Glucose:  Checking 4 times per day  Reviewed Dexcom CGM - time in range 82%     Current Meds:   iLet pump - bionic pancreas   -->she is getting 55 units Basal; 10 units Breakfast, 16 units subcutaneous lunch and dinner     Metformin d/c'd due to CKD  Farxiga d/c'd due to recurrent yeast infections     REVIEW OF SYSTEMS  Eyes: Diabetic retinopathy present: Yes, diabetic cataract            Most recent visit to eye doctor in last 12 months: Yes    CV: Cardiovascular disease present: No         Hypertension present: Yes         Hyperlipidemia present: Yes         Peripheral Vascular Disease present: No    : Nephropathy present: No    Neuro: Neuropathy present: Yes    Skin: Infection or ulceration: No    Osteoporosis: No    Thyroid disease: No      Medications:     Current Outpatient Medications:     Insulin Lispro (HUMALOG KWIKPEN) 200 UNIT/ML Subcutaneous Solution Pen-injector, Inject 150 units subcutaneous daily via insulin pump, Disp: 60 mL, Rfl: 1    Insulin Syringes, Disposable, U-100 1 ML Does not apply Misc, Use insulin syringes to inject insulin three times per day with meals. E11.65 with insulin use., Disp: 300 each, Rfl: 1    DUPIXENT 300 MG/2ML  Subcutaneous Solution Pen-injector, INJECT 300 MG (2 ML) UNDER THE SKIN EVERY 2 WEEKS, Disp: 4 mL, Rfl: 0    losartan 100 MG Oral Tab, Take 1 tablet (100 mg total) by mouth daily. For blood pressure., Disp: 90 tablet, Rfl: 1    insulin glargine (BASAGLAR KWIKPEN) 100 UNIT/ML Subcutaneous Solution Pen-injector, Inject 10 Units into the skin nightly., Disp: 15 mL, Rfl: 0    furosemide 40 MG Oral Tab, Take 1 tablet (40 mg total) by mouth 2 (two) times daily. To help with blood pressure and edema of the legs., Disp: 180 tablet, Rfl: 1    Continuous Blood Gluc Sensor (DEXCOM G7 SENSOR) Does not apply Misc, 1 each Every 10 days. Use as directed every 10 days, Disp: 3 each, Rfl: 4    insulin lispro (HUMALOG) 100 UNIT/ML Injection Solution, Inject 150 units daily via insulin pump, Disp: 50 mL, Rfl: 3    Glucose Blood (ONETOUCH VERIO) In Vitro Strip, Check 3 times per day, Disp: 300 strip, Rfl: 1    Lancets (ONETOUCH DELICA PLUS GVLHCA91E) Does not apply Misc, 1 Device in the morning, at noon, and at bedtime. Check 3 times per day, Disp: 300 each, Rfl: 1    Blood Glucose Monitoring Suppl (ONETOUCH VERIO) w/Device Does not apply Kit, 1 Device daily., Disp: 1 kit, Rfl: 0    Continuous Blood Gluc Sensor (DEXCOM G7 SENSOR) Does not apply Misc, 1 each Every 10 days. Use as directed every 10 days, Disp: 3 each, Rfl: 11    Insulin Lispro, 1 Unit Dial, 100 UNIT/ML Subcutaneous Solution Pen-injector, Inject 10 Units into the skin in the morning, at noon, and at bedtime., Disp: 15 mL, Rfl: 1    triamcinolone 0.1 % External Cream, Apply 1 Application topically 2 (two) times daily as needed., Disp: 60 g, Rfl: 0    atorvastatin 20 MG Oral Tab, Take 1 tablet (20 mg total) by mouth nightly., Disp: 90 tablet, Rfl: 3    fluticasone propionate 50 MCG/ACT Nasal Suspension, 2 sprays by Nasal route daily., Disp: 3 each, Rfl: 3    Insulin Pen Needle (BD PEN NEEDLE DAMIAN U/F) 32G X 4 MM Does not apply Misc, Inject 4 times per day, Disp: 400 each,  Rfl: 1    clobetasol 0.05 % External Ointment, Apply 1 Application. topically daily as needed., Disp: 60 g, Rfl: 2    tacrolimus (PROTOPIC) 0.1 % External Ointment, Apply 1 Application. topically 2 (two) times daily., Disp: 60 g, Rfl: 3    clotrimazole 1 % External Cream, Apply 1 Application. topically 2 (two) times daily. Apply to the groin area as needed, Disp: 28 g, Rfl: 0    acetaminophen 500 MG Oral Tab, Take 1 tablet (500 mg total) by mouth every 6 (six) hours as needed for Pain., Disp: , Rfl:     aspirin 81 MG Oral Chew Tab, CSW ONE T D, Disp: , Rfl: 6    Dulaglutide (TRULICITY) 1.5 MG/0.5ML Subcutaneous Solution Pen-injector, Inject 1.5 mg into the skin once a week. (Patient not taking: Reported on 2024), Disp: 12 each, Rfl: 1    ondansetron 4 MG Oral Tablet Dispersible, Take 1 tablet (4 mg total) by mouth every 8 (eight) hours as needed for Nausea., Disp: 30 tablet, Rfl: 0    insulin glargine (BASAGLAR KWIKPEN) 100 UNIT/ML Subcutaneous Solution Pen-injector, ADMINISTER 35 UNITS UNDER THE SKIN TWICE DAILY (Patient not taking: Reported on 2024), Disp: 63 mL, Rfl: 0    gabapentin 300 MG Oral Cap, Take 1 capsule (300 mg total) by mouth 2 (two) times daily. (Patient not taking: Reported on 2024), Disp: 180 capsule, Rfl: 3     Allergies:   Allergies   Allergen Reactions    Latex HIVES    Norvasc [Amlodipine] SWELLING    Penicillins HIVES       Social History:   Social History     Socioeconomic History    Marital status:    Tobacco Use    Smoking status: Former     Current packs/day: 0.00     Average packs/day: 0.5 packs/day for 15.0 years (7.5 ttl pk-yrs)     Types: Cigarettes     Start date: 2001     Quit date: 2016     Years since quittin.4     Passive exposure: Past    Smokeless tobacco: Never   Vaping Use    Vaping status: Never Used   Substance and Sexual Activity    Alcohol use: No     Alcohol/week: 0.0 standard drinks of alcohol    Drug use: No   Other Topics Concern     Breast feeding No    Reaction to local anesthetic No    Pt has a pacemaker No    Pt has a defibrillator No       Medical History:   Past Medical History:    Acute carpal tunnel syndrome of right wrist    Right endoscopic carpal tunnel release    Carpal tunnel syndrome, left    Chronic back pain    CKD (chronic kidney disease) stage 3, GFR 30-59 ml/min (HCC)    Diabetes (HCC)    Diabetes mellitus (HCC)    dx 1995    Diastolic dysfunction    Grade I    Essential hypertension    dx 1995    High blood pressure    High cholesterol    Hyperlipidemia    Lipoma    surgical excision age 17    Macular degeneration of left eye    Neuropathy    Obesity (BMI 30-39.9)    Osteoarthritis       Surgical history:   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   Back: no kyphosis  Respiratory:  non-labored. no increased work of breathing.    Lymph Nodes:  No abnormal nodes noted  Skin:  normal moisture and skin texture  Hematologic:  no excessive bruising  Psychiatric:  oriented to time, self, and place  Bilateral barefoot skin diabetic exam is abnormal with diabetic monofilament/sensation testing abnormal and dystrophic nails and/or dry skin      ASSESSMENT/PLAN:      1. Diabetes Mellitus Type 2, Uncontrolled  -Uncontrolled, HgA1c 7.2% -->significantly improved   -Discussed importance of glycemic control to prevent complications of diabetes  -Discussed complications of diabetes include  retinopathy, neuropathy, nephropathy and cardiovascular disease  -Discussed importance of SBGM  -Discussed importance of low CHO diet, recommend 45gm per meal or 135gm per day  -Congratulated patient on improved BG levels with iLet pump   -Continue current pump settings    -Continue Dexcom CGM   -LDL improved   -Abnormal foot exam performed today   -elevated urine microalbumin 5/2024   -Reviewed importance of SBGM- check sugars three times daily. Call clinic before next appt if sugars remain persistently >200 or <80. Verbalized understanding and instructions provided in writing  -UTD with optho     A total of 30 minutes was spent on obtaining history, reviewing pertinent labs, evaluating patient, providing multiple treatment options, reinforcing diet/exercise and compliance, and completing documentation.     RTC 4 months    Mary Schmitz MD   6/26/2024

## 2024-06-27 LAB — H PYLORI BREATH TEST: NEGATIVE

## 2024-06-28 ENCOUNTER — ANESTHESIA EVENT (OUTPATIENT)
Dept: ENDOSCOPY | Facility: HOSPITAL | Age: 59
End: 2024-06-28
Payer: MEDICAID

## 2024-06-28 ENCOUNTER — HOSPITAL ENCOUNTER (OUTPATIENT)
Facility: HOSPITAL | Age: 59
Setting detail: HOSPITAL OUTPATIENT SURGERY
Discharge: HOME | End: 2024-06-28
Attending: STUDENT IN AN ORGANIZED HEALTH CARE EDUCATION/TRAINING PROGRAM | Admitting: STUDENT IN AN ORGANIZED HEALTH CARE EDUCATION/TRAINING PROGRAM
Payer: MEDICAID

## 2024-06-28 ENCOUNTER — ANESTHESIA (OUTPATIENT)
Dept: ENDOSCOPY | Facility: HOSPITAL | Age: 59
End: 2024-06-28
Payer: MEDICAID

## 2024-06-28 DIAGNOSIS — Z12.11 SCREENING FOR COLON CANCER: ICD-10-CM

## 2024-06-28 DIAGNOSIS — K59.00 CONSTIPATION, UNSPECIFIED CONSTIPATION TYPE: ICD-10-CM

## 2024-06-28 LAB — GLUCOSE BLDC GLUCOMTR-MCNC: 191 MG/DL (ref 70–99)

## 2024-06-28 PROCEDURE — 0DBP8ZX EXCISION OF RECTUM, VIA NATURAL OR ARTIFICIAL OPENING ENDOSCOPIC, DIAGNOSTIC: ICD-10-PCS | Performed by: STUDENT IN AN ORGANIZED HEALTH CARE EDUCATION/TRAINING PROGRAM

## 2024-06-28 PROCEDURE — 45380 COLONOSCOPY AND BIOPSY: CPT | Performed by: STUDENT IN AN ORGANIZED HEALTH CARE EDUCATION/TRAINING PROGRAM

## 2024-06-28 PROCEDURE — 0DBN8ZX EXCISION OF SIGMOID COLON, VIA NATURAL OR ARTIFICIAL OPENING ENDOSCOPIC, DIAGNOSTIC: ICD-10-PCS | Performed by: STUDENT IN AN ORGANIZED HEALTH CARE EDUCATION/TRAINING PROGRAM

## 2024-06-28 PROCEDURE — 45385 COLONOSCOPY W/LESION REMOVAL: CPT | Performed by: STUDENT IN AN ORGANIZED HEALTH CARE EDUCATION/TRAINING PROGRAM

## 2024-06-28 DEVICE — REPLAY HEMOSTASIS CLIP, 11MM SPAN
Type: IMPLANTABLE DEVICE | Status: FUNCTIONAL
Brand: REPLAY

## 2024-06-28 RX ORDER — SODIUM CHLORIDE 9 MG/ML
INJECTION, SOLUTION INTRAVENOUS CONTINUOUS PRN
Status: DISCONTINUED | OUTPATIENT
Start: 2024-06-28 | End: 2024-06-28 | Stop reason: SURG

## 2024-06-28 RX ORDER — NALOXONE HYDROCHLORIDE 0.4 MG/ML
0.08 INJECTION, SOLUTION INTRAMUSCULAR; INTRAVENOUS; SUBCUTANEOUS ONCE AS NEEDED
Status: DISCONTINUED | OUTPATIENT
Start: 2024-06-28 | End: 2024-06-28

## 2024-06-28 RX ORDER — SODIUM CHLORIDE, SODIUM LACTATE, POTASSIUM CHLORIDE, CALCIUM CHLORIDE 600; 310; 30; 20 MG/100ML; MG/100ML; MG/100ML; MG/100ML
INJECTION, SOLUTION INTRAVENOUS CONTINUOUS
Status: DISCONTINUED | OUTPATIENT
Start: 2024-06-28 | End: 2024-06-28

## 2024-06-28 RX ADMIN — SODIUM CHLORIDE: 9 INJECTION, SOLUTION INTRAVENOUS at 09:25:00

## 2024-06-28 NOTE — DISCHARGE INSTRUCTIONS
Home Care Instructions for Colonoscopy with Sedation    Diet:  - Resume your regular diet as tolerated unless otherwise instructed.  - Start with light meals to minimize bloating.  - Do not drink alcohol today.    Medication:  - If you have questions about resuming your normal medications, please contact your Primary Care Physician.    Activities:  - Take it easy today. Do not return to work today.  - Do not drive today.  - Do not operate any machinery today (including kitchen equipment).    Colonoscopy:  - You may notice some rectal \"spotting\" (a little blood on the toilet tissue) for a day or two after the exam. This is normal.  - If you experience any rectal bleeding (not spotting), persistent tenderness or sharp severe abdominal pains, oral temperature over 100 degrees Fahrenheit, light-headedness or dizziness, or any other problems, contact your doctor.    **If unable to reach your doctor, please go to the [facility] Emergency Room**    - Your referring physician will receive a full report of your examination.  - If you do not hear from your doctor's office within two weeks of your biopsy, please call them for your results.    You may be able to see your laboratory results in [facility] between 4 and 7 business days.  In some cases, your physician may not have viewed the results before they are released to [facility].  If you have questions regarding your results contact the physician who ordered the test/exam by phone or via [facility] by choosing \"Ask a Medical Question.\"

## 2024-06-28 NOTE — OPERATIVE REPORT
COLONOSCOPY REPORT    Zenia David     3/30/1965 Age 59 year old   PCP Tiago Amin DO Endoscopist Neto Simpson MD       Date of procedure: 24    Procedure: Colonoscopy w/ cold snare polypectomy, cold forceps polypectomy  Pre-operative diagnosis: screening    Post-operative diagnosis: polyps, hemorrhoids    Medications: MAC    Withdrawal time: 18 minutes    Procedure:  Informed consent was obtained from the patient after the risks of the procedure were discussed, including but not limited to bleeding, perforation, aspiration, infection, or possibility of a missed lesion. After discussions of the risks/benefits and alternatives to this procedure, as well as the planned sedation, the patient was placed in the left lateral decubitus position and begun on continuous blood pressure pulse oximetry and EKG monitoring and this was maintained throughout the procedure. Once an adequate level of sedation was obtained a digital rectal exam was completed. Then the lubricated tip of the [id number] diagnostic video colonoscope was inserted and advanced without difficulty to the cecum using water immersion and CO2 insufflation technique. The cecum was identified by localizing the trifold, the appendix and the ileocecal valve. Withdrawal was begun with thorough washing and careful examination of the colonic walls and folds. A routine second examination of the cecum/ascending colon was performed. Photodocumentation was obtained. The bowel prep was fair in right colon. Views of the colon were good with washing. I then carefully withdrew the instrument from the patient who tolerated the procedure well.     *Difficulty tolerating anesthesia*    Complications: none.    Findings:   1. 7 polyps noted as follows:      A. FIVE -- 2 mm polyps in the sigmoid colon; sessile morphology; cold forceps polypectomy performed, polyps retrieved.      B. 10 mm polyp in the sigmoid colon; semipedunculated morphology; cold  snare polypectomy performed, polyp retrieved. 4 hemostatic clips after resection, no bleeding seen after clip placement      C. 2 mm polyp in the rectum; sessile morphology; cold forceps polypectomy performed, polyp retrieved.    2. Diverticulosis: no large diverticula appreciate.    3. Ileocecal valve appeared healthy and normal.    4. The colonic mucosa throughout the colon showed normal vascular pattern, without evidence of angioectasias or inflammation.     5. Rectum was meticulously evaluated in antegrade view, hemorrhoids seen.    6. RIVKA: normal rectal tone, no masses palpated.     Impression:   7 polyps removed ranging in size from 2 mm to 10 mm.  Hemorrhoids.  The colon was otherwise normal with glistening mucosa and intact vascular pattern.    Recommend:  Await pathology. The interval for the next colonoscopy will be determined after reviewing pathology. If new signs or symptoms develop, colonoscopy may need to be repeated sooner.   High fiber diet.  Monitor for blood in the stool. If having more than just tinge of blood, call office or go to the ER.  AVOID NSAIDS (motrin, aleve, ibuprofen, etc) for 14 days.    >>>If tissue was obtained and you have not received your pathology results either by phone or letter within 2 weeks, please call our office at [phone].    Specimens: polyps    Blood loss: <1 ml

## 2024-06-28 NOTE — ANESTHESIA PREPROCEDURE EVALUATION
Anesthesia PreOp Note    HPI:     Zenia David is a 59 year old female who presents for preoperative consultation requested by: Neto Simpson MD    Date of Surgery: 6/28/2024    Procedure(s):  COLONOSCOPY  Indication: Screening for colon cancer/ Constipation, unspecified constipation type    Relevant Problems   No relevant active problems       NPO:  Last Liquid Consumption Date: 06/28/24  Last Liquid Consumption Time: 0600  Last Solid Consumption Date: 06/26/24  Last Solid Consumption Time: 1900  Last Liquid Consumption Date: 06/28/24          History Review:  Patient Active Problem List    Diagnosis Date Noted   • Acute respiratory failure with hypoxia ([facility]) 03/11/2023   • Community acquired pneumonia, unspecified laterality 03/11/2023   • Morbid (severe) obesity due to excess calories ([facility]) 03/29/2022   • Chondrocalcinosis of left knee 12/09/2021   • Non-rheumatic aortic sclerosis 12/09/2021   • Observation for unspecified suspected condition    • Essential hypertension 05/02/2018   • Chronic fatigue 05/02/2018   • Heart burn 05/02/2018   • Obesity (BMI 30-39.9) 05/02/2018   • Diabetic polyneuropathy associated with type 2 diabetes mellitus ([facility]) 04/30/2018   • Axonal polyneuropathy 04/30/2018   • Renal cyst, left 03/20/2018   • Splenic lesion 03/20/2018   • Uncontrolled diabetes mellitus with hyperglycemia, with long-term current use of insulin ([facility]) 03/13/2017   • Macular degeneration of left eye 03/13/2017   • Bilateral carpal tunnel syndrome 03/13/2017   • Chest pain, atypical 02/03/2017   • NERIS (acute kidney injury) ([facility]) 02/03/2017   • Acute renal failure (ARF) ([facility]) 02/03/2017       Past Medical History:   • Acute carpal tunnel syndrome of right wrist    Right endoscopic carpal tunnel release   • Carpal tunnel syndrome, left   • Chronic back pain   • CKD (chronic kidney disease) stage 3, GFR 30-59 ml/min ([facility])   • Diabetes ([facility])   • Diabetes mellitus ([facility])    dx 1995   • Diastolic dysfunction     Grade I   • Essential hypertension    dx 1995   • High blood pressure   • High cholesterol   • Hyperlipidemia   • Lipoma    surgical excision age 17   • Macular degeneration of left eye   • Neuropathy   • Obesity (BMI 30-39.9)   • Osteoarthritis       Past Surgical History:   Procedure Laterality Date   • Eye surgery      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 For blood pressure. 90 tablet 1 6/28/2024   • furosemide 40 MG Oral Tab Take 1 tablet (40 mg total) by mouth 2 (two) times daily. To help with blood pressure and edema of the legs. 180 tablet 1 6/24/2024   • insulin lispro (HUMALOG) 100 UNIT/ML Injection Solution Inject 150 units daily via insulin pump 50 mL 3 6/27/2024   • insulin glargine (BASAGLAR KWIKPEN) 100 UNIT/ML Subcutaneous Solution Pen-injector ADMINISTER 35 UNITS UNDER THE SKIN TWICE DAILY 63 mL 0 not taking   • atorvastatin 20 MG Oral Tab Take 1 tablet (20 mg total) by mouth nightly. 90 tablet 3 6/27/2024   • fluticasone propionate 50 MCG/ACT Nasal Suspension 2 sprays by Nasal route daily. 3 each 3    • acetaminophen 500 MG Oral Tab Take 1 tablet (500 mg total) by mouth every 6 (six) hours as needed for Pain.   PRN   • aspirin 81 MG Oral Chew Tab CSW ONE T D  6 6/21/2024   • Insulin Syringes, Disposable, U-100 1 ML Does not apply Misc Use insulin syringes to inject insulin three times per day with meals. E11.65 with insulin use. 300 each 1 6/27/2024   • DUPIXENT 300 MG/2ML  Subcutaneous Solution Pen-injector INJECT 300 MG (2 ML) UNDER THE SKIN EVERY 2 WEEKS 4 mL 0 6/16/2024   • insulin glargine (BASAGLAR KWIKPEN) 100 UNIT/ML Subcutaneous Solution Pen-injector Inject 10 Units into the skin nightly. 15 mL 0 6/27/2024   • Continuous Blood Gluc Sensor (DEXCOM G7 SENSOR) Does not apply Misc 1 each Every 10 days. Use as directed every 10 days 3 each 4    • ondansetron 4 MG Oral Tablet Dispersible Take 1 tablet (4 mg total) by mouth every 8 (eight) hours as needed for Nausea. 30 tablet 0    • Glucose Blood (ONETOUCH VERIO) In Vitro Strip Check 3 times per day 300 strip 1    • Lancets (ONETOUCH DELICA PLUS [id number]) Does not apply Misc 1 Device in the morning, at noon, and at bedtime. Check 3 times per day 300 each 1 PRN   • Blood Glucose Monitoring Suppl (ONETOUCH VERIO) w/Device Does not apply Kit 1 Device daily. 1 kit 0    • Continuous Blood Gluc Sensor (DEXCOM G7 SENSOR) Does not apply Misc 1 each Every 10 days. Use as directed every 10 days 3 each 11    • triamcinolone 0.1 % External Cream Apply 1 Application topically 2 (two) times daily as needed. 60 g 0 PRN   • Insulin Pen Needle (BD PEN NEEDLE DAMIAN U/F) 32G X 4 MM Does not apply Misc Inject 4 times per day 400 each 1 6/27/2024   • gabapentin 300 MG Oral Cap Take 1 capsule (300 mg total) by mouth 2 (two) times daily. (Patient not taking: Reported on 6/25/2024) 180 capsule 3 Not Taking   • clobetasol 0.05 % External Ointment Apply 1 Application. topically daily as needed. 60 g 2    • tacrolimus (PROTOPIC) 0.1 % External Ointment Apply 1 Application. topically 2 (two) times daily. 60 g 3 PRN   • clotrimazole 1 % External Cream Apply 1 Application. topically 2 (two) times daily. Apply to the groin area as needed 28 g 0      Current Facility-Administered Medications Ordered in Epic   Medication Dose Route Frequency Provider Last Rate Last Admin   • lactated ringers infusion   Intravenous Continuous Neto Simpson MD         No  current Epic-ordered outpatient medications on file.       Allergies   Allergen Reactions   • Latex HIVES   • Norvasc [Amlodipine] SWELLING   • Penicillins HIVES       Family History   Problem Relation Age of Onset   • Heart Disorder Father    • Stroke Father    • Alcohol abuse Father    • Diabetes Mother    • Diabetes Sister    • Diabetes Sister      Social History     Socioeconomic History   • Marital status:    Tobacco Use   • Smoking status: Former     Current packs/day: 0.00     Average packs/day: 0.5 packs/day for 15.0 years (7.5 ttl pk-yrs)     Types: Cigarettes     Start date: 2001     Quit date: 2016     Years since quittin.4     Passive exposure: Past   • Smokeless tobacco: Never   Vaping Use   • Vaping status: Never Used   Substance and Sexual Activity   • Alcohol use: No     Alcohol/week: 0.0 standard drinks of alcohol   • Drug use: No   Other Topics Concern   • Breast feeding No   • Reaction to local anesthetic No   • Pt has a pacemaker No   • Pt has a defibrillator No       Available pre-op labs reviewed.  Lab Results   Component Value Date    WBC 11.1 (H) 05/10/2024    RBC 3.56 (L) 05/10/2024    HGB 10.9 (L) 05/10/2024    HCT 32.8 (L) 05/10/2024    MCV 92.1 05/10/2024    MCH 30.6 05/10/2024    MCHC 33.2 05/10/2024    RDW 14.2 05/10/2024    .0 05/10/2024     Lab Results   Component Value Date     2024    K 5.1 2024     2024    CO2 26.0 2024    BUN 46 (H) 2024    CREATSERUM 1.29 (H) 2024     (H) 2024    PGLU 191 (H) 2024    CA 9.8 2024          Vital Signs:  Body mass index is 40.44 kg/m².   height is 1.651 m (5' 5\") and weight is 110.2 kg (243 lb). Her blood pressure is 173/62 (abnormal) and her pulse is 94. Her respiration is 22 and oxygen saturation is 97%.   Vitals:    24 1220 24 0857   BP:  (!) 173/62   Pulse:  94   Resp:  22   SpO2:  97%   Weight: 110.2 kg (243 lb)    Height: 1.651 m  (5' 5\")         Anesthesia Evaluation     Patient summary reviewed and Nursing notes reviewed    No history of anesthetic complications   Airway   Mallampati: II  Dental      Pulmonary    (-) asthma, shortness of breath, sleep apnea  Cardiovascular   (+) hypertension  (-) past MI, CAD    Rate: normal    Neuro/Psych    (+)  neuromuscular disease,      (-) CVA    GI/Hepatic/Renal      Endo/Other    (+) diabetes mellitus  Abdominal   (+) obese               Anesthesia Plan:   ASA:  3  Plan:   MAC    I have informed Zenia David and/or legal guardian or family member of the nature of the anesthetic plan, benefits, risks including possible dental damage if relevant, major complications, and any alternative forms of anesthetic management.   All of the patient's questions were answered to the best of my ability. The patient desires the anesthetic management as planned.  Valentina Torres CRNA  6/28/2024 9:19 AM  Present on Admission:  **None**

## 2024-06-28 NOTE — H&P
History & Physical Examination    Patient Name: Zenia David  MRN: [id number]  [facility]: [id number]  YOB: 1965    Diagnosis: screening for colon cancer    Medications Prior to Admission   Medication Sig Dispense Refill Last Dose    Insulin Lispro (HUMALOG KWIKPEN) 200 UNIT/ML Subcutaneous Solution Pen-injector Inject 150 units subcutaneous daily via insulin pump 60 mL 1     losartan 100 MG Oral Tab Take 1 tablet (100 mg total) by mouth daily. For blood pressure. 90 tablet 1     furosemide 40 MG Oral Tab Take 1 tablet (40 mg total) by mouth 2 (two) times daily. To help with blood pressure and edema of the legs. 180 tablet 1     insulin lispro (HUMALOG) 100 UNIT/ML Injection Solution Inject 150 units daily via insulin pump 50 mL 3     insulin glargine (BASAGLAR KWIKPEN) 100 UNIT/ML Subcutaneous Solution Pen-injector ADMINISTER 35 UNITS UNDER THE SKIN TWICE DAILY (Patient not taking: Reported on 6/26/2024) 63 mL 0     atorvastatin 20 MG Oral Tab Take 1 tablet (20 mg total) by mouth nightly. 90 tablet 3     fluticasone propionate 50 MCG/ACT Nasal Suspension 2 sprays by Nasal route daily. 3 each 3     acetaminophen 500 MG Oral Tab Take 1 tablet (500 mg total) by mouth every 6 (six) hours as needed for Pain.       aspirin 81 MG Oral Chew Tab CSW ONE T D  6     Insulin Syringes, Disposable, U-100 1 ML Does not apply Misc Use insulin syringes to inject insulin three times per day with meals. E11.65 with insulin use. 300 each 1     DUPIXENT 300 MG/2ML Subcutaneous Solution Pen-injector INJECT 300 MG (2 ML) UNDER THE SKIN EVERY 2 WEEKS 4 mL 0     insulin glargine (BASAGLAR KWIKPEN) 100 UNIT/ML Subcutaneous Solution Pen-injector Inject 10 Units into the skin nightly. 15 mL 0     Continuous Blood Gluc Sensor (DEXCOM G7 SENSOR) Does not apply Misc 1 each Every 10 days. Use as directed every 10 days 3 each 4     ondansetron 4 MG Oral Tablet Dispersible Take 1 tablet (4 mg total) by mouth every 8 (eight) hours as  needed for Nausea. 30 tablet 0     Glucose Blood (ONETOUCH VERIO) In Vitro Strip Check 3 times per day 300 strip 1     Lancets (ONETOUCH DELICA PLUS [id number]) Does not apply Misc 1 Device in the morning, at noon, and at bedtime. Check 3 times per day 300 each 1     Blood Glucose Monitoring Suppl (ONETOUCH VERIO) w/Device Does not apply Kit 1 Device daily. 1 kit 0     Continuous Blood Gluc Sensor (DEXCOM G7 SENSOR) Does not apply Misc 1 each Every 10 days. Use as directed every 10 days 3 each 11     triamcinolone 0.1 % External Cream Apply 1 Application topically 2 (two) times daily as needed. 60 g 0     Insulin Pen Needle (BD PEN NEEDLE DAMIAN U/F) 32G X 4 MM Does not apply Misc Inject 4 times per day 400 each 1     gabapentin 300 MG Oral Cap Take 1 capsule (300 mg total) by mouth 2 (two) times daily. (Patient not taking: Reported on 6/25/2024) 180 capsule 3 Not Taking    clobetasol 0.05 % External Ointment Apply 1 Application. topically daily as needed. 60 g 2     tacrolimus (PROTOPIC) 0.1 % External Ointment Apply 1 Application. topically 2 (two) times daily. 60 g 3     clotrimazole 1 % External Cream Apply 1 Application. topically 2 (two) times daily. Apply to the groin area as needed 28 g 0      No current facility-administered medications for this encounter.       Allergies:   Allergies   Allergen Reactions    Latex HIVES    Norvasc [Amlodipine] SWELLING    Penicillins HIVES       Past Medical History:    Acute carpal tunnel syndrome of right wrist    Right endoscopic carpal tunnel release    Carpal tunnel syndrome, left    Chronic back pain    CKD (chronic kidney disease) stage 3, GFR 30-59 ml/min (HCC)    Diabetes (HCC)    Diabetes mellitus (HCC)    dx 1995    Diastolic dysfunction    Grade I    Essential hypertension    dx 1995    High blood pressure    High cholesterol    Hyperlipidemia    Lipoma    surgical excision age 17    Macular degeneration of left eye    Neuropathy    Obesity (BMI 30-39.9)     Osteoarthritis     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    Lipoma removal      Wrist arthroscop,release xvers lig Right 2019    Right endoscopic carpal tunnel release    Wrist arthroscop,release xvers lig Left 2019    Left endoscopic carpal tunnel release     Family History   Problem Relation Age of Onset    Heart Disorder Father     Stroke Father     Alcohol abuse Father     Diabetes Mother     Diabetes Sister     Diabetes Sister      Social History     Tobacco Use    Smoking status: Former     Current packs/day: 0.00     Average packs/day: 0.5 packs/day for 15.0 years (7.5 ttl pk-yrs)     Types: Cigarettes     Start date: 2001     Quit date: 2016     Years since quittin.4     Passive exposure: Past    Smokeless tobacco: Never   Substance Use Topics    Alcohol use: No     Alcohol/week: 0.0 standard drinks of alcohol       SYSTEM Check if Review is Normal Check if Physical Exam is Normal If not normal, please explain:   HEENT [X ] [ X]    NECK  [X ] [ X]    HEART [X ] [ X]    LUNGS [X ] [ X]    ABDOMEN [X ] [ X]    EXTREMITIES [X ] [ X]    OTHER        I have discussed the risks and benefits and alternatives of the procedure with the patient/family.  They understand and agree to proceed with plan of care.   I have reviewed the History and Physical done within the last 30 days.  Any changes noted above.    Neto Simpson MD  [facility]

## 2024-06-28 NOTE — ANESTHESIA POSTPROCEDURE EVALUATION
Patient: Zenia David    Procedure Summary       Date: 06/28/24 Room / Location: [facility] ENDOSCOPY 04 / [facility] ENDOSCOPY    Anesthesia Start: 0922 Anesthesia Stop: 1029    Procedure: COLONOSCOPY Diagnosis:       Screening for colon cancer      Constipation, unspecified constipation type      (polyps, hemorrhopids)    Surgeons: Neto Simpson MD Anesthesiologist:     Anesthesia Type: MAC ASA Status: 3            Anesthesia Type: No value filed.    Vitals Value Taken Time   /73 06/28/24 1045   Temp 98 06/28/24 1053   Pulse 97 06/28/24 1049   Resp 18 06/28/24 1049   SpO2 98 % 06/28/24 1049   Vitals shown include unfiled device data.    [facility] AN Post Evaluation:   Patient Evaluated in PACU  Patient Participation: complete - patient participated  Level of Consciousness: awake  Pain Score: 0  Pain Management: adequate  Airway Patency:patent  Yes    Cardiovascular Status: acceptable  Respiratory Status: acceptable  Postoperative Hydration acceptable      Valentina Torres CRNA  6/28/2024 10:53 AM

## 2024-07-01 ENCOUNTER — TELEPHONE (OUTPATIENT)
Facility: CLINIC | Age: 59
End: 2024-07-01

## 2024-07-01 VITALS
SYSTOLIC BLOOD PRESSURE: 176 MMHG | HEIGHT: 65 IN | DIASTOLIC BLOOD PRESSURE: 57 MMHG | OXYGEN SATURATION: 99 % | RESPIRATION RATE: 13 BRPM | BODY MASS INDEX: 40.48 KG/M2 | WEIGHT: 243 LBS | HEART RATE: 95 BPM

## 2024-07-01 NOTE — PROGRESS NOTES
3 adenomas removed on recent colonoscopy, plan to repeat in 3 years.    **Difficulty tolerating anesthesia**.    Mychart sent.    GI Staff:    Can you please place recall for this patient to have a colonoscopy in 3 years.    Thank you.    Neto Simpson MD

## 2024-07-01 NOTE — TELEPHONE ENCOUNTER
Health Maintenance Updated.    3 year colonoscopy recall entered into patient outreach in [facility].  Next colonoscopy will be due 6/28/2027.

## 2024-07-01 NOTE — TELEPHONE ENCOUNTER
----- Message from Neto Simpson sent at 7/1/2024  7:27 AM CDT -----  3 adenomas removed on recent colonoscopy, plan to repeat in 3 years.    ##Difficulty tolerating anesthesia##.    Mychart sent.    GI Staff:    Can you please place recall for this patient to have a colonoscopy in 3 years.    Thank you.    Neto Simpson MD

## 2024-07-05 RX ORDER — DUPILUMAB 300 MG/2ML
300 INJECTION, SOLUTION SUBCUTANEOUS
Qty: 4 ML | Refills: 0 | OUTPATIENT
Start: 2024-07-05

## 2024-07-09 ENCOUNTER — OFFICE VISIT (OUTPATIENT)
Dept: DERMATOLOGY CLINIC | Facility: CLINIC | Age: 59
End: 2024-07-09

## 2024-07-09 DIAGNOSIS — L30.9 DERMATITIS: Primary | ICD-10-CM

## 2024-07-09 PROCEDURE — 99213 OFFICE O/P EST LOW 20 MIN: CPT | Performed by: PHYSICIAN ASSISTANT

## 2024-07-09 RX ORDER — METFORMIN HYDROCHLORIDE 500 MG/1
TABLET, EXTENDED RELEASE ORAL
COMMUNITY
Start: 2024-05-22

## 2024-07-09 RX ORDER — MONTELUKAST SODIUM 10 MG/1
TABLET ORAL
COMMUNITY
Start: 2024-05-22

## 2024-07-09 RX ORDER — DUPILUMAB 300 MG/2ML
300 INJECTION, SOLUTION SUBCUTANEOUS
Qty: 4 ML | Refills: 3 | Status: SHIPPED | OUTPATIENT
Start: 2024-07-09

## 2024-07-09 NOTE — PROGRESS NOTES
HPI:    Patient ID: Zenia David is a 59 year old female.    Patient presents for Eczema follow-up to the groin area. Current treatment with Dupixent and with clobetasol and protopic as needed. Condition has been well controlled on Dupixent- last injection on 07/07/24. No draining or tenderness noted. Hx of allergies to medications noted.         Review of Systems   Constitutional:  Negative for chills and fever.   Musculoskeletal:  Negative for arthralgias and myalgias.   Skin:  Positive for rash. Negative for color change and wound.            Current Outpatient Medications   Medication Sig Dispense Refill    Insulin Lispro (HUMALOG KWIKPEN) 200 UNIT/ML Subcutaneous Solution Pen-injector Inject 150 units subcutaneous daily via insulin pump 60 mL 1    Insulin Syringes, Disposable, U-100 1 ML Does not apply Misc Use insulin syringes to inject insulin three times per day with meals. E11.65 with insulin use. 300 each 1    DUPIXENT 300 MG/2ML Subcutaneous Solution Pen-injector INJECT 300 MG (2 ML) UNDER THE SKIN EVERY 2 WEEKS 4 mL 0    losartan 100 MG Oral Tab Take 1 tablet (100 mg total) by mouth daily. For blood pressure. 90 tablet 1    insulin glargine (BASAGLAR KWIKPEN) 100 UNIT/ML Subcutaneous Solution Pen-injector Inject 10 Units into the skin nightly. 15 mL 0    furosemide 40 MG Oral Tab Take 1 tablet (40 mg total) by mouth 2 (two) times daily. To help with blood pressure and edema of the legs. 180 tablet 1    Continuous Blood Gluc Sensor (DEXCOM G7 SENSOR) Does not apply Misc 1 each Every 10 days. Use as directed every 10 days 3 each 4    insulin lispro (HUMALOG) 100 UNIT/ML Injection Solution Inject 150 units daily via insulin pump 50 mL 3    Glucose Blood (ONETOUCH VERIO) In Vitro Strip Check 3 times per day 300 strip 1    Lancets (ONETOUCH DELICA PLUS [id number]) Does not apply Misc 1 Device in the morning, at noon, and at bedtime. Check 3 times per day 300 each 1    Blood Glucose Monitoring Suppl  (ONETOUCH VERIO) w/Device Does not apply Kit 1 Device daily. 1 kit 0    Continuous Blood Gluc Sensor (DEXCOM G7 SENSOR) Does not apply Misc 1 each Every 10 days. Use as directed every 10 days 3 each 11    atorvastatin 20 MG Oral Tab Take 1 tablet (20 mg total) by mouth nightly. 90 tablet 3    fluticasone propionate 50 MCG/ACT Nasal Suspension 2 sprays by Nasal route daily. 3 each 3    Insulin Pen Needle (BD PEN NEEDLE DAMIAN U/F) 32G X 4 MM Does not apply Misc Inject 4 times per day 400 each 1    acetaminophen 500 MG Oral Tab Take 1 tablet (500 mg total) by mouth every 6 (six) hours as needed for Pain.      aspirin 81 MG Oral Chew Tab CSW ONE T D  6    metFORMIN  MG Oral Tablet 24 Hr  (Patient not taking: Reported on 7/9/2024)      montelukast 10 MG Oral Tab  (Patient not taking: Reported on 7/9/2024)      ondansetron 4 MG Oral Tablet Dispersible Take 1 tablet (4 mg total) by mouth every 8 (eight) hours as needed for Nausea. 30 tablet 0    triamcinolone 0.1 % External Cream Apply 1 Application topically 2 (two) times daily as needed. (Patient not taking: Reported on 7/9/2024) 60 g 0    insulin glargine (BASAGLAR KWIKPEN) 100 UNIT/ML Subcutaneous Solution Pen-injector ADMINISTER 35 UNITS UNDER THE SKIN TWICE DAILY (Patient not taking: Reported on 7/9/2024) 63 mL 0    gabapentin 300 MG Oral Cap Take 1 capsule (300 mg total) by mouth 2 (two) times daily. (Patient not taking: Reported on 6/25/2024) 180 capsule 3    clobetasol 0.05 % External Ointment Apply 1 Application. topically daily as needed. (Patient not taking: Reported on 7/9/2024) 60 g 2    tacrolimus (PROTOPIC) 0.1 % External Ointment Apply 1 Application. topically 2 (two) times daily. (Patient not taking: Reported on 7/9/2024) 60 g 3    clotrimazole 1 % External Cream Apply 1 Application. topically 2 (two) times daily. Apply to the groin area as needed (Patient not taking: Reported on 7/9/2024) 28 g 0     Allergies:  Allergies   Allergen Reactions     Latex HIVES    Norvasc [Amlodipine] SWELLING    Penicillins HIVES      LMP 07/20/2013   There is no height or weight on file to calculate BMI.  PHYSICAL EXAM:   Physical Exam  Constitutional:       General: She is not in acute distress.     Appearance: Normal appearance.   Skin:     General: Skin is warm and dry.      Findings: Rash present.      Comments: Much improved. No draining or tenderness noted. No eczematous areas noted. No scaling noted. No erythema noted.    Neurological:      Mental Status: She is alert and oriented to person, place, and time.                ASSESSMENT/PLAN:   1. Dermatitis  -After discussion with patient, advised the following:  -Continue with dupixent  -Return in 6 months  -Use topicals PRN   -To call or follow-up with worsening symptoms or concerns.   -Pt was agreeable to plan and will comply with discussion above.         No orders of the defined types were placed in this encounter.      Meds This Visit:  Requested Prescriptions     Pending Prescriptions Disp Refills    Dupilumab (DUPIXENT) 300 MG/2ML Subcutaneous Solution Pen-injector 4 mL 3     Sig: Inject 300 mg into the skin every 14 (fourteen) days.       Imaging & Referrals:  None         ID#[id number]

## 2024-08-05 ENCOUNTER — LAB ENCOUNTER (OUTPATIENT)
Dept: LAB | Age: 59
End: 2024-08-05
Attending: FAMILY MEDICINE
Payer: MEDICAID

## 2024-08-05 ENCOUNTER — OFFICE VISIT (OUTPATIENT)
Dept: FAMILY MEDICINE CLINIC | Facility: CLINIC | Age: 59
End: 2024-08-05

## 2024-08-05 VITALS
HEART RATE: 88 BPM | BODY MASS INDEX: 40.98 KG/M2 | DIASTOLIC BLOOD PRESSURE: 76 MMHG | SYSTOLIC BLOOD PRESSURE: 122 MMHG | HEIGHT: 65 IN | WEIGHT: 246 LBS | TEMPERATURE: 97 F

## 2024-08-05 DIAGNOSIS — Z79.4 UNCONTROLLED DIABETES MELLITUS WITH HYPERGLYCEMIA, WITH LONG-TERM CURRENT USE OF INSULIN (HCC): ICD-10-CM

## 2024-08-05 DIAGNOSIS — M25.561 CHRONIC PAIN OF BOTH KNEES: ICD-10-CM

## 2024-08-05 DIAGNOSIS — Z79.4 UNCONTROLLED DIABETES MELLITUS WITH HYPERGLYCEMIA, WITH LONG-TERM CURRENT USE OF INSULIN (HCC): Primary | ICD-10-CM

## 2024-08-05 DIAGNOSIS — I10 ESSENTIAL HYPERTENSION: ICD-10-CM

## 2024-08-05 DIAGNOSIS — Z12.31 VISIT FOR SCREENING MAMMOGRAM: ICD-10-CM

## 2024-08-05 DIAGNOSIS — R60.0 EDEMA OF BOTH LOWER LEGS: ICD-10-CM

## 2024-08-05 DIAGNOSIS — G89.29 CHRONIC PAIN OF BOTH KNEES: ICD-10-CM

## 2024-08-05 DIAGNOSIS — M25.562 CHRONIC PAIN OF BOTH KNEES: ICD-10-CM

## 2024-08-05 DIAGNOSIS — E11.65 UNCONTROLLED DIABETES MELLITUS WITH HYPERGLYCEMIA, WITH LONG-TERM CURRENT USE OF INSULIN (HCC): Primary | ICD-10-CM

## 2024-08-05 DIAGNOSIS — M11.269 CHONDROCALCINOSIS OF KNEE, UNSPECIFIED LATERALITY: ICD-10-CM

## 2024-08-05 DIAGNOSIS — E11.65 UNCONTROLLED DIABETES MELLITUS WITH HYPERGLYCEMIA, WITH LONG-TERM CURRENT USE OF INSULIN (HCC): ICD-10-CM

## 2024-08-05 LAB
ANION GAP SERPL CALC-SCNC: 6 MMOL/L (ref 0–18)
BUN BLD-MCNC: 47 MG/DL (ref 9–23)
BUN/CREAT SERPL: 29.2 (ref 10–20)
CALCIUM BLD-MCNC: 10.6 MG/DL (ref 8.7–10.4)
CHLORIDE SERPL-SCNC: 104 MMOL/L (ref 98–112)
CO2 SERPL-SCNC: 27 MMOL/L (ref 21–32)
CREAT BLD-MCNC: 1.61 MG/DL
EGFRCR SERPLBLD CKD-EPI 2021: 36 ML/MIN/1.73M2 (ref 60–?)
FASTING STATUS PATIENT QL REPORTED: NO
GLUCOSE BLD-MCNC: 215 MG/DL (ref 70–99)
OSMOLALITY SERPL CALC.SUM OF ELEC: 303 MOSM/KG (ref 275–295)
POTASSIUM SERPL-SCNC: 6 MMOL/L (ref 3.5–5.1)
SODIUM SERPL-SCNC: 137 MMOL/L (ref 136–145)

## 2024-08-05 PROCEDURE — 99215 OFFICE O/P EST HI 40 MIN: CPT | Performed by: FAMILY MEDICINE

## 2024-08-05 PROCEDURE — 36415 COLL VENOUS BLD VENIPUNCTURE: CPT

## 2024-08-05 PROCEDURE — 80048 BASIC METABOLIC PNL TOTAL CA: CPT

## 2024-08-05 RX ORDER — LOSARTAN POTASSIUM 100 MG/1
100 TABLET ORAL DAILY
Qty: 90 TABLET | Refills: 3 | Status: SHIPPED | OUTPATIENT
Start: 2024-08-05

## 2024-08-05 RX ORDER — FUROSEMIDE 40 MG/1
40 TABLET ORAL DAILY
Qty: 90 TABLET | Refills: 3 | Status: SHIPPED | OUTPATIENT
Start: 2024-08-05 | End: 2025-07-31

## 2024-08-05 NOTE — PROGRESS NOTES
Patient ID: Zenia David is a 59 year old female.    HPI  Chief Complaint   Patient presents with    Hypertension     In June her hemoglobin A1c was  7.2.  She is seeing Dr. Schmitz the endocrinologist.  She is doing really well with the diabetes.  She does need to see an ophthalmologist and I went ahead and did a referral to Dr. Cueto.    She wears a Dexcom monitor and now has a Insulin pump which she is very happy with.  In May when I saw her blood pressure was elevated.  She was already on furosemide once daily.  We added losartan as she also does have diabetes.  She also sees a nephrologist.    She states she also wants to see an orthopedic specialist to get perhaps some permanent treatment for her knee pain.  She states that the knee pain is chronic and is worse on the left knee.  She had steroid injections by me but that only helped for 2 months for each knee.  She has chondrocalcinosis along with arthritis of the knees.  She states that hurts for her to walk and therefore exercise.      Wt Readings from Last 6 Encounters:   08/05/24 246 lb (111.6 kg)   06/25/24 243 lb (110.2 kg)   06/26/24 242 lb (109.8 kg)   05/16/24 242 lb 6.4 oz (110 kg)   04/24/24 233 lb (105.7 kg)   04/18/24 244 lb 12.8 oz (111 kg)       BMI Readings from Last 6 Encounters:   08/05/24 40.94 kg/m²   06/25/24 40.44 kg/m²   06/26/24 40.27 kg/m²   05/16/24 40.34 kg/m²   04/24/24 38.77 kg/m²   04/18/24 40.74 kg/m²       BP Readings from Last 6 Encounters:   08/05/24 140/84   06/28/24 (!) 176/57   06/26/24 153/83   05/16/24 158/78   04/24/24 110/60   04/18/24 154/70         Review of Systems  Patient denies any chest pain, shortness breath, diaphoresis, dizziness, hypoglycemia.        Medical History:      Past Medical History:    Acute carpal tunnel syndrome of right wrist    Right endoscopic carpal tunnel release    Carpal tunnel syndrome, left    Chronic back pain    CKD (chronic kidney disease) stage 3, GFR 30-59 ml/min ([facility])     Diabetes (HCC)    Diabetes mellitus (HCC)    dx 1995    Diastolic dysfunction    Grade I    Essential hypertension    dx 1995    High blood pressure    High cholesterol    Hyperlipidemia    Lipoma    surgical excision age 17    Macular degeneration of left eye    Neuropathy    Obesity (BMI 30-39.9)    Osteoarthritis       Past Surgical History:   Procedure Laterality Date    Colonoscopy N/A 6/28/2024    Procedure: COLONOSCOPY;  Surgeon: Neto Simpson MD;  Location: [facility] ENDOSCOPY    Eye surgery      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 4 mL 3    Insulin Lispro (HUMALOG KWIKPEN) 200 UNIT/ML Subcutaneous Solution Pen-injector Inject 150 units subcutaneous daily via insulin pump 60 mL 1    Insulin Syringes, Disposable, U-100 1 ML Does not apply Misc Use insulin syringes to inject insulin three times per day with meals. E11.65 with insulin use. 300 each 1    losartan 100 MG Oral Tab Take 1 tablet (100 mg total) by mouth daily. For blood pressure. 90 tablet 1    insulin glargine (BASAGLAR KWIKPEN) 100 UNIT/ML Subcutaneous Solution Pen-injector Inject 10 Units into the skin nightly. 15 mL 0    furosemide 40 MG Oral Tab Take 1 tablet (40 mg total) by mouth 2 (two) times daily. To help with blood pressure and edema of the legs. 180 tablet 1    Continuous Blood Gluc Sensor (DEXCOM G7 SENSOR) Does not apply Misc 1 each Every 10 days. Use as directed every 10 days 3 each 4    insulin lispro (HUMALOG) 100 UNIT/ML Injection Solution Inject  150 units daily via insulin pump 50 mL 3    Glucose Blood (ONETOUCH VERIO) In Vitro Strip Check 3 times per day 300 strip 1    Lancets (ONETOUCH DELICA PLUS [id number]) Does not apply Misc 1 Device in the morning, at noon, and at bedtime. Check 3 times per day 300 each 1    Blood Glucose Monitoring Suppl (ONETOUCH VERIO) w/Device Does not apply Kit 1 Device daily. 1 kit 0    Continuous Blood Gluc Sensor (DEXCOM G7 SENSOR) Does not apply Misc 1 each Every 10 days. Use as directed every 10 days 3 each 11    triamcinolone 0.1 % External Cream Apply 1 Application topically 2 (two) times daily as needed. 60 g 0    insulin glargine (BASAGLAR KWIKPEN) 100 UNIT/ML Subcutaneous Solution Pen-injector ADMINISTER 35 UNITS UNDER THE SKIN TWICE DAILY 63 mL 0    atorvastatin 20 MG Oral Tab Take 1 tablet (20 mg total) by mouth nightly. 90 tablet 3    fluticasone propionate 50 MCG/ACT Nasal Suspension 2 sprays by Nasal route daily. 3 each 3    Insulin Pen Needle (BD PEN NEEDLE DAMIAN U/F) 32G X 4 MM Does not apply Misc Inject 4 times per day 400 each 1    gabapentin 300 MG Oral Cap Take 1 capsule (300 mg total) by mouth 2 (two) times daily. 180 capsule 3    clobetasol 0.05 % External Ointment Apply 1 Application. topically daily as needed. 60 g 2    tacrolimus (PROTOPIC) 0.1 % External Ointment Apply 1 Application. topically 2 (two) times daily. 60 g 3    clotrimazole 1 % External Cream Apply 1 Application. topically 2 (two) times daily. Apply to the groin area as needed 28 g 0    acetaminophen 500 MG Oral Tab Take 1 tablet (500 mg total) by mouth every 6 (six) hours as needed for Pain.      aspirin 81 MG Oral Chew Tab CSW ONE T D  6     Allergies:  Allergies   Allergen Reactions    Latex HIVES    Norvasc [Amlodipine] SWELLING    Penicillins HIVES        Physical Exam:       Physical Exam  Blood pressure 140/84, pulse 88, temperature 96.9 °F (36.1 °C), temperature source Temporal, height 5' 5\" (1.651 m), weight 246 lb (111.6 kg), last  menstrual period 07/20/2013, not currently breastfeeding.  Vitals:    08/05/24 1336 08/05/24 1358   BP: 140/84 122/76   Pulse: 88    Temp: 96.9 °F (36.1 °C)    TempSrc: Temporal    Weight: 246 lb (111.6 kg)    Height: 5' 5\" (1.651 m)      Physical Exam   Constitutional: Patient is oriented to person, place, and time. Patient appears well-developed and well-nourished. No distress.   HENT:   Head: Normocephalic and atraumatic.   Neck: Normal range of motion. Neck supple. No thyromegaly present.   Lymphadenopathy:     Has  no cervical adenopathy.   Cardiovascular: Normal rate, regular rhythm and no murmur heard.  Pulmonary/Chest: Effort normal and breath sounds normal. No respiratory distress.   Neurological: Patient is alert and oriented to person, place, and time.   Skin: Skin is warm and dry.   Psychiatric: Patient has a normal mood and affect.   Lower legs: No edema of the legs bilaterally.  Bilateral knees with antalgic gait and she has crepitus bilaterally.  Nursing note and vitals reviewed.           Assessment/Plan:      Diagnoses and all orders for this visit:    Uncontrolled diabetes mellitus with hyperglycemia, with long-term current use of insulin (HCC)  -     OPHTHALMOLOGY - INTERNAL  -     Basic Metabolic Panel (8); Future  She will do a BMP for me today as we started her on losartan last visit.  She needs an ophthalmologist and I did a referral.  Essential hypertension  -     losartan 100 MG Oral Tab; Take 1 tablet (100 mg total) by mouth daily. For blood pressure.  -     furosemide 40 MG Oral Tab; Take 1 tablet (40 mg total) by mouth daily. To help with blood pressure and edema of the legs.  -     Basic Metabolic Panel (8); Future  No complaints with the losartan and will continue  Chronic pain of both knees  -     ORTHOPEDIC - INTERNAL  She did not respond much to the steroid injections in the knee.  I will have her see orthopedic specialist to see what they would advise  Chondrocalcinosis of knee,  unspecified laterality  -     ORTHOPEDIC - INTERNAL    Visit for screening mammogram  -     [facility] ZACKERY 2D+3D SCREENING BILAT (CPT=77067/55270); Future  Reminded her she needs to get the mammogram done.  Edema of both lower legs  -     furosemide 40 MG Oral Tab; Take 1 tablet (40 mg total) by mouth daily. To help with blood pressure and edema of the legs.  No edema at this time as the furosemide is working wonderfully for her.      Referrals (if applicable)  Orders Placed This Encounter   Procedures    OPHTHALMOLOGY - INTERNAL     Phone number [phone].     Referral Priority:   Routine     Referral Type:   OFFICE VISIT     Referred to Provider:   Peter Cueto MD     Requested Specialty:   OPHTHALMOLOGY     Number of Visits Requested:   2    ORTHOPEDIC - INTERNAL     Call [phone] for the [facility] Orthopedic physicians.    The steroid injection only helped for really about 2 or 3 months for each knee with her arthritis and chondrocalcinosis.  Is she a knee replacement candidate?     Referral Priority:   Routine     Referral Type:   OFFICE VISIT     Referred to Provider:   Joey Anguiano MD     Requested Specialty:   SURGERY, ORTHOPEDIC     Number of Visits Requested:   3         Follow up if symptoms persist.  Take medicine (if given) as prescribed.  Approach to treatment discussed and patient/family member understands and agrees to plan.     No follow-ups on file.        Tiago Amin DO  8/5/2024

## 2024-08-06 ENCOUNTER — NURSE TRIAGE (OUTPATIENT)
Dept: FAMILY MEDICINE CLINIC | Facility: CLINIC | Age: 59
End: 2024-08-06

## 2024-08-06 DIAGNOSIS — I10 ESSENTIAL HYPERTENSION: Primary | ICD-10-CM

## 2024-08-06 DIAGNOSIS — N18.30 STAGE 3 CHRONIC KIDNEY DISEASE, UNSPECIFIED WHETHER STAGE 3A OR 3B CKD (HCC): ICD-10-CM

## 2024-08-06 DIAGNOSIS — E87.5 HYPERKALEMIA: ICD-10-CM

## 2024-08-06 NOTE — TELEPHONE ENCOUNTER
Spoke with the patient,verified full name and     Informed her of message and assisted with appointment.    Future Appointments   Date Time Provider Department Center   2024 10:20 AM [facility]   2024 10:45 AM [facility]

## 2024-08-06 NOTE — TELEPHONE ENCOUNTER
Action Requested: Summary for Provider     []  Critical Lab, Recommendations Needed  [] Need Additional Advice  []   FYI    [x]   Need Orders  [x] Need Medications Sent to Pharmacy  []  Other     SUMMARY: Dennise for . Patient stated that she was in to see  yesterday and she forgot to mentioned that she has a UTI. Per patient for the past 2 weeks she has  burning sensation when she urinates and she feels she does not completely empty her bladder. Patient also having some lower abdominal pressure. Per patient \"like a nervous feeling\" Per patient she gets this often. Patient denied having a fever, no visible blood in the urine. Patient has lower back pain. Patient will like for a antibiotic  to be called. Please advise. Dennise there are no appointment for today. Do you want to order a urine test for patient?  Patient was inform if her symptoms get worse she is to go to the I/[facility] or ER for a evaluation.    Reason for call: Urinary Symptoms  Onset: 2 weeks    Reason for Disposition   Painful urination AND EITHER frequency or urgency    Protocols used: Urination Pain - Female-A-OH

## 2024-08-07 ENCOUNTER — OFFICE VISIT (OUTPATIENT)
Dept: FAMILY MEDICINE CLINIC | Facility: CLINIC | Age: 59
End: 2024-08-07
Payer: MEDICAID

## 2024-08-07 ENCOUNTER — TELEPHONE (OUTPATIENT)
Dept: NEPHROLOGY | Facility: CLINIC | Age: 59
End: 2024-08-07

## 2024-08-07 VITALS
DIASTOLIC BLOOD PRESSURE: 71 MMHG | HEART RATE: 83 BPM | SYSTOLIC BLOOD PRESSURE: 152 MMHG | BODY MASS INDEX: 40 KG/M2 | RESPIRATION RATE: 16 BRPM | WEIGHT: 241.81 LBS

## 2024-08-07 DIAGNOSIS — R30.0 BURNING WITH URINATION: Primary | ICD-10-CM

## 2024-08-07 DIAGNOSIS — N18.32 STAGE 3B CHRONIC KIDNEY DISEASE (HCC): Primary | ICD-10-CM

## 2024-08-07 DIAGNOSIS — I10 ESSENTIAL HYPERTENSION: ICD-10-CM

## 2024-08-07 DIAGNOSIS — E87.5 HYPERKALEMIA: ICD-10-CM

## 2024-08-07 DIAGNOSIS — N17.9 ACUTE KIDNEY INJURY (HCC): ICD-10-CM

## 2024-08-07 DIAGNOSIS — R39.15 URINARY URGENCY: ICD-10-CM

## 2024-08-07 LAB
APPEARANCE: CLEAR
BILIRUBIN: NEGATIVE
GLUCOSE (URINE DIPSTICK): NEGATIVE MG/DL
KETONES (URINE DIPSTICK): NEGATIVE MG/DL
LEUKOCYTES: NEGATIVE
MULTISTIX LOT#: ABNORMAL NUMERIC
NITRITE, URINE: NEGATIVE
OCCULT BLOOD: NEGATIVE
PH, URINE: 6 (ref 4.5–8)
PROTEIN (URINE DIPSTICK): 100 MG/DL
SPECIFIC GRAVITY: 1.01 (ref 1–1.03)
URINE-COLOR: YELLOW
UROBILINOGEN,SEMI-QN: 0.2 MG/DL (ref 0–1.9)

## 2024-08-07 PROCEDURE — 81002 URINALYSIS NONAUTO W/O SCOPE: CPT | Performed by: NURSE PRACTITIONER

## 2024-08-07 PROCEDURE — 99214 OFFICE O/P EST MOD 30 MIN: CPT | Performed by: NURSE PRACTITIONER

## 2024-08-07 NOTE — PROGRESS NOTES
HPI    Pt here for urinary urgency, pressure, has slight back ache. Has had symptoms for weeks.    Is diabetic. Is on insulin pump. Blood sugar runs about 125.     Has appointment w Dr Hawley next week.   Is on low potassium diet and has stopped blood pressure medications due to elevated potassium levels (6.0).       Review of Systems   Constitutional:  Positive for activity change.   Genitourinary:  Positive for urgency. Negative for decreased urine volume, dysuria, hematuria and pelvic pain.        Has suprapubic pressure when urinating.    Musculoskeletal:  Positive for back pain.       Vitals:    08/07/24 1021 08/07/24 1101   BP: (!) 162/80 152/71   Pulse: 83    Resp: 16    Weight: 241 lb 12.8 oz (109.7 kg)      Body mass index is 40.24 kg/m².  Wt Readings from Last 6 Encounters:   08/07/24 241 lb 12.8 oz (109.7 kg)   08/05/24 246 lb (111.6 kg)   06/25/24 243 lb (110.2 kg)   06/26/24 242 lb (109.8 kg)   05/16/24 242 lb 6.4 oz (110 kg)   04/24/24 233 lb (105.7 kg)        Health Maintenance   Topic Date Due    Pap Smear  Never done    Diabetes Care Dilated Eye Exam  02/09/2018    Mammogram  06/12/2020    COVID-19 Vaccine (3 - 2023-24 season) 09/01/2023    Annual Physical  08/14/2024    HTN: BP Follow-Up  09/07/2024    Influenza Vaccine (1) 10/01/2024    Diabetes Care A1C  12/26/2024    Diabetes Care: Microalb/Creat Ratio  05/10/2025    Diabetes Care Foot Exam  06/26/2025    Diabetes Care: GFR  08/05/2025    Colorectal Cancer Screening  06/28/2027    DTaP,Tdap,and Td Vaccines (3 - Td or Tdap) 12/22/2033    Annual Depression Screening  Completed    Pneumococcal Vaccine: Birth to 64yrs  Completed    Zoster Vaccines  Completed       Patient's last menstrual period was 07/20/2013.    Past Medical History:    Acute carpal tunnel syndrome of right wrist    Right endoscopic carpal tunnel release    Carpal tunnel syndrome, left    Chronic back pain    CKD (chronic kidney disease) stage 3, GFR 30-59 ml/min ([facility])    Diabetes  (HCC)    Diabetes mellitus (HCC)    dx     Diastolic dysfunction    Grade I    Essential hypertension    dx     High blood pressure    High cholesterol    Hyperlipidemia    Lipoma    surgical excision age 17    Macular degeneration of left eye    Neuropathy    Obesity (BMI 30-39.9)    Osteoarthritis       .  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    Lipoma removal      Wrist arthroscop,release xvers lig Right 2019    Right endoscopic carpal tunnel release    Wrist arthroscop,release xvers lig Left 2019    Left endoscopic carpal tunnel release       Family History   Problem Relation Age of Onset    Heart Disorder Father     Stroke Father     Alcohol abuse Father     Diabetes Mother     Diabetes Sister     Diabetes Sister        Social History     Socioeconomic History    Marital status:      Spouse name: Not on file    Number of children: Not on file    Years of education: Not on file    Highest education level: Not on file   Occupational History    Not on file   Tobacco Use    Smoking status: Former     Current packs/day: 0.00     Average packs/day: 0.5 packs/day for 15.0 years (7.5 ttl pk-yrs)     Types: Cigarettes     Start date: 2001     Quit date: 2016     Years since quittin.5     Passive exposure: Past    Smokeless tobacco: Never   Vaping Use    Vaping status: Never Used   Substance and Sexual Activity    Alcohol use: No     Alcohol/week: 0.0 standard drinks of alcohol    Drug use: No    Sexual activity: Not on file   Other Topics Concern    Grew up on a farm Not Asked    History of tanning Not Asked    Outdoor occupation Not Asked    Breast feeding No    Reaction to local anesthetic No    Pt has a pacemaker No    Pt has a defibrillator No   Social History Narrative    Lives with      Homemaker     Social Determinants of Health     Financial Resource Strain: Not on file   Food Insecurity: Not on file   Transportation Needs: Not on file   Physical Activity: Not on file   Stress: Not on file   Social Connections: Not on file   Housing Stability: Not on file       Current Outpatient Medications   Medication Sig Dispense Refill    Blood Pressure Monitoring (BLOOD PRESSURE KIT) Does not apply Device 1 each  in the morning and 1 each before bedtime. Please check blood pressure bid. 1 each 0    cephalexin 500 MG Oral Cap Take 1 capsule (500 mg total) by mouth 2 (two) times daily for 7 days. Risk of cross sensitivity is low-please dispense as pt has chronic renal disease 14 capsule 0    losartan 100 MG Oral Tab Take 1 tablet (100 mg total) by mouth daily. For blood pressure. 90 tablet 3    furosemide 40 MG Oral Tab Take 1 tablet (40 mg total) by mouth daily. To help with blood pressure and edema of the legs. 90 tablet 3    montelukast 10 MG Oral Tab       Dupilumab (DUPIXENT) 300 MG/2ML Subcutaneous Solution Pen-injector Inject 300 mg into the skin every 14 (fourteen) days. 4 mL 3    Insulin Lispro (HUMALOG KWIKPEN) 200 UNIT/ML Subcutaneous Solution Pen-injector Inject 150 units subcutaneous daily via insulin pump 60 mL 1    Insulin Syringes, Disposable, U-100 1 ML Does not apply Misc Use insulin syringes to inject insulin three times per day with meals. E11.65 with insulin use. 300 each 1    insulin glargine (BASAGLAR KWIKPEN) 100 UNIT/ML Subcutaneous Solution Pen-injector Inject 10 Units into the skin nightly. 15 mL 0    Continuous Blood Gluc Sensor (DEXCOM G7 SENSOR) Does not apply Misc 1 each Every 10 days. Use as directed every 10 days 3 each 4    insulin lispro (HUMALOG) 100 UNIT/ML Injection Solution Inject 150 units daily via insulin pump 50 mL 3    Glucose Blood (ONETOUCH VERIO) In Vitro Strip Check 3 times per day 300 strip 1    Lancets (ONETOUCH DELICA PLUS [id number]) Does not apply  Misc 1 Device in the morning, at noon, and at bedtime. Check 3 times per day 300 each 1    Blood Glucose Monitoring Suppl (ONETOUCH VERIO) w/Device Does not apply Kit 1 Device daily. 1 kit 0    Continuous Blood Gluc Sensor (DEXCOM G7 SENSOR) Does not apply Misc 1 each Every 10 days. Use as directed every 10 days 3 each 11    triamcinolone 0.1 % External Cream Apply 1 Application topically 2 (two) times daily as needed. 60 g 0    insulin glargine (BASAGLAR KWIKPEN) 100 UNIT/ML Subcutaneous Solution Pen-injector ADMINISTER 35 UNITS UNDER THE SKIN TWICE DAILY 63 mL 0    atorvastatin 20 MG Oral Tab Take 1 tablet (20 mg total) by mouth nightly. 90 tablet 3    fluticasone propionate 50 MCG/ACT Nasal Suspension 2 sprays by Nasal route daily. 3 each 3    Insulin Pen Needle (BD PEN NEEDLE DAMIAN U/F) 32G X 4 MM Does not apply Misc Inject 4 times per day 400 each 1    gabapentin 300 MG Oral Cap Take 1 capsule (300 mg total) by mouth 2 (two) times daily. 180 capsule 3    clobetasol 0.05 % External Ointment Apply 1 Application. topically daily as needed. 60 g 2    tacrolimus (PROTOPIC) 0.1 % External Ointment Apply 1 Application. topically 2 (two) times daily. 60 g 3    clotrimazole 1 % External Cream Apply 1 Application. topically 2 (two) times daily. Apply to the groin area as needed 28 g 0    acetaminophen 500 MG Oral Tab Take 1 tablet (500 mg total) by mouth every 6 (six) hours as needed for Pain.      aspirin 81 MG Oral Chew Tab CSW ONE T D  6       Allergies:  Allergies   Allergen Reactions    Latex HIVES    Norvasc [Amlodipine] SWELLING    Penicillins HIVES       Physical Exam  Vitals and nursing note reviewed.   Constitutional:       Appearance: Normal appearance. She is obese.   Cardiovascular:      Rate and Rhythm: Normal rate and regular rhythm.      Heart sounds: Normal heart sounds.   Pulmonary:      Effort: Pulmonary effort is normal. No respiratory distress.      Breath sounds: Normal breath sounds.   Skin:      General: Skin is warm and dry.   Neurological:      Mental Status: She is alert and oriented to person, place, and time.           Component  Ref Range & Units 8/5/24  2:19 PM   Glucose  70 - 99 mg/dL 215 High    Sodium  136 - 145 mmol/L 137   Potassium  3.5 - 5.1 mmol/L 6.0 High Panic    Chloride  98 - 112 mmol/L 104   CO2  21.0 - 32.0 mmol/L 27.0   Anion Gap  0 - 18 mmol/L 6   BUN  9 - 23 mg/dL 47 High    Creatinine  0.55 - 1.02 mg/dL 1.61 High    BUN/CREA Ratio  10.0 - 20.0 29.2 High    Calcium, Total  8.7 - 10.4 mg/dL 10.6 High    Calculated Osmolality  275 - 295 mOsm/kg 303 High    eGFR-Cr  >=60 mL/min/1.73m2 36 Low    Patient Fasting for BMP? No   Resulting Agency [facility]                Urine-glu-neg  Josr-neg  Ket-net  Blood-neg  Sp g 1.015  Ph 6.0  Pro-100  Uro 0.2  Nit-neg  Leuk-neg  Assessment and Plan:  Problem List Items Addressed This Visit       Essential hypertension     Check blood pressure twice per day with arm cuff bp monitor for 2 weeks  Record date, time, blood pressure and pulse reading  Send in copy of bp log to me via [employer] or you may call into nurse triage staff  Call if bp consistently > 140/86  Go to ER if any severe headache, chest pain, shortness of breath, visual changes  Please let me know if you have any questions or concerns.     Follow up in 2 weeks w Dr Amin.          Hyperkalemia     Has follow up with nephrology  Continue to hold blood pressure medications as directed         Urinary urgency - Primary     Urine dip is essentially normal-will send for culture  Discussed supportive care  Please call if symptoms worsen or are not resolving.                          Discussed plan of care with pt and pt is in agreement.All questions answered. Pt to call with questions or concerns.    Encouraged to sign up for My Chart if not already registered.     Note to patient and family:  The 21st Century Cures Act makes medical notes available to patients in the interest of  transparency.  However, please be advised that this is a medical document.  It is intended as a peer to peer communication.  It is written in medical language and may contain abbreviations or verbiage that are technical and unfamiliar.  It may appear blunt or direct.  Medical documents are intended to carry relevant information, facts as evident, and the clinical opinion of the practitioner.

## 2024-08-07 NOTE — TELEPHONE ENCOUNTER
----- Message from Brenda HUNT sent at 8/6/2024  7:39 PM CDT -----  Results read by patient. Nephrology currently closed. Message forwarded to nephrology.    Written by Tiago Amin DO on 8/6/2024  2:20 PM CDT  Seen by patient Zenia DANIEL Frankie on 8/6/2024  2:56 PM         Brenda Amin RN  8/6/2024  7:39 PM CDT Back to Top      Results read by patient. Nephrology currently closed. Message forwarded to nephrology.     Written by Tiago Amin DO on 8/6/2024  2:20 PM CDT  Seen by patient Zenia DANEIL Frankie on 8/6/2024  2:56 PM    Tiago Amin DO  8/6/2024  2:20 PM CDT       Your potassium is again high at 6.0.  Kidney function is much worse than it was 1 month ago.  Calcium is also increased.  I need you to see Dr. Hawley, the kidney specialist again.  At this time I would hold off on taking the losartan 100 mg as that could be increasing your potassium. Nephrology department phone number is [phone].  Nurse, please facilitate appointment with a nephrologist within 7 to 10 days.

## 2024-08-07 NOTE — TELEPHONE ENCOUNTER
Dr Hawley please see note below spoke to patient is doing fine ,advised to follow low potassium diet ,Losartan was held ,to check blood pressure daily to call for ant concern with high blood pressure,pt verbalized understanding ,scheduled.Mailed copy of low potassium food.

## 2024-08-07 NOTE — TELEPHONE ENCOUNTER
Agree. Stop Losartan. Follow low potassium diet.   Check BP at home.   Repeat BMP ordered to done on Friday, 8/9.

## 2024-08-12 DIAGNOSIS — N30.01 ACUTE CYSTITIS WITH HEMATURIA: Primary | ICD-10-CM

## 2024-08-12 PROBLEM — R39.15 URINARY URGENCY: Status: ACTIVE | Noted: 2024-08-07

## 2024-08-12 PROBLEM — E87.5 HYPERKALEMIA: Status: ACTIVE | Noted: 2024-08-12

## 2024-08-12 RX ORDER — CEPHALEXIN 500 MG/1
500 CAPSULE ORAL 2 TIMES DAILY
Qty: 14 CAPSULE | Refills: 0 | Status: SHIPPED | OUTPATIENT
Start: 2024-08-12 | End: 2024-08-19

## 2024-08-12 NOTE — ASSESSMENT & PLAN NOTE
Check blood pressure twice per day with arm cuff bp monitor for 2 weeks  Record date, time, blood pressure and pulse reading  Send in copy of bp log to me via [employer] or you may call into nurse triage staff  Call if bp consistently > 140/86  Go to ER if any severe headache, chest pain, shortness of breath, visual changes  Please let me know if you have any questions or concerns.     Follow up in 2 weeks w Dr Amin.

## 2024-08-12 NOTE — ASSESSMENT & PLAN NOTE
Urine dip is essentially normal-will send for culture  Discussed supportive care  Please call if symptoms worsen or are not resolving.

## 2024-08-14 ENCOUNTER — LAB ENCOUNTER (OUTPATIENT)
Dept: LAB | Age: 59
End: 2024-08-14
Attending: INTERNAL MEDICINE
Payer: MEDICAID

## 2024-08-14 DIAGNOSIS — N17.9 ACUTE KIDNEY INJURY (HCC): ICD-10-CM

## 2024-08-14 DIAGNOSIS — E87.5 HYPERKALEMIA: ICD-10-CM

## 2024-08-14 DIAGNOSIS — N18.32 STAGE 3B CHRONIC KIDNEY DISEASE (HCC): ICD-10-CM

## 2024-08-14 DIAGNOSIS — N30.01 ACUTE CYSTITIS WITH HEMATURIA: ICD-10-CM

## 2024-08-14 DIAGNOSIS — N30.00 ACUTE CYSTITIS WITHOUT HEMATURIA: Primary | ICD-10-CM

## 2024-08-14 DIAGNOSIS — R80.1 PERSISTENT PROTEINURIA: ICD-10-CM

## 2024-08-14 LAB
ALBUMIN SERPL-MCNC: 4.4 G/DL (ref 3.2–4.8)
ALBUMIN/GLOB SERPL: 1.5 {RATIO} (ref 1–2)
ALP LIVER SERPL-CCNC: 96 U/L
ALT SERPL-CCNC: 21 U/L
ANION GAP SERPL CALC-SCNC: 8 MMOL/L (ref 0–18)
AST SERPL-CCNC: 26 U/L (ref ?–34)
BASOPHILS # BLD AUTO: 0.15 X10(3) UL (ref 0–0.2)
BASOPHILS NFR BLD AUTO: 1.6 %
BILIRUB SERPL-MCNC: 0.3 MG/DL (ref 0.2–1.1)
BILIRUB UR QL: NEGATIVE
BUN BLD-MCNC: 41 MG/DL (ref 9–23)
BUN/CREAT SERPL: 28.7 (ref 10–20)
CALCIUM BLD-MCNC: 9.3 MG/DL (ref 8.7–10.4)
CHLORIDE SERPL-SCNC: 103 MMOL/L (ref 98–112)
CLARITY UR: CLEAR
CO2 SERPL-SCNC: 29 MMOL/L (ref 21–32)
CREAT BLD-MCNC: 1.43 MG/DL
CREAT UR-SCNC: 47.2 MG/DL
DEPRECATED RDW RBC AUTO: 41.3 FL (ref 35.1–46.3)
EGFRCR SERPLBLD CKD-EPI 2021: 42 ML/MIN/1.73M2 (ref 60–?)
EOSINOPHIL # BLD AUTO: 0.67 X10(3) UL (ref 0–0.7)
EOSINOPHIL NFR BLD AUTO: 7 %
ERYTHROCYTE [DISTWIDTH] IN BLOOD BY AUTOMATED COUNT: 12.5 % (ref 11–15)
FASTING STATUS PATIENT QL REPORTED: YES
GLOBULIN PLAS-MCNC: 3 G/DL (ref 2–3.5)
GLUCOSE BLD-MCNC: 222 MG/DL (ref 70–99)
GLUCOSE UR-MCNC: 70 MG/DL
HCT VFR BLD AUTO: 33.9 %
HGB BLD-MCNC: 11.3 G/DL
HGB UR QL STRIP.AUTO: NEGATIVE
HYALINE CASTS #/AREA URNS AUTO: PRESENT /LPF
IMM GRANULOCYTES # BLD AUTO: 0.04 X10(3) UL (ref 0–1)
IMM GRANULOCYTES NFR BLD: 0.4 %
KETONES UR-MCNC: NEGATIVE MG/DL
LEUKOCYTE ESTERASE UR QL STRIP.AUTO: NEGATIVE
LYMPHOCYTES # BLD AUTO: 3.47 X10(3) UL (ref 1–4)
LYMPHOCYTES NFR BLD AUTO: 36 %
MCH RBC QN AUTO: 30.4 PG (ref 26–34)
MCHC RBC AUTO-ENTMCNC: 33.3 G/DL (ref 31–37)
MCV RBC AUTO: 91.1 FL
MICROALBUMIN UR-MCNC: 35 MG/DL
MICROALBUMIN/CREAT 24H UR-RTO: 741.5 UG/MG (ref ?–30)
MONOCYTES # BLD AUTO: 0.73 X10(3) UL (ref 0.1–1)
MONOCYTES NFR BLD AUTO: 7.6 %
NEUTROPHILS # BLD AUTO: 4.58 X10 (3) UL (ref 1.5–7.7)
NEUTROPHILS # BLD AUTO: 4.58 X10(3) UL (ref 1.5–7.7)
NEUTROPHILS NFR BLD AUTO: 47.4 %
NITRITE UR QL STRIP.AUTO: NEGATIVE
OSMOLALITY SERPL CALC.SUM OF ELEC: 307 MOSM/KG (ref 275–295)
PH UR: 6.5 [PH] (ref 5–8)
PLATELET # BLD AUTO: 244 10(3)UL (ref 150–450)
POTASSIUM SERPL-SCNC: 4.6 MMOL/L (ref 3.5–5.1)
PROT SERPL-MCNC: 7.4 G/DL (ref 5.7–8.2)
PROT UR-MCNC: 50 MG/DL
RBC # BLD AUTO: 3.72 X10(6)UL
SODIUM SERPL-SCNC: 140 MMOL/L (ref 136–145)
SP GR UR STRIP: 1.01 (ref 1–1.03)
UROBILINOGEN UR STRIP-ACNC: NORMAL
WBC # BLD AUTO: 9.6 X10(3) UL (ref 4–11)

## 2024-08-14 PROCEDURE — 81001 URINALYSIS AUTO W/SCOPE: CPT

## 2024-08-14 PROCEDURE — 80053 COMPREHEN METABOLIC PANEL: CPT | Performed by: INTERNAL MEDICINE

## 2024-08-14 PROCEDURE — 82043 UR ALBUMIN QUANTITATIVE: CPT

## 2024-08-14 PROCEDURE — 82570 ASSAY OF URINE CREATININE: CPT

## 2024-08-14 PROCEDURE — 36415 COLL VENOUS BLD VENIPUNCTURE: CPT

## 2024-08-14 PROCEDURE — 85025 COMPLETE CBC W/AUTO DIFF WBC: CPT

## 2024-08-16 ENCOUNTER — OFFICE VISIT (OUTPATIENT)
Dept: NEPHROLOGY | Facility: CLINIC | Age: 59
End: 2024-08-16
Payer: MEDICAID

## 2024-08-16 VITALS
HEART RATE: 80 BPM | SYSTOLIC BLOOD PRESSURE: 159 MMHG | WEIGHT: 248 LBS | BODY MASS INDEX: 41 KG/M2 | DIASTOLIC BLOOD PRESSURE: 70 MMHG

## 2024-08-16 DIAGNOSIS — N18.32 STAGE 3B CHRONIC KIDNEY DISEASE (HCC): Primary | ICD-10-CM

## 2024-08-16 DIAGNOSIS — E11.21 DIABETIC NEPHROPATHY ASSOCIATED WITH TYPE 2 DIABETES MELLITUS (HCC): ICD-10-CM

## 2024-08-16 DIAGNOSIS — R80.1 PERSISTENT PROTEINURIA: ICD-10-CM

## 2024-08-16 DIAGNOSIS — I10 PRIMARY HYPERTENSION: ICD-10-CM

## 2024-08-16 DIAGNOSIS — E78.2 MIXED HYPERLIPIDEMIA: ICD-10-CM

## 2024-08-16 DIAGNOSIS — E87.5 HYPERKALEMIA: ICD-10-CM

## 2024-08-16 PROCEDURE — 99214 OFFICE O/P EST MOD 30 MIN: CPT | Performed by: INTERNAL MEDICINE

## 2024-08-16 RX ORDER — CARVEDILOL 12.5 MG/1
12.5 TABLET ORAL 2 TIMES DAILY WITH MEALS
Qty: 60 TABLET | Refills: 6 | Status: SHIPPED | OUTPATIENT
Start: 2024-08-16

## 2024-08-16 NOTE — PROGRESS NOTES
Chief Complaint   Patient presents with    Follow - Up     Patient here for a follow up. Off Losartan due to high potassium.       Nephrology Progress Note     59 year old female with past medical history of T2DM (dx 1995) complicated by retinopathy and neuropathy, HTN (dx 1995), HLD, CKD 3b, osteoarthritis (knees) is here for follow up of CKD 3b and hyperkalemia.    Interval history: Losartan was stopped due to hyperkalemia.      [id number]: Cr mostly 1-1.2  2020: Cr 1.28 5/7 2021: Cr 1.96 4/22 2022: Cr 1.21 1/2, 1.44 10/10  2023: Admitted in March (3/11-3/22). Cr 1.51 on admission and 0.69 at dc.   Cr 1.74 5/22, 1.89 8/10, increased to 2.07 8/14, improved to 1.3 12/22 2024: Cr 1.38 2/20, 1.31 5/10, 1.29 6/26, 1.61 8/5, 1.43 8/14     Denies NSAIDs use. Takes Tylenol for knee pain.      CT abd/pelvis (1/3/22) showed: \"stable mildly prominent extrarenal pelves, left greater than right, without maciel hydronephrosis or hydroureter. There is perinephric fat stranding bilaterally without perinephric fluid collection. There is a stable 1.1 cm cyst in the inferior left renal pole.\"     Hyperkalemia: She has h/o hyperkalemia related to Lisinopril, which was stopped. Losartan was recently started but had a to stop due to hyperkalemia again. Potassium improved to 4.6 8/14.       Proteinuria: Microalb/Cr 94 mg 1/8/19, 60 mg 4/22/21, 157 mg 10/10/22, 137 mg 8/14/23, 310 mg 2/20/24, 845 mg 5/10/24, 741 mg 8/14/24.      T2DM: She had an insulin pump placed about 4 weeks ago. BG are improving.   Did not tolerate Farxiga due to yeast infections.   Recent a1c 7.2 6/2024. a1c has been mostly 8-9 range since 2017  DM is complicated by retinopathy, neuropathy, and nephropathy.    Following with Dr Schmitz.      HTN: /70 today. Not checking BP at home.   Off Amlodipine due to swelling and Lisinopril/Losartan due to hyperkalemia.      HLD: Chol 133, Trig 111, HDL 39, LDL 74 6/2024. Taking Lipitor 20 mg daily.       Anemia: Hb 11.3  8/2024.      Lower extremity edema: Has trace edema. Was taking Furosemide 40 mg daily, increased to BID last week due to hyperkalemia.     Additional labs:   Phos 2.7, Vitamin D 25 hydroxy 37.5, iPTH 83 5/2024.       FHx:   Mother (D) - DM  Father (passed away in 40s) - CAD, CVA, alcohol use.   Sisters x 2 (D) - DM  No family history of CKD.      SHx: History of smoking, 1/2PPD x 15 yrs, quit 2016. Does not drink alcohol currently.   . Has 4 sons and 2 daughters. Youngest son lives with her.       HISTORY:  Past Medical History:    Acute carpal tunnel syndrome of right wrist    Right endoscopic carpal tunnel release    Carpal tunnel syndrome, left    Chronic back pain    CKD (chronic kidney disease) stage 3, GFR 30-59 ml/min (HCC)    Diabetes (HCC)    Diabetes mellitus (HCC)    dx 1995    Diastolic dysfunction    Grade I    Essential hypertension    dx 1995    High blood pressure    High cholesterol    Hyperlipidemia    Lipoma    surgical excision age 17    Macular degeneration of left eye    Neuropathy    Obesity (BMI 30-39.9)    Osteoarthritis      Past Surgical History:   Procedure Laterality Date    Colonoscopy N/A 6/28/2024    Procedure: COLONOSCOPY;  Surgeon: Neto Simpson MD;  Location: [facility] ENDOSCOPY    Eye surgery      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 60 tablet 6    cephalexin 500 MG Oral Cap Take 1 capsule (500 mg total) by mouth 2 (two) times daily for 7 days. Risk of cross sensitivity is low-please dispense as pt has chronic renal disease 14  capsule 0    furosemide 40 MG Oral Tab Take 1 tablet (40 mg total) by mouth daily. To help with blood pressure and edema of the legs. 90 tablet 3    Dupilumab (DUPIXENT) 300 MG/2ML Subcutaneous Solution Pen-injector Inject 300 mg into the skin every 14 (fourteen) days. 4 mL 3    Insulin Lispro (HUMALOG KWIKPEN) 200 UNIT/ML Subcutaneous Solution Pen-injector Inject 150 units subcutaneous daily via insulin pump 60 mL 1    triamcinolone 0.1 % External Cream Apply 1 Application topically 2 (two) times daily as needed. 60 g 0    atorvastatin 20 MG Oral Tab Take 1 tablet (20 mg total) by mouth nightly. 90 tablet 3    fluticasone propionate 50 MCG/ACT Nasal Suspension 2 sprays by Nasal route daily. 3 each 3    clobetasol 0.05 % External Ointment Apply 1 Application. topically daily as needed. 60 g 2    tacrolimus (PROTOPIC) 0.1 % External Ointment Apply 1 Application. topically 2 (two) times daily. 60 g 3    clotrimazole 1 % External Cream Apply 1 Application. topically 2 (two) times daily. Apply to the groin area as needed 28 g 0    acetaminophen 500 MG Oral Tab Take 1 tablet (500 mg total) by mouth every 6 (six) hours as needed for Pain.      aspirin 81 MG Oral Chew Tab CSW ONE T D  6    Blood Pressure Monitoring (BLOOD PRESSURE KIT) Does not apply Device 1 each  in the morning and 1 each before bedtime. Please check blood pressure bid. 1 each 0    Insulin Syringes, Disposable, U-100 1 ML Does not apply Misc Use insulin syringes to inject insulin three times per day with meals. E11.65 with insulin use. 300 each 1    Continuous Blood Gluc Sensor (DEXCOM G7 SENSOR) Does not apply Misc 1 each Every 10 days. Use as directed every 10 days 3 each 4    Glucose Blood (ONETOUCH VERIO) In Vitro Strip Check 3 times per day 300 strip 1    Lancets (ONETOUCH DELICA PLUS [id number]) Does not apply Misc 1 Device in the morning, at noon, and at bedtime. Check 3 times per day 300 each 1    Blood Glucose Monitoring Suppl (ONETOUCH VERIO)  w/Device Does not apply Kit 1 Device daily. 1 kit 0    Continuous Blood Gluc Sensor (DEXCOM G7 SENSOR) Does not apply Misc 1 each Every 10 days. Use as directed every 10 days 3 each 11    Insulin Pen Needle (BD PEN NEEDLE DAMIAN U/F) 32G X 4 MM Does not apply Misc Inject 4 times per day 400 each 1       Allergies:  Allergies   Allergen Reactions    Latex HIVES    Norvasc [Amlodipine] SWELLING    Penicillins HIVES         ROS:     Review of Systems   Constitutional:  Negative for chills, fatigue and fever.   Respiratory:  Negative for cough and shortness of breath.    Cardiovascular:  Negative for chest pain and leg swelling.   Gastrointestinal:  Positive for constipation. Negative for abdominal pain, diarrhea, nausea and vomiting.   Genitourinary:  Negative for difficulty urinating, dysuria and flank pain.   Musculoskeletal:  Positive for arthralgias (knees).          Vitals:    08/16/24 1004   BP: 159/70   Pulse: 80       PHYSICAL EXAM:   Physical Exam  Constitutional:       Appearance: Normal appearance. She is obese.   Cardiovascular:      Rate and Rhythm: Normal rate and regular rhythm.      Heart sounds: Normal heart sounds.   Pulmonary:      Effort: Pulmonary effort is normal.      Breath sounds: Normal breath sounds.   Musculoskeletal:         General: Normal range of motion.      Comments: Trace edema   Skin:     General: Skin is warm and dry.   Neurological:      General: No focal deficit present.      Mental Status: She is alert and oriented to person, place, and time.   Psychiatric:         Mood and Affect: Mood normal.         Behavior: Behavior normal.             ASSESSMENT/PLAN:   Assessment   Encounter Diagnoses   Name Primary?    Stage 3b chronic kidney disease (HCC) Yes    Hyperkalemia     Persistent proteinuria     Diabetic nephropathy associated with type 2 diabetes mellitus (HCC)     Primary hypertension     Mixed hyperlipidemia        CKD 3b:   Likely due to diabetic nephropathy and hypertensive  nephrosclerosis  Recent Cr 1.43 eGFR 42 8/14/24, improved back to baseline.   Advised to avoid NSAIDs and take Tylenol/acetaminophen for pain.   Asked to restrict sodium to 2 grams per day and instructions on low protein diet given.   Advised tight control of DM and HTN to prevent complications including progressive CKD, CAD or CVA.     Proteinuria:   Likely due to diabetic nephropathy and hypertensive nephrosclerosis  Off Lisinopril/Losartan due to hyperkalemia.      Hyperkalemia:   Likely due to diet, ARB and CKD  Following low potassium diet.   Off Losartan     DM:   Better controlled. Cont insulin pump per Endo.      HTN:   Elevated. Will start Coreg 12.5 mg BID.     HLD:   Stable. Cont meds.     Lower extremity edema:   Edema has improved. Asked to decrease Furosemide 40 mg to daily.       Follow up in 2 months.          Orders This Visit:  No orders of the defined types were placed in this encounter.      Meds This Visit:  Requested Prescriptions     Signed Prescriptions Disp Refills    carvedilol (COREG) 12.5 MG Oral Tab 60 tablet 6     Sig: Take 1 tablet (12.5 mg total) by mouth 2 (two) times daily with meals.       Imaging & Referrals:  None       30 minutes spent in the care of the patient which included: reviewing prior records, obtaining history and examining patient, discussing lab results and treatment plan including diet, and documentation.      Rene Hawley MD  8/16/2024

## 2024-08-22 DIAGNOSIS — E78.2 MIXED HYPERLIPIDEMIA: ICD-10-CM

## 2024-08-23 RX ORDER — ATORVASTATIN CALCIUM 20 MG/1
20 TABLET, FILM COATED ORAL NIGHTLY
Qty: 90 TABLET | Refills: 3 | Status: SHIPPED | OUTPATIENT
Start: 2024-08-23

## 2024-08-23 NOTE — TELEPHONE ENCOUNTER
Refill passed per [facility] protocol.  Requested Prescriptions   Pending Prescriptions Disp Refills    ATORVASTATIN 20 MG Oral Tab [Pharmacy Med Name: ATORVASTATIN 20MG TABLETS] 90 tablet 3     Sig: TAKE 1 TABLET(20 MG) BY MOUTH EVERY NIGHT       Cholesterol Medication Protocol Passed - 8/22/2024  2:34 PM        Passed - ALT < 80     Lab Results   Component Value Date    ALT 21 08/14/2024             Passed - ALT resulted within past year        Passed - Lipid panel within past 12 months     Lab Results   Component Value Date    CHOLEST 133 06/26/2024    TRIG 111 06/26/2024    HDL 39 (L) 06/26/2024    LDL 74 06/26/2024    VLDL 17 06/26/2024    NONHDLC 94 06/26/2024             Passed - In person appointment or virtual visit in the past 12 mos or appointment in next 3 mos     Recent Outpatient Visits              1 week ago Stage 3b chronic kidney disease (HCC)    [facility] Rene Hawley MD    Office Visit    2 weeks ago Burning with urination    [facility] Dennise Pinzon APRN    Office Visit    2 weeks ago Uncontrolled diabetes mellitus with hyperglycemia, with long-term current use of insulin (Formerly [facility])    [facility] Tiago Amin DO    Office Visit    1 month ago Dermatitis    [facility] Juwan Jaffe PA-C    Office Visit    1 month ago Uncontrolled type 2 diabetes mellitus with hyperglycemia (Formerly [facility])    [facility] Mary Schmitz MD    Office Visit          Future Appointments         Provider Department Appt Notes    In 3 weeks ADO DEXA RM1; ADO ERICH RM1 [facility] [place]     In 3 weeks Joey Anguiano MD [facility] knee pain    In 1 month Rene Hawley MD [facility] 2 month     In 2 months Mary Schmitz MD [facility] 4 mo f/u                       Future Appointments         Provider Department Appt Notes    In 3 weeks ADO DEXA RM1; ADO ERICH RM1 [facility] [place]     In 3 weeks Joey Anguiano MD [facility] knee pain    In 1 month Rene Hawley MD [facility] 2 month    In 2 months Mary Schmitz MD [facility] 4 mo f/u          Recent Outpatient Visits              1 week ago Stage 3b chronic kidney disease (HCC)    [facility] Rene Hawley MD    Office Visit    2 weeks ago Burning with urination    [facility] Dennise Pinzon APRN    Office Visit    2 weeks ago Uncontrolled diabetes mellitus with hyperglycemia, with long-term current use of insulin ([facility])    [facility] Tiago Amin DO    Office Visit    1 month ago Dermatitis    [facility] Juwan Jaffe PA-C    Office Visit    1 month ago Uncontrolled type 2 diabetes mellitus with hyperglycemia ([facility])    [facility] Mary Schmitz MD    Office Visit

## 2024-08-25 ENCOUNTER — HOSPITAL ENCOUNTER (EMERGENCY)
Facility: HOSPITAL | Age: 59
Discharge: HOME | End: 2024-08-25
Attending: EMERGENCY MEDICINE
Payer: MEDICAID

## 2024-08-25 VITALS
OXYGEN SATURATION: 94 % | HEIGHT: 65 IN | RESPIRATION RATE: 16 BRPM | DIASTOLIC BLOOD PRESSURE: 71 MMHG | SYSTOLIC BLOOD PRESSURE: 141 MMHG | HEART RATE: 92 BPM | WEIGHT: 243 LBS | BODY MASS INDEX: 40.48 KG/M2 | TEMPERATURE: 98 F

## 2024-08-25 DIAGNOSIS — U07.1 COVID: Primary | ICD-10-CM

## 2024-08-25 LAB
FLUAV + FLUBV RNA SPEC NAA+PROBE: NEGATIVE
FLUAV + FLUBV RNA SPEC NAA+PROBE: NEGATIVE
RSV RNA SPEC NAA+PROBE: NEGATIVE
SARS-COV-2 RNA RESP QL NAA+PROBE: DETECTED

## 2024-08-25 PROCEDURE — 0241U SARS-COV-2/FLU A AND B/RSV BY PCR (GENEXPERT): CPT | Performed by: EMERGENCY MEDICINE

## 2024-08-25 PROCEDURE — 99283 EMERGENCY DEPT VISIT LOW MDM: CPT

## 2024-08-25 PROCEDURE — 0241U SARS-COV-2/FLU A AND B/RSV BY PCR (GENEXPERT): CPT

## 2024-08-25 NOTE — DISCHARGE INSTRUCTIONS
Return to emergency department for headache, neck stiffness, shortness of breath, chest pain, altered mental status, any worsening symptoms. Please take 650 mg of Tylenol every 4 hr as needed for temperature of 100.4° or above.  Take plenty of rest.  You did not meet criteria for Covid -19 admission.  Follow with your primary care provider over the telephone in the next few days. Please quarantine per the latest CDC guidelines.      The Emergency Department is not intended to be a substitute for an effort to provide complete medical care. The imaging, if any, have been interpreted on a preliminary basis pending final reading by the radiologist.    The Emergency Department is not intended to be a substitute for an effort to provide complete medical care. The imaging, if any, have often been interpreted on a preliminary basis pending final reading by the radiologist. If your blood pressure was greater than 140/90, please have this blood pressure rechecked by your primary care provider in the next several days.

## 2024-08-25 NOTE — ED PROVIDER NOTES
Patient Seen in: [facility]         EMERGENCY DEPARTMENT NOTE    Dictated. Voice Transcription software has been utilized for this dictation (the reader should be aware that typographical errors are possible with voice transcription software and to please contact the dictating physician if there are questions.)         History     Chief Complaint   Patient presents with    Cough/URI       There may be discrepancies from triage note.     HPI    History provided by patient and patient's .  59-year-old female, history of hypertension, diabetes complaining of cough, congestion, fatigue for 3 days.  She  suspect she has COVID.  No sick contacts, recent travel.    No fevers, chills, nausea, vomiting, diarrhea, constipation, cough, cold symptoms, urinary complaints.      History reviewed.   Past Medical History:    Acute carpal tunnel syndrome of right wrist    Right endoscopic carpal tunnel release    Carpal tunnel syndrome, left    Chronic back pain    CKD (chronic kidney disease) stage 3, GFR 30-59 ml/min (HCC)    Diabetes (HCC)    Diabetes mellitus (HCC)    dx 1995    Diastolic dysfunction    Grade I    Essential hypertension    dx 1995    High blood pressure    High cholesterol    Hyperlipidemia    Lipoma    surgical excision age 17    Macular degeneration of left eye    Neuropathy    Obesity (BMI 30-39.9)    Osteoarthritis       History reviewed.   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00     Average packs/day: 0.5 packs/day for 15.0 years (7.5 ttl pk-yrs)     Types: Cigarettes     Start date: 2001     Quit date: 2016     Years since quittin.5     Passive exposure: Past    Smokeless tobacco: Never   Vaping Use    Vaping status: Never Used   Substance and Sexual Activity    Alcohol use: No     Alcohol/week: 0.0 standard drinks of alcohol    Drug use: No   Other Topics Concern    Breast feeding No    Reaction to local anesthetic No    Pt has a pacemaker No    Pt has a defibrillator No       ROS  Pertinent positives as listed.  All other organ systems are reviewed and are negative.    Constitutional and vital signs reviewed.      Social History and Family History elements reviewed from today, pertinent positives to the presenting problem noted.    Physical Exam     ED Triage Vitals [24 0938]   /63   Pulse 88   Resp 16   Temp 98 °F (36.7 °C)   Temp src Temporal   SpO2 97 %   O2 Device None (Room air)       All measures to prevent infection transmission during my interaction with the patient were taken. The patient was already wearing a droplet mask on my arrival to the room. Personal protective equipment including droplet mask, eye protection, and gloves were worn throughout the duration of the exam.  Handwashing was performed prior to and after the exam.  Stethoscope and any equipment used during my examination was cleaned with super sani-cloth germicidal wipes following the exam.     Physical Exam      Review of prior notes in Care everywhere/Epic performed by myself:  ***       noted.    Physical Exam     ED Triage Vitals [08/25/24 0938]   /63   Pulse 88   Resp 16   Temp 98 °F (36.7 °C)   Temp src Temporal   SpO2 97 %   O2 Device None (Room air)       All measures to prevent infection transmission during my interaction with the patient were taken. The patient was already wearing a droplet mask on my arrival to the room. Personal protective equipment including droplet mask, eye protection, and gloves were worn throughout the duration of the exam.  Handwashing was performed prior to and after the exam.  Stethoscope and any equipment used during my examination was cleaned with super sani-cloth germicidal wipes following the exam.     Physical Exam  Vitals and nursing note reviewed.   Constitutional:       General: She is not in acute distress.     Appearance: She is obese. She is not ill-appearing or toxic-appearing.      Comments: Smiles, no distress, sounds congested   HENT:      Head: Normocephalic and atraumatic.      Nose: Congestion present.   Cardiovascular:      Rate and Rhythm: Normal rate and regular rhythm.   Pulmonary:      Effort: Pulmonary effort is normal. No respiratory distress.      Breath sounds: No stridor. No wheezing, rhonchi or rales.   Chest:      Chest wall: No tenderness.   Abdominal:      General: There is no distension.      Palpations: Abdomen is soft.      Tenderness: There is no abdominal tenderness. There is no guarding or rebound.   Musculoskeletal:      Cervical back: Normal range of motion and neck supple.      Right lower leg: No edema.      Left lower leg: No edema.   Skin:     Capillary Refill: Capillary refill takes less than 2 seconds.   Neurological:      Mental Status: She is alert.      Comments: Gross motor and sensory function intact symmetrically and bilaterally to upper extremities and lower extremities.   Psychiatric:         Mood and Affect: Mood normal.         Behavior: Behavior normal.           Review of prior notes in Care  everywhere/Epic performed by myself:  Recent ER visit    ED Course     If labs obtained, they are personally reviewed by myself:     Labs Reviewed   SARS-COV-2/FLU A AND B/RSV BY PCR (GENEXPERT) - Abnormal; Notable for the following components:       Result Value    SARS-CoV-2 (COVID-19) - (GeneXpert) Detected (*)     All other components within normal limits    Narrative:     This test is intended for the qualitative detection and differentiation of SARS-CoV-2, influenza A, influenza B, and respiratory syncytial virus (RSV) viral RNA in nasopharyngeal or nares swabs from individuals suspected of respiratory viral infection consistent with COVID-19 by their healthcare provider. Signs and symptoms of respiratory viral infection due to SARS-CoV-2, influenza, and RSV can be similar.    Test performed using the Xpert Xpress SARS-CoV-2/FLU/RSV (real time RT-PCR)  assay on the GeneXpert instrument, [employer], [place] [address].   This test is being used under the Food and Drug Administration's Emergency Use Authorization.    The authorized Fact Sheet for Healthcare Providers for this assay is available upon request from the laboratory.   [facility] POCT GLUCOSE MANUAL       If radiologic studies ordered during today's ER visit, my independent interpretation are seen directly below.  This is awaiting the radiologist's final interpretation.      Imaging Results read by radiology in ED: No results found.        ED Medications Administered: Medications - No data to display        Vitals:    08/25/24 0938 08/25/24 1133   BP: 145/63 141/71   Pulse: 88 92   Resp: 16    Temp: 98 °F (36.7 °C)    TempSrc: Temporal    SpO2: 97% 94%   Weight: 110.2 kg    Height: 165.1 cm (5' 5\")      *I personally reviewed and interpreted all ED vitals.    Pulse Ox interpretation by myself: 94%, Room air, Normal     Medical Record Review: I personally reviewed available prior medical records for any recent pertinent discharge summaries, testing, and  procedures and reviewed those reports.      [facility]     Medical decision making/ED Course:   59-year-old female who presents to the emergency department for cough and fatigue.  Denies chest pain, shortness of breath, saturating normally.  No tachypnea.  Clear lungs.  Her symptoms seem most consistent with COVID-19.  Patient was suspicious about the aforementioned.  Comfortable with no further workup today.  Supportive care instructions provided.  Strict return precautions given  Tuberculosis, pneumothorax, pneumonia,pe, CHF, copd, among other life-threatening medical conditions considered and seems unlikely given patient's history, exam, and appearance.      Patient encouraged to follow-up with primary care provider in the next few days.  Advised to return to the emergency department for any worsening symptoms    Patient is non toxic appearing, is in no distress, hemodynamically stable.  Pt agrees and is aware of plan.         Differential Diagnosis:  as listed above in medical decision making.   *Please note that in the presenting to the emergency department, illness/injury that poses a threat to life or function is considered during this patient's initial evaluation.    The complexity of this visit is therefore inherently more complex given the need to consider life threatening pathology prior to any other etiology for this patient's visit.    The differential diagnosis and medical decision above exemplify this rationale.       Medical Decision Making  Problems Addressed:  COVID: acute illness or injury    Amount and/or Complexity of Data Reviewed  Independent Historian: spouse  External Data Reviewed: labs and notes.  Labs: ordered. Decision-making details documented in ED Course.    Risk  OTC drugs.               Vitals:    08/25/24 0938 08/25/24 1133   BP: 145/63 141/71   Pulse: 88 92   Resp: 16    Temp: 98 °F (36.7 °C)    TempSrc: Temporal    SpO2: 97% 94%   Weight: 110.2 kg    Height: 165.1 cm (5' 5\")               Complicating Factors: Significant medical problems that contribute to the complexity of this emergency room evaluation is listed above.    Condition upon leaving the department: Stable    Disposition and Plan     Clinical Impression:  1. COVID        Disposition:  Discharge    Medications Prescribed:  Discharge Medication List as of 8/25/2024 11:26 AM          I have discussed the discharge plan with the patient and/or family or well wisher present in the room with the patient's permission.  They state that they understand and agree with the plan.  All questions regarding their care have been answered prior to discharge.  They are aware: Emergency Department is not intended to be a substitute for an effort to provide complete medical care. The imaging, if any, have often been interpreted on a preliminary basis pending final reading by the radiologist.  Instructed to return immediately to the ED if any changes or worsening of condition should occur.  If patient's blood pressure was greater than 140/90 today, patient encouraged to have this blood pressure rechecked with primary MD and blood pressure education provided.

## 2024-09-18 ENCOUNTER — OFFICE VISIT (OUTPATIENT)
Dept: ORTHOPEDICS CLINIC | Facility: CLINIC | Age: 59
End: 2024-09-18
Payer: MEDICAID

## 2024-09-18 ENCOUNTER — HOSPITAL ENCOUNTER (OUTPATIENT)
Dept: GENERAL RADIOLOGY | Facility: HOSPITAL | Age: 59
Discharge: HOME | End: 2024-09-18
Attending: ORTHOPAEDIC SURGERY
Payer: MEDICAID

## 2024-09-18 VITALS — WEIGHT: 240 LBS | BODY MASS INDEX: 39.99 KG/M2 | HEIGHT: 65 IN

## 2024-09-18 DIAGNOSIS — M25.562 LEFT KNEE PAIN, UNSPECIFIED CHRONICITY: ICD-10-CM

## 2024-09-18 DIAGNOSIS — M25.562 LEFT KNEE PAIN, UNSPECIFIED CHRONICITY: Primary | ICD-10-CM

## 2024-09-18 DIAGNOSIS — M17.12 PRIMARY OSTEOARTHRITIS OF LEFT KNEE: ICD-10-CM

## 2024-09-18 PROCEDURE — 20610 DRAIN/INJ JOINT/BURSA W/O US: CPT | Performed by: ORTHOPAEDIC SURGERY

## 2024-09-18 PROCEDURE — 73562 X-RAY EXAM OF KNEE 3: CPT | Performed by: ORTHOPAEDIC SURGERY

## 2024-09-18 PROCEDURE — 99203 OFFICE O/P NEW LOW 30 MIN: CPT | Performed by: ORTHOPAEDIC SURGERY

## 2024-09-18 RX ORDER — CELECOXIB 200 MG/1
200 CAPSULE ORAL DAILY
Qty: 30 CAPSULE | Refills: 0 | Status: SHIPPED | OUTPATIENT
Start: 2024-09-18

## 2024-09-18 RX ORDER — TRIAMCINOLONE ACETONIDE 40 MG/ML
40 INJECTION, SUSPENSION INTRA-ARTICULAR; INTRAMUSCULAR ONCE
Status: COMPLETED | OUTPATIENT
Start: 2024-09-18 | End: 2024-09-18

## 2024-09-18 RX ADMIN — TRIAMCINOLONE ACETONIDE 40 MG: 40 INJECTION, SUSPENSION INTRA-ARTICULAR; INTRAMUSCULAR at 16:09:00

## 2024-09-18 NOTE — PROGRESS NOTES
NURSING INTAKE COMMENTS:   Chief Complaint   Patient presents with    Consult     Left knee pain for about 8 months, denies injury, 7/10 pain scale       HPI: This 59 year old female presents today with complaints of left knee pain.  She had a cortisone injection from her primary care physician previously which gave her 3 months of pain relief.    Past Medical History:    Acute carpal tunnel syndrome of right wrist    Right endoscopic carpal tunnel release    Carpal tunnel syndrome, left    Chronic back pain    CKD (chronic kidney disease) stage 3, GFR 30-59 ml/min (HCC)    Diabetes (HCC)    Diabetes mellitus (HCC)    dx 1995    Diastolic dysfunction    Grade I    Essential hypertension    dx 1995    High blood pressure    High cholesterol    Hyperlipidemia    Lipoma    surgical excision age 17    Macular degeneration of left eye    Neuropathy    Obesity (BMI 30-39.9)    Osteoarthritis     Past Surgical History:   Procedure Laterality Date    Colonoscopy N/A 6/28/2024    Procedure: COLONOSCOPY;  Surgeon: Neto Simpson MD;  Location: [facility] ENDOSCOPY    Eye surgery      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 90 tablet 3    carvedilol (COREG) 12.5 MG Oral Tab Take 1 tablet (12.5 mg total) by mouth 2 (two) times daily with meals. 60 tablet 6    Blood Pressure Monitoring (BLOOD PRESSURE KIT) Does not apply Device 1 each  in the morning and 1 each before bedtime. Please check blood pressure bid. 1 each 0    furosemide 40 MG Oral Tab Take 1 tablet (40 mg total) by mouth daily. To help with blood pressure and edema of the legs. 90 tablet 3    Dupilumab (DUPIXENT)  300 MG/2ML Subcutaneous Solution Pen-injector Inject 300 mg into the skin every 14 (fourteen) days. 4 mL 3    Insulin Lispro (HUMALOG KWIKPEN) 200 UNIT/ML Subcutaneous Solution Pen-injector Inject 150 units subcutaneous daily via insulin pump 60 mL 1    Insulin Syringes, Disposable, U-100 1 ML Does not apply Misc Use insulin syringes to inject insulin three times per day with meals. E11.65 with insulin use. 300 each 1    Continuous Blood Gluc Sensor (DEXCOM G7 SENSOR) Does not apply Misc 1 each Every 10 days. Use as directed every 10 days 3 each 4    Glucose Blood (ONETOUCH VERIO) In Vitro Strip Check 3 times per day 300 strip 1    Lancets (ONETOUCH DELICA PLUS APLFVP57Q) Does not apply Misc 1 Device in the morning, at noon, and at bedtime. Check 3 times per day 300 each 1    Blood Glucose Monitoring Suppl (ONETOUCH VERIO) w/Device Does not apply Kit 1 Device daily. 1 kit 0    Continuous Blood Gluc Sensor (DEXCOM G7 SENSOR) Does not apply Misc 1 each Every 10 days. Use as directed every 10 days 3 each 11    triamcinolone 0.1 % External Cream Apply 1 Application topically 2 (two) times daily as needed. 60 g 0    fluticasone propionate 50 MCG/ACT Nasal Suspension 2 sprays by Nasal route daily. 3 each 3    Insulin Pen Needle (BD PEN NEEDLE DAMIAN U/F) 32G X 4 MM Does not apply Misc Inject 4 times per day 400 each 1    clobetasol 0.05 % External Ointment Apply 1 Application. topically daily as needed. 60 g 2    tacrolimus (PROTOPIC) 0.1 % External Ointment Apply 1 Application. topically 2 (two) times daily. 60 g 3    clotrimazole 1 % External Cream Apply 1 Application. topically 2 (two) times daily. Apply to the groin area as needed 28 g 0    acetaminophen 500 MG Oral Tab Take 1 tablet (500 mg total) by mouth every 6 (six) hours as needed for Pain.      aspirin 81 MG Oral Chew Tab CSW ONE T D  6     Allergies   Allergen Reactions    Latex HIVES    Norvasc [Amlodipine] SWELLING    Penicillins HIVES     Family History    Problem Relation Age of Onset    Heart Disorder Father     Stroke Father     Alcohol abuse Father     Diabetes Mother     Diabetes Sister     Diabetes Sister        Social History     Occupational History    Not on file   Tobacco Use    Smoking status: Former     Current packs/day: 0.00     Average packs/day: 0.5 packs/day for 15.0 years (7.5 ttl pk-yrs)     Types: Cigarettes     Start date: 2001     Quit date: 2016     Years since quittin.6     Passive exposure: Past    Smokeless tobacco: Never   Vaping Use    Vaping status: Never Used   Substance and Sexual Activity    Alcohol use: No     Alcohol/week: 0.0 standard drinks of alcohol    Drug use: No    Sexual activity: Not on file        Review of Systems:  GENERAL: feels generally well, no significant weight loss or weight gain  SKIN: no ulcerated or worrisome skin lesions  EYES:denies blurred vision or double vision  HEENT: denies new nasal congestion, sinus pain or ST  LUNGS: denies shortness of breath  CARDIOVASCULAR: denies chest pain  GI: no hematemesis, no worsening heartburn, no diarrhea  : no dysuria, no blood in urine, no difficulty urinating, no incontinence  MUSCULOSKELETAL: no other musculoskeletal complaints other than in HPI  NEURO: no numbness or tingling, no weakness or balance disorder  PSYCHE: no depression or anxiety  HEMATOLOGIC: no hx of blood dyscrasia  ENDOCRINE: no thyroid or diabetes issues  ALL/ASTHMA: no new hx of severe allergy or asthma    Physical Examination:    Ht 5' 5\" (1.651 m)   Wt 240 lb (108.9 kg)   LMP 2013   BMI 39.94 kg/m²   Constitutional: appears well hydrated, alert and responsive, no acute distress noted  Extremities: Large effusion in the left knee.  Antalgic gait.  Full extension with 120 degrees of flexion.  No instability.      Imaging: Moderate osteoarthritis with chondrocalcinosis    Lab Results   Component Value Date    WBC 9.6 2024    HGB 11.3 (L) 2024    .0  08/14/2024      Lab Results   Component Value Date     (H) 08/14/2024    BUN 41 (H) 08/14/2024    CREATSERUM 1.43 (H) 08/14/2024    GFRNAA 50 (L) 01/02/2022    GFRAA 57 (L) 01/02/2022        Assessment and Plan:  Diagnoses and all orders for this visit:    Left knee pain, unspecified chronicity  -     XR KNEE (3 VIEWS), LEFT (CPT=73562); Future    Primary osteoarthritis of left knee  -     arthrocentesis major joint  -     triamcinolone acetonide (Kenalog-40) 40 MG/ML injection 40 mg        Assessment: She has osteoarthritis of the left knee with an effusion.  She is morbidly obese.    Plan: I aspirated and injected the left knee, prescribed Celebrex and physical therapy, and advocated weight reduction.  She will follow-up with me as needed.    The above note was creating using Dragon speech recognition technology. Please excuse any typos.    MEMO ESTES MD

## 2024-09-18 NOTE — PROGRESS NOTES
Per verbal order from Dr. Anguiano, draw up and 4ml of 0.5% Marcaine and 1ml of Kenalog 40 for injection into left knee. Jacqueline MONROE MA  Patient provided education handout for cortisone injection.

## 2024-09-23 ENCOUNTER — TELEPHONE (OUTPATIENT)
Dept: ENDOCRINOLOGY CLINIC | Facility: CLINIC | Age: 59
End: 2024-09-23

## 2024-09-23 DIAGNOSIS — E11.65 UNCONTROLLED TYPE 2 DIABETES MELLITUS WITH HYPERGLYCEMIA (HCC): ICD-10-CM

## 2024-09-23 RX ORDER — INSULIN LISPRO 100 [IU]/ML
INJECTION, SOLUTION INTRAVENOUS; SUBCUTANEOUS
Qty: 50 ML | Refills: 3 | Status: SHIPPED | OUTPATIENT
Start: 2024-09-23

## 2024-09-23 RX ORDER — INSULIN LISPRO 200 [IU]/ML
INJECTION, SOLUTION SUBCUTANEOUS
Qty: 60 ML | Refills: 1 | Status: SHIPPED | OUTPATIENT
Start: 2024-09-23 | End: 2024-11-20

## 2024-09-23 NOTE — TELEPHONE ENCOUNTER
Patient is out of the medicine Insulin Lispro (HUMALOG KWIKPEN) 200 UNIT/ML Subcutaneous Solution Pen-injector  please follow up

## 2024-09-23 NOTE — TELEPHONE ENCOUNTER
M for patient to call back.  sent to inform her of refill sent.      Passed for humalog - Endocrine refill protocol for rapid acting, regular, intermediate, and mixed insulin:    Protocol Criteria:  PASSED Reason: N/A  Appointment with Endocrinology completed in the last 6 months or scheduled in the next 3 months     Verify appointment has been completed or scheduled in the appropriate timeline. If so can send a 90 day supply with 1 refill.   Verify A1C has been completed in the last 6 months and is below 8.5%    -May substitute prescriptions for Novolog and Humalog unless documented allergy (pens and vials) at the same dose and concentration per insurance preference and provider protocol.   -May substitute prescriptions for Novolin R and Humulin R unless documented allergy (pens and vials) at the same dose and concentration per insurance preference and provider protocol.   -May substitute prescriptions for Novolin N and Humulin N unless documented allergy (pens and vials) at the same dose and concentration per insurance preference and provider protocol.   -May substitute prescriptions for Humulin and Novolin 70/30 insulin unless documented allergy at the same dose and concentration per insurance preference and provider protocol.    Last completed office visit: 6/26/2024 Mary Schmitz MD   Next scheduled Follow up:   Future Appointments   Date Time Provider Department Center   10/14/2024  9:20 AM Rene Hawley MD [id number]   11/20/2024 10:45 AM Mary Schmitz MD [id number]      Last A1C result: 7.2% done 6/26/2024.

## 2024-09-23 NOTE — TELEPHONE ENCOUNTER
Endocrine refill protocol for basal insulins     Protocol Criteria: PASSED - Appointment with Endocrinology completed in the last 6 months or scheduled in the next 3 months    - A1c result completed in the last 6 months and is below 8.5%     Verify appointment has been completed or scheduled in the appropriate timeline. If so can send a 90 day supply with 1 refill per provider protocol.    Verify A1c has been completed within the last 6 months and is below 8.5%     Last completed office visit:6/26/2024 Mary Schmitz MD   Next scheduled Follow up:   Future Appointments   Date Time Provider Department Center          11/20/2024 10:45 AM Mary Schmitz MD [facility]      Last A1c result: Last A1c value was 7.2% done 6/26/2024.

## 2024-10-12 ENCOUNTER — LAB ENCOUNTER (OUTPATIENT)
Dept: LAB | Age: 59
End: 2024-10-12
Attending: INTERNAL MEDICINE
Payer: MEDICAID

## 2024-10-12 DIAGNOSIS — E87.5 HYPERKALEMIA: ICD-10-CM

## 2024-10-12 DIAGNOSIS — R80.1 PERSISTENT PROTEINURIA: ICD-10-CM

## 2024-10-12 DIAGNOSIS — N18.32 STAGE 3B CHRONIC KIDNEY DISEASE (HCC): ICD-10-CM

## 2024-10-12 DIAGNOSIS — E11.21 DIABETIC NEPHROPATHY ASSOCIATED WITH TYPE 2 DIABETES MELLITUS (HCC): ICD-10-CM

## 2024-10-12 LAB
ALBUMIN SERPL-MCNC: 4.3 G/DL (ref 3.2–4.8)
ALBUMIN/GLOB SERPL: 1.5 {RATIO} (ref 1–2)
ALP LIVER SERPL-CCNC: 111 U/L
ALT SERPL-CCNC: 37 U/L
ANION GAP SERPL CALC-SCNC: 8 MMOL/L (ref 0–18)
AST SERPL-CCNC: 28 U/L (ref ?–34)
BILIRUB SERPL-MCNC: 0.3 MG/DL (ref 0.2–1.1)
BILIRUB UR QL: NEGATIVE
BUN BLD-MCNC: 38 MG/DL (ref 9–23)
BUN/CREAT SERPL: 28.8 (ref 10–20)
CALCIUM BLD-MCNC: 9.6 MG/DL (ref 8.7–10.4)
CHLORIDE SERPL-SCNC: 106 MMOL/L (ref 98–112)
CLARITY UR: CLEAR
CO2 SERPL-SCNC: 29 MMOL/L (ref 21–32)
CREAT BLD-MCNC: 1.32 MG/DL
CREAT UR-SCNC: 28.7 MG/DL
EGFRCR SERPLBLD CKD-EPI 2021: 46 ML/MIN/1.73M2 (ref 60–?)
FASTING STATUS PATIENT QL REPORTED: NO
GLOBULIN PLAS-MCNC: 2.8 G/DL (ref 2–3.5)
GLUCOSE BLD-MCNC: 126 MG/DL (ref 70–99)
GLUCOSE UR-MCNC: NORMAL MG/DL
HGB UR QL STRIP.AUTO: NEGATIVE
HYALINE CASTS #/AREA URNS AUTO: PRESENT /LPF
KETONES UR-MCNC: NEGATIVE MG/DL
LEUKOCYTE ESTERASE UR QL STRIP.AUTO: 500
MICROALBUMIN UR-MCNC: 16.4 MG/DL
MICROALBUMIN/CREAT 24H UR-RTO: 571.4 UG/MG (ref ?–30)
NITRITE UR QL STRIP.AUTO: NEGATIVE
OSMOLALITY SERPL CALC.SUM OF ELEC: 307 MOSM/KG (ref 275–295)
PH UR: 5.5 [PH] (ref 5–8)
POTASSIUM SERPL-SCNC: 4.9 MMOL/L (ref 3.5–5.1)
PROT SERPL-MCNC: 7.1 G/DL (ref 5.7–8.2)
PROT UR-MCNC: 20 MG/DL
SODIUM SERPL-SCNC: 143 MMOL/L (ref 136–145)
SP GR UR STRIP: 1.01 (ref 1–1.03)
UROBILINOGEN UR STRIP-ACNC: NORMAL

## 2024-10-12 PROCEDURE — 82570 ASSAY OF URINE CREATININE: CPT

## 2024-10-12 PROCEDURE — 80053 COMPREHEN METABOLIC PANEL: CPT

## 2024-10-12 PROCEDURE — 82043 UR ALBUMIN QUANTITATIVE: CPT

## 2024-10-12 PROCEDURE — 81001 URINALYSIS AUTO W/SCOPE: CPT

## 2024-10-12 PROCEDURE — 36415 COLL VENOUS BLD VENIPUNCTURE: CPT

## 2024-10-14 ENCOUNTER — OFFICE VISIT (OUTPATIENT)
Dept: NEPHROLOGY | Facility: CLINIC | Age: 59
End: 2024-10-14

## 2024-10-14 VITALS
WEIGHT: 250 LBS | BODY MASS INDEX: 42 KG/M2 | SYSTOLIC BLOOD PRESSURE: 160 MMHG | HEART RATE: 77 BPM | DIASTOLIC BLOOD PRESSURE: 65 MMHG

## 2024-10-14 DIAGNOSIS — I10 PRIMARY HYPERTENSION: ICD-10-CM

## 2024-10-14 DIAGNOSIS — E78.2 MIXED HYPERLIPIDEMIA: ICD-10-CM

## 2024-10-14 DIAGNOSIS — E11.21 DIABETIC NEPHROPATHY ASSOCIATED WITH TYPE 2 DIABETES MELLITUS (HCC): ICD-10-CM

## 2024-10-14 DIAGNOSIS — N18.32 STAGE 3B CHRONIC KIDNEY DISEASE (HCC): ICD-10-CM

## 2024-10-14 DIAGNOSIS — E87.5 HYPERKALEMIA: Primary | ICD-10-CM

## 2024-10-14 DIAGNOSIS — R80.1 PERSISTENT PROTEINURIA: ICD-10-CM

## 2024-10-14 PROCEDURE — 99214 OFFICE O/P EST MOD 30 MIN: CPT | Performed by: INTERNAL MEDICINE

## 2024-10-14 RX ORDER — LOSARTAN POTASSIUM 25 MG/1
25 TABLET ORAL DAILY
Qty: 30 TABLET | Refills: 6 | Status: SHIPPED | OUTPATIENT
Start: 2024-10-14

## 2024-10-14 NOTE — PROGRESS NOTES
Chief Complaint   Patient presents with    Follow - Up     Patient here for a follow up,have labs done on 10/12/24.       Nephrology Progress Note     59 year old female with past medical history of T2DM (dx 1995) complicated by retinopathy and neuropathy, HTN (dx 1995), HLD, CKD 3b, osteoarthritis (knees), and obesity is here for follow up of CKD 3b and hyperkalemia.    Interval history: BP elevated. Taking Coreg only daily, didn't know it was BID.      1124-7642: Cr mostly 1-1.2  2020: Cr 1.28 5/7 2021: Cr 1.96 4/22 2022: Cr 1.21 1/2, 1.44 10/10  2023: Admitted in March (3/11-3/22). Cr 1.51 on admission and 0.69 at [place].   Cr 1.74 5/22, 1.89 8/10, increased to 2.07 8/14, improved to 1.3 12/22 2024: Cr 1.38 2/20, 1.31 5/10, 1.29 6/26, 1.61 8/5, 1.43 8/14, 1.32 10/12     Denies NSAIDs use. Takes Tylenol for knee pain.      CT abd/pelvis (1/3/22) showed: \"stable mildly prominent extrarenal pelves, left greater than right, without maciel hydronephrosis or hydroureter. There is perinephric fat stranding bilaterally without perinephric fluid collection. There is a stable 1.1 cm cyst in the inferior left renal pole.\"     Hyperkalemia: She has h/o hyperkalemia related to Lisinopril and Losartan. Recent Potassium 4.9 10/12       Proteinuria: Microalb/Cr 94 mg 1/8/19, 60 mg 4/22/21, 157 mg 10/10/22, 137 mg 8/14/23, 310 mg 2/20/24, 845 mg 5/10/24, 741 mg 8/14/24, 571 mg 10/12/24     T2DM: She had an insulin pump placed about 4 weeks ago. BG are improving.   Did not tolerate Farxiga due to yeast infections.   Recent a1c 7.2 6/2024. a1c has been mostly 8-9 range since 2017  DM is complicated by retinopathy, neuropathy, and nephropathy.    Following with Dr Schmitz.      HTN: Taking Coreg 12.5 mg daily. /65 today.   Off Amlodipine due to swelling and Lisinopril/Losartan due to hyperkalemia.      HLD: Chol 133, Trig 111, HDL 39, LDL 74 6/2024. Taking Lipitor 20 mg daily.       Anemia: Hb 11.3 8/2024.      Lower extremity  edema: Has trace edema. Taking Furosemide 40 mg daily.    Additional labs:   Phos 2.7, Vitamin D 25 hydroxy 37.5, iPTH 83 5/2024.       FHx:   Mother (D) - DM  Father (passed away in 40s) - CAD, CVA, alcohol use.   Sisters x 2 (D) - DM  No family history of CKD.      SHx: History of smoking, 1/2PPD x 15 yrs, quit 2016. Does not drink alcohol currently.   . Has 4 sons and 2 daughters. Youngest son lives with her.       HISTORY:  Past Medical History:    Acute carpal tunnel syndrome of right wrist    Right endoscopic carpal tunnel release    Carpal tunnel syndrome, left    Chronic back pain    CKD (chronic kidney disease) stage 3, GFR 30-59 ml/min (HCC)    Diabetes (HCC)    Diabetes mellitus (HCC)    dx 1995    Diastolic dysfunction    Grade I    Essential hypertension    dx 1995    High blood pressure    High cholesterol    Hyperlipidemia    Lipoma    surgical excision age 17    Macular degeneration of left eye    Neuropathy    Obesity (BMI 30-39.9)    Osteoarthritis      Past Surgical History:   Procedure Laterality Date    Colonoscopy N/A 6/28/2024    Procedure: COLONOSCOPY;  Surgeon: Neto Simpson MD;  Location: 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 30 packet 6    losartan 25 MG Oral Tab Take 1 tablet (25 mg total) by mouth daily. 30 tablet 6    INSULIN LISPRO 100 UNIT/ML Injection Solution INJECT 150 UNITS DAILY VIA INSULIN PUMP 50 mL 3    Insulin Lispro (HUMALOG KWIKPEN) 200 UNIT/ML Subcutaneous Solution Pen-injector Inject 150  units subcutaneous daily via insulin pump 60 mL 1    atorvastatin 20 MG Oral Tab Take 1 tablet (20 mg total) by mouth nightly. 90 tablet 3    carvedilol (COREG) 12.5 MG Oral Tab Take 1 tablet (12.5 mg total) by mouth 2 (two) times daily with meals. 60 tablet 6    furosemide 40 MG Oral Tab Take 1 tablet (40 mg total) by mouth daily. To help with blood pressure and edema of the legs. 90 tablet 3    Dupilumab (DUPIXENT) 300 MG/2ML Subcutaneous Solution Pen-injector Inject 300 mg into the skin every 14 (fourteen) days. 4 mL 3    triamcinolone 0.1 % External Cream Apply 1 Application topically 2 (two) times daily as needed. 60 g 0    fluticasone propionate 50 MCG/ACT Nasal Suspension 2 sprays by Nasal route daily. 3 each 3    clobetasol 0.05 % External Ointment Apply 1 Application. topically daily as needed. 60 g 2    tacrolimus (PROTOPIC) 0.1 % External Ointment Apply 1 Application. topically 2 (two) times daily. 60 g 3    clotrimazole 1 % External Cream Apply 1 Application. topically 2 (two) times daily. Apply to the groin area as needed 28 g 0    acetaminophen 500 MG Oral Tab Take 1 tablet (500 mg total) by mouth every 6 (six) hours as needed for Pain.      aspirin 81 MG Oral Chew Tab CSW ONE T D  6    Blood Pressure Monitoring (BLOOD PRESSURE KIT) Does not apply Device 1 each  in the morning and 1 each before bedtime. Please check blood pressure bid. 1 each 0    Insulin Syringes, Disposable, U-100 1 ML Does not apply Misc Use insulin syringes to inject insulin three times per day with meals. E11.65 with insulin use. 300 each 1    Continuous Blood Gluc Sensor (DEXCOM G7 SENSOR) Does not apply Misc 1 each Every 10 days. Use as directed every 10 days 3 each 4    Glucose Blood (ONETOUCH VERIO) In Vitro Strip Check 3 times per day 300 strip 1    Lancets (ONETOUCH DELICA PLUS LOCVCO62T) Does not apply Misc 1 Device in the morning, at noon, and at bedtime. Check 3 times per day 300 each 1    Blood Glucose Monitoring Suppl  (ONETOUCH VERIO) w/Device Does not apply Kit 1 Device daily. 1 kit 0    Continuous Blood Gluc Sensor (DEXCOM G7 SENSOR) Does not apply Misc 1 each Every 10 days. Use as directed every 10 days 3 each 11    Insulin Pen Needle (BD PEN NEEDLE DAMIAN U/F) 32G X 4 MM Does not apply Misc Inject 4 times per day 400 each 1       Allergies:  Allergies   Allergen Reactions    Latex HIVES    Norvasc [Amlodipine] SWELLING    Penicillins HIVES         ROS:     Review of Systems   Constitutional:  Negative for chills, fatigue and fever.   Respiratory:  Negative for cough and shortness of breath.    Cardiovascular:  Negative for chest pain and leg swelling.   Gastrointestinal:  Positive for constipation. Negative for abdominal pain, diarrhea, nausea and vomiting.   Genitourinary:  Negative for difficulty urinating, dysuria and flank pain.   Musculoskeletal:  Positive for arthralgias (knees).          Vitals:    10/14/24 0921   BP: 160/65   Pulse: 77         PHYSICAL EXAM:   Physical Exam  Constitutional:       Appearance: Normal appearance. She is obese.   Cardiovascular:      Rate and Rhythm: Normal rate and regular rhythm.      Heart sounds: Normal heart sounds.   Pulmonary:      Effort: Pulmonary effort is normal.      Breath sounds: Normal breath sounds.   Musculoskeletal:         General: Normal range of motion.      Comments: Trace edema   Skin:     General: Skin is warm and dry.   Neurological:      General: No focal deficit present.      Mental Status: She is alert and oriented to person, place, and time.   Psychiatric:         Mood and Affect: Mood normal.         Behavior: Behavior normal.             ASSESSMENT/PLAN:   Assessment   Encounter Diagnoses   Name Primary?    Stage 3b chronic kidney disease (HCC)     Hyperkalemia Yes    Persistent proteinuria     Diabetic nephropathy associated with type 2 diabetes mellitus (HCC)     Primary hypertension     Mixed hyperlipidemia        CKD 3b:   Likely due to diabetic  nephropathy and hypertensive nephrosclerosis  Recent Cr 1.32 eGFR 46 10/12/24,at baseline.   Advised to avoid NSAIDs and take Tylenol/acetaminophen for pain.   Asked to restrict sodium to 2 grams per day and instructions on low protein diet given.   Advised tight control of DM and HTN to prevent complications including progressive CKD, CAD or CVA.     Proteinuria:   Likely due to diabetic nephropathy and hypertensive nephrosclerosis  Will restart Losartan 25 mg daily. Asked hold Losartan until she starts Lokelma.      Hyperkalemia:   Will start Lokelma 10 grams daily.   Following low potassium diet.   Repeat BMP in a month ordered.      DM:   Better controlled. Cont insulin pump per Endo.      HTN:   Uncontrolled. Cont Coreg 12.5 mg BID and will add Losartan 25 mg daily.     HLD:   Stable. Cont meds.     Lower extremity edema:   Cont Furosemide 40 mg  daily.       Follow up in 4 months.          Orders This Visit:  Orders Placed This Encounter   Procedures    Basic Metabolic Panel (8)       Meds This Visit:  Requested Prescriptions     Signed Prescriptions Disp Refills    sodium zirconium cyclosilicate (LOKELMA) 10 g Oral Powd Pack 30 packet 6     Sig: Take 1 packet (10 g total) by mouth daily.    losartan 25 MG Oral Tab 30 tablet 6     Sig: Take 1 tablet (25 mg total) by mouth daily.       Imaging & Referrals:  None       30 minutes spent in the care of the patient which included: reviewing prior records, obtaining history and examining patient, discussing lab results and treatment plan including diet, ordering medications as above, and documentation.      Rene Hawley MD  10/14/2024

## 2024-10-21 DIAGNOSIS — L30.9 DERMATITIS: ICD-10-CM

## 2024-10-21 NOTE — TELEPHONE ENCOUNTER
Refill Request for medication(s):     Last Office Visit: 07/09/2024    Last Refill: 07/09/2024    Pharmacy, Dosage verified:  yes    Condition Update (if applicable):     Rx pended and sent to provider for approval, please advise. Thank You!

## 2024-11-16 ENCOUNTER — APPOINTMENT (OUTPATIENT)
Dept: CT IMAGING | Facility: HOSPITAL | Age: 59
End: 2024-11-16
Attending: EMERGENCY MEDICINE
Payer: MEDICAID

## 2024-11-16 ENCOUNTER — HOSPITAL ENCOUNTER (EMERGENCY)
Facility: HOSPITAL | Age: 59
Discharge: HOME | End: 2024-11-16
Attending: EMERGENCY MEDICINE
Payer: MEDICAID

## 2024-11-16 VITALS
SYSTOLIC BLOOD PRESSURE: 145 MMHG | DIASTOLIC BLOOD PRESSURE: 73 MMHG | HEART RATE: 79 BPM | TEMPERATURE: 98 F | RESPIRATION RATE: 20 BRPM | OXYGEN SATURATION: 94 %

## 2024-11-16 DIAGNOSIS — R10.9 ACUTE LEFT FLANK PAIN: Primary | ICD-10-CM

## 2024-11-16 LAB
ALBUMIN SERPL-MCNC: 4 G/DL (ref 3.2–4.8)
ALBUMIN/GLOB SERPL: 1.3 {RATIO} (ref 1–2)
ALP LIVER SERPL-CCNC: 169 U/L
ALT SERPL-CCNC: 55 U/L
ANION GAP SERPL CALC-SCNC: 8 MMOL/L (ref 0–18)
AST SERPL-CCNC: 34 U/L (ref ?–34)
BASOPHILS # BLD AUTO: 0.07 X10(3) UL (ref 0–0.2)
BASOPHILS NFR BLD AUTO: 0.6 %
BILIRUB SERPL-MCNC: 0.3 MG/DL (ref 0.2–1.1)
BILIRUB UR QL: NEGATIVE
BUN BLD-MCNC: 42 MG/DL (ref 9–23)
BUN/CREAT SERPL: 33.1 (ref 10–20)
CALCIUM BLD-MCNC: 9.7 MG/DL (ref 8.7–10.4)
CHLORIDE SERPL-SCNC: 106 MMOL/L (ref 98–112)
CLARITY UR: CLEAR
CO2 SERPL-SCNC: 27 MMOL/L (ref 21–32)
COLOR UR: COLORLESS
CREAT BLD-MCNC: 1.27 MG/DL
DEPRECATED RDW RBC AUTO: 44.7 FL (ref 35.1–46.3)
EGFRCR SERPLBLD CKD-EPI 2021: 48 ML/MIN/1.73M2 (ref 60–?)
EOSINOPHIL # BLD AUTO: 0.71 X10(3) UL (ref 0–0.7)
EOSINOPHIL NFR BLD AUTO: 6.4 %
ERYTHROCYTE [DISTWIDTH] IN BLOOD BY AUTOMATED COUNT: 13.2 % (ref 11–15)
GLOBULIN PLAS-MCNC: 3.1 G/DL (ref 2–3.5)
GLUCOSE BLD-MCNC: 90 MG/DL (ref 70–99)
GLUCOSE UR-MCNC: NORMAL MG/DL
HCT VFR BLD AUTO: 31.9 %
HGB BLD-MCNC: 10.3 G/DL
HGB UR QL STRIP.AUTO: NEGATIVE
IMM GRANULOCYTES # BLD AUTO: 0.09 X10(3) UL (ref 0–1)
IMM GRANULOCYTES NFR BLD: 0.8 %
KETONES UR-MCNC: NEGATIVE MG/DL
LEUKOCYTE ESTERASE UR QL STRIP.AUTO: NEGATIVE
LIPASE SERPL-CCNC: 38 U/L (ref 12–53)
LYMPHOCYTES # BLD AUTO: 3.36 X10(3) UL (ref 1–4)
LYMPHOCYTES NFR BLD AUTO: 30.2 %
MCH RBC QN AUTO: 29.9 PG (ref 26–34)
MCHC RBC AUTO-ENTMCNC: 32.3 G/DL (ref 31–37)
MCV RBC AUTO: 92.7 FL
MONOCYTES # BLD AUTO: 0.95 X10(3) UL (ref 0.1–1)
MONOCYTES NFR BLD AUTO: 8.5 %
NEUTROPHILS # BLD AUTO: 5.96 X10 (3) UL (ref 1.5–7.7)
NEUTROPHILS # BLD AUTO: 5.96 X10(3) UL (ref 1.5–7.7)
NEUTROPHILS NFR BLD AUTO: 53.5 %
NITRITE UR QL STRIP.AUTO: NEGATIVE
OSMOLALITY SERPL CALC.SUM OF ELEC: 302 MOSM/KG (ref 275–295)
PH UR: 7 [PH] (ref 5–8)
PLATELET # BLD AUTO: 248 10(3)UL (ref 150–450)
POTASSIUM SERPL-SCNC: 4.5 MMOL/L (ref 3.5–5.1)
PROT SERPL-MCNC: 7.1 G/DL (ref 5.7–8.2)
PROT UR-MCNC: 50 MG/DL
RBC # BLD AUTO: 3.44 X10(6)UL
SODIUM SERPL-SCNC: 141 MMOL/L (ref 136–145)
SP GR UR STRIP: 1.02 (ref 1–1.03)
UROBILINOGEN UR STRIP-ACNC: NORMAL
WBC # BLD AUTO: 11.1 X10(3) UL (ref 4–11)

## 2024-11-16 PROCEDURE — 85025 COMPLETE CBC W/AUTO DIFF WBC: CPT | Performed by: EMERGENCY MEDICINE

## 2024-11-16 PROCEDURE — 81001 URINALYSIS AUTO W/SCOPE: CPT | Performed by: EMERGENCY MEDICINE

## 2024-11-16 PROCEDURE — 96361 HYDRATE IV INFUSION ADD-ON: CPT

## 2024-11-16 PROCEDURE — 74177 CT ABD & PELVIS W/CONTRAST: CPT | Performed by: EMERGENCY MEDICINE

## 2024-11-16 PROCEDURE — 99284 EMERGENCY DEPT VISIT MOD MDM: CPT

## 2024-11-16 PROCEDURE — 96375 TX/PRO/DX INJ NEW DRUG ADDON: CPT

## 2024-11-16 PROCEDURE — 80053 COMPREHEN METABOLIC PANEL: CPT | Performed by: EMERGENCY MEDICINE

## 2024-11-16 PROCEDURE — 96374 THER/PROPH/DIAG INJ IV PUSH: CPT

## 2024-11-16 PROCEDURE — 99285 EMERGENCY DEPT VISIT HI MDM: CPT

## 2024-11-16 PROCEDURE — 83690 ASSAY OF LIPASE: CPT | Performed by: EMERGENCY MEDICINE

## 2024-11-16 RX ORDER — DICYCLOMINE HCL 20 MG
20 TABLET ORAL 4 TIMES DAILY PRN
Qty: 30 TABLET | Refills: 0 | Status: SHIPPED | OUTPATIENT
Start: 2024-11-16 | End: 2024-12-16

## 2024-11-16 RX ORDER — ONDANSETRON 2 MG/ML
4 INJECTION INTRAMUSCULAR; INTRAVENOUS ONCE
Status: COMPLETED | OUTPATIENT
Start: 2024-11-16 | End: 2024-11-16

## 2024-11-16 RX ORDER — MORPHINE SULFATE 4 MG/ML
4 INJECTION, SOLUTION INTRAMUSCULAR; INTRAVENOUS ONCE
Status: COMPLETED | OUTPATIENT
Start: 2024-11-16 | End: 2024-11-16

## 2024-11-16 NOTE — ED PROVIDER NOTES
Patient Seen in: [facility] Emergency Department      History     Chief Complaint   Patient presents with    Abdomen/Flank Pain     Stated Complaint: abdominal pain    Subjective:   HPI      59-year-old female presents for evaluation for left lower quadrant abdominal pain for the past several hours.  Pain has been constant, severe, associated with nausea but no vomiting.  She does suspect she might have a urinary tract infection and has been urinating more frequently.  She does report constipation.  She denies any diarrhea.  She denies any fevers or chills.    Objective:     Past Medical History:    Acute carpal tunnel syndrome of right wrist    Right endoscopic carpal tunnel release    Carpal tunnel syndrome, left    Chronic back pain    CKD (chronic kidney disease) stage 3, GFR 30-59 ml/min (HCC)    Diabetes (HCC)    Diabetes mellitus (HCC)    dx     Diastolic dysfunction    Grade I    Essential hypertension    dx     High blood pressure    High cholesterol    Hyperlipidemia    Lipoma    surgical excision age 17    Macular degeneration of left eye    Neuropathy    Obesity (BMI 30-39.9)    Osteoarthritis              Past Surgical History:   Procedure Laterality Date    Colonoscopy N/A 2024    Procedure: COLONOSCOPY;  Surgeon: Neto Simpson MD;  Location: [facility] END[facility]    Eye surgery      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    Lipoma removal      Wrist arthroscop,release xvers lig Right 2019    Right endoscopic carpal tunnel release    Wrist arthroscop,release xvers lig Left 2019    Left endoscopic carpal tunnel release                Social History     Socioeconomic History    Marital status:    Tobacco Use    Smoking status: Former     Current packs/day: 0.00     Average packs/day: 0.5 packs/day for 15.0 years (7.5 ttl pk-yrs)     Types: Cigarettes     Start date: 2001     Quit date: 2016     Years since quittin.8     Passive exposure: Past     Smokeless tobacco: Never   Vaping Use    Vaping status: Never Used   Substance and Sexual Activity    Alcohol use: No     Alcohol/week: 0.0 standard drinks of alcohol    Drug use: No   Other Topics Concern    Breast feeding No    Reaction to local anesthetic No    Pt has a pacemaker No    Pt has a defibrillator No   Social History Narrative    Lives with     Homemaker                  Physical Exam     ED Triage Vitals   BP 11/16/24 0138 (!) 198/73   Pulse 11/16/24 0138 79   Resp 11/16/24 0138 20   Temp 11/16/24 0138 97.7 °F (36.5 °C)   Temp src 11/16/24 0138 Oral   SpO2 11/16/24 0138 97 %   O2 Device 11/16/24 0245 None (Room air)       Current Vitals:   Vital Signs  BP: 145/73  Pulse: 79  Resp: 20  Temp: 97.7 °F (36.5 °C)  Temp src: Oral  MAP (mmHg): 92    Oxygen Therapy  SpO2: 94 %  O2 Device: None (Room air)        Physical Exam  Vitals and nursing note reviewed.   Constitutional:       Appearance: Normal appearance.   HENT:      Head: Normocephalic and atraumatic.   Cardiovascular:      Rate and Rhythm: Normal rate and regular rhythm.      Pulses: Normal pulses.      Heart sounds: Normal heart sounds.   Pulmonary:      Effort: Pulmonary effort is normal.      Breath sounds: Normal breath sounds.   Abdominal:      General: Bowel sounds are normal.      Palpations: Abdomen is soft.      Tenderness: There is abdominal tenderness in the left lower quadrant. There is no guarding or rebound.   Musculoskeletal:         General: Normal range of motion.      Cervical back: Normal range of motion.   Skin:     General: Skin is warm and dry.   Neurological:      General: No focal deficit present.      Mental Status: She is alert.             ED Course     Labs Reviewed   CBC WITH DIFFERENTIAL WITH PLATELET - Abnormal; Notable for the following components:       Result Value    WBC 11.1 (*)     RBC 3.44 (*)     HGB 10.3 (*)     HCT 31.9 (*)     Eosinophil Absolute 0.71 (*)     All other components within normal limits    COMP METABOLIC PANEL (14) - Abnormal; Notable for the following components:    BUN 42 (*)     Creatinine 1.27 (*)     BUN/CREA Ratio 33.1 (*)     Calculated Osmolality 302 (*)     eGFR-Cr 48 (*)     ALT 55 (*)     AST 34 (*)     Alkaline Phosphatase 169 (*)     All other components within normal limits   URINALYSIS WITH CULTURE REFLEX - Abnormal; Notable for the following components:    Urine Color Colorless (*)     Protein Urine 50 (*)     Squamous Epi. Cells Few (*)     All other components within normal limits   LIPASE - Normal       ED Course as of 11/16/24 0438  ------------------------------------------------------------  Time: 11/16 0351  Value: CT ABDOMEN+PELVIS(CONTRAST ONLY)(CPT=74177)  Comment: Per my independent interpretation, patient's CT Abdomen and Pelvis demonstrates no ureterolithiasis.                MDM              Medical Decision Making  Differential diagnosis includes but is not limited to diverticulitis, UTI, constipation, ureterolithiasis, etc    Patient is a mild leukocytosis.  Chemistry was unremarkable.  Urinalysis without evidence of UTI.  CT imaging negative for any acute findings.  Uncertain etiology for pain at this time.  She is however feeling better after morphine.  She does report she had a recent colonoscopy which showed some polyps.  Pain could be musculoskeletal.  Started on some Bentyl for possible abdominal cramping as a cause of her symptoms.  She is discharged home and is to follow-up with PCP.    Medical Record Review: I personally reviewed available prior medical records for any recent pertinent discharge summaries, testing, and procedures, and reviewed those reports.    Complicating factors: The patient  has a past medical history of Acute carpal tunnel syndrome of right wrist (04/11/2019), Carpal tunnel syndrome, left (05/23/2019), Chronic back pain, CKD (chronic kidney disease) stage 3, GFR 30-59 ml/min (HCC), Diabetes (HCC), Diabetes mellitus (HCC), Diastolic  dysfunction, Essential hypertension, High blood pressure, High cholesterol, Hyperlipidemia, Lipoma, Macular degeneration of left eye, Neuropathy, Obesity (BMI 30-39.9), and Osteoarthritis. and  has a past surgical history that includes lipoma removal; Eye surgery; wrist arthroscop,release xvers lig (Right, 05/03/2019); wrist arthroscop,release xvers lig (Left, 05/28/2019); and colonoscopy (N/A, 6/28/2024). that contribute to the medical complexity of this ED evaluation.     Clinical impression as well as any lab results and radiology findings were discussed with the patient and/or caregiver. I personally reviewed all laboratory results and radiology images myself. Patient is advised to follow up with PCP for reevaluation. I provided discharge instructions and return precautions. Patient and/or caregiver voices understanding and agreement with the treatment plan. All questions were addressed and answered.         Problems Addressed:  Acute left flank pain: acute illness or injury with systemic symptoms    Amount and/or Complexity of Data Reviewed  External Data Reviewed: labs.     Details: Creatinine on 10/12/24 reviewed and 1.32  Labs: ordered. Decision-making details documented in ED Course.  Radiology: ordered and independent interpretation performed. Decision-making details documented in ED Course.    Risk  Prescription drug management.  Parenteral controlled substances.  Risk Details: IV morphine        Disposition and Plan     Clinical Impression:  1. Acute left flank pain         Disposition:  Discharge  11/16/2024  4:36 am    Follow-up:  Tiago Amin DO  [address] [place] [id number]  [phone]    Schedule an appointment as soon as possible for a visit in 1 week(s)  For follow up and re-evaluation          Medications Prescribed:  Current Discharge Medication List        START taking these medications    Details   dicyclomine 20 MG Oral Tab Take 1 tablet (20 mg total) by mouth 4 (four)  times daily as needed (abdominal pain).  Qty: 30 tablet, Refills: 0                 Supplementary Documentation:

## 2024-11-20 ENCOUNTER — TELEPHONE (OUTPATIENT)
Dept: ENDOCRINOLOGY CLINIC | Facility: CLINIC | Age: 59
End: 2024-11-20

## 2024-11-20 ENCOUNTER — OFFICE VISIT (OUTPATIENT)
Dept: ENDOCRINOLOGY CLINIC | Facility: CLINIC | Age: 59
End: 2024-11-20

## 2024-11-20 VITALS
WEIGHT: 261 LBS | BODY MASS INDEX: 43 KG/M2 | SYSTOLIC BLOOD PRESSURE: 163 MMHG | HEART RATE: 80 BPM | DIASTOLIC BLOOD PRESSURE: 85 MMHG

## 2024-11-20 DIAGNOSIS — E11.65 UNCONTROLLED TYPE 2 DIABETES MELLITUS WITH HYPERGLYCEMIA (HCC): Primary | ICD-10-CM

## 2024-11-20 LAB
AMB EXT GMI: 7.4 %
GLUCOSE BLOOD: 184
HEMOGLOBIN A1C: 6.3 % (ref 4.3–5.6)
TEST STRIP LOT #: NORMAL NUMERIC

## 2024-11-20 PROCEDURE — 83036 HEMOGLOBIN GLYCOSYLATED A1C: CPT | Performed by: INTERNAL MEDICINE

## 2024-11-20 PROCEDURE — 82947 ASSAY GLUCOSE BLOOD QUANT: CPT | Performed by: INTERNAL MEDICINE

## 2024-11-20 PROCEDURE — 99214 OFFICE O/P EST MOD 30 MIN: CPT | Performed by: INTERNAL MEDICINE

## 2024-11-20 RX ORDER — INSULIN LISPRO 100 [IU]/ML
INJECTION, SOLUTION INTRAVENOUS; SUBCUTANEOUS
Qty: 50 ML | Refills: 3 | Status: SHIPPED | OUTPATIENT
Start: 2024-11-20

## 2024-11-20 NOTE — PROGRESS NOTES
Name: Zenia David  Date: 11/20/2024    HISTORY OF PRESENT ILLNESS   Zenia David is a 59 year old female who presents for hospital follow up for diabetes mellitus diagnosed 23 years ago, transitioned to insulin therapy 15 years ago.      Prior HbA, C or glycohemoglobin were 9.9% 5/2017; 10.3% 8/2017; 9.5% 2/2018; 12.7% 1/2019; 8.9% 3/2019; 7.1% 7/2019; 8.1% 10/2019; 7.0% 1/2020; 7.8% 5/2020; 10.3% 1/2022; 8.9% 4/2022; 11.7% 10/2022; 11.9% 2/2023; 9.3% 6/2023; 10.3% 2/2024; 7.2% 6/2024; 6.3% POC Today     Dietary compliance: Poor; significant snacking at night and cheating on pasta/rice, chips, no soda     Exercise: No -->is walking with cane but limited with hip pain and neuropathy    Polyuria/polydipsia: No  Blurred vision: No    Episodes of hypoglycemia: No   Blood Glucose:  Checking 4 times per day  Reviewed Dexcom CGM - time in range 82%     Current Meds:   iLet pump - bionic pancreas   -->she changed back to U100 script     Metformin d/c'd due to CKD  Farxiga d/c'd due to recurrent yeast infections     REVIEW OF SYSTEMS  Eyes: Diabetic retinopathy present: Yes, diabetic cataract            Most recent visit to eye doctor in last 12 months: Yes    CV: Cardiovascular disease present: No         Hypertension present: Yes         Hyperlipidemia present: Yes         Peripheral Vascular Disease present: No    : Nephropathy present: No    Neuro: Neuropathy present: Yes    Skin: Infection or ulceration: No    Osteoporosis: No    Thyroid disease: No      Medications:     Current Outpatient Medications:     dicyclomine 20 MG Oral Tab, Take 1 tablet (20 mg total) by mouth 4 (four) times daily as needed (abdominal pain)., Disp: 30 tablet, Rfl: 0    DUPIXENT 300 MG/2ML Subcutaneous Solution Pen-injector, INJECT 300 MG UNDER THE SKIN EVERY 14 DAYS, Disp: 4 mL, Rfl: 3    sodium zirconium cyclosilicate (LOKELMA) 10 g Oral Powd Pack, Take 1 packet (10 g total) by mouth daily., Disp: 30 packet, Rfl: 6    losartan 25 MG  Oral Tab, Take 1 tablet (25 mg total) by mouth daily., Disp: 30 tablet, Rfl: 6    INSULIN LISPRO 100 UNIT/ML Injection Solution, INJECT 150 UNITS DAILY VIA INSULIN PUMP, Disp: 50 mL, Rfl: 3    Insulin Lispro (HUMALOG KWIKPEN) 200 UNIT/ML Subcutaneous Solution Pen-injector, Inject 150 units subcutaneous daily via insulin pump, Disp: 60 mL, Rfl: 1    atorvastatin 20 MG Oral Tab, Take 1 tablet (20 mg total) by mouth nightly., Disp: 90 tablet, Rfl: 3    carvedilol (COREG) 12.5 MG Oral Tab, Take 1 tablet (12.5 mg total) by mouth 2 (two) times daily with meals., Disp: 60 tablet, Rfl: 6    Blood Pressure Monitoring (BLOOD PRESSURE KIT) Does not apply Device, 1 each  in the morning and 1 each before bedtime. Please check blood pressure bid., Disp: 1 each, Rfl: 0    furosemide 40 MG Oral Tab, Take 1 tablet (40 mg total) by mouth daily. To help with blood pressure and edema of the legs., Disp: 90 tablet, Rfl: 3    Insulin Syringes, Disposable, U-100 1 ML Does not apply Misc, Use insulin syringes to inject insulin three times per day with meals. E11.65 with insulin use., Disp: 300 each, Rfl: 1    Continuous Blood Gluc Sensor (DEXCOM G7 SENSOR) Does not apply Misc, 1 each Every 10 days. Use as directed every 10 days, Disp: 3 each, Rfl: 4    Glucose Blood (ONETOUCH VERIO) In Vitro Strip, Check 3 times per day, Disp: 300 strip, Rfl: 1    Lancets (ONETOUCH DELICA PLUS VMQCXB28T) Does not apply Misc, 1 Device in the morning, at noon, and at bedtime. Check 3 times per day, Disp: 300 each, Rfl: 1    Blood Glucose Monitoring Suppl (ONETOUCH VERIO) w/Device Does not apply Kit, 1 Device daily., Disp: 1 kit, Rfl: 0    Continuous Blood Gluc Sensor (DEXCOM G7 SENSOR) Does not apply Misc, 1 each Every 10 days. Use as directed every 10 days, Disp: 3 each, Rfl: 11    triamcinolone 0.1 % External Cream, Apply 1 Application topically 2 (two) times daily as needed., Disp: 60 g, Rfl: 0    fluticasone propionate 50 MCG/ACT Nasal Suspension, 2  sprays by Nasal route daily., Disp: 3 each, Rfl: 3    Insulin Pen Needle (BD PEN NEEDLE DAMIAN U/F) 32G X 4 MM Does not apply Misc, Inject 4 times per day, Disp: 400 each, Rfl: 1    clobetasol 0.05 % External Ointment, Apply 1 Application. topically daily as needed., Disp: 60 g, Rfl: 2    tacrolimus (PROTOPIC) 0.1 % External Ointment, Apply 1 Application. topically 2 (two) times daily., Disp: 60 g, Rfl: 3    clotrimazole 1 % External Cream, Apply 1 Application. topically 2 (two) times daily. Apply to the groin area as needed, Disp: 28 g, Rfl: 0    acetaminophen 500 MG Oral Tab, Take 1 tablet (500 mg total) by mouth every 6 (six) hours as needed for Pain., Disp: , Rfl:     aspirin 81 MG Oral Chew Tab, CSW ONE T D, Disp: , Rfl: 6     Allergies:   Allergies   Allergen Reactions    Latex HIVES    Norvasc [Amlodipine] SWELLING    Penicillins HIVES       Social History:   Social History     Socioeconomic History    Marital status:    Tobacco Use    Smoking status: Former     Current packs/day: 0.00     Average packs/day: 0.5 packs/day for 15.0 years (7.5 ttl pk-yrs)     Types: Cigarettes     Start date: 2001     Quit date: 2016     Years since quittin.8     Passive exposure: Past    Smokeless tobacco: Never   Vaping Use    Vaping status: Never Used   Substance and Sexual Activity    Alcohol use: No     Alcohol/week: 0.0 standard drinks of alcohol    Drug use: No   Other Topics Concern    Breast feeding No    Reaction to local anesthetic No    Pt has a pacemaker No    Pt has a defibrillator No       Medical History:   Past Medical History:    Acute carpal tunnel syndrome of right wrist    Right endoscopic carpal tunnel release    Carpal tunnel syndrome, left    Chronic back pain    CKD (chronic kidney disease) stage 3, GFR 30-59 ml/min (HCC)    Diabetes (HCC)    Diabetes mellitus (HCC)    dx     Diastolic dysfunction    Grade I    Essential hypertension    dx     High blood pressure    High  cholesterol    Hyperlipidemia    Lipoma    surgical excision age 17    Macular degeneration of left eye    Neuropathy    Obesity (BMI 30-39.9)    Osteoarthritis       Surgical history:   Past Surgical History:   Procedure Laterality Date    Colonoscopy N/A 6/28/2024    Procedure: COLONOSCOPY;  Surgeon: Neto Simpson MD;  Location: [facility] ENDOSCOPY    Eye surgery      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   Back: no kyphosis  Respiratory:  non-labored. no increased work of breathing.    Lymph Nodes:  No abnormal nodes noted  Skin:  normal moisture and skin texture  Hematologic:  no excessive bruising  Psychiatric:  oriented to time, self, and place      ASSESSMENT/PLAN:      1. Diabetes Mellitus Type 2, controlled  -controlled, HgA1c 6.3% -->improved   -Discussed importance of glycemic control to prevent complications of diabetes  -Discussed complications of diabetes include retinopathy, neuropathy, nephropathy and cardiovascular disease  -Discussed importance of SBGM  -Discussed importance of low CHO diet, recommend 45gm per meal or 135gm per day  -Congratulated patient on improved BG levels with iLet pump   -Continue current pump settings    -Change back to U100 Humalog insulin -->she was having some hypoglycemia with U200   -Continue Dexcom CGM   -LDL improved   -Abnormal foot exam performed 6/2024   -elevated urine microalbumin 5/2024   -Reviewed importance of SBGM- check sugars three times daily. Call clinic before  next appt if sugars remain persistently >200 or <80. Verbalized understanding and instructions provided in writing  -UTD with optho   -BP elevated, stable on repeat  -Sent note to nephrology to follow up on BP medications     A total of 30 minutes was spent on obtaining history, reviewing pertinent labs, evaluating patient, providing multiple treatment options, reinforcing diet/exercise and compliance, and completing documentation.     RTC 4 months    Mary Schmitz MD   11/20/2024

## 2024-11-20 NOTE — TELEPHONE ENCOUNTER
Dr. Hawley - would you follow up with patient regarding BP and medications.  Her BP remains elevated and she has new LE edema on medications.  Might need adjustment. Thanks.

## 2024-11-21 NOTE — TELEPHONE ENCOUNTER
Outgoing call; scheduled appointment for tomorrow- unable to come in today.  -To do fasting labs early morning tomorrow- to have lab results ready for appointment at noon.  -Bring home blood pressure readings.  -Bring home wrist B/P machine to compare readings.

## 2024-11-22 ENCOUNTER — LAB ENCOUNTER (OUTPATIENT)
Dept: LAB | Age: 59
End: 2024-11-22
Attending: INTERNAL MEDICINE
Payer: MEDICAID

## 2024-11-22 DIAGNOSIS — R80.1 PERSISTENT PROTEINURIA: ICD-10-CM

## 2024-11-22 DIAGNOSIS — E87.5 HYPERKALEMIA: ICD-10-CM

## 2024-11-22 DIAGNOSIS — E11.21 DIABETIC NEPHROPATHY ASSOCIATED WITH TYPE 2 DIABETES MELLITUS (HCC): ICD-10-CM

## 2024-11-22 DIAGNOSIS — N18.32 STAGE 3B CHRONIC KIDNEY DISEASE (HCC): ICD-10-CM

## 2024-11-22 LAB
ANION GAP SERPL CALC-SCNC: 8 MMOL/L (ref 0–18)
BUN BLD-MCNC: 41 MG/DL (ref 9–23)
BUN/CREAT SERPL: 26.3 (ref 10–20)
CALCIUM BLD-MCNC: 10.8 MG/DL (ref 8.7–10.4)
CHLORIDE SERPL-SCNC: 103 MMOL/L (ref 98–112)
CO2 SERPL-SCNC: 29 MMOL/L (ref 21–32)
CREAT BLD-MCNC: 1.56 MG/DL
EGFRCR SERPLBLD CKD-EPI 2021: 38 ML/MIN/1.73M2 (ref 60–?)
FASTING STATUS PATIENT QL REPORTED: YES
GLUCOSE BLD-MCNC: 84 MG/DL (ref 70–99)
OSMOLALITY SERPL CALC.SUM OF ELEC: 299 MOSM/KG (ref 275–295)
POTASSIUM SERPL-SCNC: 4.3 MMOL/L (ref 3.5–5.1)
SODIUM SERPL-SCNC: 140 MMOL/L (ref 136–145)

## 2024-11-22 PROCEDURE — 36415 COLL VENOUS BLD VENIPUNCTURE: CPT

## 2024-11-22 PROCEDURE — 80048 BASIC METABOLIC PNL TOTAL CA: CPT

## 2024-11-26 ENCOUNTER — OFFICE VISIT (OUTPATIENT)
Dept: FAMILY MEDICINE CLINIC | Facility: CLINIC | Age: 59
End: 2024-11-26

## 2024-11-26 ENCOUNTER — EKG ENCOUNTER (OUTPATIENT)
Dept: LAB | Age: 59
End: 2024-11-26
Attending: FAMILY MEDICINE
Payer: MEDICAID

## 2024-11-26 ENCOUNTER — HOSPITAL ENCOUNTER (OUTPATIENT)
Dept: GENERAL RADIOLOGY | Age: 59
Discharge: HOME | End: 2024-11-26
Attending: FAMILY MEDICINE
Payer: MEDICAID

## 2024-11-26 ENCOUNTER — LAB ENCOUNTER (OUTPATIENT)
Dept: LAB | Age: 59
End: 2024-11-26
Attending: FAMILY MEDICINE
Payer: MEDICAID

## 2024-11-26 VITALS
HEART RATE: 80 BPM | SYSTOLIC BLOOD PRESSURE: 138 MMHG | BODY MASS INDEX: 43 KG/M2 | WEIGHT: 258 LBS | DIASTOLIC BLOOD PRESSURE: 76 MMHG | TEMPERATURE: 97 F

## 2024-11-26 DIAGNOSIS — R06.01 ORTHOPNEA: ICD-10-CM

## 2024-11-26 DIAGNOSIS — R60.0 BILATERAL LEG EDEMA: ICD-10-CM

## 2024-11-26 DIAGNOSIS — R06.01 ORTHOPNEA: Primary | ICD-10-CM

## 2024-11-26 LAB
ATRIAL RATE: 83 BPM
BNP SERPL-MCNC: 121 PG/ML (ref ?–100)
P AXIS: 58 DEGREES
P-R INTERVAL: 156 MS
Q-T INTERVAL: 392 MS
QRS DURATION: 82 MS
QTC CALCULATION (BEZET): 460 MS
R AXIS: 16 DEGREES
T AXIS: 115 DEGREES
TSI SER-ACNC: 2.33 UIU/ML (ref 0.55–4.78)
VENTRICULAR RATE: 83 BPM

## 2024-11-26 PROCEDURE — 93010 ELECTROCARDIOGRAM REPORT: CPT | Performed by: STUDENT IN AN ORGANIZED HEALTH CARE EDUCATION/TRAINING PROGRAM

## 2024-11-26 PROCEDURE — 84443 ASSAY THYROID STIM HORMONE: CPT

## 2024-11-26 PROCEDURE — 90656 IIV3 VACC NO PRSV 0.5 ML IM: CPT | Performed by: FAMILY MEDICINE

## 2024-11-26 PROCEDURE — 71046 X-RAY EXAM CHEST 2 VIEWS: CPT | Performed by: FAMILY MEDICINE

## 2024-11-26 PROCEDURE — 36415 COLL VENOUS BLD VENIPUNCTURE: CPT

## 2024-11-26 PROCEDURE — 99214 OFFICE O/P EST MOD 30 MIN: CPT | Performed by: FAMILY MEDICINE

## 2024-11-26 PROCEDURE — 90471 IMMUNIZATION ADMIN: CPT | Performed by: FAMILY MEDICINE

## 2024-11-26 PROCEDURE — 93005 ELECTROCARDIOGRAM TRACING: CPT

## 2024-11-26 PROCEDURE — 83880 ASSAY OF NATRIURETIC PEPTIDE: CPT

## 2024-11-26 RX ORDER — FUROSEMIDE 20 MG/1
20 TABLET ORAL
Qty: 90 TABLET | Refills: 1 | Status: SHIPPED | OUTPATIENT
Start: 2024-11-26

## 2024-11-26 NOTE — PROGRESS NOTES
Patient ID: Zenia David is a 59 year old female.    HPI  Chief Complaint   Patient presents with    Edema     Swelling in legs and ankles, since starting new B/P meds,      Last seen on 08/05/2024.    Pt c/o edema in lower extremities that began when she started taking carvedilol that Dr. Thompson, nephrologist, placed her on. Dr. Hawley also decreased furosemide to 40 mg daily in the morning and took her off of losartan. I reviewed the note and labs. She states that she has been gaining weight as well.  Pt states that by the end of the day swelling causes pain and it becomes painful to walk. Pt has some SOB especially while lying down at night. She has been sleeping up on a couple of pillows or in her recliner chair at night. Episodes do not happen every night but have been occurring more frequently. Pt has not consulted with cardiology. FHx a fib in mother. I reviewed cardio echogram from March, 2023.  The ejection fraction was 60-65%. I discussed with her.  She is not having any chest pain or palpitations.  She states she feels much better if she is sitting up in bed and is unable to lie flat.    Last A1c value was 6.3% done 11/20/2024. Patient denies any chest pain,  diaphoresis, dizziness, hypoglycemia, numbness or tingling in the legs. Pt regularly consults with endocrinology. She has already consulted with ophthalmology, who diagnosed her with cataracts.     She will receive the flu shot today.       Wt Readings from Last 6 Encounters:   11/26/24 258 lb   11/20/24 261 lb   10/14/24 250 lb   09/18/24 240 lb   08/25/24 243 lb   08/16/24 248 lb       BMI Readings from Last 6 Encounters:   11/26/24 42.93 kg/m²   11/20/24 43.43 kg/m²   10/14/24 41.60 kg/m²   09/18/24 39.94 kg/m²   08/25/24 40.44 kg/m²   08/16/24 41.27 kg/m²       BP Readings from Last 6 Encounters:   11/26/24 138/76   11/20/24 (!) 163/85   11/16/24 145/73   10/14/24 160/65   08/25/24 141/71   08/16/24 159/70         Review of Systems    Respiratory:  Positive for shortness of breath.    Cardiovascular:  Positive for leg swelling. Negative for chest pain.           Medical History:      Past Medical History:    Acute carpal tunnel syndrome of right wrist    Right endoscopic carpal tunnel release    Carpal tunnel syndrome, left    Chronic back pain    CKD (chronic kidney disease) stage 3, GFR 30-59 ml/min (HCC)    Diabetes (HCC)    Diabetes mellitus (HCC)    dx 1995    Diastolic dysfunction    Grade I    Essential hypertension    dx 1995    High blood pressure    High cholesterol    Hyperlipidemia    Lipoma    surgical excision age 17    Macular degeneration of left eye    Neuropathy    Obesity (BMI 30-39.9)    Osteoarthritis       Past Surgical History:   Procedure Laterality Date    Colonoscopy N/A 6/28/2024    Procedure: COLONOSCOPY;  Surgeon: Neto Simpson MD;  Location: [facility] ENDOSCOPY    Eye surgery      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 30 tablet 0    DUPIXENT 300 MG/2ML Subcutaneous Solution Pen-injector INJECT 300 MG UNDER THE SKIN EVERY 14 DAYS 4 mL 3    sodium zirconium cyclosilicate (LOKELMA) 10 g Oral Powd Pack Take 1 packet (10 g total) by mouth daily. 30 packet 6    atorvastatin 20 MG Oral Tab Take 1 tablet (20 mg total) by mouth nightly. 90 tablet 3    carvedilol (COREG) 12.5 MG Oral Tab Take 1 tablet (12.5 mg total) by mouth 2 (two) times daily with meals. 60 tablet 6    Blood Pressure Monitoring (BLOOD PRESSURE KIT) Does not  apply Device 1 each  in the morning and 1 each before bedtime. Please check blood pressure bid. 1 each 0    furosemide 40 MG Oral Tab Take 1 tablet (40 mg total) by mouth daily. To help with blood pressure and edema of the legs. 90 tablet 3    Continuous Blood Gluc Sensor (DEXCOM G7 SENSOR) Does not apply Misc 1 each Every 10 days. Use as directed every 10 days 3 each 4    Lancets (ONETOUCH DELICA PLUS GAEZMP96C) Does not apply Misc 1 Device in the morning, at noon, and at bedtime. Check 3 times per day 300 each 1    Blood Glucose Monitoring Suppl (ONETOUCH VERIO) w/Device Does not apply Kit 1 Device daily. 1 kit 0    Continuous Blood Gluc Sensor (DEXCOM G7 SENSOR) Does not apply Misc 1 each Every 10 days. Use as directed every 10 days 3 each 11    triamcinolone 0.1 % External Cream Apply 1 Application topically 2 (two) times daily as needed. 60 g 0    fluticasone propionate 50 MCG/ACT Nasal Suspension 2 sprays by Nasal route daily. 3 each 3    clobetasol 0.05 % External Ointment Apply 1 Application. topically daily as needed. 60 g 2    tacrolimus (PROTOPIC) 0.1 % External Ointment Apply 1 Application. topically 2 (two) times daily. 60 g 3    clotrimazole 1 % External Cream Apply 1 Application. topically 2 (two) times daily. Apply to the groin area as needed 28 g 0    acetaminophen 500 MG Oral Tab Take 1 tablet (500 mg total) by mouth every 6 (six) hours as needed for Pain.      aspirin 81 MG Oral Chew Tab CSW ONE T D  6    losartan 25 MG Oral Tab Take 1 tablet (25 mg total) by mouth daily. (Patient not taking: Reported on 11/26/2024) 30 tablet 6    Insulin Syringes, Disposable, U-100 1 ML Does not apply Misc Use insulin syringes to inject insulin three times per day with meals. E11.65 with insulin use. 300 each 1    Glucose Blood (ONETOUCH VERIO) In Vitro Strip Check 3 times per day 300 strip 1    Insulin Pen Needle (BD PEN NEEDLE DAMIAN U/F) 32G X 4 MM Does not apply Misc Inject 4 times per day 400 each 1      Allergies:Allergies[1]     Physical Exam:       Physical Exam  Blood pressure 138/76, pulse 80, temperature 96.9 °F (36.1 °C), temperature source Tympanic, weight 258 lb, last menstrual period 07/20/2013, not currently breastfeeding.      Physical Exam   Constitutional: Patient is oriented to person, place, and time. Patient appears well-developed and well-nourished. No distress.   Cardiovascular: Normal rate, regular rhythm and normal heart sounds.    Pulmonary/Chest: Effort normal and breath sounds normal. No respiratory distress.  No tachypnea.  She is not winded when she speaks.  Neurological: Patient is alert and oriented to person, place, and time.   Skin: Skin is warm.  No cyanosis or diaphoresis.  Psychiatry: Normal mood and affect.  Lower legs: 1+ pretibial edema bilaterally.  No ulcerations.  She does have some venous stasis dermatitis bilaterally worse on the right than the left.  Right ankle: 23 cm  Left ankle: 22 cm    Vitals reviewed.           Assessment/Plan:      Diagnoses and all orders for this visit:    Orthopnea  -     XR CHEST PA + LAT CHEST (CPT=71046); Future  -     BNP (Brain Natriuretic Peptide) [E]; Future  -     EKG 12 Lead; Future  -     furosemide 20 MG Oral Tab; Take 1 tablet (20 mg total) by mouth daily with dinner. Take the 40 mg of Lasix in the morning and this 20 mg dose at dinner.  -     Assay, Thyroid Stim Hormone; Future  I would like to get labs and chest x-ray and EKG done.  I am not sure if the carvedilol is truly causing the swelling in the legs but want to make sure that this is not congestive heart failure due to the orthopnea.  Bilateral leg edema  -     XR CHEST PA + LAT CHEST (CPT=71046); Future  -     BNP (Brain Natriuretic Peptide) [E]; Future  -     EKG 12 Lead; Future  -     furosemide 20 MG Oral Tab; Take 1 tablet (20 mg total) by mouth daily with dinner. Take the 40 mg of Lasix in the morning and this 20 mg dose at dinner.  -     Assay, Thyroid Stim Hormone;  Future  She states she wakes without any swelling in the legs in the morning and then the swelling starts as she is up and about.  Other orders  -     Fluzone trivalent vaccine, PF 0.5mL, 6mo+ (26944)        Referrals (if applicable)  No orders of the defined types were placed in this encounter.      Follow up if symptoms persist.  Take medicine (if given) as prescribed.  Approach to treatment discussed and patient/family member understands and agrees to plan.     No follow-ups on file.    There are no Patient Instructions on file for this visit.    Shi Wong    11/26/2024    By signing my name below, IShi,  attest that this documentation has been prepared under the direction and in the presence of Tiago Amin DO.   Electronically Signed: Shi Wong, 11/26/2024, 11:37 AM.    I, Tiago Amin DO,  personally performed the services described in this documentation. All medical record entries made by the scribe were at my direction and in my presence.  I have reviewed the chart and discharge instructions (if applicable) and agree that the record reflects my personal performance and is accurate and complete.  Tiago Amin DO, 11/26/2024, 12:11 PM                  [1]   Allergies  Allergen Reactions    Latex HIVES    Norvasc [Amlodipine] SWELLING    Penicillins HIVES

## 2024-12-02 ENCOUNTER — TELEPHONE (OUTPATIENT)
Dept: FAMILY MEDICINE CLINIC | Facility: CLINIC | Age: 59
End: 2024-12-02

## 2024-12-02 NOTE — TELEPHONE ENCOUNTER
Response to cancel request received from pharmacy. Left detailed message to cancel script.   Received: 6 days ago  [facility] Ref[facility]  The pharmacy received the electronic cancel request, but could not cancel the prescription. Additional follow up tasks may be necessary based on the pharmacy response noted below.    Pharmacy Note: Unable to Cancel Rx. Please contact Pharmacy          Pharmacy    [id number] - [place], [place] - [address] AT [facility] [address], [phone], [phone]   [address] [facility] [address]   Phone: [phone] Fax: [phone]   Hours: Not open 24 hours     Outpatient Medication Detail     Disp Refills Start End    losartan 25 MG Oral Tab (Discontinued) 30 tablet 6 10/14/2024 11/26/2024    Sig - Route: Take 1 tablet (25 mg total) by mouth daily. - Oral    Patient not taking: Reported on 11/26/2024    Sent to pharmacy as: Losartan Potassium 25 MG Oral Tablet (Cozaar)    Reason for Discontinue:  Stop This Medication    E-Prescribing Status: Receipt confirmed by pharmacy (10/14/2024  9:50 AM CDT)    E-Cancel Status: Request denied by pharmacy (11/26/2024 11:40 AM CST)       E-Cancel Status Note: Unable to Cancel Rx. Please contact Pharmacy      Order Providers    Prescribing Provider Encounter Provider   Rene Hawley MD Haleem, Shabnum, MD         Encounter    View Encounter              This Order Has Been Discontinued    Order Status Reason By On   Discontinued  Stop This Medication Tiago Amin DO 11/26/24 1145

## 2024-12-20 ENCOUNTER — OFFICE VISIT (OUTPATIENT)
Dept: NEPHROLOGY | Facility: CLINIC | Age: 59
End: 2024-12-20

## 2024-12-20 VITALS
SYSTOLIC BLOOD PRESSURE: 128 MMHG | WEIGHT: 260 LBS | HEART RATE: 72 BPM | BODY MASS INDEX: 43 KG/M2 | DIASTOLIC BLOOD PRESSURE: 78 MMHG

## 2024-12-20 DIAGNOSIS — E78.2 MIXED HYPERLIPIDEMIA: ICD-10-CM

## 2024-12-20 DIAGNOSIS — R80.1 PERSISTENT PROTEINURIA: ICD-10-CM

## 2024-12-20 DIAGNOSIS — E87.5 HYPERKALEMIA: ICD-10-CM

## 2024-12-20 DIAGNOSIS — N18.32 STAGE 3B CHRONIC KIDNEY DISEASE (HCC): Primary | ICD-10-CM

## 2024-12-20 DIAGNOSIS — I10 PRIMARY HYPERTENSION: ICD-10-CM

## 2024-12-20 DIAGNOSIS — E11.21 DIABETIC NEPHROPATHY ASSOCIATED WITH TYPE 2 DIABETES MELLITUS (HCC): ICD-10-CM

## 2024-12-20 PROCEDURE — 99214 OFFICE O/P EST MOD 30 MIN: CPT | Performed by: INTERNAL MEDICINE

## 2024-12-20 NOTE — PROGRESS NOTES
Chief Complaint   Patient presents with    Follow - Up     Patient here for a follow up,states [facility]       Nephrology Progress Note     59 year old female with past medical history of T2DM (dx 1995) complicated by retinopathy and neuropathy, HTN (dx 1995), HLD, CKD 3b, osteoarthritis (knees), and obesity is here for follow up of CKD 3b and hyperkalemia.    Interval history: Never started Lokelma and Losartan. States pharmacy never called with refills.      [id number]: Cr mostly 1-1.2  2020: Cr 1.28 5/7 2021: Cr 1.96 4/22 2022: Cr 1.21 1/2, 1.44 10/10  2023: Admitted in March (3/11-3/22). Cr 1.51 on admission and 0.69 at [place].   Cr 1.74 5/22, 1.89 8/10, increased to 2.07 8/14, improved to 1.3 12/22 2024: Cr 1.38 2/20, 1.31 5/10, 1.29 6/26, 1.61 8/5, 1.43 8/14, 1.32 10/12, 1.27 11/16, 1.56 11/22     Denies NSAIDs use. Takes Tylenol for knee pain.      CT abd/pelvis (1/3/22) showed: \"stable mildly prominent extrarenal pelves, left greater than right, without maciel hydronephrosis or hydroureter. There is perinephric fat stranding bilaterally without perinephric fluid collection. There is a stable 1.1 cm cyst in the inferior left renal pole.\"     Hyperkalemia: She has h/o hyperkalemia related to Lisinopril and Losartan. Recent Potassium 4.3 11/2024       Proteinuria: Microalb/Cr 94 mg 1/8/19, 60 mg 4/22/21, 157 mg 10/10/22, 137 mg 8/14/23, 310 mg 2/20/24, 845 mg 5/10/24, 741 mg 8/14/24, 571 mg 10/12/24     T2DM: Has insulin pump. BG much better.   Did not tolerate Farxiga due to yeast infections.   Recent a1c 6.3 11/2024. a1c has been mostly 8-9 range since 2017  DM is complicated by retinopathy, neuropathy, and nephropathy.    Following with Dr Schmitz.      HTN: Taking Coreg 12.5 mg daily. /65 today.   Off Amlodipine due to swelling and Lisinopril/Losartan due to hyperkalemia.      HLD: Chol 133, Trig 111, HDL 39, LDL 74 6/2024. Taking Lipitor 20 mg daily.       Anemia: Hb 11.3 8/2024.      Lower extremity  edema: Has trace edema. Taking Furosemide 40 mg am and 20 mg pm.     Additional labs:   Phos 2.7, Vitamin D 25 hydroxy 37.5, iPTH 83 5/2024.  Ca 10.8, elevated from before. Needs repeat.        FHx:   Mother (D) - DM  Father (passed away in 40s) - CAD, CVA, alcohol use.   Sisters x 2 (D) - DM  No family history of CKD.      SHx: History of smoking, 1/2PPD x 15 yrs, quit 2016. Does not drink alcohol currently.   . Has 4 sons and 2 daughters. Youngest son lives with her.       HISTORY:  Past Medical History:    Acute carpal tunnel syndrome of right wrist    Right endoscopic carpal tunnel release    Carpal tunnel syndrome, left    Chronic back pain    CKD (chronic kidney disease) stage 3, GFR 30-59 ml/min (HCC)    Diabetes (HCC)    Diabetes mellitus (HCC)    dx 1995    Diastolic dysfunction    Grade I    Essential hypertension    dx 1995    High blood pressure    High cholesterol    Hyperlipidemia    Lipoma    surgical excision age 17    Macular degeneration of left eye    Neuropathy    Obesity (BMI 30-39.9)    Osteoarthritis      Past Surgical History:   Procedure Laterality Date    Colonoscopy N/A 6/28/2024    Procedure: COLONOSCOPY;  Surgeon: Neto Simpson MD;  Location: [facility] ENDOSCOPY    Eye surgery      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    Lipoma removal      Wrist arthroscop,release xvers lig Right 05/03/2019    Right endoscopic carpal tunnel release    Wrist arthroscop,release xvers lig Left 05/28/2019    Left endoscopic carpal tunnel release           Medications (Active prior to today's visit):  Current Outpatient Medications   Medication Sig Dispense Refill    insulin lispro 100 UNIT/ML Injection Solution Inject 150 units daily via insulin pump 50 mL 3    DUPIXENT 300 MG/2ML Subcutaneous Solution Pen-injector INJECT 300 MG UNDER THE SKIN EVERY 14 DAYS 4 mL 3    atorvastatin 20 MG Oral Tab Take 1 tablet (20 mg total) by mouth nightly. 90 tablet 3    carvedilol (COREG) 12.5 MG Oral Tab  Take 1 tablet (12.5 mg total) by mouth 2 (two) times daily with meals. 60 tablet 6    furosemide 40 MG Oral Tab Take 1 tablet (40 mg total) by mouth daily. To help with blood pressure and edema of the legs. 90 tablet 3    triamcinolone 0.1 % External Cream Apply 1 Application topically 2 (two) times daily as needed. 60 g 0    fluticasone propionate 50 MCG/ACT Nasal Suspension 2 sprays by Nasal route daily. 3 each 3    clobetasol 0.05 % External Ointment Apply 1 Application. topically daily as needed. 60 g 2    tacrolimus (PROTOPIC) 0.1 % External Ointment Apply 1 Application. topically 2 (two) times daily. 60 g 3    clotrimazole 1 % External Cream Apply 1 Application. topically 2 (two) times daily. Apply to the groin area as needed 28 g 0    acetaminophen 500 MG Oral Tab Take 1 tablet (500 mg total) by mouth every 6 (six) hours as needed for Pain.      aspirin 81 MG Oral Chew Tab CSW ONE T D  6    furosemide 20 MG Oral Tab Take 1 tablet (20 mg total) by mouth daily with dinner. Take the 40 mg of Lasix in the morning and this 20 mg dose at dinner. 90 tablet 1    Blood Pressure Monitoring (BLOOD PRESSURE KIT) Does not apply Device 1 each  in the morning and 1 each before bedtime. Please check blood pressure bid. 1 each 0    Insulin Syringes, Disposable, U-100 1 ML Does not apply Misc Use insulin syringes to inject insulin three times per day with meals. E11.65 with insulin use. 300 each 1    Continuous Blood Gluc Sensor (DEXCOM G7 SENSOR) Does not apply Misc 1 each Every 10 days. Use as directed every 10 days 3 each 4    Glucose Blood (ONETOUCH VERIO) In Vitro Strip Check 3 times per day 300 strip 1    Lancets (ONETOUCH DELICA PLUS XGTLRS38E) Does not apply Misc 1 Device in the morning, at noon, and at bedtime. Check 3 times per day 300 each 1    Blood Glucose Monitoring Suppl (ONETOUCH VERIO) w/Device Does not apply Kit 1 Device daily. 1 kit 0    Continuous Blood Gluc Sensor (DEXCOM G7 SENSOR) Does not apply Misc 1  each Every 10 days. Use as directed every 10 days 3 each 11    Insulin Pen Needle (BD PEN NEEDLE DAMIAN U/F) 32G X 4 MM Does not apply Misc Inject 4 times per day 400 each 1       Allergies:  Allergies   Allergen Reactions    Latex HIVES    Norvasc [Amlodipine] SWELLING    Penicillins HIVES         ROS:     Review of Systems   Constitutional:  Negative for chills, fatigue and fever.   Respiratory:  Negative for cough and shortness of breath.    Cardiovascular:  Negative for chest pain and leg swelling.   Gastrointestinal:  Positive for constipation. Negative for abdominal pain, diarrhea, nausea and vomiting.   Genitourinary:  Negative for difficulty urinating, dysuria and flank pain.   Musculoskeletal:  Positive for arthralgias (knees).          Vitals:    12/20/24 1059   BP: 128/78   Pulse: 72         PHYSICAL EXAM:   Physical Exam  Constitutional:       Appearance: Normal appearance. She is obese.   Cardiovascular:      Rate and Rhythm: Normal rate and regular rhythm.      Heart sounds: Normal heart sounds.   Pulmonary:      Effort: Pulmonary effort is normal.      Breath sounds: Normal breath sounds.   Musculoskeletal:         General: Normal range of motion.      Comments: Trace edema   Skin:     General: Skin is warm and dry.   Neurological:      General: No focal deficit present.      Mental Status: She is alert and oriented to person, place, and time.   Psychiatric:         Mood and Affect: Mood normal.         Behavior: Behavior normal.             ASSESSMENT/PLAN:   Assessment   Encounter Diagnoses   Name Primary?    Stage 3b chronic kidney disease (HCC) Yes    Hyperkalemia     Persistent proteinuria     Diabetic nephropathy associated with type 2 diabetes mellitus (HCC)     Primary hypertension     Mixed hyperlipidemia          CKD 3b:   Likely due to diabetic nephropathy and hypertensive nephrosclerosis  Recent Cr 1.56 eGFR 38 11/22/24, above baseline.   Advised to avoid NSAIDs and take  Tylenol/acetaminophen for pain.   Asked to restrict sodium to 2 grams per day and instructions on low protein diet given.   Advised tight control of DM and HTN to prevent complications including progressive CKD, CAD or CVA.     Proteinuria:   Likely due to diabetic nephropathy and hypertensive nephrosclerosis  Can start Losartan in the future.      Hyperkalemia:   Improved.   Following low potassium diet.   Cont Furosemide.      DM:   Better controlled. Cont insulin pump per Endo.      HTN:   Well controlled. Cont meds.     HLD:   Stable. Cont meds.     Lower extremity edema:   Cont Furosemide 40 mg daily am and 20 mg pm.        Follow up in 6 months.          Orders This Visit:  No orders of the defined types were placed in this encounter.      Meds This Visit:  Requested Prescriptions      No prescriptions requested or ordered in this encounter       Imaging & Referrals:  None       30 minutes spent in the care of the patient which included: reviewing prior records, obtaining history and examining patient, discussing lab results and treatment plan including diet, ordering labs, and documentation.      Rene Hawley MD  12/20/2024

## 2025-01-02 ENCOUNTER — HOSPITAL ENCOUNTER (OUTPATIENT)
Dept: MAMMOGRAPHY | Age: 60
Discharge: HOME | End: 2025-01-02
Attending: FAMILY MEDICINE
Payer: MEDICAID

## 2025-01-02 ENCOUNTER — TELEPHONE (OUTPATIENT)
Dept: ENDOCRINOLOGY CLINIC | Facility: CLINIC | Age: 60
End: 2025-01-02

## 2025-01-02 DIAGNOSIS — Z12.31 VISIT FOR SCREENING MAMMOGRAM: ICD-10-CM

## 2025-01-02 PROCEDURE — 77067 SCR MAMMO BI INCL CAD: CPT | Performed by: FAMILY MEDICINE

## 2025-01-02 PROCEDURE — 77063 BREAST TOMOSYNTHESIS BI: CPT | Performed by: FAMILY MEDICINE

## 2025-01-26 DIAGNOSIS — R06.01 ORTHOPNEA: ICD-10-CM

## 2025-01-26 DIAGNOSIS — I35.8 NON-RHEUMATIC AORTIC SCLEROSIS: ICD-10-CM

## 2025-01-26 DIAGNOSIS — I10 ESSENTIAL HYPERTENSION: Primary | ICD-10-CM

## 2025-01-26 DIAGNOSIS — R94.31 ABNORMAL EKG: ICD-10-CM

## 2025-01-27 ENCOUNTER — TELEPHONE (OUTPATIENT)
Dept: FAMILY MEDICINE CLINIC | Facility: CLINIC | Age: 60
End: 2025-01-27

## 2025-01-27 NOTE — TELEPHONE ENCOUNTER
[facility]ennox message sent.      Tiago Amin DO  1/26/2025  5:47 PM CST Back to Top      Please make sure that patient knows that I I did a referral to cardiology

## 2025-02-03 ENCOUNTER — TELEPHONE (OUTPATIENT)
Dept: DERMATOLOGY CLINIC | Facility: CLINIC | Age: 60
End: 2025-02-03

## 2025-02-10 DIAGNOSIS — L30.9 DERMATITIS: ICD-10-CM

## 2025-02-10 RX ORDER — DUPILUMAB 300 MG/2ML
300 INJECTION, SOLUTION SUBCUTANEOUS
Qty: 4 ML | Refills: 3 | Status: SHIPPED | OUTPATIENT
Start: 2025-02-10

## 2025-02-10 NOTE — TELEPHONE ENCOUNTER
Refill Request for medication(s):     Last Office Visit: 7/9/2024    Last Refill: 10/21/2024    Pharmacy, Dosage verified: yes    Condition Update (if applicable):     Rx pended and sent to provider for approval, please advise. Thank You!

## 2025-02-16 ENCOUNTER — APPOINTMENT (OUTPATIENT)
Dept: GENERAL RADIOLOGY | Facility: HOSPITAL | Age: 60
End: 2025-02-16
Attending: EMERGENCY MEDICINE
Payer: MEDICAID

## 2025-02-16 ENCOUNTER — HOSPITAL ENCOUNTER (INPATIENT)
Facility: HOSPITAL | Age: 60
LOS: 2 days | Discharge: HOME | End: 2025-02-18
Attending: EMERGENCY MEDICINE | Admitting: HOSPITALIST
Payer: MEDICAID

## 2025-02-16 DIAGNOSIS — I50.9 ACUTE ON CHRONIC HEART FAILURE, UNSPECIFIED HEART FAILURE TYPE (HCC): Primary | ICD-10-CM

## 2025-02-16 LAB
ALBUMIN SERPL-MCNC: 4.3 G/DL (ref 3.2–4.8)
ALP LIVER SERPL-CCNC: 188 U/L
ALT SERPL-CCNC: 40 U/L
ANION GAP SERPL CALC-SCNC: 9 MMOL/L (ref 0–18)
AST SERPL-CCNC: 30 U/L (ref ?–34)
ATRIAL RATE: 85 BPM
BASOPHILS # BLD AUTO: 0.1 X10(3) UL (ref 0–0.2)
BASOPHILS NFR BLD AUTO: 1 %
BILIRUB DIRECT SERPL-MCNC: <0.1 MG/DL (ref ?–0.3)
BILIRUB SERPL-MCNC: 0.3 MG/DL (ref 0.2–1.1)
BUN BLD-MCNC: 37 MG/DL (ref 9–23)
BUN/CREAT SERPL: 24.2 (ref 10–20)
CALCIUM BLD-MCNC: 9.1 MG/DL (ref 8.7–10.4)
CHLORIDE SERPL-SCNC: 99 MMOL/L (ref 98–112)
CO2 SERPL-SCNC: 28 MMOL/L (ref 21–32)
CREAT BLD-MCNC: 1.53 MG/DL
DEPRECATED RDW RBC AUTO: 43.6 FL (ref 35.1–46.3)
EGFRCR SERPLBLD CKD-EPI 2021: 39 ML/MIN/1.73M2 (ref 60–?)
EOSINOPHIL # BLD AUTO: 0.59 X10(3) UL (ref 0–0.7)
EOSINOPHIL NFR BLD AUTO: 6.1 %
ERYTHROCYTE [DISTWIDTH] IN BLOOD BY AUTOMATED COUNT: 12.9 % (ref 11–15)
GLUCOSE BLD-MCNC: 171 MG/DL (ref 70–99)
GLUCOSE BLDC GLUCOMTR-MCNC: 209 MG/DL (ref 70–99)
GLUCOSE BLDC GLUCOMTR-MCNC: 75 MG/DL (ref 70–99)
HCT VFR BLD AUTO: 34.9 %
HGB BLD-MCNC: 11.1 G/DL
IMM GRANULOCYTES # BLD AUTO: 0.05 X10(3) UL (ref 0–1)
IMM GRANULOCYTES NFR BLD: 0.5 %
LYMPHOCYTES # BLD AUTO: 3.04 X10(3) UL (ref 1–4)
LYMPHOCYTES NFR BLD AUTO: 31.3 %
MCH RBC QN AUTO: 29.3 PG (ref 26–34)
MCHC RBC AUTO-ENTMCNC: 31.8 G/DL (ref 31–37)
MCV RBC AUTO: 92.1 FL
MONOCYTES # BLD AUTO: 0.73 X10(3) UL (ref 0.1–1)
MONOCYTES NFR BLD AUTO: 7.5 %
NEUTROPHILS # BLD AUTO: 5.19 X10 (3) UL (ref 1.5–7.7)
NEUTROPHILS # BLD AUTO: 5.19 X10(3) UL (ref 1.5–7.7)
NEUTROPHILS NFR BLD AUTO: 53.6 %
NT-PROBNP SERPL-MCNC: 1322 PG/ML (ref ?–125)
OSMOLALITY SERPL CALC.SUM OF ELEC: 295 MOSM/KG (ref 275–295)
P AXIS: 58 DEGREES
P-R INTERVAL: 150 MS
PLATELET # BLD AUTO: 247 10(3)UL (ref 150–450)
POTASSIUM SERPL-SCNC: 4.8 MMOL/L (ref 3.5–5.1)
PROT SERPL-MCNC: 7.3 G/DL (ref 5.7–8.2)
Q-T INTERVAL: 370 MS
QRS DURATION: 84 MS
QTC CALCULATION (BEZET): 440 MS
R AXIS: 12 DEGREES
RBC # BLD AUTO: 3.79 X10(6)UL
SODIUM SERPL-SCNC: 136 MMOL/L (ref 136–145)
T AXIS: 156 DEGREES
TROPONIN I SERPL HS-MCNC: 7 NG/L
VENTRICULAR RATE: 85 BPM
WBC # BLD AUTO: 9.7 X10(3) UL (ref 4–11)

## 2025-02-16 PROCEDURE — 71045 X-RAY EXAM CHEST 1 VIEW: CPT | Performed by: EMERGENCY MEDICINE

## 2025-02-16 PROCEDURE — 99223 1ST HOSP IP/OBS HIGH 75: CPT | Performed by: HOSPITALIST

## 2025-02-16 RX ORDER — POLYETHYLENE GLYCOL 3350 17 G/17G
17 POWDER, FOR SOLUTION ORAL DAILY PRN
Status: DISCONTINUED | OUTPATIENT
Start: 2025-02-16 | End: 2025-02-18

## 2025-02-16 RX ORDER — NICOTINE POLACRILEX 4 MG
15 LOZENGE BUCCAL
Status: DISCONTINUED | OUTPATIENT
Start: 2025-02-16 | End: 2025-02-18

## 2025-02-16 RX ORDER — ASPIRIN 81 MG/1
81 TABLET ORAL DAILY
COMMUNITY

## 2025-02-16 RX ORDER — HYDROCODONE BITARTRATE AND ACETAMINOPHEN 5; 325 MG/1; MG/1
2 TABLET ORAL EVERY 4 HOURS PRN
Status: DISCONTINUED | OUTPATIENT
Start: 2025-02-16 | End: 2025-02-18

## 2025-02-16 RX ORDER — ONDANSETRON 2 MG/ML
4 INJECTION INTRAMUSCULAR; INTRAVENOUS EVERY 6 HOURS PRN
Status: DISCONTINUED | OUTPATIENT
Start: 2025-02-16 | End: 2025-02-18

## 2025-02-16 RX ORDER — HYDROCODONE BITARTRATE AND ACETAMINOPHEN 5; 325 MG/1; MG/1
1 TABLET ORAL EVERY 4 HOURS PRN
Status: DISCONTINUED | OUTPATIENT
Start: 2025-02-16 | End: 2025-02-18

## 2025-02-16 RX ORDER — BISACODYL 10 MG
10 SUPPOSITORY, RECTAL RECTAL
Status: DISCONTINUED | OUTPATIENT
Start: 2025-02-16 | End: 2025-02-18

## 2025-02-16 RX ORDER — CARVEDILOL 12.5 MG/1
12.5 TABLET ORAL 2 TIMES DAILY WITH MEALS
Status: DISCONTINUED | OUTPATIENT
Start: 2025-02-16 | End: 2025-02-17

## 2025-02-16 RX ORDER — ACETAMINOPHEN 500 MG
1000 TABLET ORAL ONCE
Status: COMPLETED | OUTPATIENT
Start: 2025-02-16 | End: 2025-02-16

## 2025-02-16 RX ORDER — FUROSEMIDE 10 MG/ML
40 INJECTION INTRAMUSCULAR; INTRAVENOUS
Status: DISCONTINUED | OUTPATIENT
Start: 2025-02-16 | End: 2025-02-18

## 2025-02-16 RX ORDER — PROCHLORPERAZINE EDISYLATE 5 MG/ML
5 INJECTION INTRAMUSCULAR; INTRAVENOUS EVERY 8 HOURS PRN
Status: DISCONTINUED | OUTPATIENT
Start: 2025-02-16 | End: 2025-02-18

## 2025-02-16 RX ORDER — ONDANSETRON 2 MG/ML
4 INJECTION INTRAMUSCULAR; INTRAVENOUS EVERY 6 HOURS PRN
Status: DISCONTINUED | OUTPATIENT
Start: 2025-02-16 | End: 2025-02-16

## 2025-02-16 RX ORDER — SENNOSIDES 8.6 MG
17.2 TABLET ORAL NIGHTLY PRN
Status: DISCONTINUED | OUTPATIENT
Start: 2025-02-16 | End: 2025-02-18

## 2025-02-16 RX ORDER — SODIUM PHOSPHATE, DIBASIC AND SODIUM PHOSPHATE, MONOBASIC 7; 19 G/230ML; G/230ML
1 ENEMA RECTAL ONCE AS NEEDED
Status: DISCONTINUED | OUTPATIENT
Start: 2025-02-16 | End: 2025-02-18

## 2025-02-16 RX ORDER — NICOTINE POLACRILEX 4 MG
30 LOZENGE BUCCAL
Status: DISCONTINUED | OUTPATIENT
Start: 2025-02-16 | End: 2025-02-18

## 2025-02-16 RX ORDER — PROCHLORPERAZINE EDISYLATE 5 MG/ML
5 INJECTION INTRAMUSCULAR; INTRAVENOUS EVERY 8 HOURS PRN
Status: DISCONTINUED | OUTPATIENT
Start: 2025-02-16 | End: 2025-02-16

## 2025-02-16 RX ORDER — ATORVASTATIN CALCIUM 20 MG/1
20 TABLET, FILM COATED ORAL DAILY
Status: DISCONTINUED | OUTPATIENT
Start: 2025-02-17 | End: 2025-02-18

## 2025-02-16 RX ORDER — ACETAMINOPHEN 325 MG/1
650 TABLET ORAL EVERY 4 HOURS PRN
Status: DISCONTINUED | OUTPATIENT
Start: 2025-02-16 | End: 2025-02-18

## 2025-02-16 RX ORDER — ASPIRIN 81 MG/1
81 TABLET ORAL DAILY
Status: DISCONTINUED | OUTPATIENT
Start: 2025-02-16 | End: 2025-02-18

## 2025-02-16 RX ORDER — HEPARIN SODIUM 5000 [USP'U]/ML
7500 INJECTION, SOLUTION INTRAVENOUS; SUBCUTANEOUS EVERY 8 HOURS SCHEDULED
Status: DISCONTINUED | OUTPATIENT
Start: 2025-02-16 | End: 2025-02-18

## 2025-02-16 RX ORDER — FUROSEMIDE 10 MG/ML
40 INJECTION INTRAMUSCULAR; INTRAVENOUS ONCE
Status: COMPLETED | OUTPATIENT
Start: 2025-02-16 | End: 2025-02-16

## 2025-02-16 RX ORDER — HEPARIN SODIUM 5000 [USP'U]/ML
5000 INJECTION, SOLUTION INTRAVENOUS; SUBCUTANEOUS EVERY 8 HOURS SCHEDULED
Status: DISCONTINUED | OUTPATIENT
Start: 2025-02-16 | End: 2025-02-16

## 2025-02-16 RX ORDER — DEXTROSE MONOHYDRATE 25 G/50ML
50 INJECTION, SOLUTION INTRAVENOUS
Status: DISCONTINUED | OUTPATIENT
Start: 2025-02-16 | End: 2025-02-18

## 2025-02-16 RX ORDER — FLUTICASONE PROPIONATE 50 MCG
2 SPRAY, SUSPENSION (ML) NASAL DAILY PRN
Status: DISCONTINUED | OUTPATIENT
Start: 2025-02-16 | End: 2025-02-18

## 2025-02-16 NOTE — CONSULTS
[facility]  Cardiology Consultation    Zenia David Patient Status:  Emergency    3/30/1965 MRN [id number]   Location [facility] EMERGENCY DEPARTMENT Attending Isabella Holloway MD   Hosp Day # 0 PCP Tiago Amin DO     Date of Admission:  2025  Date of Consult:  2025  Reason for Consultation:   Lower extremity edema, orthopnea    History of Present Illness:   Patient is a 59-year-old female with a history of HTN, HLD, DM, and CKD who presents with orthopnea and lower extremity edema.  Has had lower extremity edema chronically for over a year, however since November has had intermittent worsening of her edema associated with orthopnea at night.  Over the last few days in particular symptoms worsened has had difficulty sleeping, notes abdominal distention and lower extremity edema.  No chest pain, palpitations, dizziness, or syncope.  Does not have a scale at home to weigh herself.  She takes Lasix 40 mg in the morning and 20 mg in the evening.    proBNP 1322  Troponin 7  Creatinine 1.53    CXR-no acute finding  ECG-sinus rhythm, anterolateral ST-T wave abnormality, 85 bpm    Cardiac history:  HTN  HLD  DMII  CKD    Past Medical History  Past Medical History:    Acute carpal tunnel syndrome of right wrist    Right endoscopic carpal tunnel release    Carpal tunnel syndrome, left    Chronic back pain    CKD (chronic kidney disease) stage 3, GFR 30-59 ml/min (HCC)    Diabetes (HCC)    Diabetes mellitus (HCC)    dx     Diastolic dysfunction    Grade I    Essential hypertension    dx     High blood pressure    High cholesterol    Hyperlipidemia    Lipoma    surgical excision age 17    Macular degeneration of left eye    Neuropathy    Obesity (BMI 30-39.9)    Osteoarthritis     Past Surgical History  Past Surgical History:   Procedure Laterality Date    Colonoscopy N/A 2024    Procedure: COLONOSCOPY;  Surgeon: Neto Simpson MD;  Location: [facility] ENDOSCOPY     Eye surgery      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  Family History   Problem Relation Age of Onset    Heart Disorder Father     Stroke Father     Alcohol abuse Father     Diabetes Mother     Diabetes Sister     Diabetes Sister      Social History  Lives at home with her .  No tobacco, alcohol, illicit drug use.  Pediatric History   Patient Parents    Not on file     Other Topics Concern    [place] farm Not Asked    History of tanning Not Asked    Outdoor occupation Not Asked    Breast feeding No    Reaction to local anesthetic No    Pt has a pacemaker No    Pt has a defibrillator No   Social History Narrative    Lives with     Homemaker         Current Medications:  No current facility-administered medications for this encounter.     (Not in a hospital admission)    Allergies  Allergies[1]    Review of Systems:     14 point review of systems completed and negative except as noted.    Physical Exam:   Patient Vitals for the past 24 hrs:   BP Temp Pulse Resp SpO2   02/16/25 1258 133/46 -- 70 21 94 %   02/16/25 1233 155/55 -- 70 12 95 %   02/16/25 1148 (!) 194/76 98.8 °F (37.1 °C) 88 -- 98 %     General: Alert and oriented x 3. No apparent distress.   HEENT: Normocephalic, anicteric sclera, neck supple, no thyromegaly or adenopathy.  Neck: No JVD, carotids 2+, no bruits.  Cardiac: Regular rate and rhythm. S1, S2 normal. No murmur, pericardial rub, S3, or extra cardiac sounds.  Lungs: Clear without wheezes, rales, rhonchi or dullness.  Normal excursions and effort.  Abdomen: Soft, non-tender. No organosplenomegally, mass or rebound, BS-present.  Extremities: Without clubbing or cyanosis. 2+ LE edema.    Neurologic: Alert  and oriented, normal affect. No focal defects  Skin: Warm and dry.     Results:   Laboratory Data:  Lab Results   Component Value Date    WBC 9.7 02/16/2025    HGB 11.1 (L) 02/16/2025    HCT 34.9 (L) 02/16/2025    .0 02/16/2025    CREATSERUM 1.53 (H) 02/16/2025    BUN 37 (H) 02/16/2025     02/16/2025    K 4.8 02/16/2025    CL 99 02/16/2025    CO2 28.0 02/16/2025     (H) 02/16/2025    CA 9.1 02/16/2025    ALB 4.3 02/16/2025    ALKPHO 188 (H) 02/16/2025    TP 7.3 02/16/2025    AST 30 02/16/2025    ALT 40 02/16/2025    TSH 2.333 11/26/2024    LIP 38 11/16/2024    DDIMER 1.69 02/03/2017    MG 1.7 03/22/2023    PHOS 2.7 05/10/2024    TROP 0.00 02/08/2017     (H) 01/13/2018         Recent Labs   Lab 02/16/25  1222   *   BUN 37*   CREATSERUM 1.53*   CA 9.1      K 4.8   CL 99   CO2 28.0     Recent Labs   Lab 02/16/25  1222   RBC 3.79*   HGB 11.1*   HCT 34.9*   MCV 92.1   MCH 29.3   MCHC 31.8   RDW 12.9   NEPRELIM 5.19   WBC 9.7   .0       No results for input(s): \"BNPML\" in the last 168 hours.    No results for input(s): \"TROP\", \"CK\" in the last 168 hours.    Imaging:  XR CHEST AP PORTABLE  (CPT=71045)    Result Date: 2/16/2025  CONCLUSION:  1. Bandlike scarring in mid to lower lungs. 2. Stable cardiomegaly.  1.   Dictated by (CST): Merrill Tyson MD on 2/16/2025 at 1:26 PM     Finalized by (CST): Merrill Tyson MD on 2/16/2025 at 1:27 PM           Impression:   Assessment:  Fluid retention  HTN  HLD  DMII  CKD    Plan:  - Presents with orthopnea, bilateral lower extremity edema, abdominal distention concerning for acute on chronic fluid retention  - Obtain transthoracic echocardiogram, last echo 2023  - Continue Lasix 40 mg IV twice daily  - I's/O, daily weights and creatinine    Thank you for allowing me to participate in the care of your patient.    Rufino Wisdom MD  [facility] [place]  2/16/2025         [1]   Allergies  Allergen Reactions    Latex HIVES     Norvasc [Amlodipine] SWELLING    Penicillins HIVES

## 2025-02-16 NOTE — ED PROVIDER NOTES
Patient Seen in: [facility] Emergency Department      History     Chief Complaint   Patient presents with    Difficulty Breathing     Stated Complaint: Shortness of Breath    Subjective:   HPI      59-year-old female with history of CHF, diabetes, kidney disease presents for evaluation of shortness of breath.  Patient reports progressively worsening shortness of breath over the past several days.  Reports she stopped taking her morning dose of Lasix as she thought this would be okay.  2 weeks ago was started on Lasix and advised to see cardiology, patient has not yet been able to make an appointment with cardiology.  She reports shortness of breath, lower extremity swelling, orthopnea.  No chest pain or pressure, fever, cough.    Objective:     No pertinent past medical history.            No pertinent past surgical history.              No pertinent social history.                Physical Exam     ED Triage Vitals   BP 02/16/25 1148 (!) 194/76   Pulse 02/16/25 1148 88   Resp 02/16/25 1233 12   Temp 02/16/25 1148 98.8 °F (37.1 °C)   Temp src 02/16/25 1548 Oral   SpO2 02/16/25 1148 98 %   O2 Device 02/16/25 1148 None (Room air)       Current Vitals:   Vital Signs  BP: 155/73  Pulse: 69  Resp: 18  Temp: 97.8 °F (36.6 °C)  Temp src: Oral  MAP (mmHg): 98    Oxygen Therapy  SpO2: 96 %  O2 Device: None (Room air)        Physical Exam  Vitals and nursing note reviewed.   Constitutional:       General: She is not in acute distress.     Appearance: She is well-developed.   HENT:      Head: Normocephalic and atraumatic.   Eyes:      Conjunctiva/sclera: Conjunctivae normal.   Cardiovascular:      Rate and Rhythm: Normal rate and regular rhythm.      Heart sounds: Normal heart sounds.   Pulmonary:      Effort: Pulmonary effort is normal. No respiratory distress.      Breath sounds: Normal breath sounds.   Abdominal:      General: Bowel sounds are normal. There is no distension.      Palpations: Abdomen is soft.       Tenderness: There is no abdominal tenderness. There is no guarding or rebound.   Musculoskeletal:         General: Normal range of motion.      Cervical back: Normal range of motion and neck supple.   Skin:     General: Skin is warm and dry.      Findings: No rash.   Neurological:      General: No focal deficit present.      Mental Status: She is alert and oriented to person, place, and time.             ED Course     Labs Reviewed   BASIC METABOLIC PANEL (8) - Abnormal; Notable for the following components:       Result Value    Glucose 171 (*)     BUN 37 (*)     Creatinine 1.53 (*)     BUN/CREA Ratio 24.2 (*)     eGFR-Cr 39 (*)     All other components within normal limits   HEPATIC FUNCTION PANEL (7) - Abnormal; Notable for the following components:    Alkaline Phosphatase 188 (*)     All other components within normal limits   CBC WITH DIFFERENTIAL WITH PLATELET - Abnormal; Notable for the following components:    RBC 3.79 (*)     HGB 11.1 (*)     HCT 34.9 (*)     All other components within normal limits   PRO BETA NATRIURETIC PEPTIDE - Abnormal; Notable for the following components:    Pro-Beta Natriuretic Peptide 1,322 (*)     All other components within normal limits   TROPONIN I HIGH SENSITIVITY - Normal   POCT GLUCOSE - Normal   RAINBOW DRAW BLUE     EKG    Rate, intervals and axes as noted on EKG Report.  Rate: 85  Rhythm: Sinus Rhythm  Reading: Sinus rhythm, new T wave inversion V3 through V6 compared to EKG from 11/26/2024, no STEMI                Imaging Results Available and Reviewed while in ED:   XR CHEST AP PORTABLE  (CPT=71045)   Final Result   PROCEDURE: XR CHEST AP PORTABLE  (CPT=71045)   TIME: 1253 hours       COMPARISON: [facility], XR CHEST PA + LAT CHEST    (CPT=71046), 11/26/2024, 12:28 PM.       INDICATIONS: Shortness of breath bilateral leg swelling, and weakness x1    month.       TECHNIQUE:   Single view.         FINDINGS:    CARDIAC/VASC: Stable cardiomegaly.  Top normal  pulmonary vascularity.    Atherosclerotic calcification aorta.   MEDIAST/DARRYL:   No visible mass or adenopathy.   LUNGS/PLEURA: Bandlike scarring in mid lungs and right lung base.  No    consolidation or pleural effusion.   BONES: No fracture or visible bony lesion.   OTHER: Negative.                     =====   CONCLUSION:    1. Bandlike scarring in mid to lower lungs.   2. Stable cardiomegaly.     1.            Dictated by (CST): Merrill Tyson MD on 2/16/2025 at 1:26 PM        Finalized by (CST): Merrill Tyson MD on 2/16/2025 at 1:27 PM                   ED Medications Administered:   Medications   furosemide (Lasix) 10 mg/mL injection 40 mg (40 mg Intravenous Given 2/16/25 1828)   acetaminophen (Tylenol) tab 650 mg (has no administration in time range)     Or   HYDROcodone-acetaminophen (Norco) 5-325 MG per tab 1 tablet (has no administration in time range)     Or   HYDROcodone-acetaminophen (Norco) 5-325 MG per tab 2 tablet (has no administration in time range)   aspirin DR tab 81 mg (81 mg Oral Not Given 2/16/25 1615)   atorvastatin (Lipitor) tab 20 mg (has no administration in time range)   carvedilol (Coreg) tab 12.5 mg (12.5 mg Oral Given 2/16/25 1734)   fluticasone propionate (Flonase) 50 MCG/ACT nasal suspension 2 spray (has no administration in time range)   glucose (Dex4) 15 GM/59ML oral liquid 15 g (has no administration in time range)     Or   glucose (Glutose) 40% oral gel 15 g (has no administration in time range)     Or   glucose-vitamin C (Dex-4) chewable tab 4 tablet (has no administration in time range)     Or   dextrose 50% injection 50 mL (has no administration in time range)     Or   glucose (Dex4) 15 GM/59ML oral liquid 30 g (has no administration in time range)     Or   glucose (Glutose) 40% oral gel 30 g (has no administration in time range)     Or   glucose-vitamin C (Dex-4) chewable tab 8 tablet (has no administration in time range)   insulin via iLet Insulin Pump ( Subcutaneous  Automatically Held 2/19/25 2100)   heparin (Porcine) 5000 UNIT/ML injection 7,500 Units (has no administration in time range)   acetaminophen (Tylenol) tab 650 mg (has no administration in time range)     Or   HYDROcodone-acetaminophen (Norco) 5-325 MG per tab 1 tablet (has no administration in time range)     Or   HYDROcodone-acetaminophen (Norco) 5-325 MG per tab 2 tablet (has no administration in time range)   polyethylene glycol (PEG 3350) (Miralax) 17 g oral packet 17 g (has no administration in time range)   sennosides (Senokot) tab 17.2 mg (has no administration in time range)   bisacodyl (Dulcolax) 10 MG rectal suppository 10 mg (has no administration in time range)   fleet enema (Fleet) rectal enema 133 mL (has no administration in time range)   ondansetron (Zofran) 4 MG/2ML injection 4 mg (has no administration in time range)   prochlorperazine (Compazine) 10 MG/2ML injection 5 mg (has no administration in time range)   insulin aspart (NovoLOG) 100 Units/mL FlexPen 1-5 Units ( Subcutaneous Not Given 2/16/25 1815)   furosemide (Lasix) 10 mg/mL injection 40 mg (40 mg Intravenous Given 2/16/25 1330)   acetaminophen (Tylenol Extra Strength) tab 1,000 mg (1,000 mg Oral Given 2/16/25 1452)       Vitals:    02/16/25 1445 02/16/25 1529 02/16/25 1548 02/16/25 1549   BP: 150/51  155/73    BP Location:   Right arm    Pulse: 71  67 69   Resp:   18    Temp:   97.8 °F (36.6 °C)    TempSrc:   Oral    SpO2: 96%  96%    Weight:  118 kg       *I personally reviewed and interpreted all ED vitals.         [facility]          Admission disposition: 2/16/2025  1:36 PM           Medical Decision Making  Differential diagnosis includes but is not limited to heart failure, cardiomyopathy, anemia, kidney failure, arrhythmia well-appearing patient, vital signs within normal limits,    History and exam consistent with heart failure exacerbation.  Given 40 mg Lasix IV, discussed with and admitted to hospitalist.  Discussed with marga REINA  further recommendations for the emergency department.    Problems Addressed:  Acute on chronic heart failure, unspecified heart failure type (HCC): chronic illness or injury with exacerbation, progression, or side effects of treatment    Amount and/or Complexity of Data Reviewed  External Data Reviewed: labs.     Details: Resolution of leukocytosis, CBC stable compared to 11/16/2024, BMP stable compared to 11/22/2024  Labs: ordered.  Radiology: ordered.  ECG/medicine tests: ordered and independent interpretation performed. Decision-making details documented in ED Course.  Discussion of management or test interpretation with external provider(s): Discussed with cardiology and hospitalist        Disposition and Plan     Clinical Impression:  1. Acute on chronic heart failure, unspecified heart failure type (HCC)         Disposition:  Admit  2/16/2025  1:36 pm    Follow-up:  No follow-up provider specified.        Medications Prescribed:  Current Discharge Medication List              Supplementary Documentation:         Hospital Problems       Present on Admission  Date Reviewed: 12/28/2024            ICD-10-CM Noted POA    * (Principal) Acute on chronic heart failure, unspecified heart failure type (HCC) I50.9 2/16/2025 Unknown    Acute congestive heart failure (HCC) I50.9 2/16/2025 Unknown    CHF (congestive heart failure) (HCC) I50.9 2/16/2025 Unknown

## 2025-02-16 NOTE — PLAN OF CARE
Problem: Patient Centered Care  Goal: Patient preferences are identified and integrated in the patient's plan of care  Description: Interventions:  - What would you like us to know as we care for you?  - Provide timely, complete, and accurate information to patient/family  - Incorporate patient and family knowledge, values, beliefs, and cultural backgrounds into the planning and delivery of care  - Encourage patient/family to participate in care and decision-making at the level they choose  - Honor patient and family perspectives and choices  Outcome: Progressing     Problem: Diabetes/Glucose Control  Goal: Glucose maintained within prescribed range  Description: INTERVENTIONS:  - Monitor Blood Glucose as ordered  - Assess for signs and symptoms of hyperglycemia and hypoglycemia  - Administer ordered medications to maintain glucose within target range  - Assess barriers to adequate nutritional intake and initiate nutrition consult as needed  - Instruct patient on self management of diabetes  Outcome: Progressing     Problem: Patient/Family Goals  Goal: Patient/Family Long Term Goal  Description: Patient's Long Term Goal:    Interventions:  - See additional Care Plan goals for specific interventions  Outcome: Progressing  Goal: Patient/Family Short Term Goal  Description: Patient's Short Term Goal:    Interventions:   - See additional Care Plan goals for specific interventions  Outcome: Progressing

## 2025-02-16 NOTE — ED QUICK NOTES
Orders for admission, patient is aware of plan and ready to go upstairs. Any questions, please call ED RN Esha at extension [phone].     Patient Covid vaccination status: Fully vaccinated     COVID Test Ordered in ED: None    COVID Suspicion at Admission: N/A    Running Infusions:  None    Mental Status/LOC at time of transport: axox4    Other pertinent information:   CIWA score: N/A   NIH score:  N/A

## 2025-02-16 NOTE — ED INITIAL ASSESSMENT (HPI)
Pt here for eval difficulty breathing x 1 month, + 2 edema to bilat ankles. States can't sleep at night. Saw dr when first started and told it was chf. Take lasix twice a day, a high dose in the morning  and a lower dose at night. Stopped taking the hi dose in the am 2 weeks ago bc wasn't having swelling, but started taking again 1 week later. States never stopped taking the low dose.

## 2025-02-17 ENCOUNTER — APPOINTMENT (OUTPATIENT)
Dept: CV DIAGNOSTICS | Facility: HOSPITAL | Age: 60
End: 2025-02-17
Attending: HOSPITALIST
Payer: MEDICAID

## 2025-02-17 LAB
ANION GAP SERPL CALC-SCNC: 10 MMOL/L (ref 0–18)
BASOPHILS # BLD AUTO: 0.11 X10(3) UL (ref 0–0.2)
BASOPHILS NFR BLD AUTO: 1.3 %
BUN BLD-MCNC: 34 MG/DL (ref 9–23)
BUN/CREAT SERPL: 24.1 (ref 10–20)
CALCIUM BLD-MCNC: 9.6 MG/DL (ref 8.7–10.4)
CHLORIDE SERPL-SCNC: 99 MMOL/L (ref 98–112)
CO2 SERPL-SCNC: 26 MMOL/L (ref 21–32)
CREAT BLD-MCNC: 1.41 MG/DL
DEPRECATED RDW RBC AUTO: 44.2 FL (ref 35.1–46.3)
EGFRCR SERPLBLD CKD-EPI 2021: 43 ML/MIN/1.73M2 (ref 60–?)
EOSINOPHIL # BLD AUTO: 0.58 X10(3) UL (ref 0–0.7)
EOSINOPHIL NFR BLD AUTO: 6.8 %
ERYTHROCYTE [DISTWIDTH] IN BLOOD BY AUTOMATED COUNT: 12.9 % (ref 11–15)
GLUCOSE BLD-MCNC: 81 MG/DL (ref 70–99)
GLUCOSE BLDC GLUCOMTR-MCNC: 209 MG/DL (ref 70–99)
GLUCOSE BLDC GLUCOMTR-MCNC: 223 MG/DL (ref 70–99)
GLUCOSE BLDC GLUCOMTR-MCNC: 95 MG/DL (ref 70–99)
GLUCOSE BLDC GLUCOMTR-MCNC: 95 MG/DL (ref 70–99)
HCT VFR BLD AUTO: 38.2 %
HGB BLD-MCNC: 12 G/DL
IMM GRANULOCYTES # BLD AUTO: 0.05 X10(3) UL (ref 0–1)
IMM GRANULOCYTES NFR BLD: 0.6 %
LYMPHOCYTES # BLD AUTO: 2.83 X10(3) UL (ref 1–4)
LYMPHOCYTES NFR BLD AUTO: 33.1 %
MAGNESIUM SERPL-MCNC: 1.7 MG/DL (ref 1.6–2.6)
MCH RBC QN AUTO: 29.5 PG (ref 26–34)
MCHC RBC AUTO-ENTMCNC: 31.4 G/DL (ref 31–37)
MCV RBC AUTO: 93.9 FL
MONOCYTES # BLD AUTO: 0.8 X10(3) UL (ref 0.1–1)
MONOCYTES NFR BLD AUTO: 9.4 %
NEUTROPHILS # BLD AUTO: 4.17 X10 (3) UL (ref 1.5–7.7)
NEUTROPHILS # BLD AUTO: 4.17 X10(3) UL (ref 1.5–7.7)
NEUTROPHILS NFR BLD AUTO: 48.8 %
OSMOLALITY SERPL CALC.SUM OF ELEC: 287 MOSM/KG (ref 275–295)
PLATELET # BLD AUTO: 210 10(3)UL (ref 150–450)
PLATELETS.RETICULATED NFR BLD AUTO: 13.7 % (ref 0–7)
POTASSIUM SERPL-SCNC: 4.6 MMOL/L (ref 3.5–5.1)
RBC # BLD AUTO: 4.07 X10(6)UL
SODIUM SERPL-SCNC: 135 MMOL/L (ref 136–145)
WBC # BLD AUTO: 8.5 X10(3) UL (ref 4–11)

## 2025-02-17 PROCEDURE — 93306 TTE W/DOPPLER COMPLETE: CPT | Performed by: HOSPITALIST

## 2025-02-17 PROCEDURE — 99233 SBSQ HOSP IP/OBS HIGH 50: CPT | Performed by: HOSPITALIST

## 2025-02-17 RX ORDER — CARVEDILOL 25 MG/1
25 TABLET ORAL 2 TIMES DAILY WITH MEALS
Status: DISCONTINUED | OUTPATIENT
Start: 2025-02-17 | End: 2025-02-18

## 2025-02-17 RX ORDER — CARVEDILOL 12.5 MG/1
12.5 TABLET ORAL ONCE
Status: COMPLETED | OUTPATIENT
Start: 2025-02-17 | End: 2025-02-17

## 2025-02-17 RX ORDER — INSULIN DEGLUDEC 100 U/ML
40 INJECTION, SOLUTION SUBCUTANEOUS NIGHTLY
Status: DISCONTINUED | OUTPATIENT
Start: 2025-02-17 | End: 2025-02-17

## 2025-02-17 RX ORDER — MAGNESIUM OXIDE 400 MG/1
400 TABLET ORAL ONCE
Status: COMPLETED | OUTPATIENT
Start: 2025-02-17 | End: 2025-02-17

## 2025-02-17 RX ORDER — CARVEDILOL 12.5 MG/1
12.5 TABLET ORAL 2 TIMES DAILY WITH MEALS
Qty: 60 TABLET | Refills: 1 | Status: SHIPPED | OUTPATIENT
Start: 2025-02-17

## 2025-02-17 RX ORDER — INSULIN DEGLUDEC 100 U/ML
40 INJECTION, SOLUTION SUBCUTANEOUS NIGHTLY
Status: DISCONTINUED | OUTPATIENT
Start: 2025-02-17 | End: 2025-02-18

## 2025-02-17 NOTE — DIABETES ED
[facility]  Inpatient Diabetes Clinician Note    Zenia DANIEL David Patient Status:  Inpatient   3/30/1965 MRN [id number]  Location [facility] 3W/SW Attending Tasia Parker MD  Hosp Day # 1 PCP Tiago Amin DO      Patient admitted to hospital with insulin pump and CGM.    Pt is still wearing dexcom G7 on back of left arm - blood sugar at 124 via CGM when visted.  Pt is off of her insulin pump right now - beta bionics - as she doesn't have her charging pad with her.       Insulin Pump Settings: (Retrieved from pump)    Type of Pump:  Beta Bionics  Type of Insulin:  U-100  Automated Insulin Deliver System:yes    Last site change: Off of insulin pump currently    Total daily dose per review of pump history: 143.5 units/hr    Basal:   Total daily basal rate per review of pump history: 58.2 units/day    Insulin to Carbohydrate Ratios:   Current pump uses usual meal settings, which she inputs 85% of the time.     Usual B is 19.2 units  Usual L is 22.6 units  Ususal Dinner is 13.6 units    She does have a secondary target from 12a-7a, which is not as aggressive as her usual     Per CGM her time in range () is 64.9%    [facility] if [facility] receives  and would like to resume insulin pump.       Bailey Smallwood RD  Diabetes Educator  2025

## 2025-02-17 NOTE — PHYSICAL THERAPY NOTE
PHYSICAL THERAPY EVALUATION - INPATIENT     Room Number: 320/320-A  Evaluation Date: 2025  Type of Evaluation: Initial   Physician Order: PT Eval and Treat    Presenting Problem: acute on chronic heart failure, difficulty breathing, BLE swelling  Co-Morbidities : history of HTN, HLD, DM, and CKD  Reason for Therapy: Mobility Dysfunction and Discharge Planning    PHYSICAL THERAPY ASSESSMENT   Patient is a 59 year old female admitted 2025 for acute on chronic heart failure, difficulty breathing, BLE swelling. Prior to admission, patient's baseline is Independent with ADLs/iADLs/functional mobility. Patient is currently functioning at baseline with bed mobility, transfers, gait, maintaining seated position, and standing prolonged periods. Patient denying any functional mobility concerns at this time. Reports that she has been ambulating in room independently. Patient with unmet skilled IP PT needs at this time.     PLAN  Patient has been evaluated and presents with no skilled Physical Therapy needs at this time.  Patient will be discharged from Physical Therapy services. Please re-order if a new functional limitation presents during this admission.    PT Device Recommendation: None    PHYSICAL THERAPY MEDICAL/SOCIAL HISTORY     Problem List  Principal Problem:    Acute on chronic heart failure, unspecified heart failure type (HCC)  Active Problems:    CHF (congestive heart failure) (HCC)    Acute congestive heart failure ([facility])    HOME SITUATION  Type of Home: Other (Comment) (Mobile Home)  Home Layout: One level  Stairs to Enter : 3   Railing: Yes    Lives With: Spouse    Drives: Yes   Patient Regularly Uses: Glasses      SUBJECTIVE  Agreeable    PHYSICAL THERAPY EXAMINATION   OBJECTIVE  Precautions: None  Fall Risk: Standard fall risk    PAIN ASSESSMENT  Ratin    COGNITION  Overall Cognitive Status:  WFL - within functional limits    RANGE OF MOTION AND STRENGTH ASSESSMENT  Upper extremity ROM and  strength are within functional limits   Lower extremity ROM is within functional limits   Lower extremity strength is within functional limits     BALANCE  Static Sitting: Normal  Dynamic Sitting: Normal  Static Standing: Good  Dynamic Standing: Good    ACTIVITY TOLERANCE  Pulse: 81  Heart Rate Source: Monitor     BP: (!) 165/69  BP Location: Right arm  BP Method: Automatic  Patient Position: Sitting    O2 WALK  Oxygen Therapy  SPO2% on Room Air at Rest: 97    AM-PAC '6-Clicks' INPATIENT SHORT FORM - BASIC MOBILITY  How much difficulty does the patient currently have...  Patient Difficulty: Turning over in bed (including adjusting bedclothes, sheets and blankets)?: None   Patient Difficulty: Sitting down on and standing up from a chair with arms (e.g., wheelchair, bedside commode, etc.): None   Patient Difficulty: Moving from lying on back to sitting on the side of the bed?: None   How much help from another person does the patient currently need...   Help from Another: Moving to and from a bed to a chair (including a wheelchair)?: None   Help from Another: Need to walk in hospital room?: None   Help from Another: Climbing 3-5 steps with a railing?: A Little     AM-PAC Score:  Raw Score: 23   Approx Degree of Impairment: 11.2%   Standardized Score (AM-PAC Scale): 56.93   CMS Modifier (G-Code): CI    FUNCTIONAL ABILITY STATUS  Functional Mobility/Gait Assessment  Gait Assistance: Independent  Distance (ft): 60 ft  Assistive Device: None  Pattern: Within Functional Limits  Stairs:  (simuated stair negotiation via alternated marching in place with no BUE support - Supervision)  Sit to Stand: independent    Exercise/Education Provided:  Body mechanics  Energy conservation  Functional activity tolerated  Gait training  Posture  Lower therapeutic exercise:  Alternating marching  Transfer training    Skilled Therapy Provided: Patient sitting in bedside chair upon arrival. RN approved activity. Educated patient on POC and  benefits of mobilization. Agreeable to participate. Patient reporting no pain. Patient tolerating functional mobility tasks at an independent level, patient reporting no concerns at this time.     The patient's Approx Degree of Impairment: 11.2% has been calculated based on documentation in the [facility] '6 clicks' Inpatient Basic Mobility Short Form.  Research supports that patients with this level of impairment may benefit from no skilled PT needs.  Final disposition will be made by interdisciplinary medical team.    Patient End of Session: Up in chair;Needs met;Call light within reach;RN aware of session/findings;Hospital anti-slip socks;All patient questions and concerns addressed    Patient was able to achieve the following ...   Patient able to transfer  At previous, functional level  Safely and independently    Patient able to ambulate on level surfaces  At previous, functional level  Safely and independently     Patient Evaluation Complexity Level:  History Low - no personal factors and/or co-morbidities   Examination of body systems Low -  addressing 1-2 elements   Clinical Presentation Low- Stable   Clinical Decision Making  Low Complexity     Gait Training: 10 minutes

## 2025-02-17 NOTE — TELEPHONE ENCOUNTER
Last office visit:  12/20/2024    Return to office:  6 months     Follow up appointment:  No schedule   ( Patient in Hospital  #[id number])

## 2025-02-17 NOTE — CONSULTS
[facility]  part of [facility]    Report of Consultation    Zenia David Patient Status:  Inpatient    3/30/1965 MRN [id number]   Location [facility] 3W/SW Attending Tasia Parker MD   Hosp Day # 1 PCP Tiago Amin DO     Date of Admission:  2025  Date of Consult:  25  Reason for Consultation:   Insulin Pump Management    History of Present Illness:   Patient is a 59 year old female who was admitted to the hospital for Acute on chronic heart failure, unspecified heart failure type (HCC):  Patient has been admitted with shortness of breath and lower extremity swelling.She wears the iLet insulin pump. She is seen by Dr. Schmitz in the Endocrine clinic.     She has taken off her pump this morning.     Past Medical History  Past Medical History:    Acute carpal tunnel syndrome of right wrist    Right endoscopic carpal tunnel release    Carpal tunnel syndrome, left    Chronic back pain    CKD (chronic kidney disease) stage 3, GFR 30-59 ml/min ([facility])    Diabetes ([facility])    Diabetes mellitus ([facility])    dx     Diastolic dysfunction    Grade I    Essential hypertension    dx     High blood pressure    High cholesterol    Hyperlipidemia    Lipoma    surgical excision age 17    Macular degeneration of left eye    Neuropathy    Obesity (BMI 30-39.9)    Osteoarthritis       Past Surgical History  Past Surgical History:   Procedure Laterality Date    Colonoscopy N/A 2024    Procedure: COLONOSCOPY;  Surgeon: Neto Simpson MD;  Location: [facility] ENDOSCOPY    Eye surgery      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    Lipoma removal      Wrist arthroscop,release xvers lig Right 2019    Right endoscopic carpal tunnel release    Wrist arthroscop,release xvers lig Left 2019    Left endoscopic carpal tunnel release       Family History  Family History   Problem Relation Age of Onset    Heart Disorder Father     Stroke Father     Alcohol abuse Father     Diabetes  Mother     Diabetes Sister     Diabetes Sister        Social History  Social History     Socioeconomic History    Marital status:    Tobacco Use    Smoking status: Former     Current packs/day: 0.00     Average packs/day: 0.5 packs/day for 15.0 years (7.5 ttl pk-yrs)     Types: Cigarettes     Start date: 2001     Quit date: 2016     Years since quittin.0     Passive exposure: Past    Smokeless tobacco: Never   Vaping Use    Vaping status: Never Used   Substance and Sexual Activity    Alcohol use: No     Alcohol/week: 0.0 standard drinks of alcohol    Drug use: No   Other Topics Concern    Breast feeding No    Reaction to local anesthetic No    Pt has a pacemaker No    Pt has a defibrillator No   Social History Narrative    Lives with     Homemaker     Social Drivers of Health     Food Insecurity: No Food Insecurity (2025)    NCSS - Food Insecurity     Worried About Running Out of Food in the Last Year: No     Ran Out of Food in the Last Year: No   Transportation Needs: No Transportation Needs (2025)    NCSS - Transportation     Lack of Transportation: No   Housing Stability: Not At Risk (2025)    NCSS - Housing/Utilities     Has Housing: Yes     Worried About Losing Housing: No     Unable to Get Utilities: No           Current Medications:  Current Facility-Administered Medications   Medication Dose Route Frequency    carvedilol (Coreg) tab 25 mg  25 mg Oral BID with meals    furosemide (Lasix) 10 mg/mL injection 40 mg  40 mg Intravenous BID (Diuretic)    acetaminophen (Tylenol) tab 650 mg  650 mg Oral Q4H PRN    Or    HYDROcodone-acetaminophen (Norco) 5-325 MG per tab 1 tablet  1 tablet Oral Q4H PRN    Or    HYDROcodone-acetaminophen (Norco) 5-325 MG per tab 2 tablet  2 tablet Oral Q4H PRN    aspirin DR tab 81 mg  81 mg Oral Daily    atorvastatin (Lipitor) tab 20 mg  20 mg Oral Daily    fluticasone propionate (Flonase) 50 MCG/ACT nasal suspension 2 spray  2 spray Nasal  Daily PRN    glucose (Dex4) 15 GM/59ML oral liquid 15 g  15 g Oral Q15 Min PRN    Or    glucose (Glutose) 40% oral gel 15 g  15 g Oral Q15 Min PRN    Or    glucose-vitamin C (Dex-4) chewable tab 4 tablet  4 tablet Oral Q15 Min PRN    Or    dextrose 50% injection 50 mL  50 mL Intravenous Q15 Min PRN    Or    glucose (Dex4) 15 GM/59ML oral liquid 30 g  30 g Oral Q15 Min PRN    Or    glucose (Glutose) 40% oral gel 30 g  30 g Oral Q15 Min PRN    Or    glucose-vitamin C (Dex-4) chewable tab 8 tablet  8 tablet Oral Q15 Min PRN    [Held by provider] insulin via iLet Insulin Pump   Subcutaneous TID AC and HS    heparin (Porcine) 5000 UNIT/ML injection 7,500 Units  7,500 Units Subcutaneous Q8H PEBBLES    acetaminophen (Tylenol) tab 650 mg  650 mg Oral Q4H PRN    Or    HYDROcodone-acetaminophen (Norco) 5-325 MG per tab 1 tablet  1 tablet Oral Q4H PRN    Or    HYDROcodone-acetaminophen (Norco) 5-325 MG per tab 2 tablet  2 tablet Oral Q4H PRN    polyethylene glycol (PEG 3350) (Miralax) 17 g oral packet 17 g  17 g Oral Daily PRN    sennosides (Senokot) tab 17.2 mg  17.2 mg Oral Nightly PRN    bisacodyl (Dulcolax) 10 MG rectal suppository 10 mg  10 mg Rectal Daily PRN    fleet enema (Fleet) rectal enema 133 mL  1 enema Rectal Once PRN    ondansetron (Zofran) 4 MG/2ML injection 4 mg  4 mg Intravenous Q6H PRN    prochlorperazine (Compazine) 10 MG/2ML injection 5 mg  5 mg Intravenous Q8H PRN    insulin aspart (NovoLOG) 100 Units/mL FlexPen 1-5 Units  1-5 Units Subcutaneous TID CC     Medications Prior to Admission   Medication Sig    aspirin 81 MG Oral Tab EC Take 1 tablet (81 mg total) by mouth daily.    DUPIXENT 300 MG/2ML Subcutaneous Solution Auto-injector INJECT 300 MG UNDER THE SKIN EVERY 14 DAYS    furosemide 20 MG Oral Tab Take 1 tablet (20 mg total) by mouth daily with dinner. Take the 40 mg of Lasix in the morning and this 20 mg dose at dinner.    insulin lispro 100 UNIT/ML Injection Solution Inject 150 units daily via insulin  pump    atorvastatin 20 MG Oral Tab Take 1 tablet (20 mg total) by mouth nightly. (Patient taking differently: Take 1 tablet (20 mg total) by mouth daily.)    carvedilol (COREG) 12.5 MG Oral Tab Take 1 tablet (12.5 mg total) by mouth 2 (two) times daily with meals.    furosemide 40 MG Oral Tab Take 1 tablet (40 mg total) by mouth daily. To help with blood pressure and edema of the legs.    fluticasone propionate 50 MCG/ACT Nasal Suspension 2 sprays by Nasal route daily. (Patient taking differently: 2 sprays by Nasal route daily as needed for Allergies.)    clobetasol 0.05 % External Ointment Apply 1 Application. topically daily as needed.    acetaminophen 500 MG Oral Tab Take 2 tablets (1,000 mg total) by mouth every 6 (six) hours as needed for Pain.    Continuous Blood Gluc Sensor (DEXCOM G7 SENSOR) Does not apply Misc 1 each Every 10 days. Use as directed every 10 days       Allergies  Allergies[1]    Review of Systems:   Pertinent items are noted in HPI.    Physical Exam:   Vital Signs:  Blood pressure (!) 174/69, pulse 72, temperature 98.3 °F (36.8 °C), temperature source Oral, resp. rate 18, weight 260 lb 1.6 oz (118 kg), last menstrual period 07/20/2013, SpO2 97%, not currently breastfeeding.     General: No acute distress. Alert and oriented x 3.  HEENT: Moist mucous membranes. EOM-I. PERRL  Neck: No lymphadenopathy.  No JVD. No carotid bruits.  Respiratory: Clear to auscultation bilaterally.  No wheezes. No rhonchi.  Cardiovascular: S1, S2.  Regular rate and rhythm.  No murmurs. Equal pulses   Abdomen: Soft, nontender, nondistended.  Positive bowel sounds. No rebound tenderness  Neurologic: No focal neurological deficits.  Musculoskeletal: Full range of motion of all extremities.  No swelling noted.  Integument: No lesions. No erythema.  Psychiatric: Appropriate mood and affect.    Results:     Laboratory Data:  Lab Results   Component Value Date    WBC 8.5 02/17/2025    HGB 12.0 02/17/2025    HCT 38.2  02/17/2025    .0 02/17/2025    CREATSERUM 1.41 (H) 02/17/2025    BUN 34 (H) 02/17/2025     (L) 02/17/2025    K 4.6 02/17/2025    CL 99 02/17/2025    CO2 26.0 02/17/2025    GLU 81 02/17/2025    CA 9.6 02/17/2025    ALB 4.3 02/16/2025    ALKPHO 188 (H) 02/16/2025    TP 7.3 02/16/2025    AST 30 02/16/2025    ALT 40 02/16/2025    TSH 2.333 11/26/2024    LIP 38 11/16/2024    DDIMER 1.69 02/03/2017    MG 1.7 03/22/2023    PHOS 2.7 05/10/2024    TROP 0.00 02/08/2017     (H) 01/13/2018         Imaging:  XR CHEST AP PORTABLE  (CPT=71045)    Result Date: 2/16/2025  CONCLUSION:  1. Bandlike scarring in mid to lower lungs. 2. Stable cardiomegaly.  1.   Dictated by (CST): Merrill Tyson MD on 2/16/2025 at 1:26 PM     Finalized by (CST): Merrill Tyson MD on 2/16/2025 at 1:27 PM              Impression:     Acute on chronic heart failure, unspecified heart failure type (HCC)  - This has revolved    Treatment of Type 2a Diabetes  - Start Tresiba 40 units   - Start aspart 16 tid with meals  - Start low dose CF scale with meals and at bedtime     Thank you for allowing me to participate in the care of your patient.    Peyton Weiner MD  2/17/2025         [1]   Allergies  Allergen Reactions    Latex HIVES    Norvasc [Amlodipine] SWELLING    Penicillins HIVES

## 2025-02-17 NOTE — PROGRESS NOTES
Progress Note  Zenia David Patient Status:  Inpatient    3/30/1965 MRN [id number]   Location [facility] 3W/SW Attending Tasia Parker MD   Hosp Day # 1 PCP Tiago Amin DO     Subjective:  Pt ambulating in room without any dyspnea. Pt denies any chest pain, SOB or dizziness. On room air.   Pt stated the swelling has improved from admission    Objective:  /71 (BP Location: Right arm)   Pulse 81   Temp 97.7 °F (36.5 °C) (Oral)   Resp 18   Wt 260 lb 1.6 oz (118 kg)   LMP 2013   SpO2 95%   BMI 43.28 kg/m²     Telemetry: NSR, PVC's      Intake/Output:    Intake/Output Summary (Last 24 hours) at 2025 07  Last data filed at 2025  Gross per 24 hour   Intake 720 ml   Output 400 ml   Net 320 ml       Last 3 Weights   25 1529 260 lb 1.6 oz (118 kg)   24 1059 260 lb (117.9 kg)   24 1133 258 lb (117 kg)       Labs:  Recent Labs   Lab 25  1222   *   BUN 37*   CREATSERUM 1.53*   EGFRCR 39*   CA 9.1      K 4.8   CL 99   CO2 28.0     Recent Labs   Lab 25  1222   RBC 3.79*   HGB 11.1*   HCT 34.9*   MCV 92.1   MCH 29.3   MCHC 31.8   RDW 12.9   NEPRELIM 5.19   WBC 9.7   .0         Recent Labs   Lab 25  1222   TROPHS 7     No results found for: \"PT\", \"INR\"    Diagnostics:       Review of Systems   Respiratory: Negative.     Cardiovascular:  Positive for leg swelling. Negative for chest pain, palpitations, orthopnea, claudication and PND.       Physical Exam:    Physical Exam  Vitals reviewed.   Constitutional:       General: She is not in acute distress.     Appearance: She is not ill-appearing.   Neck:      Vascular: No JVD.   Cardiovascular:      Rate and Rhythm: Normal rate and regular rhythm.      Pulses: Normal pulses.      Heart sounds: Normal heart sounds. No murmur heard.     No friction rub. No gallop.   Pulmonary:      Effort: Pulmonary effort is normal. No respiratory distress.      Breath sounds: Normal breath  sounds. No stridor. No wheezing, rhonchi or rales.   Chest:      Chest wall: No tenderness.   Musculoskeletal:      Cervical back: Normal range of motion.      Right lower leg: Edema present.      Left lower leg: Edema present.   Skin:     General: Skin is warm and dry.   Neurological:      Mental Status: She is alert and oriented to person, place, and time.   Psychiatric:         Mood and Affect: Mood normal.         Medications:   furosemide  40 mg Intravenous BID (Diuretic)    aspirin  81 mg Oral Daily    atorvastatin  20 mg Oral Daily    carvedilol  12.5 mg Oral BID with meals    [Held by provider] insulin   Subcutaneous TID AC and HS    heparin  7,500 Units Subcutaneous Q8H PEBBLES    insulin aspart  1-5 Units Subcutaneous TID CC         Assessment/Plan:    Volume overload  - presented with orthopnea, bilateral lower extremity edema, abdominal distention  - on IV lasix 40mg BID  - echo pending, last echo from 2023 with LVEF 60-65%  - pro BNP 1,322  - chest xray with stable cardiomegaly and normal pulmonary vasculature  - no acurate intake and output charted    HTN  - SBP's in 140-150's.  - on coreg, lasix   - previously was on ACE/ARB and discontinued due to hyperkalemia. Not on CCB due to swelling  HLD  - ldl 74  - on lipitor     DMII  - A1c 6.3    CKD   - baseline creatinine 1.2- 1.3  - creatinine of 1.53 yesterday    Plan:  - mildly volume overloaded on exam, will continue with IV lasix now  - will increase corg to 25mg BID for better BP control  - further recommendation pending echo and lab results   - strict intake and output monitoring  - daily weight  - electrolyte replacement per protocol       SYED Shepherd  [facility]  2/17/2025  7:17 AM    Seen/examined patient independently.  Agree with findings, assessment and plan as outlined above.     Reports significantly improved LE edema.   Denies any CP or SOB.    In regards to volume overload, TTE is pending. Continue diuresis,  transition to PO in next 1-2 days. Strict I/Os and closely monitor renal function / lytes.     Further management pending TTE.    Rest of plan as above.    Talon Munoz, DO

## 2025-02-17 NOTE — PAYOR COMM NOTE
--------------  ADMISSION REVIEW     Payor: [facility]  Subscriber #:  [id number]  Authorization Number: [id number]    Admit date: 2/16/25  Admit time:  3:22 PM       REVIEW DOCUMENTATION:     ED Provider Notes        ED Provider Notes signed by Isabella Holloway MD at 2/16/2025  6:44 PM          Patient Seen in: [facility] Emergency Department      History     Chief Complaint   Patient presents with    Difficulty Breathing     Stated Complaint: Shortness of Breath    Subjective:   HPI      59-year-old female with history of CHF, diabetes, kidney disease presents for evaluation of shortness of breath.  Patient reports progressively worsening shortness of breath over the past several days.  Reports she stopped taking her morning dose of Lasix as she thought this would be okay.  2 weeks ago was started on Lasix and advised to see cardiology, patient has not yet been able to make an appointment with cardiology.  She reports shortness of breath, lower extremity swelling, orthopnea.  No chest pain or pressure, fever, cough.          Physical Exam     ED Triage Vitals   BP 02/16/25 1148 (!) 194/76   Pulse 02/16/25 1148 88   Resp 02/16/25 1233 12   Temp 02/16/25 1148 98.8 °F (37.1 °C)   Temp src 02/16/25 1548 Oral   SpO2 02/16/25 1148 98 %   O2 Device 02/16/25 1148 None (Room air)       Current Vitals:   Vital Signs  BP: 155/73  Pulse: 69  Resp: 18  Temp: 97.8 °F (36.6 °C)  Temp src: Oral  MAP (mmHg): 98    Oxygen Therapy  SpO2: 96 %  O2 Device: None (Room air)        Physical Exam  Vitals and nursing note reviewed.   Constitutional:       General: She is not in acute distress.     Appearance: She is well-developed.   HENT:      Head: Normocephalic and atraumatic.   Eyes:      Conjunctiva/sclera: Conjunctivae normal.   Cardiovascular:      Rate and Rhythm: Normal rate and regular rhythm.      Heart sounds: Normal heart sounds.   Pulmonary:      Effort: Pulmonary effort is normal. No  respiratory distress.      Breath sounds: Normal breath sounds.   Abdominal:      General: Bowel sounds are normal. There is no distension.      Palpations: Abdomen is soft.      Tenderness: There is no abdominal tenderness. There is no guarding or rebound.   Musculoskeletal:         General: Normal range of motion.      Cervical back: Normal range of motion and neck supple.   Skin:     General: Skin is warm and dry.      Findings: No rash.   Neurological:      General: No focal deficit present.      Mental Status: She is alert and oriented to person, place, and time.             ED Course     Labs Reviewed   BASIC METABOLIC PANEL (8) - Abnormal; Notable for the following components:       Result Value    Glucose 171 (*)     BUN 37 (*)     Creatinine 1.53 (*)     BUN/CREA Ratio 24.2 (*)     eGFR-Cr 39 (*)     All other components within normal limits   HEPATIC FUNCTION PANEL (7) - Abnormal; Notable for the following components:    Alkaline Phosphatase 188 (*)     All other components within normal limits   CBC WITH DIFFERENTIAL WITH PLATELET - Abnormal; Notable for the following components:    RBC 3.79 (*)     HGB 11.1 (*)     HCT 34.9 (*)     All other components within normal limits   PRO BETA NATRIURETIC PEPTIDE - Abnormal; Notable for the following components:    Pro-Beta Natriuretic Peptide 1,322 (*)     All other components within normal limits   TROPONIN I HIGH SENSITIVITY - Normal   POCT GLUCOSE - Normal   RAINBOW DRAW BLUE     EKG    Rate, intervals and axes as noted on EKG Report.  Rate: 85  Rhythm: Sinus Rhythm  Reading: Sinus rhythm, new T wave inversion V3 through V6 compared to EKG from 11/26/2024, no STEMI                Imaging Results Available and Reviewed while in ED:   XR CHEST AP PORTABLE  (CPT=71045)   Final Result   PROCEDURE: XR CHEST AP PORTABLE  (CPT=71045)   TIME: 1253 hours       COMPARISON: [facility], XR CHEST PA + LAT CHEST    (CPT=71046), 11/26/2024, 12:28 PM.        INDICATIONS: Shortness of breath bilateral leg swelling, and weakness x1    month.       TECHNIQUE:   Single view.         FINDINGS:    CARDIAC/VASC: Stable cardiomegaly.  Top normal pulmonary vascularity.    Atherosclerotic calcification aorta.   MEDIAST/DARRYL:   No visible mass or adenopathy.   LUNGS/PLEURA: Bandlike scarring in mid lungs and right lung base.  No    consolidation or pleural effusion.   BONES: No fracture or visible bony lesion.   OTHER: Negative.                     =====   CONCLUSION:    1. Bandlike scarring in mid to lower lungs.   2. Stable cardiomegaly.     1.            Dictated by (CST): Merrill Tyson MD on 2/16/2025 at 1:26 PM        Finalized by (CST): Merrill Tyson MD on 2/16/2025 at 1:27 PM                   ED Medications Administered:   Medications   furosemide (Lasix) 10 mg/mL injection 40 mg (40 mg Intravenous Given 2/16/25 1828)   acetaminophen (Tylenol) tab 650 mg (has no administration in time range)     Or   HYDROcodone-acetaminophen (Norco) 5-325 MG per tab 1 tablet (has no administration in time range)     Or   HYDROcodone-acetaminophen (Norco) 5-325 MG per tab 2 tablet (has no administration in time range)   aspirin DR tab 81 mg (81 mg Oral Not Given 2/16/25 1615)   atorvastatin (Lipitor) tab 20 mg (has no administration in time range)   carvedilol (Coreg) tab 12.5 mg (12.5 mg Oral Given 2/16/25 1734)   fluticasone propionate (Flonase) 50 MCG/ACT nasal suspension 2 spray (has no administration in time range)   glucose (Dex4) 15 GM/59ML oral liquid 15 g (has no administration in time range)     Or   glucose (Glutose) 40% oral gel 15 g (has no administration in time range)     Or   glucose-vitamin C (Dex-4) chewable tab 4 tablet (has no administration in time range)     Or   dextrose 50% injection 50 mL (has no administration in time range)     Or   glucose (Dex4) 15 GM/59ML oral liquid 30 g (has no administration in time range)     Or   glucose (Glutose) 40% oral gel 30 g  (has no administration in time range)     Or   glucose-vitamin C (Dex-4) chewable tab 8 tablet (has no administration in time range)   insulin via iLet Insulin Pump ( Subcutaneous Automatically Held 2/19/25 2100)   heparin (Porcine) 5000 UNIT/ML injection 7,500 Units (has no administration in time range)   acetaminophen (Tylenol) tab 650 mg (has no administration in time range)     Or   HYDROcodone-acetaminophen (Norco) 5-325 MG per tab 1 tablet (has no administration in time range)     Or   HYDROcodone-acetaminophen (Norco) 5-325 MG per tab 2 tablet (has no administration in time range)   polyethylene glycol (PEG 3350) (Miralax) 17 g oral packet 17 g (has no administration in time range)   sennosides (Senokot) tab 17.2 mg (has no administration in time range)   bisacodyl (Dulcolax) 10 MG rectal suppository 10 mg (has no administration in time range)   fleet enema (Fleet) rectal enema 133 mL (has no administration in time range)   ondansetron (Zofran) 4 MG/2ML injection 4 mg (has no administration in time range)   prochlorperazine (Compazine) 10 MG/2ML injection 5 mg (has no administration in time range)   insulin aspart (NovoLOG) 100 Units/mL FlexPen 1-5 Units ( Subcutaneous Not Given 2/16/25 1815)   furosemide (Lasix) 10 mg/mL injection 40 mg (40 mg Intravenous Given 2/16/25 1330)   acetaminophen (Tylenol Extra Strength) tab 1,000 mg (1,000 mg Oral Given 2/16/25 1452)       Vitals:    02/16/25 1445 02/16/25 1529 02/16/25 1548 02/16/25 1549   BP: 150/51  155/73    BP Location:   Right arm    Pulse: 71  67 69   Resp:   18    Temp:   97.8 °F (36.6 °C)    TempSrc:   Oral    SpO2: 96%  96%    Weight:  118 kg       *I personally reviewed and interpreted all ED vitals.        [facility]          Admission disposition: 2/16/2025  1:36 PM           Medical Decision Making  Differential diagnosis includes but is not limited to heart failure, cardiomyopathy, anemia, kidney failure, arrhythmia well-appearing patient, vital signs  within normal limits,    History and exam consistent with heart failure exacerbation.  Given 40 mg Lasix IV, discussed with and admitted to hospitalist.  Discussed with KATHERYN JEFFERS, no further recommendations for the emergency department.    Problems Addressed:  Acute on chronic heart failure, unspecified heart failure type (HCC): chronic illness or injury with exacerbation, progression, or side effects of treatment    Amount and/or Complexity of Data Reviewed  External Data Reviewed: labs.     Details: Resolution of leukocytosis, CBC stable compared to 11/16/2024, BMP stable compared to 11/22/2024  Labs: ordered.  Radiology: ordered.  ECG/medicine tests: ordered and independent interpretation performed. Decision-making details documented in ED Course.  Discussion of management or test interpretation with external provider(s): Discussed with cardiology and hospitalist        Disposition and Plan     Clinical Impression:  1. Acute on chronic heart failure, unspecified heart failure type (HCC)         Disposition:  Admit  2/16/2025  1:36 pm     Hospital Problems       Present on Admission  Date Reviewed: 12/28/2024            ICD-10-CM Noted POA    * (Principal) Acute on chronic heart failure, unspecified heart failure type (HCC) I50.9 2/16/2025 Unknown    Acute congestive heart failure (HCC) I50.9 2/16/2025 Unknown    CHF (congestive heart failure) (HCC) I50.9 2/16/2025 Unknown              Signed by Isabella Holloway MD on 2/16/2025  6:44 PM             History and Physical    H&P signed by Tasia Parker MD at 2/16/2025  7:37 PM    [facility]  part of [facility]     History & Physical   Date:  2/16/2025  Date of Admission:  2/16/2025     History provided by:Patient and family   Chief Complaint:          Chief Complaint   Patient presents with    Difficulty Breathing         HPI:   Zenia David is a(n) 59 year old female who has a pmh of Diabetes Type 1, CKD Stage 2, HTN, CHF who was admitted  for acute shortness of breath and leg swelling. Patient states she woke up from sleep and felt very short of breath.She also says her blood sugars are always all over the place with the insulin pump. She denies chest pain, fever/chills, LOC, diarrhea and constipation. She will be admitted to the medical floor for further treatment and workup. Cardiology has been notified. Cardiology has been notified. She has been placed on IV lasix. BP currently is 155/73, HR 69 and temp 97.8.        Assessment/Plan:     Acute on chronic CHF  - echo pending   - Cards on board  - IV lasix 40 mg BID  - monitor urine output      HTN  CKD stage 1  Dyslipidemia  - resume home meds     DM1  - SSI low dose  - accuchecks AC and HS  - Patient does not want to use her insulin pump right now as she states her blood sugar drops very low and she would like to discuss it with the endocrinologist  - Consulted Endocrinology     DVT proph- heparin     Full     [email or web address]         QUIQUE CAZARES MD  2/16/2025            CONSULT  Impression:   Assessment:  Fluid retention  HTN  HLD  DMII  CKD     Plan:  - Presents with orthopnea, bilateral lower extremity edema, abdominal distention concerning for acute on chronic fluid retention  - Obtain transthoracic echocardiogram, last echo 2023  - Continue Lasix 40 mg IV twice daily  - I's/O, daily weights and creatinine     Thank you for allowing me to participate in the care of your patient.     Rufino Wisdom MD  [facility]  2/16/2025 2/17          Date of Service: 2/17/2025  7:17 AM         Progress Note     Subjective:  Pt ambulating in room without any dyspnea. Pt denies any chest pain, SOB or dizziness. On room air.   Pt stated the swelling has improved from admission     Objective:  /71 (BP Location: Right arm)   Pulse 81   Temp 97.7 °F (36.5 °C) (Oral)   Resp 18   Wt 260 lb 1.6 oz (118 kg)   LMP 07/20/2013   SpO2 95%   BMI 43.28 kg/m²      Telemetry: NSR, PVC's         Intake/Output:     Intake/Output Summary (Last 24 hours) at 2/17/2025 0717  Last data filed at 2/16/2025 2053      Gross per 24 hour   Intake 720 ml   Output 400 ml   Net 320 ml              Last 3 Weights   02/16/25 1529 260 lb 1.6 oz (118 kg)   12/20/24 1059 260 lb (117.9 kg)   11/26/24 1133 258 lb (117 kg)         Labs:      Recent Labs   Lab 02/16/25  1222   *   BUN 37*   CREATSERUM 1.53*   EGFRCR 39*   CA 9.1      K 4.8   CL 99   CO2 28.0          Recent Labs   Lab 02/16/25  1222   RBC 3.79*   HGB 11.1*   HCT 34.9*   MCV 92.1   MCH 29.3   MCHC 31.8   RDW 12.9   NEPRELIM 5.19   WBC 9.7   .0                Recent Labs   Lab 02/16/25  1222   TROPHS 7      No results found for: \"PT\", \"INR\"     Diagnostics:         Review of Systems   Respiratory: Negative.     Cardiovascular:  Positive for leg swelling. Negative for chest pain, palpitations, orthopnea, claudication and PND.         Physical Exam:    Physical Exam  Vitals reviewed.   Constitutional:       General: She is not in acute distress.     Appearance: She is not ill-appearing.   Neck:      Vascular: No JVD.   Cardiovascular:      Rate and Rhythm: Normal rate and regular rhythm.      Pulses: Normal pulses.      Heart sounds: Normal heart sounds. No murmur heard.     No friction rub. No gallop.   Pulmonary:      Effort: Pulmonary effort is normal. No respiratory distress.      Breath sounds: Normal breath sounds. No stridor. No wheezing, rhonchi or rales.   Chest:      Chest wall: No tenderness.   Musculoskeletal:      Cervical back: Normal range of motion.      Right lower leg: Edema present.      Left lower leg: Edema present.   Skin:     General: Skin is warm and dry.   Neurological:      Mental Status: She is alert and oriented to person, place, and time.   Psychiatric:         Mood and Affect: Mood normal.            Medications:   furosemide  40 mg Intravenous BID (Diuretic)    aspirin  81 mg Oral Daily    atorvastatin  20 mg Oral  Daily    carvedilol  12.5 mg Oral BID with meals    [Held by provider] insulin   Subcutaneous TID AC and HS    heparin  7,500 Units Subcutaneous Q8H PEBBLES    insulin aspart  1-5 Units Subcutaneous TID CC            Assessment/Plan:     Volume overload  - presented with orthopnea, bilateral lower extremity edema, abdominal distention  - on IV lasix 40mg BID  - echo pending, last echo from 2023 with LVEF 60-65%  - pro BNP 1,322  - chest xray with stable cardiomegaly and normal pulmonary vasculature  - no acurate intake and output charted     HTN  - SBP's in 140-150's.  - on coreg, lasix   - previously was on ACE/ARB and discontinued due to hyperkalemia. Not on CCB due to swelling  HLD  - ldl 74  - on lipitor      DMII  - A1c 6.3     CKD   - baseline creatinine 1.2- 1.3  - creatinine of 1.53 yesterday     Plan:  - mildly volume overloaded on exam, will continue with IV lasix now  - will increase corg to 25mg BID for better BP control  - further recommendation pending echo and lab results   - strict intake and output monitoring  - daily weight  - electrolyte replacement per protocol         SYED Shepherd  [facility]  2/17/2025  7:17 AM                MEDICATIONS ADMINISTERED IN LAST 1 DAY:  acetaminophen (Tylenol Extra Strength) tab 1,000 mg       Date Action Dose Route User    2/16/2025 1452 Given 1,000 mg Oral Esha Gonsalves RN          aspirin DR tab 81 mg       Date Action Dose Route User    2/17/2025 0923 Given 81 mg Oral Pennie Kim RN          atorvastatin (Lipitor) tab 20 mg       Date Action Dose Route User    2/17/2025 0923 Given 20 mg Oral Pennie Kim RN          carvedilol (Coreg) tab 12.5 mg       Date Action Dose Route User    2/16/2025 1734 Given 12.5 mg Oral Tessa Kirk RN          carvedilol (Coreg) tab 12.5 mg       Date Action Dose Route User    2/17/2025 0923 Given 12.5 mg Oral Pennie Kim RN          carvedilol (Coreg) tab 12.5 mg       Date Action Dose  Route User    2/17/2025 0923 Given 12.5 mg Oral Pennie Kim RN          furosemide (Lasix) 10 mg/mL injection 40 mg       Date Action Dose Route User    2/16/2025 1330 Given 40 mg Intravenous Esha Gonsalves RN          furosemide (Lasix) 10 mg/mL injection 40 mg       Date Action Dose Route User    2/17/2025 0923 Given 40 mg Intravenous Pennie Kim RN    2/16/2025 1828 Given 40 mg Intravenous Tessa Kirk RN          heparin (Porcine) 5000 UNIT/ML injection 5,000 Units       Date Action Dose Route User    2/16/2025 1734 Given 5,000 Units Subcutaneous (Right Lower Abdomen) Tessa Kirk RN          heparin (Porcine) 5000 UNIT/ML injection 7,500 Units       Date Action Dose Route User    2/16/2025 2051 Given 7,500 Units Subcutaneous (Left Upper Arm) Margoth Sin RN            Vitals (last day)       Date/Time Temp Pulse Resp BP SpO2 Weight O2 Device O2 Flow Rate (L/min) [facility]    02/17/25 0921 98.3 °F (36.8 °C) 72 18 174/69 97 % -- None (Room air) -- LN    02/17/25 0839 -- 81 -- 165/69 -- -- -- -- JH    02/17/25 0555 -- 81 18 155/71 95 % -- None (Room air) -- CC    02/17/25 0400 -- 73 -- -- -- -- -- -- GT    02/16/25 2050 97.7 °F (36.5 °C) 71 18 145/71 96 % -- None (Room air) -- CC    02/16/25 1901 -- 66 -- -- -- -- -- -- GT    02/16/25 1549 -- 69 -- -- -- -- -- -- BP    02/16/25 1548 97.8 °F (36.6 °C) 67 18 155/73 96 % -- None (Room air) -- MM    02/16/25 1529 -- -- -- -- -- 260 lb 1.6 oz (118 kg) -- -- MM    02/16/25 1445 -- 71 -- 150/51 96 % -- None (Room air) -- VR    02/16/25 1258 -- 70 21 133/46 94 % -- -- -- VR    02/16/25 1233 -- 70 12 155/55 95 % -- -- -- VR    02/16/25 1148 98.8 °F (37.1 °C) 88 -- 194/76 98 % -- None (Room air) -- CP

## 2025-02-17 NOTE — PROGRESS NOTES
[facility]  part of [facility]    Progress Note    Zenia David Patient Status:  Inpatient    3/30/1965 MRN [id number]   Location [facility] 3W/SW Attending Tasia Parker MD   Hosp Day # 1 PCP Tiago Amin DO       Subjective:   Zenia David is feeling ok. Legs are less swollen. Afebrile.     Objective:   Blood pressure (!) 174/69, pulse 72, temperature 98.3 °F (36.8 °C), temperature source Oral, resp. rate 18, weight 260 lb 1.6 oz (118 kg), last menstrual period 2013, SpO2 97%, not currently breastfeeding.    Physical Exam:    General: No acute distress.   Respiratory: Clear to auscultation bilaterally. No wheezes. No rhonchi.  Cardiovascular: S1, S2. Regular rate and rhythm. No murmurs, rubs or gallops.   Abdomen: Soft, nontender, nondistended.  Positive bowel sounds. No rebound or guarding.  Neurologic: No focal neurological deficits.   Musculoskeletal: Moves all extremities.  Extremities: No edema.    Results:     Lab Results   Component Value Date    WBC 8.5 2025    HGB 12.0 2025    HCT 38.2 2025    .0 2025    CREATSERUM 1.41 (H) 2025    BUN 34 (H) 2025     (L) 2025    K 4.6 2025    CL 99 2025    CO2 26.0 2025    GLU 81 2025    CA 9.6 2025    ALB 4.3 2025    ALKPHO 188 (H) 2025    BILT 0.3 2025    TP 7.3 2025    AST 30 2025    ALT 40 2025    TSH 2.333 2024    LIP 38 2024    DDIMER 1.69 2017    MG 1.7 2025    PHOS 2.7 05/10/2024    TROP 0.00 2017     (H) 2018       XR CHEST AP PORTABLE  (CPT=71045)    Result Date: 2025  CONCLUSION:  1. Bandlike scarring in mid to lower lungs. 2. Stable cardiomegaly.  1.   Dictated by (CST): Merrill Tyson MD on 2025 at 1:26 PM     Finalized by (CST): Merrill Tyson MD on 2025 at 1:27 PM         EKG 12 Lead    Result Date: 2025  Normal sinus rhythm ST &  T wave abnormality, consider anterolateral ischemia Abnormal ECG When compared with ECG of 26-NOV-2024 12:14, T wave inversion now evident in Anterior leads Confirmed by EDU BELCHER ([id number]) on 2/16/2025 2:34:41 PM      carvedilol  25 mg Oral BID with meals    furosemide  40 mg Intravenous BID (Diuretic)    aspirin  81 mg Oral Daily    atorvastatin  20 mg Oral Daily    [Held by provider] insulin   Subcutaneous TID AC and HS    heparin  7,500 Units Subcutaneous Q8H PEBBLES    insulin aspart  1-5 Units Subcutaneous TID CC       acetaminophen **OR** HYDROcodone-acetaminophen **OR** HYDROcodone-acetaminophen    fluticasone propionate    glucose **OR** glucose **OR** glucose-vitamin C **OR** dextrose **OR** glucose **OR** glucose **OR** glucose-vitamin C    acetaminophen **OR** HYDROcodone-acetaminophen **OR** HYDROcodone-acetaminophen    polyethylene glycol (PEG 3350)    sennosides    bisacodyl    fleet enema    ondansetron    prochlorperazine      Assessment and Plan:        Acute on chronic CHF  - echo pending   - Cards on board  - IV lasix 40 mg BID  - CXR reviewed  -Coreg increased to 25 mg POB ID   - monitor urine output      HTN  CKD stage 1  Dyslipidemia  - resume home meds     DM1  - SSI low dose  - accuchecks AC and HS  - Patient does not want to use her insulin pump right now as she states her blood sugar drops very low and she would like to discuss it with the endocrinologist  - Consulted Endocrinology     DVT proph- heparin     Full     [email or web address] CAZARES MD  02/17/25

## 2025-02-17 NOTE — PROGRESS NOTES
heparin adjusted per P&T protocol based on weight AND renal function  Estimated Creatinine Clearance: 35.2 mL/min (A) (based on SCr of 1.53 mg/dL (H)).   Body mass index is 43.28 kg/m².     *NOTE: If patient has BMI < 40 kg/m2 and CrCl < 30 mL/min, use Ivent Type: Renal Dose Adjustment; Subtype: Enoxaparin- Renal Adjustment   Agent Adjustment (BMI > 40 kg/m2)    CrCl > 30 mL/min CrCl < 30 mL/min Hemodialysis   Enoxaparin 0.5 mg/kg q12h 0.5 mg/kg once daily Heparin 5000 units subcutaneously q8h   Heparin 7500 units q8h* 5000 units q8h* 5000 units q8h*   * NOTE: if q12h is ordered, keep frequency at q12h

## 2025-02-17 NOTE — PLAN OF CARE
Problem: Patient Centered Care  Goal: Patient preferences are identified and integrated in the patient's plan of care  Description: Interventions:  - What would you like us to know as we care for you?   - Provide timely, complete, and accurate information to patient/family  - Incorporate patient and family knowledge, values, beliefs, and cultural backgrounds into the planning and delivery of care  - Encourage patient/family to participate in care and decision-making at the level they choose  - Honor patient and family perspectives and choices  Outcome: Progressing     Problem: Diabetes/Glucose Control  Goal: Glucose maintained within prescribed range  Description: INTERVENTIONS:  - Monitor Blood Glucose as ordered  - Assess for signs and symptoms of hyperglycemia and hypoglycemia  - Administer ordered medications to maintain glucose within target range  - Assess barriers to adequate nutritional intake and initiate nutrition consult as needed  - Instruct patient on self management of diabetes  Outcome: Progressing     Problem: Patient/Family Goals  Goal: Patient/Family Long Term Goal  Description: Patient's Long Term Goal: Discharge home safely    Interventions:  - Medications  -Labs  -Vitals  - See additional Care Plan goals for specific interventions  Outcome: Progressing  Goal: Patient/Family Short Term Goal  Description: Patient's Short Term Goal: Improved strength and stability    Interventions:   - Ambulate TID  -Up in chair for meals  - See additional Care Plan goals for specific interventions  Outcome: Progressing

## 2025-02-17 NOTE — PLAN OF CARE
No acute issues overnight.     Problem: Patient Centered Care  Goal: Patient preferences are identified and integrated in the patient's plan of care  Description: Interventions:  - What would you like us to know as we care for you?   - Provide timely, complete, and accurate information to patient/family  - Incorporate patient and family knowledge, values, beliefs, and cultural backgrounds into the planning and delivery of care  - Encourage patient/family to participate in care and decision-making at the level they choose  - Honor patient and family perspectives and choices  Outcome: Progressing     Problem: Diabetes/Glucose Control  Goal: Glucose maintained within prescribed range  Description: INTERVENTIONS:  - Monitor Blood Glucose as ordered  - Assess for signs and symptoms of hyperglycemia and hypoglycemia  - Administer ordered medications to maintain glucose within target range  - Assess barriers to adequate nutritional intake and initiate nutrition consult as needed  - Instruct patient on self management of diabetes  Outcome: Progressing     Problem: Patient/Family Goals  Goal: Patient/Family Long Term Goal  Description: Patient's Long Term Goal:     Interventions:  - See additional Care Plan goals for specific interventions  Outcome: Progressing  Goal: Patient/Family Short Term Goal  Description: Patient's Short Term Goal:     Interventions:   - See additional Care Plan goals for specific interventions  Outcome: Progressing

## 2025-02-18 LAB
ANION GAP SERPL CALC-SCNC: 10 MMOL/L (ref 0–18)
BUN BLD-MCNC: 54 MG/DL (ref 9–23)
BUN/CREAT SERPL: 32.5 (ref 10–20)
CALCIUM BLD-MCNC: 9.5 MG/DL (ref 8.7–10.4)
CHLORIDE SERPL-SCNC: 96 MMOL/L (ref 98–112)
CO2 SERPL-SCNC: 28 MMOL/L (ref 21–32)
CREAT BLD-MCNC: 1.66 MG/DL
DEPRECATED RDW RBC AUTO: 41.8 FL (ref 35.1–46.3)
EGFRCR SERPLBLD CKD-EPI 2021: 35 ML/MIN/1.73M2 (ref 60–?)
ERYTHROCYTE [DISTWIDTH] IN BLOOD BY AUTOMATED COUNT: 12.8 % (ref 11–15)
GLUCOSE BLD-MCNC: 246 MG/DL (ref 70–99)
GLUCOSE BLDC GLUCOMTR-MCNC: 262 MG/DL (ref 70–99)
GLUCOSE BLDC GLUCOMTR-MCNC: 271 MG/DL (ref 70–99)
GLUCOSE BLDC GLUCOMTR-MCNC: 282 MG/DL (ref 70–99)
HCT VFR BLD AUTO: 34.9 %
HGB BLD-MCNC: 11.5 G/DL
MAGNESIUM SERPL-MCNC: 1.7 MG/DL (ref 1.6–2.6)
MCH RBC QN AUTO: 29.8 PG (ref 26–34)
MCHC RBC AUTO-ENTMCNC: 33 G/DL (ref 31–37)
MCV RBC AUTO: 90.4 FL
NT-PROBNP SERPL-MCNC: 807 PG/ML (ref ?–125)
OSMOLALITY SERPL CALC.SUM OF ELEC: 301 MOSM/KG (ref 275–295)
PLATELET # BLD AUTO: 260 10(3)UL (ref 150–450)
POTASSIUM SERPL-SCNC: 4.7 MMOL/L (ref 3.5–5.1)
RBC # BLD AUTO: 3.86 X10(6)UL
SODIUM SERPL-SCNC: 134 MMOL/L (ref 136–145)
WBC # BLD AUTO: 8.9 X10(3) UL (ref 4–11)

## 2025-02-18 PROCEDURE — 99239 HOSP IP/OBS DSCHRG MGMT >30: CPT | Performed by: HOSPITALIST

## 2025-02-18 RX ORDER — MAGNESIUM OXIDE 400 MG/1
400 TABLET ORAL ONCE
Status: COMPLETED | OUTPATIENT
Start: 2025-02-18 | End: 2025-02-18

## 2025-02-18 RX ORDER — TORSEMIDE 20 MG/1
20 TABLET ORAL 2 TIMES DAILY
Qty: 60 TABLET | Refills: 1 | Status: SHIPPED | OUTPATIENT
Start: 2025-02-18 | End: 2025-02-18

## 2025-02-18 RX ORDER — TORSEMIDE 20 MG/1
20 TABLET ORAL DAILY
Qty: 30 TABLET | Refills: 1 | Status: SHIPPED | OUTPATIENT
Start: 2025-02-18 | End: 2025-04-19

## 2025-02-18 RX ORDER — CARVEDILOL 25 MG/1
25 TABLET ORAL 2 TIMES DAILY WITH MEALS
Qty: 60 TABLET | Refills: 0 | Status: SHIPPED | OUTPATIENT
Start: 2025-02-18

## 2025-02-18 RX ORDER — TORSEMIDE 20 MG/1
20 TABLET ORAL DAILY
Status: DISCONTINUED | OUTPATIENT
Start: 2025-02-18 | End: 2025-02-18

## 2025-02-18 RX ORDER — TORSEMIDE 20 MG/1
20 TABLET ORAL DAILY
Status: DISCONTINUED | OUTPATIENT
Start: 2025-02-19 | End: 2025-02-18

## 2025-02-18 NOTE — DIABETES ED
[facility]    Diabetes Education  Note    Zenia David Patient Status:  Inpatient   3/30/1965 MRN [id number]  Location [facility] 3W/SW Attending Tasia Parker MD  Hosp Day # 2 PCP Tiago Amin DO      Labs:    HEMOGLOBIN A1C (%)   Date Value   2024 6.3 (A)     Asked by endocrinologist to see pt to review insulin pump self-management. Pt states has had diabetes for 31 years. Started pump therapy with iLet insulin pump approx 1 year ago. Had not been on insulin pump before that. \"I love my iLet!\" Records indicate A1C 10.3% one year ago and most recent 6.3%. Chart review indicates that pt did not want to use pump in hospital because her BG drops too low when using the pump. Per conversation today, pt states the only reason she did not use pump in hospital is because it wasn't charged and she did not bring . Will resume pump when she returns home today.    Pt states she only occasionally has BG <70, and that it is usually because she does not treat it right away or she sleeps through warning alarm. States it starts alarming when BG 95. States that if pre meal BG around , she will not bolus until after she's done eating so that her BG does not drop down. When that happens, her BG then rises too high and then drops too low, but not hypoglycemic. She also states she needs to learn more about eating less and eating the right types of food. Feels she needs to lose weight.    Education Provided:  Reinforced proper timing of meal bolus to prevent wide swings in BG levels  Discussed action of rapid acting mealtime insulin  Importance of addressing low BG alarms in timely manner and treat BG before it gets to level below 70.    Patient verbalized understanding and was receptive to information provided.      Recommendations:  Outpatient visit with RD - communicated rec to endocrinology      Ana Berry RN, BC-ADM, [facility]  Diabetes Educator  [id number]  2025  3:02 PM

## 2025-02-18 NOTE — DISCHARGE PLANNING
Patient was provided with discharge instructions, education, and follow up information. Prescriptions were already sent electronically to patient's pharmacy. Patient verbalizes understanding of follow up information, specifically Cardiology, Endocrinology and PCP. Patient has no questions after reviewing all instructions and will be going home.     Sofie FORD, Discharge Leader [id number]

## 2025-02-18 NOTE — CONSULTS
Heart Failure Nurse  Progress Note    Patient was evaluated by the Heart Failure Nurse  for understanding, verbalization, demonstration, and recall of education related to heart failure, overall adherence to the behaviors necessary to maintain a compensated status, and risk for readmission.     Patient assessment: quickly reviewed HF education at bedside, patient discharging today and ride waiting downstairs. Will review HF ed during callback process.     Patient is able to verbalize signs/symptoms fluid overload/impending HF exacerbation and who to contact with problems                                          ___ yes  __x_ no      Patient is following a 2000 mg sodium diet                                             ___ yes  _x__ no    If no, barriers to 2000 mg sodium diet:_______________________________________________    Patient informed of 2-Part dietician classes that is free if sign up within 30 days of discharge or $40  _x__ yes  ___ no      Patient is adherent to medication regimen                                              __x_ yes  ___ no    If no, barriers to medication regimen:    Patient has sufficient funds to purchase medication                      __x_ yes  ___ no      Patient has a scale in the home              ___ yes  _x__ no      Patient is adherent to daily weight monitoring                                        ___ yes   __x_ no    If no, barriers to daily weight monitoring:    Symptom Tracker Worksheet reviewed with patient  _x__ yes   ___ no      Patient verbalizes understanding of stoplight/heart failure zones          _x__ yes   ___ no      Patient understands the importance of 7-day follow-up appointment      x___ yes  ___ no    Appointment Date: 2/27 with Re at 3:00pm          Patient has adequate transportation to attend follow-up appointments    _x__ yes  ___ no    If no, was referral to Social Work made  ___yes  _x_ no      Family/Friend present during education:  None    Additional consultations required: None    Franchesca HAHNN, RN, PCCN   HF  [id number]

## 2025-02-18 NOTE — DISCHARGE SUMMARY
[facility]  part of [facility]    Discharge Summary    Zenai David Patient Status:  Inpatient    3/30/1965 MRN [id number]   Location [facility] 3W/SW Attending Tasia Parker MD   Hosp Day # 2 PCP Tiago Amin DO     Date of Admission: 2025   Date of Discharge: 2025    Hospital Discharge Diagnoses: Acute on chronic Systolic CHF    Lace+ Score: 66  59-90 High Risk  29-58 Medium Risk  0-28   Low Risk.    TCM Follow-Up Recommendation:  LACE > 58: High Risk of readmission after discharge from the hospital.        Admitting Diagnosis: Acute on chronic heart failure, unspecified heart failure type (HCC) [I50.9]    Disposition: Home    Discharge Diagnosis: .Principal Problem:    Acute on chronic heart failure, unspecified heart failure type (HCC)  Active Problems:    CHF (congestive heart failure) (HCC)    Acute congestive heart failure (HCC)      Hospital Course:   Reason for Admission:   Per Dr. Guerra  Zenia David is a(n) 59 year old female who has a pmh of Diabetes Type 1, CKD Stage 2, HTN, CHF who was admitted for acute shortness of breath and leg swelling. Patient states she woke up from sleep and felt very short of breath.She also says her blood sugars are always all over the place with the insulin pump. She denies chest pain, fever/chills, LOC, diarrhea and constipation. She will be admitted to the medical floor for further treatment and workup. Cardiology has been notified. Cardiology has been notified. She has been placed on IV lasix. BP currently is 155/73, HR 69 and temp 97.8.     Discharge Physical Exam:   Physical Exam:    General: No acute distress.   Respiratory: Clear to auscultation bilaterally. No wheezes. No rhonchi.  Cardiovascular: S1, S2. Regular rate and rhythm. No murmurs, rubs or gallops.   Abdomen: Soft, nontender, nondistended.  Positive bowel sounds. No rebound or guarding.  Neurologic: No focal neurological deficits.   Musculoskeletal:  Moves all extremities.    Hospital Course:      Acute on chronic CHF  - echo pending   - Cards on board  - IV lasix 40 mg BID- home on torsemide 20 BID   - CXR reviewed  -Coreg increased to 25 mg PO BID   - monitor urine output      HTN  CKD stage 1  Dyslipidemia  - resume home meds     DM1  - SSI low dose  - accuchecks AC and HS  - Consulted Endocrinology- they will adjust her pump prior to discharge.      DVT proph- heparin     Full     MDM.high    Complications: none    Consultants         Provider   Role Specialty     Peyton Weiner MD      Consulting Physician ENDOCRINOLOGY     Rufino Wisdom MD      Consulting Physician Cardiovascular Diseases                Discharge Plan:   Discharge Condition: Stable    Current Discharge Medication List        New Orders    Details   torsemide 20 MG Oral Tab Take 1 tablet (20 mg total) by mouth in the morning and 1 tablet (20 mg total) before bedtime.           Home Meds - Modified    Details   !! carvedilol 25 MG Oral Tab Take 1 tablet (25 mg total) by mouth 2 (two) times daily with meals.       !! - Potential duplicate medications found. Please discuss with provider.        Home Meds - Unchanged    Details   aspirin 81 MG Oral Tab EC Take 1 tablet (81 mg total) by mouth daily.      DUPIXENT 300 MG/2ML Subcutaneous Solution Auto-injector INJECT 300 MG UNDER THE SKIN EVERY 14 DAYS      insulin lispro 100 UNIT/ML Injection Solution Inject 150 units daily via insulin pump      atorvastatin 20 MG Oral Tab Take 1 tablet (20 mg total) by mouth nightly.      fluticasone propionate 50 MCG/ACT Nasal Suspension 2 sprays by Nasal route daily.      clobetasol 0.05 % External Ointment Apply 1 Application. topically daily as needed.      acetaminophen 500 MG Oral Tab Take 2 tablets (1,000 mg total) by mouth every 6 (six) hours as needed for Pain.      !! carvedilol 12.5 MG Oral Tab Take 1 tablet (12.5 mg total) by mouth 2 (two) times daily with meals.      Continuous Blood Gluc  Sensor (DEXCOM G7 SENSOR) Does not apply Misc 1 each Every 10 days. Use as directed every 10 days       !! - Potential duplicate medications found. Please discuss with provider.              Discharge Diet: ADA diet     Discharge Activity: As tolerated       Discharge Medications        START taking these medications        Instructions Prescription details   carvedilol 12.5 MG Tabs  Commonly known as: Coreg      Take 1 tablet (12.5 mg total) by mouth 2 (two) times daily with meals.   Quantity: 60 tablet  Refills: 1     carvedilol 25 MG Tabs  Commonly known as: Coreg      Take 1 tablet (25 mg total) by mouth 2 (two) times daily with meals.   Quantity: 60 tablet  Refills: 0     torsemide 20 MG Tabs  Commonly known as: Demadex      Take 1 tablet (20 mg total) by mouth in the morning and 1 tablet (20 mg total) before bedtime.   Stop taking on: April 19, 2025  Quantity: 60 tablet  Refills: 1            CHANGE how you take these medications        Instructions Prescription details   atorvastatin 20 MG Tabs  Commonly known as: Lipitor  What changed: when to take this      Take 1 tablet (20 mg total) by mouth nightly.   Quantity: 90 tablet  Refills: 3            CONTINUE taking these medications        Instructions Prescription details   acetaminophen 500 MG Tabs  Commonly known as: Tylenol Extra Strength      Take 2 tablets (1,000 mg total) by mouth every 6 (six) hours as needed for Pain.   Refills: 0     aspirin 81 MG Tbec      Take 1 tablet (81 mg total) by mouth daily.   Refills: 0     clobetasol 0.05 % Oint  Commonly known as: Temovate      Apply 1 Application. topically daily as needed.   Quantity: 60 g  Refills: 2     Dexcom G7 Sensor Misc      1 each Every 10 days. Use as directed every 10 days   Quantity: 3 each  Refills: 4     Dupixent 300 MG/2ML Soaj  Generic drug: Dupilumab      INJECT 300 MG UNDER THE SKIN EVERY 14 DAYS   Quantity: 4 mL  Refills: 3     fluticasone propionate 50 MCG/ACT Susp  Commonly known as:  Flonase      2 sprays by Nasal route daily.   Quantity: 3 each  Refills: 3     insulin lispro 100 UNIT/ML Soln  Commonly known as: Humalog      Inject 150 units daily via insulin pump   Quantity: 50 mL  Refills: 3            STOP taking these medications      furosemide 20 MG Tabs  Commonly known as: Lasix        furosemide 40 MG Tabs  Commonly known as: Lasix                  Where to Get Your Medications        These medications were sent to [id number] - [place], [place] - [address] AT [facility], [phone], [phone]  [address] [address]      Phone: [phone]   carvedilol 12.5 MG Tabs  carvedilol 25 MG Tabs  torsemide 20 MG Tabs         Follow up:      Follow-up Information       Kelsey Salazar APRN. Go on 2/27/2025.    Specialties: Nurse Practitioner, CARDIOLOGY  Why: @300pm  Contact information:  [address]  [place] [place] [facility] [address]  [phone]               Tiago Amin, DO Follow up in 1 week(s).    Specialty: Family Medicine  Contact information:  [address]  [address]  [facility] [id number] [phone]                             Follow up Labs and imaging:         Time spent:  > 30 minutes    QUIQUE CAZARES MD  2/18/2025

## 2025-02-18 NOTE — DISCHARGE INSTRUCTIONS
Going Home Instructions  In this section you will find the tools which will guide you through the first few days after you leave the hospital. Continued use of these tools will help you develop the skills necessary to keep your heart failure under control.     Home Care Instructions Following Heart Failure - the most important things to do every day include:   Weigh yourself and review the “Self-Check Plan” sheet every morning.   Call your cardiologist office if you are in the “Pay Attention-Use Caution” (yellow zone) or “Medical Alert-Warning!” (red zone) as outlined in the Self-Check Plan sheet.  Take your medicines as prescribed.  Limit your sodium (salt) intake.  Know when to call your cardiologist, primary doctor, or nurse.  Know when to seek emergency care.      Things for You to Remember:   1. See your doctor or healthcare provider as written on your discharge instructions.  It is important that you attend this appointment to make sure your symptoms are under control.     2. Your recommended sodium intake is 6806-7300 mg daily.    3.  Weigh yourself every day.    4. Some exercise and activity is important to help keep your heart functioning and strong. Unless instructed not to exercise, you may walk at a slow to moderate pace for 10-15 minutes 2-3 days per week to start. Pace your activity to prevent shortness of breath or fatigue. Stop exercising if you develop chest pain, lightheadedness, or significant shortness of breath.       Call Your Cardiologist If:   You gain 2-3 pounds in one day or 5 pounds in one week.  You have more difficulty breathing.  You are getting more tired with normal activity.  You are more short of breath lying down, or awaken at night short of breath.  You have swelling of your feet or legs.  You urinate less often during the day and more often at night.  You have cramps in your legs.  You have blurred vision or see yellowish-green halos around objects of lights.    Go to the  Emergency Room If:   You have pain or tightness in your chest  You are extremely short of breath  You are coughing up pink-frothy mucus  You are traveling and develop symptoms of worsening heart failure      ** Please follow up with your cardiologist or Advanced Practice Provider as written on your discharge instructions. If you are not provided with an appointment, let your nurse know so you can get an appointment**

## 2025-02-18 NOTE — TELEPHONE ENCOUNTER
Looks like she is in the hospital but make sure she understands that Dr. Hawley is gone and she should establish herself with a new nephrologist.

## 2025-02-18 NOTE — PLAN OF CARE
Problem: Patient Centered Care  Goal: Patient preferences are identified and integrated in the patient's plan of care  Description: Interventions:  - What would you like us to know as we care for you? Im from home   - Provide timely, complete, and accurate information to patient/family  - Incorporate patient and family knowledge, values, beliefs, and cultural backgrounds into the planning and delivery of care  - Encourage patient/family to participate in care and decision-making at the level they choose  - Honor patient and family perspectives and choices  Outcome: Progressing     Problem: Diabetes/Glucose Control  Goal: Glucose maintained within prescribed range  Description: INTERVENTIONS:  - Monitor Blood Glucose as ordered  - Assess for signs and symptoms of hyperglycemia and hypoglycemia  - Administer ordered medications to maintain glucose within target range  - Assess barriers to adequate nutritional intake and initiate nutrition consult as needed  - Instruct patient on self management of diabetes  Outcome: Progressing     Problem: Patient/Family Goals  Goal: Patient/Family Long Term Goal  Description: Patient's Long Term Goal: Get stronger     Interventions:  - See additional Care Plan goals for specific interventions  Outcome: Progressing  Goal: Patient/Family Short Term Goal  Description: Patient's Short Term Goal: Go home     Interventions:   - See additional Care Plan goals for specific interventions  Outcome: Progressing     Problem: CARDIOVASCULAR - ADULT  Goal: Maintains optimal cardiac output and hemodynamic stability  Description: INTERVENTIONS:  - Monitor vital signs, rhythm, and trends  - Monitor for bleeding, hypotension and signs of decreased cardiac output  - Evaluate effectiveness of vasoactive medications to optimize hemodynamic stability  - Monitor arterial and/or venous puncture sites for bleeding and/or hematoma  - Assess quality of pulses, skin color and temperature  - Assess for signs  of decreased coronary artery perfusion - ex. Angina  - Evaluate fluid balance, assess for edema, trend weights  Outcome: Progressing  Goal: Absence of cardiac arrhythmias or at baseline  Description: INTERVENTIONS:  - Continuous cardiac monitoring, monitor vital signs, obtain 12 lead EKG if indicated  - Evaluate effectiveness of antiarrhythmic and heart rate control medications as ordered  - Initiate emergency measures for life threatening arrhythmias  - Monitor electrolytes and administer replacement therapy as ordered  Outcome: Progressing

## 2025-02-18 NOTE — PROGRESS NOTES
Progress Note  Zenia David Patient Status:  Inpatient    3/30/1965 MRN [id number]   Location [facility] 3W/SW Attending Tasia Parker MD   Hosp Day # 2 PCP Tiago Amin DO     Subjective:  Her leg swelling is much improved back to baseline. She denies shortness of breath. Denies chest pain. Feeling good today and hopeful to go home.     Objective:  /51 (BP Location: Left arm)   Pulse 74   Temp 98 °F (36.7 °C) (Oral)   Resp 18   Wt 256 lb (116.1 kg)   LMP 2013   SpO2 91%   BMI 42.60 kg/m²     Intake/Output:    Intake/Output Summary (Last 24 hours) at 2025 0941  Last data filed at 2025 0741  Gross per 24 hour   Intake 402 ml   Output 2500 ml   Net -2098 ml       Last 3 Weights   25 0627 256 lb (116.1 kg)   25 1529 260 lb 1.6 oz (118 kg)   24 1059 260 lb (117.9 kg)   24 1133 258 lb (117 kg)       Labs:  Recent Labs   Lab 25  1222 25  0806 25  0647   * 81 246*   BUN 37* 34* 54*   CREATSERUM 1.53* 1.41* 1.66*   EGFRCR 39* 43* 35*   CA 9.1 9.6 9.5    135* 134*   K 4.8 4.6 4.7   CL 99 99 96*   CO2 28.0 26.0 28.0     Recent Labs   Lab 25  1222 25  0806 25  0647   RBC 3.79* 4.07 3.86   HGB 11.1* 12.0 11.5*   HCT 34.9* 38.2 34.9*   MCV 92.1 93.9 90.4   MCH 29.3 29.5 29.8   MCHC 31.8 31.4 33.0   RDW 12.9 12.9 12.8   NEPRELIM 5.19 4.17  --    WBC 9.7 8.5 8.9   .0 210.0 260.0         Recent Labs   Lab 25  1222   TROPHS 7       Diagnostics:   Telemetry: NSR    Review of Systems   Cardiovascular:  Negative for chest pain and leg swelling.   Respiratory:  Negative for shortness of breath.        Physical Exam:  General: Alert and oriented in no apparent distress. obese  HEENT: Pupils equal. Mucous membranes moist.   Neck: No JVD  Cardiac:  Normal S1 S2, Regular. No murmur  Lungs: Clear without wheezes or crackles    Abdomen: Soft, non-tender, ND  Extremities: trace leg swelling  Neurologic: No focal  deficits. Normal affect.  Skin: Warm and dry,    Medications:   carvedilol  25 mg Oral BID with meals    insulin aspart  1-5 Units Subcutaneous TID CC and HS    insulin degludec  40 Units Subcutaneous Nightly    insulin aspart  16 Units Subcutaneous TID CC    furosemide  40 mg Intravenous BID (Diuretic)    aspirin  81 mg Oral Daily    atorvastatin  20 mg Oral Daily    [Held by provider] insulin   Subcutaneous TID AC and HS    heparin  7,500 Units Subcutaneous Q8H PEBBLES         Assessment:    Volume overload, acute HFpEF  Hypervolemic on presentation. Likely HFpEF contributing with proBNP 1322 elevation which is usually falsely low with obesity. TTE with LVEF 55-60%, mild LAE. e/e' 12 but mild MAC present. Normal RV function.  GDMT: Did not tolerate SGLT2 in the past with recurrent genital yeast infections. History of hyperkalemia on ACEi so would be hesitant to start MRA. Consider retrial of low dose ARB/ARNI in outpatient setting while on loop diuretic.  Appears relatively euvolemic after diuresis. Start torsemide 20 mg BID.   Obesity BMI>40   HTN - controlled. Her Coreg dosing was increased this admission for better BP control.  CKD3 - creatinine baseline appears 1.2-1.6. creatinine at upper limit of normal today.   DM - endocrinology following. Statin    Plan:    Diuresed down 2 L overnight with IV lasix. Volume status appears improved. Labs starting to appear slightly dry with rising BUN. Discontinue IV diuresis. She was previously on home PO lasix 40 mg in the morning and 20 mg in the evenings. Over the past week, she was not taking the 40 mg AM dose. Will start torsemide 20 mg daily starting tomorrow for once daily dosing for easier compliance. Recommend BMP in 1 week  Will review case with Dr Munoz, should be stable for discharge this afternoon from cardiac standpoint  Recommend outpatient sleep study. Discussed importance of weight loss and strict BP control.       Plan of care discussed with patient,  LOGAN.    LUAN Ovalles  2/18/2025  9:41 AM    Seen/examined patient independently.  Agree with findings, assessment and plan as outlined above.     Reports feeling at her baseline. No further edema. Denies CP or dyspnea.    In regards to HFpEF, pt appears clinically compensated and euvolemic on exam. Transitioned to PO torsemide 20mg daily, will reassess her volume status and renal function in 1 week.     Rest of plan as above.    Talon Munoz, DO

## 2025-02-19 ENCOUNTER — PATIENT OUTREACH (OUTPATIENT)
Dept: CASE MANAGEMENT | Age: 60
End: 2025-02-19

## 2025-02-19 NOTE — PAYOR COMM NOTE
--------------  DISCHARGE REVIEW    Payor: [facility]  Subscriber #:  [id number]  Authorization Number: [id number]    Admit date: 25  Admit time:   3:22 PM  Discharge Date: 2025  3:03 PM     Admitting Physician: Tasia Parker MD  Attending Physician:  No att. providers found  Primary Care Physician: Tiago Amin DO          Discharge Summary Notes        Discharge Summary signed by Tasia Parker MD at 2025 11:49 AM       Author: Tasia Parker MD Specialty: HOSPITALIST Author Type: Physician    Filed: 2025 11:49 AM Date of Service: 2025 11:46 AM Status: Signed    : Tasia Parker MD (Physician)         [facility]  part of [facility]    Discharge Summary    Zenia E Frankie Patient Status:  Inpatient    3/30/1965 MRN [id number]   Location [facility] 3 Attending Tasia Parker MD   Hosp Day # 2 PCP Tiago Amin DO     Date of Admission: 2025   Date of Discharge: 2025    Hospital Discharge Diagnoses: Acute on chronic Systolic CHF    Lace+ Score: 66  59-90 High Risk  29-58 Medium Risk  0-28   Low Risk.    TCM Follow-Up Recommendation:  LACE > 58: High Risk of readmission after discharge from the hospital.        Admitting Diagnosis: Acute on chronic heart failure, unspecified heart failure type (HCC) [I50.9]    Disposition: Home    Discharge Diagnosis: .Principal Problem:    Acute on chronic heart failure, unspecified heart failure type (HCC)  Active Problems:    CHF (congestive heart failure) (HCC)    Acute congestive heart failure (HCC)      Hospital Course:   Reason for Admission:   Per Dr. Guerra  Zenia E Frankie is a(n) 59 year old female who has a pmh of Diabetes Type 1, CKD Stage 2, HTN, CHF who was admitted for acute shortness of breath and leg swelling. Patient states she woke up from sleep and felt very short of breath.She also says her blood sugars are always all over the place with the  insulin pump. She denies chest pain, fever/chills, LOC, diarrhea and constipation. She will be admitted to the medical floor for further treatment and workup. Cardiology has been notified. Cardiology has been notified. She has been placed on IV lasix. BP currently is 155/73, HR 69 and temp 97.8.     Discharge Physical Exam:   Physical Exam:    General: No acute distress.   Respiratory: Clear to auscultation bilaterally. No wheezes. No rhonchi.  Cardiovascular: S1, S2. Regular rate and rhythm. No murmurs, rubs or gallops.   Abdomen: Soft, nontender, nondistended.  Positive bowel sounds. No rebound or guarding.  Neurologic: No focal neurological deficits.   Musculoskeletal: Moves all extremities.    Hospital Course:      Acute on chronic CHF  - echo pending   - Cards on board  - IV lasix 40 mg BID- home on torsemide 20 BID   - CXR reviewed  -Coreg increased to 25 mg PO BID   - monitor urine output      HTN  CKD stage 1  Dyslipidemia  - resume home meds     DM1  - SSI low dose  - accuchecks AC and HS  - Consulted Endocrinology- they will adjust her pump prior to discharge.      DVT proph- heparin     Full     MDM.high    Complications: none    Consultants         Provider   Role Specialty     Peyton Weiner MD      Consulting Physician ENDOCRINOLOGY     Rufino Wisdom MD      Consulting Physician Cardiovascular Diseases                Discharge Plan:   Discharge Condition: Stable    Current Discharge Medication List        New Orders    Details   torsemide 20 MG Oral Tab Take 1 tablet (20 mg total) by mouth in the morning and 1 tablet (20 mg total) before bedtime.           Home Meds - Modified    Details   !! carvedilol 25 MG Oral Tab Take 1 tablet (25 mg total) by mouth 2 (two) times daily with meals.       !! - Potential duplicate medications found. Please discuss with provider.        Home Meds - Unchanged    Details   aspirin 81 MG Oral Tab EC Take 1 tablet (81 mg total) by mouth daily.      DUPIXENT 300  MG/2ML Subcutaneous Solution Auto-injector INJECT 300 MG UNDER THE SKIN EVERY 14 DAYS      insulin lispro 100 UNIT/ML Injection Solution Inject 150 units daily via insulin pump      atorvastatin 20 MG Oral Tab Take 1 tablet (20 mg total) by mouth nightly.      fluticasone propionate 50 MCG/ACT Nasal Suspension 2 sprays by Nasal route daily.      clobetasol 0.05 % External Ointment Apply 1 Application. topically daily as needed.      acetaminophen 500 MG Oral Tab Take 2 tablets (1,000 mg total) by mouth every 6 (six) hours as needed for Pain.      !! carvedilol 12.5 MG Oral Tab Take 1 tablet (12.5 mg total) by mouth 2 (two) times daily with meals.      Continuous Blood Gluc Sensor (DEXCOM G7 SENSOR) Does not apply Misc 1 each Every 10 days. Use as directed every 10 days       !! - Potential duplicate medications found. Please discuss with provider.              Discharge Diet: ADA diet     Discharge Activity: As tolerated       Discharge Medications        START taking these medications        Instructions Prescription details   carvedilol 12.5 MG Tabs  Commonly known as: Coreg      Take 1 tablet (12.5 mg total) by mouth 2 (two) times daily with meals.   Quantity: 60 tablet  Refills: 1     carvedilol 25 MG Tabs  Commonly known as: Coreg      Take 1 tablet (25 mg total) by mouth 2 (two) times daily with meals.   Quantity: 60 tablet  Refills: 0     torsemide 20 MG Tabs  Commonly known as: Demadex      Take 1 tablet (20 mg total) by mouth in the morning and 1 tablet (20 mg total) before bedtime.   Stop taking on: April 19, 2025  Quantity: 60 tablet  Refills: 1            CHANGE how you take these medications        Instructions Prescription details   atorvastatin 20 MG Tabs  Commonly known as: Lipitor  What changed: when to take this      Take 1 tablet (20 mg total) by mouth nightly.   Quantity: 90 tablet  Refills: 3            CONTINUE taking these medications        Instructions Prescription details   acetaminophen  500 MG Tabs  Commonly known as: Tylenol Extra Strength      Take 2 tablets (1,000 mg total) by mouth every 6 (six) hours as needed for Pain.   Refills: 0     aspirin 81 MG Tbec      Take 1 tablet (81 mg total) by mouth daily.   Refills: 0     clobetasol 0.05 % Oint  Commonly known as: Temovate      Apply 1 Application. topically daily as needed.   Quantity: 60 g  Refills: 2     Dexcom G7 Sensor Misc      1 each Every 10 days. Use as directed every 10 days   Quantity: 3 each  Refills: 4     Dupixent 300 MG/2ML Soaj  Generic drug: Dupilumab      INJECT 300 MG UNDER THE SKIN EVERY 14 DAYS   Quantity: 4 mL  Refills: 3     fluticasone propionate 50 MCG/ACT Susp  Commonly known as: Flonase      2 sprays by Nasal route daily.   Quantity: 3 each  Refills: 3     insulin lispro 100 UNIT/ML Soln  Commonly known as: Humalog      Inject 150 units daily via insulin pump   Quantity: 50 mL  Refills: 3            STOP taking these medications      furosemide 20 MG Tabs  Commonly known as: Lasix        furosemide 40 MG Tabs  Commonly known as: Lasix                  Where to Get Your Medications        These medications were sent to [id number] - [place], [place] - [address] AT [facility], [phone], [phone]  [address] [place] [id number]      Phone: [phone]   carvedilol 12.5 MG Tabs  carvedilol 25 MG Tabs  torsemide 20 MG Tabs         Follow up:      Follow-up Information       Kelsey Salazar APRN. Go on 2/27/2025.    Specialties: Nurse Practitioner, CARDIOLOGY  Why: @300pm  Contact information:  [address]  [address]  [facility] [address]  [phone]               Tiago Amin, DO Follow up in 1 week(s).    Specialty: Family Medicine  Contact information:  [address]  [address]  [facility] [address]  [phone]                             Follow up Labs and imaging:         Time spent:  > 30 minutes    QUIQUE CAZARES MD  2/18/2025      Electronically signed by Elena  MD Tasia on 2/18/2025 11:49 AM         REVIEWER COMMENTS

## 2025-02-19 NOTE — PROGRESS NOTES
TCM Request   Hospital Follow up for PCP  (Discharge2/18 elm)     PCP   Tiago Eloisa  [facility]  [address]  [address]nd [place] [address]  [phone]  Appt made for 2/21@2:15  Confirmed with pt   Closing encounter

## 2025-02-20 ENCOUNTER — OFFICE VISIT (OUTPATIENT)
Dept: DERMATOLOGY CLINIC | Facility: CLINIC | Age: 60
End: 2025-02-20

## 2025-02-20 DIAGNOSIS — L20.84 INTRINSIC ECZEMA: Primary | ICD-10-CM

## 2025-02-20 PROCEDURE — 99213 OFFICE O/P EST LOW 20 MIN: CPT | Performed by: PHYSICIAN ASSISTANT

## 2025-02-20 NOTE — PROGRESS NOTES
HPI:    Patient ID: Zenia David is a 59 year old female.    Patient presents for follow-up on eczema on the vaginal area. Currently taking dupixent. Has no itching. No rashes noted. Eczema on hands and ankles are clear. No draining or tenderness noted. Hx of allergies to medications noted. Rarely used clobetasol ointment if needed. Sleeps better at night.         Review of Systems   Constitutional:  Negative for chills and fever.   Musculoskeletal:  Negative for arthralgias and myalgias.   Skin:  Positive for rash. Negative for color change and wound.            Current Outpatient Medications   Medication Sig Dispense Refill    carvedilol 25 MG Oral Tab Take 1 tablet (25 mg total) by mouth 2 (two) times daily with meals. 60 tablet 0    torsemide 20 MG Oral Tab Take 1 tablet (20 mg total) by mouth daily. 30 tablet 1    aspirin 81 MG Oral Tab EC Take 1 tablet (81 mg total) by mouth daily.      DUPIXENT 300 MG/2ML Subcutaneous Solution Auto-injector INJECT 300 MG UNDER THE SKIN EVERY 14 DAYS 4 mL 3    insulin lispro 100 UNIT/ML Injection Solution Inject 150 units daily via insulin pump 50 mL 3    atorvastatin 20 MG Oral Tab Take 1 tablet (20 mg total) by mouth nightly. (Patient taking differently: Take 1 tablet (20 mg total) by mouth daily.) 90 tablet 3    Continuous Blood Gluc Sensor (DEXCOM G7 SENSOR) Does not apply Misc 1 each Every 10 days. Use as directed every 10 days 3 each 4    fluticasone propionate 50 MCG/ACT Nasal Suspension 2 sprays by Nasal route daily. (Patient taking differently: 2 sprays by Nasal route daily as needed for Allergies.) 3 each 3    clobetasol 0.05 % External Ointment Apply 1 Application. topically daily as needed. 60 g 2    acetaminophen 500 MG Oral Tab Take 2 tablets (1,000 mg total) by mouth every 6 (six) hours as needed for Pain.       Allergies:Allergies[1]   LMP 07/20/2013   There is no height or weight on file to calculate BMI.  PHYSICAL EXAM:   Physical Exam  Constitutional:        General: She is not in acute distress.     Appearance: Normal appearance.   Skin:     General: Skin is warm and dry.      Findings: Rash present.      Comments: Patient declined examination of groin today. Hands are clear and so is ankle. No drainign or tenderness noted.    Neurological:      Mental Status: She is alert and oriented to person, place, and time.                ASSESSMENT/PLAN:   1. Intrinsic eczema  -After discussion with patient, advised the following:  -Continue with topicals as needed.   -Continue with duipxent every other week   -Doing well and stable   -Return in 6 months   -To call or follow-up with worsening symptoms or concerns  -Patient was agreeable to plan and will comply with discussion above.         No orders of the defined types were placed in this encounter.      Meds This Visit:  Requested Prescriptions      No prescriptions requested or ordered in this encounter       Imaging & Referrals:  None         ID#[id number]       [1]   Allergies  Allergen Reactions    Latex HIVES    Norvasc [Amlodipine] SWELLING    Penicillins HIVES

## 2025-02-21 ENCOUNTER — TELEPHONE (OUTPATIENT)
Dept: ENDOCRINOLOGY CLINIC | Facility: CLINIC | Age: 60
End: 2025-02-21

## 2025-02-21 ENCOUNTER — OFFICE VISIT (OUTPATIENT)
Dept: FAMILY MEDICINE CLINIC | Facility: CLINIC | Age: 60
End: 2025-02-21
Payer: MEDICAID

## 2025-02-21 VITALS
SYSTOLIC BLOOD PRESSURE: 112 MMHG | WEIGHT: 260 LBS | HEIGHT: 64 IN | HEART RATE: 75 BPM | DIASTOLIC BLOOD PRESSURE: 70 MMHG | BODY MASS INDEX: 44.39 KG/M2 | TEMPERATURE: 98 F

## 2025-02-21 DIAGNOSIS — E11.29 CONTROLLED TYPE 2 DIABETES MELLITUS WITH MICROALBUMINURIA, WITH LONG-TERM CURRENT USE OF INSULIN (HCC): ICD-10-CM

## 2025-02-21 DIAGNOSIS — I10 ESSENTIAL HYPERTENSION: ICD-10-CM

## 2025-02-21 DIAGNOSIS — R80.9 CONTROLLED TYPE 2 DIABETES MELLITUS WITH MICROALBUMINURIA, WITH LONG-TERM CURRENT USE OF INSULIN (HCC): ICD-10-CM

## 2025-02-21 DIAGNOSIS — I50.9 ACUTE ON CHRONIC HEART FAILURE, UNSPECIFIED HEART FAILURE TYPE (HCC): Primary | ICD-10-CM

## 2025-02-21 DIAGNOSIS — R60.0 BILATERAL LEG EDEMA: ICD-10-CM

## 2025-02-21 DIAGNOSIS — Z79.4 CONTROLLED TYPE 2 DIABETES MELLITUS WITH MICROALBUMINURIA, WITH LONG-TERM CURRENT USE OF INSULIN (HCC): ICD-10-CM

## 2025-02-21 PROCEDURE — 99214 OFFICE O/P EST MOD 30 MIN: CPT | Performed by: FAMILY MEDICINE

## 2025-02-21 NOTE — PROGRESS NOTES
Patient ID: Zenia David is a 59 year old female.         The following individual(s) verbally consented to be recorded using ambient AI listening technology and understand that they can each withdraw their consent to this listening technology at any point by asking the clinician to turn off or pause the recording:    Patient name: Zenia David  Additional names:           HPI  Chief Complaint   Patient presents with    Hospital F/U     Non-TCM       History of Present Illness  Zenia David is a 59 year old female with acute on chronic systolic congestive heart failure who presents for follow-up after recent hospital discharge.    She was hospitalized from February 16 to February 18, 2025, for acute on chronic systolic congestive heart failure, experiencing shortness of breath, bilateral leg swelling, and paroxysmal nocturnal dyspnea. During her hospital stay, she received IV Lasix for fluid overload, and an echocardiogram was performed. Her Coreg dosage was increased to 25 mg twice daily, and she was discharged on torsemide 20 mg twice daily.    Since discharge, she reports significant improvement in her symptoms with no chest pain, fevers, chills, or loss of consciousness. Her leg swelling has reduced to trace pretibial edema. She is focused on weight loss and managing her salt intake to improve her heart condition.    She uses an insulin pump for diabetes management, which she finds beneficial compared to previous insulin injections. Her blood sugars were elevated during her hospital stay due to not receiving insulin as expected. She feels relieved to be back on her insulin pump, which has improved her diabetes control.    She has upcoming appointments with endocrinology on March 26, 2025, nephrology on April 7, 2025, and a follow-up with her primary care provider in April.    Wt Readings from Last 6 Encounters:   02/21/25 260 lb (117.9 kg)   02/18/25 256 lb (116.1 kg)   12/20/24 260 lb (117.9 kg)    11/26/24 258 lb (117 kg)   11/20/24 261 lb (118.4 kg)   10/14/24 250 lb (113.4 kg)       BMI Readings from Last 6 Encounters:   02/21/25 44.63 kg/m²   02/18/25 42.60 kg/m²   12/20/24 43.27 kg/m²   11/26/24 42.93 kg/m²   11/20/24 43.43 kg/m²   10/14/24 41.60 kg/m²       BP Readings from Last 6 Encounters:   02/21/25 (!) 169/72   02/18/25 134/59   12/20/24 128/78   11/26/24 138/76   11/20/24 (!) 163/85   11/16/24 145/73       Results  DIAGNOSTIC  Echocardiogram: Acute and chronic congestive heart failure (02/16/2025)    Review of Systems    As per HPI      Medical History:      Past Medical History:    Acute carpal tunnel syndrome of right wrist    Right endoscopic carpal tunnel release    Carpal tunnel syndrome, left    Chronic back pain    CKD (chronic kidney disease) stage 3, GFR 30-59 ml/min (HCC)    Diabetes (HCC)    Diabetes mellitus (HCC)    dx 1995    Diastolic dysfunction    Grade I    Essential hypertension    dx 1995    High blood pressure    High cholesterol    Hyperlipidemia    Lipoma    surgical excision age 17    Macular degeneration of left eye    Neuropathy    Obesity (BMI 30-39.9)    Osteoarthritis       Past Surgical History:   Procedure Laterality Date    Colonoscopy N/A 6/28/2024    Procedure: COLONOSCOPY;  Surgeon: Neto Simpson MD;  Location: [facility] ENDOSCOPY    Eye surgery      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 30 tablet 1    aspirin 81 MG Oral Tab EC Take 1 tablet (81 mg total) by mouth daily.      DUPIXENT 300 MG/2ML Subcutaneous Solution Auto-injector INJECT 300 MG UNDER THE SKIN EVERY 14 DAYS 4 mL 3    insulin lispro 100  UNIT/ML Injection Solution Inject 150 units daily via insulin pump 50 mL 3    Continuous Blood Gluc Sensor (DEXCOM G7 SENSOR) Does not apply Misc 1 each Every 10 days. Use as directed every 10 days 3 each 4    clobetasol 0.05 % External Ointment Apply 1 Application. topically daily as needed. 60 g 2    acetaminophen 500 MG Oral Tab Take 2 tablets (1,000 mg total) by mouth every 6 (six) hours as needed for Pain.      carvedilol 25 MG Oral Tab Take 1 tablet (25 mg total) by mouth 2 (two) times daily with meals. 60 tablet 0    atorvastatin 20 MG Oral Tab Take 1 tablet (20 mg total) by mouth nightly. (Patient not taking: Reported on 2/21/2025) 90 tablet 3    fluticasone propionate 50 MCG/ACT Nasal Suspension 2 sprays by Nasal route daily. (Patient not taking: Reported on 2/21/2025) 3 each 3     Allergies:Allergies[1]     Physical Exam:       Physical Exam  Blood pressure (!) 169/72, pulse 75, temperature 97.5 °F (36.4 °C), temperature source Tympanic, height 5' 4\" (1.626 m), weight 260 lb (117.9 kg), last menstrual period 07/20/2013, not currently breastfeeding.         Vitals:    02/21/25 1327 02/21/25 1346   BP: (!) 169/72 112/70   Pulse: 75    Temp: 97.5 °F (36.4 °C)    TempSrc: Tympanic    Weight: 260 lb (117.9 kg)    Height: 5' 4\" (1.626 m)      Physical Exam   Constitutional: Patient is oriented to person, place, and time. Patient appears well-developed and well-nourished. No distress.   HENT:   Head: Normocephalic and atraumatic.   Neck: Normal range of motion. Neck supple. No thyromegaly present.   Lymphadenopathy:     Has  no cervical adenopathy.   Cardiovascular: Normal rate, regular rhythm and no murmur heard.  Pulmonary/Chest: Effort normal and breath sounds normal. No respiratory distress.  Speaking clearly.  Not winded when she speaks  Neurological: Patient is alert and oriented to person, place, and time.   Skin: Skin is warm and dry.   Psychiatric: Patient has a normal mood and affect.   Nursing note and  vitals reviewed.    Physical Exam  VITALS: BP- 112/70  EXTREMITIES: Trace pretibial edema on distal legs.      Assessment/Plan:        Diagnoses and all orders for this visit:    Acute on chronic heart failure, unspecified heart failure type ([facility])  Hospital notes reviewed.  She is a very sweet lady.  Compliant with her medications and will ask her endocrinologist about possible weight loss medications.  She is already on the insulin pump which is made a huge difference in control of her diabetes.  Essential hypertension  Beautifully controlled.  CPM  Bilateral leg edema  Very minimal she states its much better than before.  She will continue medications as directed and she already has an appointment with her cardiologist.  Controlled type 2 diabetes mellitus with microalbuminuria, with long-term current use of insulin ([facility])  Reviewed hemoglobin A1c is much better.      Referrals (if applicable)  No orders of the defined types were placed in this encounter.        Follow up if symptoms persist.  Take medicine (if given) as prescribed.  Approach to treatment discussed and patient/family member understands and agrees to plan.     No follow-ups on file.      Assessment & Plan  Acute on Chronic Systolic Congestive Heart Failure  Admitted February 16-18, 2025, for acute on chronic systolic heart failure. Symptoms included dyspnea and bilateral leg edema. Echocardiogram performed. Coreg increased to 25 mg BID. Discharged on torsemide 20 mg BID. Currently reports improvement with minimal pretibial edema and no cellulitis. Blood pressure 112/70 mmHg. Discussed weight loss and sodium intake management. Emphasized medication adherence and follow-up.  - Continue Coreg 25 mg BID  - Continue torsemide 20 mg BID  - Monitor weight and sodium intake  - Follow up with cardiology as scheduled    Diabetes Mellitus, Type 2  On insulin pump, significantly improving glycemic control. Hyperglycemia during hospital stay due to improper  insulin administration. Reports better control with pump and anxiety without it. Discussed benefits of insulin pump over multiple daily injections.  - Continue insulin pump therapy  - Follow up with endocrinology on March 26, 2025    Obesity  Motivated to lose weight. Discussed potential weight loss medications, especially considering diabetes and insulin pump use. Endocrinologist to manage weight loss medications. Emphasized weight loss for overall health and management of heart failure and diabetes.  - Discuss weight loss medications with endocrinologist on March 26, 2025  - Encourage weight loss through diet and exercise    General Health Maintenance  60 years old, actively managing health conditions. Blood pressure well-controlled.  - Continue regular follow-ups with primary care and specialists    Follow-up  - Follow up with endocrinology on March 26, 2025  - Follow up with nephrology on April 7, 2025  - Follow up with primary care in April 2025.    Tiago Amin DO  2/21/2025        [1]   Allergies  Allergen Reactions    Latex HIVES    Norvasc [Amlodipine] SWELLING    Penicillins HIVES

## 2025-02-21 NOTE — TELEPHONE ENCOUNTER
Current Outpatient Medications   Medication Sig Dispense Refill    Continuous Blood Gluc Sensor (DEXCOM G7 SENSOR) Does not apply Misc 1 each Every 10 days. Use as directed every 10 days 3 each 4     Key [id number]

## 2025-02-24 ENCOUNTER — TELEPHONE (OUTPATIENT)
Dept: ENDOCRINOLOGY CLINIC | Facility: CLINIC | Age: 60
End: 2025-02-24

## 2025-02-24 RX ORDER — ACYCLOVIR 400 MG/1
1 TABLET ORAL
Qty: 3 EACH | Refills: 5 | Status: SHIPPED | OUTPATIENT
Start: 2025-02-24

## 2025-02-24 RX ORDER — BLOOD SUGAR DIAGNOSTIC
STRIP MISCELLANEOUS
Qty: 400 STRIP | Refills: 1 | Status: SHIPPED | OUTPATIENT
Start: 2025-02-24

## 2025-02-24 RX ORDER — BLOOD SUGAR DIAGNOSTIC
STRIP MISCELLANEOUS
Refills: 0 | Status: CANCELLED | OUTPATIENT
Start: 2025-02-24

## 2025-02-24 NOTE — TELEPHONE ENCOUNTER
Patient requesting refills for Dexcom G7 sensors and One Touch Ultra Plus Test Strips.  Please call.  Thank you.    Current Outpatient Medications   Medication Sig Dispense Refill    Continuous Blood Gluc Sensor (DEXCOM G7 SENSOR) Does not apply Misc 1 each Every 10 days. Use as directed every 10 days 3 each 4

## 2025-02-24 NOTE — TELEPHONE ENCOUNTER
Pt's test strips and sensors both require PA, sent to clinical staff for assistance. Pt notified.

## 2025-02-24 NOTE — TELEPHONE ENCOUNTER
Spoke with pt and informed her, that we will notify her, when a decision has been reached regarding PA for Dexcom G7. Pt is also requesting a refill on her test strips Onetouch flex test strips, to test 4 to 5 times a day until her sensors are approved by insurance.

## 2025-02-24 NOTE — TELEPHONE ENCOUNTER
Medication PA Requested:  Onetouch Verio test strips                                                       CoverMyMeds Used:  Key:  Quantity: 400  Day Supply: 90   Sig: pt checks blood sugar 4 times a day   DX Code:  E11.65                                CPT code (if applicable):   Case Number/Pending Ref#:

## 2025-02-24 NOTE — TELEPHONE ENCOUNTER
Endocrine Refill protocol for CGM supplies     Protocol Criteria:  PASSED Reason: N/A    If below requirement is met, send a 90-day supply with 1 refill per provider protocol.     Verify appointment with Endocrinology completed in the last 12 months or scheduled in the next 6 months     Last completed office visit:11/20/2024 Mary Schmitz MD   Next scheduled Follow up:   Future Appointments   Date Time Provider Department Center   3/26/2025 11:45 AM Mary Schmitz MD [facility]   4/7/2025  4:20 PM Thad Pickens MD [id number]   4/15/2025  1:00 PM Tiago Amin DO [facility]        90 day + 1 refill authorized per protocol.

## 2025-02-26 VITALS
BODY MASS INDEX: 43 KG/M2 | SYSTOLIC BLOOD PRESSURE: 134 MMHG | OXYGEN SATURATION: 93 % | RESPIRATION RATE: 18 BRPM | HEART RATE: 67 BPM | DIASTOLIC BLOOD PRESSURE: 59 MMHG | TEMPERATURE: 98 F | WEIGHT: 256 LBS

## 2025-02-26 NOTE — TELEPHONE ENCOUNTER
Expedited PA for G7 completed via [employer]:  Case Id  [id number]  Reference Id  [id number]  Priority  Priority: Expedited    Spoke to pharmacy tech at [facility]:  patient picked up onetouch test strips with $0 co-pay - No PA needed

## 2025-02-26 NOTE — TELEPHONE ENCOUNTER
Received a fax from [facility] in regards to the prior authorization that was submitted for the Dexcom G7 Sensors. It has been approved effective 01/01/2025 and ends 02/26/2026. [employer] and [employer] message sent to patient.

## 2025-02-27 NOTE — TELEPHONE ENCOUNTER
Patient called to speak with a nurse in regards to the Dexcom sensors. The patient states that her pharmacy states they have not received the prior authorization. Please call.

## 2025-02-28 NOTE — TELEPHONE ENCOUNTER
Left voice message for pt to call back. Sent [facility] notifying pt that PA for dexcom sensors were approved and RX was already sent to pharmacy.

## 2025-02-28 NOTE — TELEPHONE ENCOUNTER
Patient returned call. Verified name and . Advised dexcom sensors have been approved. She picked them up yesterday. No further questions or concerns.

## 2025-03-21 DIAGNOSIS — L30.9 DERMATITIS: ICD-10-CM

## 2025-03-21 RX ORDER — DUPILUMAB 300 MG/2ML
300 INJECTION, SOLUTION SUBCUTANEOUS
Qty: 4 ML | Refills: 3 | Status: CANCELLED | OUTPATIENT
Start: 2025-03-21

## 2025-03-25 RX ORDER — CARVEDILOL 25 MG/1
25 TABLET ORAL 2 TIMES DAILY WITH MEALS
Qty: 180 TABLET | Refills: 3 | Status: SHIPPED | OUTPATIENT
Start: 2025-03-25

## 2025-03-25 RX ORDER — TORSEMIDE 20 MG/1
20 TABLET ORAL DAILY
Qty: 90 TABLET | Refills: 3 | Status: SHIPPED | OUTPATIENT
Start: 2025-03-25 | End: 2026-03-20

## 2025-03-25 NOTE — TELEPHONE ENCOUNTER
Patient is requesting a refill on her carvedilol and  torsemide medications. Per the patient she is out of medication. Pharmacy: [facility]     Current Outpatient Medications   Medication Sig Dispense Refill    carvedilol 25 MG Oral Tab Take 1 tablet (25 mg total) by mouth 2 (two) times daily with meals. 60 tablet 0    torsemide 20 MG Oral Tab Take 1 tablet (20 mg total) by mouth daily. 30 tablet 1

## 2025-03-26 ENCOUNTER — OFFICE VISIT (OUTPATIENT)
Dept: ENDOCRINOLOGY CLINIC | Facility: CLINIC | Age: 60
End: 2025-03-26

## 2025-03-26 ENCOUNTER — MED REC SCAN ONLY (OUTPATIENT)
Dept: DERMATOLOGY CLINIC | Facility: CLINIC | Age: 60
End: 2025-03-26

## 2025-03-26 VITALS
DIASTOLIC BLOOD PRESSURE: 85 MMHG | HEART RATE: 71 BPM | BODY MASS INDEX: 45 KG/M2 | WEIGHT: 265 LBS | SYSTOLIC BLOOD PRESSURE: 155 MMHG

## 2025-03-26 DIAGNOSIS — E11.65 UNCONTROLLED TYPE 2 DIABETES MELLITUS WITH HYPERGLYCEMIA (HCC): Primary | ICD-10-CM

## 2025-03-26 LAB
GLUCOSE BLOOD: 120
HEMOGLOBIN A1C: 6.9 % (ref 4.3–5.6)
TEST STRIP LOT #: NORMAL NUMERIC

## 2025-03-26 PROCEDURE — 82947 ASSAY GLUCOSE BLOOD QUANT: CPT | Performed by: INTERNAL MEDICINE

## 2025-03-26 PROCEDURE — 83036 HEMOGLOBIN GLYCOSYLATED A1C: CPT | Performed by: INTERNAL MEDICINE

## 2025-03-26 PROCEDURE — 99214 OFFICE O/P EST MOD 30 MIN: CPT | Performed by: INTERNAL MEDICINE

## 2025-03-26 NOTE — PROGRESS NOTES
Name: Zenia David  Date: 3/26/2025      HISTORY OF PRESENT ILLNESS   Zenia David is a 59 year old female who presents for hospital follow up for diabetes mellitus diagnosed 23 years ago, transitioned to insulin therapy 15 years ago.      Prior HbA, C or glycohemoglobin were 9.9% 5/2017; 10.3% 8/2017; 9.5% 2/2018; 12.7% 1/2019; 8.9% 3/2019; 7.1% 7/2019; 8.1% 10/2019; 7.0% 1/2020; 7.8% 5/2020; 10.3% 1/2022; 8.9% 4/2022; 11.7% 10/2022; 11.9% 2/2023; 9.3% 6/2023; 10.3% 2/2024; 7.2% 6/2024; 6.3% 11/2024; 6.9% POC Today     Dietary compliance: Poor; significant snacking at night and cheating on pasta/rice, chips, no soda     Exercise: No -->is walking with cane but limited with hip pain and neuropathy    Polyuria/polydipsia: No  Blurred vision: No    Episodes of hypoglycemia: No   Blood Glucose:  Checking 4 times per day  Reviewed Dexcom CGM - time in range 60%     Current Meds:   iLet pump - bionic pancreas     Metformin d/c'd due to CKD  Farxiga d/c'd due to recurrent yeast infections     REVIEW OF SYSTEMS  Eyes: Diabetic retinopathy present: Yes, diabetic cataract            Most recent visit to eye doctor in last 12 months: Yes    CV: Cardiovascular disease present: No         Hypertension present: Yes         Hyperlipidemia present: Yes         Peripheral Vascular Disease present: No    : Nephropathy present: No    Neuro: Neuropathy present: Yes    Skin: Infection or ulceration: No    Osteoporosis: No    Thyroid disease: No      Medications:     Current Outpatient Medications:     carvedilol 25 MG Oral Tab, Take 1 tablet (25 mg total) by mouth 2 (two) times daily with meals., Disp: 180 tablet, Rfl: 3    torsemide 20 MG Oral Tab, Take 1 tablet (20 mg total) by mouth daily., Disp: 90 tablet, Rfl: 3    Continuous Glucose Sensor (DEXCOM G7 SENSOR) Does not apply Misc, 1 each Every 10 days. Use as directed every 10 days, Disp: 3 each, Rfl: 5    Glucose Blood (ONETOUCH VERIO) In Vitro Strip, Pt check blood sugar  4 times a day. E11.65, Disp: 400 strip, Rfl: 1    aspirin 81 MG Oral Tab EC, Take 1 tablet (81 mg total) by mouth daily., Disp: , Rfl:     DUPIXENT 300 MG/2ML Subcutaneous Solution Auto-injector, INJECT 300 MG UNDER THE SKIN EVERY 14 DAYS, Disp: 4 mL, Rfl: 3    insulin lispro 100 UNIT/ML Injection Solution, Inject 150 units daily via insulin pump, Disp: 50 mL, Rfl: 3    atorvastatin 20 MG Oral Tab, Take 1 tablet (20 mg total) by mouth nightly., Disp: 90 tablet, Rfl: 3    clobetasol 0.05 % External Ointment, Apply 1 Application. topically daily as needed., Disp: 60 g, Rfl: 2    acetaminophen 500 MG Oral Tab, Take 2 tablets (1,000 mg total) by mouth every 6 (six) hours as needed for Pain., Disp: , Rfl:     fluticasone propionate 50 MCG/ACT Nasal Suspension, 2 sprays by Nasal route daily. (Patient not taking: Reported on 3/26/2025), Disp: 3 each, Rfl: 3     Allergies:   Allergies   Allergen Reactions    Latex HIVES    Norvasc [Amlodipine] SWELLING    Penicillins HIVES       Social History:   Social History     Socioeconomic History    Marital status:    Tobacco Use    Smoking status: Former     Current packs/day: 0.00     Average packs/day: 0.5 packs/day for 15.0 years (7.5 ttl pk-yrs)     Types: Cigarettes     Start date: 2001     Quit date: 2016     Years since quittin.1     Passive exposure: Past    Smokeless tobacco: Never   Vaping Use    Vaping status: Never Used   Substance and Sexual Activity    Alcohol use: No     Alcohol/week: 0.0 standard drinks of alcohol    Drug use: No   Other Topics Concern    Breast feeding No    Reaction to local anesthetic No    Pt has a pacemaker No    Pt has a defibrillator No       Medical History:   Past Medical History:    Acute carpal tunnel syndrome of right wrist    Right endoscopic carpal tunnel release    Carpal tunnel syndrome, left    Chronic back pain    CKD (chronic kidney disease) stage 3, GFR 30-59 ml/min ([facility])    Diabetes ([facility])    Diabetes  mellitus (HCC)    dx 1995    Diastolic dysfunction    Grade I    Essential hypertension    dx 1995    High blood pressure    High cholesterol    Hyperlipidemia    Lipoma    surgical excision age 17    Macular degeneration of left eye    Neuropathy    Obesity (BMI 30-39.9)    Osteoarthritis       Surgical history:   Past Surgical History:   Procedure Laterality Date    Colonoscopy N/A 6/28/2024    Procedure: COLONOSCOPY;  Surgeon: Neto Simpson MD;  Location: [facility] ENDOSCOPY    Eye surgery      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   Back: no kyphosis  Respiratory:  non-labored. no increased work of breathing.    Lymph Nodes:  No abnormal nodes noted  Skin:  normal moisture and skin texture  Hematologic:  no excessive bruising  Psychiatric:  oriented to time, self, and place  Bilateral barefoot skin diabetic exam is abnormal with diabetic monofilament/sensation testing abnormal and dystrophic nails and/or dry skin          ASSESSMENT/PLAN:      1. Diabetes Mellitus Type 2, controlled  -controlled, HgA1c 6.9% -->at goal   -Discussed importance of glycemic control to prevent complications of diabetes  -Discussed complications of diabetes include retinopathy, neuropathy, nephropathy and cardiovascular disease  -Discussed importance of SBGM  -Discussed importance of low CHO diet, recommend 45gm per meal or 135gm per day  -Congratulated patient on improved BG levels with iLet pump   -Continue current pump settings    -Change back  to U100 Humalog insulin -->she was having some hypoglycemia with U200   -Continue Dexcom CGM   -LDL improved   -Abnormal foot exam performed today   -elevated urine microalbumin 5/2024   -UTD with optho   -BP elevated, stable on repeat -->however she had been off meds for a few days, just received refill     A total of 30 minutes was spent on obtaining history, reviewing pertinent labs, evaluating patient, providing multiple treatment options, reinforcing diet/exercise and compliance, and completing documentation.     RTC 4 months    Mary Schmitz MD   3/26/2025

## 2025-03-26 NOTE — TELEPHONE ENCOUNTER
Received PA approval from [facility] approved from 01/01/25-03/26/26.   Approval letter sent to [facility] Rec Scan

## 2025-03-26 NOTE — TELEPHONE ENCOUNTER
Received PA Approval. Sent [employer] msg to pt to reach out to [facility] since they have refills.

## 2025-04-04 RX ORDER — INSULIN LISPRO 100 [IU]/ML
INJECTION, SOLUTION INTRAVENOUS; SUBCUTANEOUS
Qty: 50 ML | Refills: 3 | Status: SHIPPED | OUTPATIENT
Start: 2025-04-04

## 2025-04-04 NOTE — TELEPHONE ENCOUNTER
Endocrine refill protocol for rapid acting, regular, intermediate, and mixed insulin:    Protocol Criteria:  PASSED Reason: N/A    If all below requirements are met, send a 90-day supply with 1 refill per provider protocol.    Verify appointment with Endocrinology completed in the last 6 months or scheduled in the next 3 months.  Verify A1C has been completed within the last 6 months and is below 8.5%    -May substitute prescriptions for Novolog and Humalog unless documented allergy (pens and vials) at the same dose and concentration per insurance preference and provider protocol.   -May substitute prescriptions for Novolin R and Humulin R unless documented allergy (pens and vials) at the same dose and concentration per insurance preference and provider protocol.   -May substitute prescriptions for Novolin N and Humulin N unless documented allergy (pens and vials) at the same dose and concentration per insurance preference and provider protocol.   -May substitute prescriptions for Humulin and Novolin 70/30 insulin unless documented allergy at the same dose and concentration per insurance preference and provider protocol.    Last completed office visit: 3/26/2025 Mary Schmitz MD   Next scheduled Follow up:   Future Appointments   Date Time Provider Department Center   4/7/2025  4:20 PM Thad Pickens MD [id number]   4/15/2025  1:00 PM Tiago Amin DO [email or web address]   7/16/2025  2:00 PM Mary Schmitz MD [facility]      Last A1C result: 6.9% done 3/26/2025.

## 2025-04-07 ENCOUNTER — OFFICE VISIT (OUTPATIENT)
Dept: NEPHROLOGY | Facility: CLINIC | Age: 60
End: 2025-04-07

## 2025-04-07 VITALS
DIASTOLIC BLOOD PRESSURE: 67 MMHG | BODY MASS INDEX: 46 KG/M2 | SYSTOLIC BLOOD PRESSURE: 155 MMHG | WEIGHT: 266 LBS | HEART RATE: 75 BPM

## 2025-04-07 DIAGNOSIS — N18.32 STAGE 3B CHRONIC KIDNEY DISEASE (HCC): Primary | ICD-10-CM

## 2025-04-07 PROCEDURE — 99215 OFFICE O/P EST HI 40 MIN: CPT | Performed by: INTERNAL MEDICINE

## 2025-04-07 NOTE — PATIENT INSTRUCTIONS
Check your blood pressures daily and call me in 1 week.  Please do follow-up labs as ordered now.  Orders are in the computer.  Talk to Dr. Schmitz about starting Ozempic.

## 2025-04-07 NOTE — PROGRESS NOTES
04/07/25        Patient: Zenia David   YOB: 1965   Date of Visit: 4/7/2025       Dear  Dr. Amin, DO,      Thank you for referring Zenia David to my practice.  Please find my assessment and plan below.      As you know she is a 60-year-old female with a history of adult onset diabetes mellitus, hypertension and congestive heart failure who I now had the pleasure of seeing for chronic kidney disease stage III.  She is a former patient of Dr. Hawley.  The patient was hospitalized from February 16 through February 18, 2025 for shortness of breath.  Was diagnosed with congestive heart failure.  Echocardiogram during that hospitalization showed a left ventricular ejection fraction of 55 to 60%.  Diastolic dysfunction was indeterminate.  Was sent home on torsemide 20 mg daily.  2 days ago she increased it to twice daily because of increasing lower extremity edema.  No shortness of breath.    On physical exam her blood pressure 155/67 with a pulse of 75 and she weighed 266 pounds.  Neck was supple without JVD.  Lungs were clear.  Heart revealed a regular rate and rhythm and S4 but no gallops, murmurs or rubs.  Abdomen was soft, flat, nontender without organomegaly, masses or bruits.  Extremities reveal 1-2+ lower extremity edema bilaterally.    She had a CT scan of the abdomen done on November 16, 2024.  Kidneys appeared to be essentially normal.  Has chronic constipation.  Reviewed recent laboratory studies.  Of note is that her creatinine is 1.27 on November 16, 2024 with an estimated GFR of 48 cc/min.  During this recent hospitalization on February 16, 2025 her creatinine is 1.53 but increased to 1.66 at time of discharge in February 18, 2025 now with an estimated GFR of 35 cc/min.  Last A1c was 6.9 on March 26.    I therefore informed the patient that there has been some deterioration in her renal function.  In part some of this may be secondary to diuretics.  Blood pressures were also  borderline elevated.  She was advised to check her blood pressures daily and call me in a week.  Follow-up laboratory studies have been ordered she is due now.  Try to minimize dose of diuretics as best possible.  Low-salt diet.  Otherwise maintain adequate duration.  Avoid nonsteroidals.  Will call with results and determine follow-up.  She potentially would be a good candidate for Ozempic.  He has not really been able to lose weight.  She will discuss this with Dr. Schmitz.    Thank you very much for allowing me to participate in the care of your patient.  If you have any questions please were free to call.           Sincerely,   Thad Pickens MD   [facility], [facility], [place]  [address]  [facility] [place] [facility] [address]    Document electronically generated by:  Thad Pickens MD

## 2025-04-24 ENCOUNTER — LAB ENCOUNTER (OUTPATIENT)
Dept: LAB | Age: 60
End: 2025-04-24
Attending: INTERNAL MEDICINE
Payer: MEDICAID

## 2025-04-24 DIAGNOSIS — N18.32 STAGE 3B CHRONIC KIDNEY DISEASE (HCC): ICD-10-CM

## 2025-04-24 LAB
ALBUMIN SERPL-MCNC: 4.3 G/DL (ref 3.2–4.8)
ANION GAP SERPL CALC-SCNC: 7 MMOL/L (ref 0–18)
BASOPHILS # BLD AUTO: 0.12 X10(3) UL (ref 0–0.2)
BASOPHILS NFR BLD AUTO: 1.2 %
BILIRUB UR QL: NEGATIVE
BUN BLD-MCNC: 40 MG/DL (ref 9–23)
BUN/CREAT SERPL: 28.4 (ref 10–20)
CALCIUM BLD-MCNC: 9.9 MG/DL (ref 8.7–10.4)
CHLORIDE SERPL-SCNC: 101 MMOL/L (ref 98–112)
CLARITY UR: CLEAR
CO2 SERPL-SCNC: 30 MMOL/L (ref 21–32)
COLOR UR: COLORLESS
CREAT BLD-MCNC: 1.41 MG/DL (ref 0.55–1.02)
CREAT UR-SCNC: 39.1 MG/DL
DEPRECATED RDW RBC AUTO: 44 FL (ref 35.1–46.3)
EGFRCR SERPLBLD CKD-EPI 2021: 42 ML/MIN/1.73M2 (ref 60–?)
EOSINOPHIL # BLD AUTO: 0.63 X10(3) UL (ref 0–0.7)
EOSINOPHIL NFR BLD AUTO: 6.3 %
ERYTHROCYTE [DISTWIDTH] IN BLOOD BY AUTOMATED COUNT: 12.8 % (ref 11–15)
GLUCOSE BLD-MCNC: 104 MG/DL (ref 70–99)
GLUCOSE UR-MCNC: NORMAL MG/DL
HCT VFR BLD AUTO: 34.1 % (ref 35–48)
HGB BLD-MCNC: 11 G/DL (ref 12–16)
HGB UR QL STRIP.AUTO: NEGATIVE
HYALINE CASTS #/AREA URNS AUTO: PRESENT /LPF
IMM GRANULOCYTES # BLD AUTO: 0.07 X10(3) UL (ref 0–1)
IMM GRANULOCYTES NFR BLD: 0.7 %
KETONES UR-MCNC: NEGATIVE MG/DL
LEUKOCYTE ESTERASE UR QL STRIP.AUTO: NEGATIVE
LYMPHOCYTES # BLD AUTO: 3.28 X10(3) UL (ref 1–4)
LYMPHOCYTES NFR BLD AUTO: 33 %
MCH RBC QN AUTO: 30.3 PG (ref 26–34)
MCHC RBC AUTO-ENTMCNC: 32.3 G/DL (ref 31–37)
MCV RBC AUTO: 93.9 FL (ref 80–100)
MICROALBUMIN UR-MCNC: 29 MG/DL
MICROALBUMIN/CREAT 24H UR-RTO: 741.7 UG/MG (ref ?–30)
MONOCYTES # BLD AUTO: 0.87 X10(3) UL (ref 0.1–1)
MONOCYTES NFR BLD AUTO: 8.8 %
NEUTROPHILS # BLD AUTO: 4.96 X10 (3) UL (ref 1.5–7.7)
NEUTROPHILS # BLD AUTO: 4.96 X10(3) UL (ref 1.5–7.7)
NEUTROPHILS NFR BLD AUTO: 50 %
NITRITE UR QL STRIP.AUTO: NEGATIVE
OSMOLALITY SERPL CALC.SUM OF ELEC: 296 MOSM/KG (ref 275–295)
PH UR: 6.5 [PH] (ref 5–8)
PHOSPHATE SERPL-MCNC: 3.4 MG/DL (ref 2.4–5.1)
PLATELET # BLD AUTO: 243 10(3)UL (ref 150–450)
POTASSIUM SERPL-SCNC: 5.1 MMOL/L (ref 3.5–5.1)
PROT UR-MCNC: 30 MG/DL
RBC # BLD AUTO: 3.63 X10(6)UL (ref 3.8–5.3)
SODIUM SERPL-SCNC: 138 MMOL/L (ref 136–145)
SP GR UR STRIP: 1.01 (ref 1–1.03)
UROBILINOGEN UR STRIP-ACNC: NORMAL
WBC # BLD AUTO: 9.9 X10(3) UL (ref 4–11)

## 2025-04-24 PROCEDURE — 85025 COMPLETE CBC W/AUTO DIFF WBC: CPT

## 2025-04-24 PROCEDURE — 82570 ASSAY OF URINE CREATININE: CPT

## 2025-04-24 PROCEDURE — 81001 URINALYSIS AUTO W/SCOPE: CPT

## 2025-04-24 PROCEDURE — 36415 COLL VENOUS BLD VENIPUNCTURE: CPT

## 2025-04-24 PROCEDURE — 80069 RENAL FUNCTION PANEL: CPT

## 2025-04-24 PROCEDURE — 82043 UR ALBUMIN QUANTITATIVE: CPT

## 2025-04-25 NOTE — TELEPHONE ENCOUNTER
I called the patient to follow up.     She is having several issues. She ran out of pump supplies 5 days ago. She was on an ilet pump with U200 insulin. With supplies from [facility]. She has been having issues having enough supplies to last the entire month and some of the supplies don't work, so she uses extra then runs out. She is also out of her dexcom sensors and has not been able to refill from [facility]. Also has not been able to refill her U-200 insulin from [facility]. She was changing her infusion set every 3 days but not lasting that long. Sometimes the infusion set comes out so she had to change early. Order is for every 2 days.     She has been checking sugar via fingerstick (confirmed has supplies) and sugars have been elevated up to 400 or sometimes \"hi\". Currently sugar reading 237. She has been injection U 100 lispro insulin with syringes she purchased. About 40 units per meal. However sugars quickly go back up. Denies symptoms of DKA. No abdominal pain or vomiting. No nausea. No rapid HR or confusion.     Currently at home she has U 100 lispro vials and 5 basaglar pens. She has U 100 syringes (I sent for more as well) and pen needles. She has been checking sugar via fingerstick and has supplies. Could not give me sugars from the past few days but over all have been above 300 sometimes as high as 400 or \"hi\".     I was unable to pull up her ilet data. I called the patient on a conference call with Claudine the ilet rep and she was able to  the patient through finding the following data:    Total daily basal: 54.6 units  Total breakfast: 8.1 units  Total lunch: 17.2 u  Total dinner: 17 units    This is with U 200, so if patient is transitioning back to U100, per claudine she would need to double the dose.     I called Dr. Weiner. She gave the following orders:  Basaglar 65 units BID  U100 lispro    - Breakfast 16 units + CF    - lunch 35 units + CF    - dinner 35 units + CF      CF = 1:  Refer to 04-25-25 message.   25 >150    I called the patient and provided her these orders for MDI. Advised to go to ER with any symptoms of DKA as discussed. Call office with any sugars less than 70 or persistently >350.     IDALIA Schmitz.     Anibal RNs:  Call [facility] on Monday to check on supplies. She is ordered to change infusion site every 2 days. Are they providing enough for this or do we need to PA? Call Claudine (ilet rep) and see if she can reach out to the patient to trouble shoot why she is losing her site so often. Call [facility] to see why she can refill her dexcom sensors or U200 insulin.

## 2025-05-06 ENCOUNTER — MED REC SCAN ONLY (OUTPATIENT)
Dept: FAMILY MEDICINE CLINIC | Facility: CLINIC | Age: 60
End: 2025-05-06

## 2025-05-15 ENCOUNTER — OFFICE VISIT (OUTPATIENT)
Dept: FAMILY MEDICINE CLINIC | Facility: CLINIC | Age: 60
End: 2025-05-15

## 2025-05-15 VITALS
BODY MASS INDEX: 46.06 KG/M2 | HEIGHT: 64 IN | DIASTOLIC BLOOD PRESSURE: 70 MMHG | TEMPERATURE: 98 F | HEART RATE: 64 BPM | WEIGHT: 269.81 LBS | SYSTOLIC BLOOD PRESSURE: 112 MMHG

## 2025-05-15 DIAGNOSIS — R80.9 CONTROLLED TYPE 2 DIABETES MELLITUS WITH MICROALBUMINURIA, WITH LONG-TERM CURRENT USE OF INSULIN (HCC): ICD-10-CM

## 2025-05-15 DIAGNOSIS — M25.561 CHRONIC PAIN OF BOTH KNEES: ICD-10-CM

## 2025-05-15 DIAGNOSIS — M25.562 CHRONIC PAIN OF BOTH KNEES: ICD-10-CM

## 2025-05-15 DIAGNOSIS — Z79.4 CONTROLLED TYPE 2 DIABETES MELLITUS WITH MICROALBUMINURIA, WITH LONG-TERM CURRENT USE OF INSULIN (HCC): ICD-10-CM

## 2025-05-15 DIAGNOSIS — R60.0 BILATERAL LEG EDEMA: Primary | ICD-10-CM

## 2025-05-15 DIAGNOSIS — M17.0 PRIMARY OSTEOARTHRITIS OF BOTH KNEES: ICD-10-CM

## 2025-05-15 DIAGNOSIS — M65.339 TRIGGER MIDDLE FINGER, UNSPECIFIED LATERALITY: ICD-10-CM

## 2025-05-15 DIAGNOSIS — G89.29 CHRONIC PAIN OF BOTH KNEES: ICD-10-CM

## 2025-05-15 DIAGNOSIS — E11.29 CONTROLLED TYPE 2 DIABETES MELLITUS WITH MICROALBUMINURIA, WITH LONG-TERM CURRENT USE OF INSULIN (HCC): ICD-10-CM

## 2025-05-15 DIAGNOSIS — N28.9 RENAL INSUFFICIENCY: ICD-10-CM

## 2025-05-15 DIAGNOSIS — E78.2 MIXED HYPERLIPIDEMIA: ICD-10-CM

## 2025-05-15 DIAGNOSIS — I10 ESSENTIAL HYPERTENSION: ICD-10-CM

## 2025-05-15 PROCEDURE — 99215 OFFICE O/P EST HI 40 MIN: CPT | Performed by: FAMILY MEDICINE

## 2025-05-15 RX ORDER — FUROSEMIDE 20 MG/1
20 TABLET ORAL 2 TIMES DAILY
Qty: 60 TABLET | Refills: 2 | Status: SHIPPED | OUTPATIENT
Start: 2025-05-15

## 2025-05-15 RX ORDER — ATORVASTATIN CALCIUM 20 MG/1
20 TABLET, FILM COATED ORAL NIGHTLY
Qty: 90 TABLET | Refills: 3 | Status: SHIPPED | OUTPATIENT
Start: 2025-05-15

## 2025-05-15 NOTE — PROGRESS NOTES
Patient ID: Zenia David is a 60 year old female.         The following individual(s) verbally consented to be recorded using ambient AI listening technology and understand that they can each withdraw their consent to this listening technology at any point by asking the clinician to turn off or pause the recording:    Patient name: Zenia David  Additional names:           HPI  Chief Complaint   Patient presents with    Pain     Patient states she has pain on legs and knees and a lot of feet swelling       History of Present Illness  Zenia David is a 60 year old female with moderate arthritis who presents with leg and knee pain and swelling.    She experiences chronic pain in her legs and knees, with significant swelling in her feet. The swelling, previously more severe, has decreased somewhat. The pain is described as stinging in her toes and general pain in her knees and legs, with the left knee being the most affected. This pain disrupts her sleep and limits her mobility, making ambulation difficult.    Both knees are affected, with the left knee being more problematic. She previously consulted an orthopedic specialist regarding a possible knee replacement but was dissatisfied with the care received. An x-ray was performed in September 2024, and a chest x-ray was performed in February 2025.    She has been experiencing swelling in her legs and feet. She stopped taking torsemide, a diuretic, due to concerns about kidney health and its ineffectiveness in reducing the swelling. She is currently on carvedilol 25 mg twice a day for heart and blood pressure management.    She reports symptoms consistent with trigger finger in the middle fingers of both hands, which have been locking and causing pain for some time. She previously underwent carpal tunnel surgery approximately ten years ago.    She has a history of cataract removal in both eyes, which has significantly improved her vision. She is also managing  diabetes with insulin, which she reports has brought her condition under control.    No ulcerations or cellulitis in her feet.    Wt Readings from Last 6 Encounters:   05/15/25 269 lb 12.8 oz (122.4 kg)   04/07/25 266 lb (120.7 kg)   03/26/25 265 lb (120.2 kg)   02/21/25 260 lb (117.9 kg)   12/20/24 260 lb (117.9 kg)   11/26/24 258 lb (117 kg)       BMI Readings from Last 6 Encounters:   05/15/25 46.31 kg/m²   04/07/25 45.66 kg/m²   03/26/25 45.49 kg/m²   02/21/25 44.63 kg/m²   02/18/25 42.60 kg/m²   12/20/24 43.27 kg/m²       BP Readings from Last 6 Encounters:   04/07/25 155/67   03/26/25 155/85   02/21/25 112/70   02/18/25 134/59   12/20/24 128/78   11/26/24 138/76       Results  LABS  Creatinine: 1.41 mg/dL (04/24/2025)    RADIOLOGY  Knee X-ray: Moderate arthritis of both knees (09/2024)  Chest X-ray: No congestive heart failure (02/16/2025)    Review of Systems  No exertional cardiac chest pain or shortness of breath unless stated in HPI which would take precedence.  See HPI for further review of systems.        Medical History:      Past Medical History:    Acute carpal tunnel syndrome of right wrist    Right endoscopic carpal tunnel release    Carpal tunnel syndrome, left    Chronic back pain    CKD (chronic kidney disease) stage 3, GFR 30-59 ml/min (HCC)    Diabetes (HCC)    Diabetes mellitus (HCC)    dx 1995    Diastolic dysfunction    Grade I    Essential hypertension    dx 1995    High blood pressure    High cholesterol    Hyperlipidemia    Lipoma    surgical excision age 17    Macular degeneration of left eye    Neuropathy    Obesity (BMI 30-39.9)    Osteoarthritis       Past Surgical History:   Procedure Laterality Date    Colonoscopy N/A 6/28/2024    Procedure: COLONOSCOPY;  Surgeon: Neto Simpson MD;  Location: [facility] ENDOSCOPY    Eye surgery      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 180 tablet 3    torsemide 20 MG Oral Tab Take 1 tablet (20 mg total) by mouth daily. (Patient taking differently: Take 1 tablet (20 mg total) by mouth 2 (two) times daily. Patient take 25mg bid) 90 tablet 3    Continuous Glucose Sensor (DEXCOM G7 SENSOR) Does not apply Misc 1 each Every 10 days. Use as directed every 10 days 3 each 5    Glucose Blood (ONETOUCH VERIO) In Vitro Strip Pt check blood sugar 4 times a day. E11.65 400 strip 1    aspirin 81 MG Oral Tab EC Take 1 tablet (81 mg total) by mouth daily.      DUPIXENT 300 MG/2ML Subcutaneous Solution Auto-injector INJECT 300 MG UNDER THE SKIN EVERY 14 DAYS 4 mL 3    atorvastatin 20 MG Oral Tab Take 1 tablet (20 mg total) by mouth nightly. 90 tablet 3    fluticasone propionate 50 MCG/ACT Nasal Suspension 2 sprays by Nasal route daily. 3 each 3    clobetasol 0.05 % External Ointment Apply 1 Application. topically daily as needed. 60 g 2    acetaminophen 500 MG Oral Tab Take 2 tablets (1,000 mg total) by mouth every 6 (six) hours as needed for Pain.       Allergies:  Allergies   Allergen Reactions    Latex HIVES    Norvasc [Amlodipine] SWELLING    Penicillins HIVES        Physical Exam:       Physical Exam  Pulse 66, temperature 97.8 °F (36.6 °C), temperature source Tympanic, height 5' 4\" (1.626 m), weight 269 lb 12.8 oz (122.4 kg), last menstrual period 07/20/2013, not currently breastfeeding.  Vitals:    05/15/25 0922 05/15/25 0954   BP:  112/70   Pulse: 66 64   Temp: 97.8 °F (36.6 °C)    TempSrc: Tympanic    Weight: 269 lb 12.8 oz (122.4 kg)    Height: 5' 4\" (1.626 m)      Physical Exam    Constitutional: Patient is oriented to person, place, and time. Patient appears well-developed and well-nourished. No distress.   HENT:   Head: Normocephalic and atraumatic.   Neck: Normal range of motion. Neck supple. No thyromegaly present.   Lymphadenopathy:     Has no cervical adenopathy.   Cardiovascular: Normal rate, regular rhythm and no murmur heard.  Pulmonary/Chest: Effort normal and breath sounds normal. No respiratory distress.  Not winded when she speaks.  Neurological: Patient is alert and oriented to person, place, and time.   Skin: Skin is warm and dry.  No pallor or diaphoresis.  No cellulitis or rash.  Psychiatric: Patient has a normal mood and affect.   Third fingers both hands not swollen.    Nursing note and vitals reviewed.           Physical Exam  VITALS: P- 64, BP- 112/78  NECK: No cervical lymphadenopathy.  CHEST: Clear to auscultation bilaterally.  CARDIOVASCULAR: Normal heart rate and rhythm, S1 and S2 normal.  EXTREMITIES: 1+ pitting edema in legs and on dorsum of feet bilaterally.  Pedal pulses normal.      Assessment/Plan:        Diagnoses and all orders for this visit:    Bilateral leg edema  -     furosemide (LASIX) 20 MG Oral Tab; Take 1 tablet (20 mg total) by mouth 2 (two) times daily. For edema of the legs  Lets try furosemide twice daily and see how she does.  She should follow-up with Dr. Pickens the nephrologist.  She is taking home blood pressures anyway and will let him know what they are especially now that she is also on furosemide.  Primary osteoarthritis of both knees  -     ORTHOPEDIC - INTERNAL  This has been bothering her for quite some time.  She may be a candidate for knee replacements referral done  Chronic pain of both knees  -     ORTHOPEDIC - INTERNAL    Renal insufficiency  Follows with nephrology  Essential hypertension  -     furosemide (LASIX) 20 MG Oral Tab; Take 1 tablet (20 mg total) by mouth 2 (two) times daily. For edema of the legs  Controlled,  continue present management  Trigger middle finger, unspecified laterality  -     PLASTIC SURGERY - INTERNAL  Hand specialist referral  Mixed hyperlipidemia  -     atorvastatin 20 MG Oral Tab; Take 1 tablet (20 mg total) by mouth nightly.    Controlled type 2 diabetes mellitus with microalbuminuria, with long-term current use of insulin ([facility])  -     PODIATRY - INTERNAL    Reviewed specialist notes and labs.    Referrals (if applicable)  Orders Placed This Encounter   Procedures    ORTHOPEDIC - INTERNAL     Referral Priority:   Routine     Referral Type:   OFFICE VISIT     Referred to Provider:   Calin Anne MD     Requested Specialty:   SURGERY, ORTHOPEDIC     Number of Visits Requested:   3    PLASTIC SURGERY - INTERNAL     Referral Priority:   Routine     Referral Type:   OFFICE VISIT     Referred to Provider:   Bran Martin MD     Requested Specialty:   SURGERY, PLASTIC     Number of Visits Requested:   3    PODIATRY - INTERNAL     Referral Priority:   Routine     Referral Type:   OFFICE VISIT     Referred to Provider:   Michelle Augustin DPM     Requested Specialty:   PODIATRIST     Number of Visits Requested:   3         Follow up if symptoms persist.  Take medicine (if given) as prescribed.  Approach to treatment discussed and patient/family member understands and agrees to plan.     Already has a follow-up on Abbi 10, 2025 which she will keep for a physical exam      Assessment & Plan  Chronic pain due to moderate arthritis of knees  Chronic pain in both knees, left worse than right, due to moderate arthritis, severely affecting mobility and sleep. Previous referral to orthopedic surgeon was unsatisfactory.  - Refer to a new orthopedic specialist for evaluation of knee pain and potential knee replacement.    Trigger finger  Trigger finger affecting the middle fingers of both hands, causing locking and pain, with worsening condition over time.  - Refer to hand specialist   Janavicious for evaluation and management of trigger finger.    Edema of legs and feet  Persistent edema in legs and feet, unresponsive to torsemide. One plus pretibial edema and one plus edema on the dorsum of the feet bilaterally. No signs of cellulitis. Blood pressure is low, necessitating careful management of diuretics.  - Discontinue torsemide due to ineffectiveness.  - Initiate Lasix (furosemide) twice daily for edema management.    Renal insufficiency  Renal insufficiency with elevated creatinine at 1.41 mg/dL. Concerned about the impact of diuretics on kidney function.    Congestive heart failure  No signs of congestive heart failure on recent chest x-ray from February 16, 2025.    Obesity  Significant weight gain, likely due to fluid retention.    Essential hypertension  Blood pressure is well-controlled at 112/78 mmHg on carvedilol 25 mg twice daily.    Tiago Amin DO  5/15/2025

## 2025-06-24 ENCOUNTER — HOSPITAL ENCOUNTER (OUTPATIENT)
Age: 60
Discharge: HOME | End: 2025-06-24
Payer: MEDICAID

## 2025-06-24 VITALS
RESPIRATION RATE: 16 BRPM | DIASTOLIC BLOOD PRESSURE: 61 MMHG | TEMPERATURE: 98 F | HEART RATE: 73 BPM | SYSTOLIC BLOOD PRESSURE: 156 MMHG | OXYGEN SATURATION: 99 %

## 2025-06-24 DIAGNOSIS — R03.0 ELEVATED BLOOD PRESSURE READING: ICD-10-CM

## 2025-06-24 DIAGNOSIS — M79.661 PAIN IN BOTH LOWER LEGS: ICD-10-CM

## 2025-06-24 DIAGNOSIS — M79.662 PAIN IN BOTH LOWER LEGS: ICD-10-CM

## 2025-06-24 DIAGNOSIS — R60.0 BILATERAL LEG EDEMA: Primary | ICD-10-CM

## 2025-06-24 PROCEDURE — 99213 OFFICE O/P EST LOW 20 MIN: CPT | Performed by: PHYSICIAN ASSISTANT

## 2025-06-24 RX ORDER — TRAMADOL HYDROCHLORIDE 50 MG/1
50 TABLET ORAL EVERY 6 HOURS PRN
Qty: 16 TABLET | Refills: 0 | Status: SHIPPED | OUTPATIENT
Start: 2025-06-24

## 2025-06-24 NOTE — ED INITIAL ASSESSMENT (HPI)
Pt c/o BLE swelling for weeks now that is getting worst, sts that she was taking lasix but stop taking starting yesterday due to it did not work.

## 2025-06-24 NOTE — ED PROVIDER NOTES
Patient Seen in: [facility]    History     Chief Complaint   Patient presents with    Swelling     Stated Complaint: leg and feet swelling    HPI    60-year-old female presents with chief complaint of bilateral lower leg swelling.  Onset \"months ago\".  Patient states she is taking oral Lasix without relief of symptoms.  Patient reports associated pain of bilateral lower legs.  Patient denies eliciting injury or trauma.  Patient denies head/neck injury or pain, open wound, decreased range of motion, ecchymosis, erythema, weakness, paraesthesias, chest pain, dyspnea, cough, wheeze, hemoptysis.    Past Medical History[1]    Past Surgical History[2]         Family History[3]    Short Social Hx on File[4]    Review of Systems    Positive for stated complaint: leg and feet swelling  Other systems are as noted in HPI.  Constitutional and vital signs reviewed.      All other systems reviewed and negative except as noted above.    PSFH elements reviewed from today and agreed except as otherwise stated in HPI.    Physical Exam     ED Triage Vitals [06/24/25 1109]   /61   Pulse 73   Resp 16   Temp 97.6 °F (36.4 °C)   Temp src Oral   SpO2 99 %   O2 Device None (Room air)       Current:/61   Pulse 73   Temp 97.6 °F (36.4 °C) (Oral)   Resp 16   LMP 07/20/2013   SpO2 99%      PULSE OX within normal limits on room air as interpreted by this provider.    Constitutional: The patient is cooperative. Appears well-developed and well-nourished.  Mild discomfort.  Psychological: Alert, No abnormalities of mood, affect.  Head: Normocephalic/atraumatic.  Eyes: Pupils are equal round reactive to light.  Conjunctiva are within normal limits.  ENT: Oropharynx is clear.  Neck: The neck is supple.  Nontender.  No meningeal signs.  Chest: There is no tenderness to the chest wall.  No CVA tenderness bilaterally.  Respiratory: Respiratory effort was normal.  There is no rales, wheezes, or rhonchi.  There is no  stridor.  Air entry is equal.  Cardiovascular: Regular rate and rhythm.  Capillary refill is brisk.  2+ pitting edema of bilateral lower legs.  Genitourinary: Not examined.  Lymphatic: No gross lymphadenopathy noted.  Musculoskeletal: Bilateral lower extremities-Bilateral lower extremities without acute bony deformity.  Positive edema of bilateral lower legs.  Bilateral lower extremities nontender to palpation.  No bony point tenderness.  Distal pulses intact.  Capillary refill normal.  Motor intact distally.  Sensory intact distally.  No ecchymosis.  No erythema.  No open wounds.  Full range of motion of bilateral lower extremities.  No compartment syndrome.  Remainder of musculoskeletal system is grossly intact.  There is no obvious deformity.  Neurological: Gross motor movement is intact in all 4 extremities.  Patient exhibits normal speech.  Skin: Skin is normal to inspection, except as documented.  Warm and dry.  No obvious bruising.  No obvious rash.        ED Course   Labs Reviewed - No data to display    MDM     Differential diagnosis including but not limited to fluid overload, electrolyte imbalance, heart failure    Patient refuses transfer to emergency department.  Patient refuses further workup including but not limited to imaging, blood work.    Physical exam remained stable as previously documented.  Physical exam findings discussed with patient.  Patient is unable to take NSAIDs due to kidney disease.  Discussed importance of follow-up with patient's PCP and nephrologist.  Patient voices understanding.    I have given the patient instructions regarding their diagnoses, expectations, follow up, and ER precautions. I explained to the patient that emergent conditions may arise and to go to the ER for new, worsening or any persistent conditions. I've explained the importance of following up with their doctor as instructed. The patient verbalized understanding of the discharge instructions and plan.    The  patient was informed of their elevated blood pressure reading at immediate care.  They were informed of the dangers of undiagnosed and untreated hypertension.  Education regarding lifestyle modifications and the need for appropriate follow-up with their PCP to have their blood pressure re-checked within 24-48 hours was provided.    Disposition and Plan     Clinical Impression:  1. Bilateral leg edema    2. Pain in both lower legs    3. Elevated blood pressure reading        Disposition:  Discharge    Follow-up:  Tiago Amin DO  [address] [facility] [address]  [facility] [address]  [phone]    Call in 1 day  For follow-up    Thad Pickens MD  [address] [facility] [id number] [phone]    Call in 1 day  For follow-up      Medications Prescribed:  Current Discharge Medication List        START taking these medications    Details   traMADol 50 MG Oral Tab Take 1 tablet (50 mg total) by mouth every 6 (six) hours as needed for Pain (severe pain).  Qty: 16 tablet, Refills: 0    Comments: Chaparrita Foote MD  Associated Diagnoses: Pain in both lower legs                            [1]   Past Medical History:   Acute carpal tunnel syndrome of right wrist    Right endoscopic carpal tunnel release    Carpal tunnel syndrome, left    Chronic back pain    CKD (chronic kidney disease) stage 3, GFR 30-59 ml/min (HCC)    Diabetes (HCC)    Diabetes mellitus (HCC)    dx 1995    Diastolic dysfunction    Grade I    Essential hypertension    dx 1995    High blood pressure    High cholesterol    Hyperlipidemia    Lipoma    surgical excision age 17    Macular degeneration of left eye    Neuropathy    Obesity (BMI 30-39.9)    Osteoarthritis   [2]   Past Surgical History:  Procedure Laterality Date    Colonoscopy N/A 6/28/2024    Procedure: COLONOSCOPY;  Surgeon: Neto Simpson MD;  Location: 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    Lipoma removal      Wrist arthroscop,release  xvers lig Right 2019    Right endoscopic carpal tunnel release    Wrist arthroscop,release xvers lig Left 2019    Left endoscopic carpal tunnel release   [3]   Family History  Problem Relation Age of Onset    Heart Disorder Father     Stroke Father     Alcohol abuse Father     Diabetes Mother     Diabetes Sister     Diabetes Sister    [4]   Social History  Socioeconomic History    Marital status:    Tobacco Use    Smoking status: Former     Current packs/day: 0.00     Average packs/day: 0.5 packs/day for 15.0 years (7.5 ttl pk-yrs)     Types: Cigarettes     Start date: 2001     Quit date: 2016     Years since quittin.4     Passive exposure: Past    Smokeless tobacco: Never   Vaping Use    Vaping status: Never Used   Substance and Sexual Activity    Alcohol use: No     Alcohol/week: 0.0 standard drinks of alcohol    Drug use: No   Other Topics Concern    Breast feeding No    Reaction to local anesthetic No    Pt has a pacemaker No    Pt has a defibrillator No   Social History Narrative    Lives with     Homemaker     Social Drivers of Health     Food Insecurity: No Food Insecurity (2025)    NCSS - Food Insecurity     Worried About Running Out of Food in the Last Year: No     Ran Out of Food in the Last Year: No   Transportation Needs: No Transportation Needs (2025)    NCSS - Transportation     Lack of Transportation: No   Housing Stability: Not At Risk (2025)    NCSS - Housing/Utilities     Has Housing: Yes     Worried About Losing Housing: No     Unable to Get Utilities: No

## 2025-06-27 DIAGNOSIS — L30.9 DERMATITIS: ICD-10-CM

## 2025-06-27 RX ORDER — DUPILUMAB 300 MG/2ML
300 INJECTION, SOLUTION SUBCUTANEOUS
Qty: 4 ML | Refills: 3 | Status: SHIPPED | OUTPATIENT
Start: 2025-06-27

## 2025-06-27 NOTE — TELEPHONE ENCOUNTER
Refill Request for medication(s): DUPIXENT 300 MG/2ML Subcutaneous Solution Auto-injector     Last Office Visit: 02/20/25    Last Refill: 02/10/25    Pharmacy, Dosage verified: [facility] [address] [phone], [phone]     Condition Update (if applicable): PA approval 01/01/25- 03/26/26. Mychart msg sent.    Rx pended and sent to provider for approval, please advise. Thank You!

## 2025-07-03 ENCOUNTER — OFFICE VISIT (OUTPATIENT)
Dept: FAMILY MEDICINE CLINIC | Facility: CLINIC | Age: 60
End: 2025-07-03
Payer: MEDICAID

## 2025-07-03 VITALS
HEART RATE: 80 BPM | WEIGHT: 276 LBS | TEMPERATURE: 98 F | BODY MASS INDEX: 47.12 KG/M2 | HEIGHT: 64 IN | DIASTOLIC BLOOD PRESSURE: 70 MMHG | SYSTOLIC BLOOD PRESSURE: 140 MMHG

## 2025-07-03 DIAGNOSIS — M79.604 PAIN IN BOTH LOWER EXTREMITIES: Primary | ICD-10-CM

## 2025-07-03 DIAGNOSIS — R60.0 BILATERAL LEG EDEMA: ICD-10-CM

## 2025-07-03 DIAGNOSIS — I10 ESSENTIAL HYPERTENSION: ICD-10-CM

## 2025-07-03 DIAGNOSIS — E11.29 CONTROLLED TYPE 2 DIABETES MELLITUS WITH MICROALBUMINURIA, WITH LONG-TERM CURRENT USE OF INSULIN (HCC): ICD-10-CM

## 2025-07-03 DIAGNOSIS — R80.9 CONTROLLED TYPE 2 DIABETES MELLITUS WITH MICROALBUMINURIA, WITH LONG-TERM CURRENT USE OF INSULIN (HCC): ICD-10-CM

## 2025-07-03 DIAGNOSIS — M25.561 CHRONIC PAIN OF BOTH KNEES: ICD-10-CM

## 2025-07-03 DIAGNOSIS — M25.562 CHRONIC PAIN OF BOTH KNEES: ICD-10-CM

## 2025-07-03 DIAGNOSIS — M79.605 PAIN IN BOTH LOWER EXTREMITIES: Primary | ICD-10-CM

## 2025-07-03 DIAGNOSIS — G89.29 CHRONIC PAIN OF BOTH KNEES: ICD-10-CM

## 2025-07-03 DIAGNOSIS — Z79.4 CONTROLLED TYPE 2 DIABETES MELLITUS WITH MICROALBUMINURIA, WITH LONG-TERM CURRENT USE OF INSULIN (HCC): ICD-10-CM

## 2025-07-03 DIAGNOSIS — M17.0 PRIMARY OSTEOARTHRITIS OF BOTH KNEES: ICD-10-CM

## 2025-07-03 PROCEDURE — 99214 OFFICE O/P EST MOD 30 MIN: CPT | Performed by: FAMILY MEDICINE

## 2025-07-03 RX ORDER — SPIRONOLACTONE 25 MG/1
25 TABLET ORAL DAILY
Qty: 90 TABLET | Refills: 0 | Status: SHIPPED | OUTPATIENT
Start: 2025-07-03 | End: 2026-06-28

## 2025-07-03 NOTE — PROGRESS NOTES
Patient ID: Zenia David is a 60 year old female.         The following individual(s) verbally consented to be recorded using ambient AI listening technology and understand that they can each withdraw their consent to this listening technology at any point by asking the clinician to turn off or pause the recording:    Patient name: Zenia David  Additional names:           HPI  Chief Complaint   Patient presents with    Swelling     Pt is still concerned about her swelling in her legs and its painful.       History of Present Illness  Zenia David is a 60 year old female who presents with persistent swelling in her legs and ankles.    She experiences persistent swelling in her legs and ankles, describing them as feeling heavy and sore. The swelling extends up to just below the kneecap and has not improved despite taking furosemide 20 mg twice a day. The edema impacts her ability to walk, causing fatigue and frequent need to sit down.    She reports worsening knee pain, severe enough to disrupt her sleep. She has previously consulted an orthopedist but was dissatisfied with the consultation and is seeking a suitable specialist for her knee issues.    Her current medications include carvedilol 25 mg twice a day for blood pressure, furosemide 20 mg twice a day for edema, and a nasal spray for allergies. No shortness of breath or other systemic symptoms are reported.    In April, her creatinine level was slightly elevated at 1.41 mg/dL, but she has not experienced any new symptoms related to kidney function.    Wt Readings from Last 6 Encounters:   07/03/25 276 lb (125.2 kg)   05/15/25 269 lb 12.8 oz (122.4 kg)   04/07/25 266 lb (120.7 kg)   03/26/25 265 lb (120.2 kg)   02/21/25 260 lb (117.9 kg)   12/20/24 260 lb (117.9 kg)       BMI Readings from Last 6 Encounters:   07/03/25 47.38 kg/m²   05/15/25 46.31 kg/m²   04/07/25 45.66 kg/m²   03/26/25 45.49 kg/m²   02/21/25 44.63 kg/m²   02/18/25 42.60 kg/m²        BP Readings from Last 6 Encounters:   07/03/25 148/77   06/24/25 156/61   05/15/25 112/70   04/07/25 155/67   03/26/25 155/85   02/21/25 112/70       Results  LABS  Creatinine: 1.41 mg/dL (04/24/2025)    Review of Systems  No exertional cardiac chest pain or shortness of breath unless stated in HPI which would take precedence.  See HPI for further review of systems.        Medical History:      Past Medical History:    Acute carpal tunnel syndrome of right wrist    Right endoscopic carpal tunnel release    Carpal tunnel syndrome, left    Chronic back pain    CKD (chronic kidney disease) stage 3, GFR 30-59 ml/min ([facility])    Diabetes ([facility])    Diabetes mellitus ([facility])    dx 1995    Diastolic dysfunction    Grade I    Essential hypertension    dx 1995    High blood pressure    High cholesterol    Hyperlipidemia    Lipoma    surgical excision age 17    Macular degeneration of left eye    Neuropathy    Obesity (BMI 30-39.9)    Osteoarthritis       Past Surgical History:   Procedure Laterality Date    Colonoscopy N/A 6/28/2024    Procedure: COLONOSCOPY;  Surgeon: Neto Simpson MD;  Location: [facility] ENDOSCOPY    Eye surgery      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 16 tablet 0    insulin lispro 100 UNIT/ML Injection Solution INJECT 200 UNITS VIA INSULIN PUMP DAILY 60 mL 3    atorvastatin 20 MG Oral Tab Take 1 tablet (20 mg total) by mouth nightly. 90 tablet 3    furosemide (LASIX) 20 MG Oral Tab Take 1 tablet  (20 mg total) by mouth 2 (two) times daily. For edema of the legs 60 tablet 2    carvedilol 25 MG Oral Tab Take 1 tablet (25 mg total) by mouth 2 (two) times daily with meals. 180 tablet 3    Continuous Glucose Sensor (DEXCOM G7 SENSOR) Does not apply Misc 1 each Every 10 days. Use as directed every 10 days 3 each 5    Glucose Blood (ONETOUCH VERIO) In Vitro Strip Pt check blood sugar 4 times a day. E11.65 400 strip 1    aspirin 81 MG Oral Tab EC Take 1 tablet (81 mg total) by mouth daily.      fluticasone propionate 50 MCG/ACT Nasal Suspension 2 sprays by Nasal route daily. 3 each 3    clobetasol 0.05 % External Ointment Apply 1 Application. topically daily as needed. 60 g 2    acetaminophen 500 MG Oral Tab Take 2 tablets (1,000 mg total) by mouth every 6 (six) hours as needed for Pain.       Allergies:  Allergies   Allergen Reactions    Latex HIVES    Norvasc [Amlodipine] SWELLING    Penicillins HIVES        Physical Exam:       Physical Exam  Blood pressure 148/77, pulse 80, temperature 97.5 °F (36.4 °C), temperature source Tympanic, height 5' 4\" (1.626 m), weight 276 lb (125.2 kg), last menstrual period 07/20/2013, not currently breastfeeding.  Vitals:    07/03/25 1058 07/03/25 1115   BP: 148/77 140/70   BP Location: Right arm    Patient Position: Sitting    Cuff Size: large    Pulse: 80    Temp: 97.5 °F (36.4 °C)    TempSrc: Tympanic    Weight: 276 lb (125.2 kg)    Height: 5' 4\" (1.626 m)           Physical Exam   Constitutional: Patient is oriented to person, place, and time. Patient appears well-developed and well-nourished. No distress.   HENT:   Head: Normocephalic and atraumatic.   Neck: Normal range of motion. Neck supple. No thyromegaly present.   Lymphadenopathy:     Has no cervical adenopathy.   Cardiovascular: Normal rate, regular rhythm and no murmur heard.  Pulmonary/Chest: Effort normal and breath sounds normal. No respiratory distress.  She is not winded when she speaks.  No pallor or  cyanosis.  Neurological: Patient is alert and oriented to person, place, and time.   Skin: Skin is warm and dry.  No pallor or diaphoresis.  Psychiatric: Patient has a normal mood and affect.     Nursing note and vitals reviewed.      Physical Exam  VITALS: BP- 140/70  NECK: Neck supple, no abnormalities.  CARDIOVASCULAR: Normal heart rate and rhythm, S1 and S2 normal without murmurs.  EXTREMITIES: 3+ pitting edema in legs up to just below the kneecap bilaterally without any skin tears.  It is tender to palpation..      Assessment/Plan:        Diagnoses and all orders for this visit:    Pain in both lower extremities  -     spironolactone 25 MG Oral Tab; Take 1 tablet (25 mg total) by mouth daily. For blood pressure and swelling of the legs.  Due to the edema.  Lets add spironolactone to her regimen and see if that helps.  She agrees with that plan and will see me in a month, sooner if any issues.  I did review the recent labs from nephrology.  Bilateral leg edema  -     spironolactone 25 MG Oral Tab; Take 1 tablet (25 mg total) by mouth daily. For blood pressure and swelling of the legs.    Controlled type 2 diabetes mellitus with microalbuminuria, with long-term current use of insulin (HCC)  Continue present management and she sees endocrinology in 2 weeks  Essential hypertension  -     spironolactone 25 MG Oral Tab; Take 1 tablet (25 mg total) by mouth daily. For blood pressure and swelling of the legs.    Primary osteoarthritis of both knees  -     ORTHOPEDIC - INTERNAL  She has had quite a bit of arthritis in the knees bilaterally and this is bothering her more as the time goes on.  Referral toe orthopedics  Chronic pain of both knees  -     ORTHOPEDIC - INTERNAL        Referrals (if applicable)  Orders Placed This Encounter   Procedures    ORTHOPEDIC - INTERNAL     Referral Priority:   Routine     Referral Type:   OFFICE VISIT     Referred to Provider:   Tonya Amaya MD     Requested Specialty:   SURGERY,  ORTHOPEDIC     Number of Visits Requested:   3         Follow up if symptoms persist.  Take medicine (if given) as prescribed.  Approach to treatment discussed and patient/family member understands and agrees to plan.     Return in about 1 month (around 8/3/2025) for High Blood Pressure followup.      Assessment & Plan  Bilateral leg edema  Significant bilateral leg edema, 3+ pitting, extending to just below the kneecap, causing discomfort and heaviness. Current furosemide 20 mg twice daily is insufficient for fluid retention management.  - Prescribe spironolactone (Aldactone) once daily to aid in fluid removal and manage blood pressure.  - Continue furosemide 20 mg twice daily.  - Monitor for hyperkalemia as a side effect of spironolactone.  - Follow up in 4-5 weeks to assess treatment effectiveness and monitor for adverse effects.    Essential hypertension  Blood pressure at 140/70 mmHg. Current regimen includes carvedilol 25 mg twice daily. Spironolactone addition expected to improve blood pressure control and address edema.  - Continue carvedilol 25 mg twice daily.  - Add spironolactone to assist in blood pressure management.    Chronic kidney disease stage 3  Chronic kidney disease stage 3 with creatinine level of 1.41 mg/dL.    Primary osteoarthritis of both knees  Worsening pain in both knees affecting sleep and mobility. Previous orthopedist referral unsatisfactory due to poor bedside manner and unhelpful advice.    Tiago Amin DO  7/3/2025

## 2025-07-08 ENCOUNTER — TELEPHONE (OUTPATIENT)
Dept: ENDOCRINOLOGY CLINIC | Facility: CLINIC | Age: 60
End: 2025-07-08

## 2025-07-08 NOTE — TELEPHONE ENCOUNTER
Medication Quantity Refills Start End   insulin lispro 100 UNIT/ML Injection Solution 60 mL 3 6/12/2025 --   Sig:   INJECT 200 UNITS VIA INSULIN PUMP DAILY     Med is not covered PA or alternative is requested

## 2025-07-08 NOTE — OCCUPATIONAL THERAPY NOTE
Addended by: SEYMOUR ZIMMERMAN on: 7/8/2025 12:59 PM     Modules accepted: Orders     OCCUPATIONAL THERAPY EVALUATION - INPATIENT     Room Number: 320/320-A  Evaluation Date: 2025  Type of Evaluation: Initial  Presenting Problem: Acute on chronic CHF    Physician Order: IP Consult to Occupational Therapy  Reason for Therapy: ADL/IADL Dysfunction and Discharge Planning    OCCUPATIONAL THERAPY ASSESSMENT   Patient is a 59 year old female admitted 2025 for acute on chronic CHF.  Prior to admission, patient's baseline is independent.  Patient is currently functioning at baseline with self care and basic mobility; pt is up and ambulatory with no device in room bathroom distances and back; managing self care at SBA-supervision level; pt verbalizes no concern or no therapy needs at this time; if change in function occurs, please re-order OT.    PLAN  Patient has been evaluated and presents with no skilled Occupational Therapy needs  at this time.  Patient will be discharged from Occupational Therapy services. Please re-order if a new functional limitation presents during this admission.    OT Device Recommendations: None    OCCUPATIONAL THERAPY MEDICAL/SOCIAL HISTORY   Problem List  Principal Problem:    Acute on chronic heart failure, unspecified heart failure type (HCC)  Active Problems:    CHF (congestive heart failure) (HCC)    Acute congestive heart failure (HCC)    HOME SITUATION  Type of Home: -- (Mobile home)  Home Layout: One level  Lives With: Spouse  Toilet and Equipment: Standard height toilet  Shower/Tub and Equipment: Tub-shower combo  Drives: Yes  Patient Regularly Uses: Glasses  SUBJECTIVE  Cooperative     OCCUPATIONAL THERAPY EXAMINATION    OBJECTIVE  Precautions: None  Fall Risk: Standard fall risk    WEIGHT BEARING RESTRICTION     PAIN ASSESSMENT  Ratin      ACTIVITIES OF DAILY LIVING ASSESSMENT  AM-PAC ‘6-Clicks’ Inpatient Daily Activity Short Form  How much help from another person does the patient currently need…  -   Putting on and taking off regular lower body  clothing?: None  -   Bathing (including washing, rinsing, drying)?: None  -   Toileting, which includes using toilet, bedpan or urinal? : None  -   Putting on and taking off regular upper body clothing?: None  -   Taking care of personal grooming such as brushing teeth?: None  -   Eating meals?: None    AM-PAC Score:  Score: 24  Approx Degree of Impairment: 0%  Standardized Score (AM-PAC Scale): 57.54  CMS Modifier (G-Code): CH    FUNCTIONAL TRANSFER ASSESSMENT  Sit to Stand: Edge of Bed  Edge of Bed: Modified Independent  Toilet Transfer: Modified Independent    BED MOBILITY  Rolling: Supervision  Supine to Sit : Supervision  Sit to Supine (OT): Supervision  Scooting: SPV    BALANCE ASSESSMENT     FUNCTIONAL ADL ASSESSMENT  Eating: Supervision  Grooming Seated: Supervision  Bathing Seated: Supervision  UB Dressing Seated: Supervision  LB Dressing Seated: Supervision  Toileting Seated: Supervision    EDUCATION PROVIDED  Patient Education : Role of Occupational Therapy; Plan of Care; Discharge Recommendations  Patient's Response to Education: Verbalized Understanding    The patient's Approx Degree of Impairment: 0% has been calculated based on documentation in the [facility] '6 clicks' Inpatient Daily Activity Short Form.  Research supports that patients with this level of impairment may benefit from home with support.  Final disposition will be made by interdisciplinary medical team.    Patient End of Session: Up in chair;Needs met;Call light within reach;All patient questions and concerns addressed;RN aware of session/findings      Patient Evaluation Complexity Level:   Occupational Profile/Medical History LOW - Brief history including review of medical or therapy records    Specific performance deficits impacting engagement in ADL/IADL LOW  1 - 3 performance deficits    Client Assessment/Performance Deficits LOW - No comorbidities nor modifications of tasks    Clinical Decision Making LOW - Analysis of occupational  profile, problem-focused assessments, limited treatment options    Overall Complexity LOW     OT Session Time: 10 minutes  Self-Care Home Management: 10 minutes    John Diane, Occupational Therapist, OTR/L ext [phone]

## 2025-07-09 NOTE — TELEPHONE ENCOUNTER
Call with pharmacy to check if humalog brand goes through.  Per pharmacy, patient picked up 3 vials yesterday of lispro at zero copay.

## 2025-07-11 DIAGNOSIS — J30.89 OTHER ALLERGIC RHINITIS: ICD-10-CM

## 2025-07-11 DIAGNOSIS — H69.92 DYSFUNCTION OF LEFT EUSTACHIAN TUBE: ICD-10-CM

## 2025-07-16 ENCOUNTER — OFFICE VISIT (OUTPATIENT)
Dept: ENDOCRINOLOGY CLINIC | Facility: CLINIC | Age: 60
End: 2025-07-16

## 2025-07-16 VITALS
SYSTOLIC BLOOD PRESSURE: 171 MMHG | BODY MASS INDEX: 46.1 KG/M2 | DIASTOLIC BLOOD PRESSURE: 91 MMHG | HEIGHT: 64 IN | WEIGHT: 270 LBS | HEART RATE: 77 BPM

## 2025-07-16 DIAGNOSIS — E11.8 CONTROLLED TYPE 2 DIABETES MELLITUS WITH COMPLICATION, WITH LONG-TERM CURRENT USE OF INSULIN (HCC): Primary | ICD-10-CM

## 2025-07-16 DIAGNOSIS — Z79.4 CONTROLLED TYPE 2 DIABETES MELLITUS WITH COMPLICATION, WITH LONG-TERM CURRENT USE OF INSULIN (HCC): Primary | ICD-10-CM

## 2025-07-16 LAB
GLUCOSE BLOOD: 139
HEMOGLOBIN A1C: 7.1 % (ref 4.3–5.6)
TEST STRIP LOT #: NORMAL NUMERIC

## 2025-07-16 PROCEDURE — 83036 HEMOGLOBIN GLYCOSYLATED A1C: CPT | Performed by: INTERNAL MEDICINE

## 2025-07-16 PROCEDURE — 99214 OFFICE O/P EST MOD 30 MIN: CPT | Performed by: INTERNAL MEDICINE

## 2025-07-16 PROCEDURE — 82947 ASSAY GLUCOSE BLOOD QUANT: CPT | Performed by: INTERNAL MEDICINE

## 2025-07-16 RX ORDER — DULAGLUTIDE 1.5 MG/.5ML
1.5 INJECTION, SOLUTION SUBCUTANEOUS WEEKLY
Qty: 6 ML | Refills: 0 | Status: SHIPPED | OUTPATIENT
Start: 2025-07-16

## 2025-07-16 RX ORDER — FLUTICASONE PROPIONATE 50 MCG
2 SPRAY, SUSPENSION (ML) NASAL DAILY
Qty: 48 G | Refills: 3 | Status: SHIPPED | OUTPATIENT
Start: 2025-07-16

## 2025-07-16 NOTE — PROGRESS NOTES
-----------------------------  Dexcom Clarity  -----------------------------  Zenia Frankie    YOB: 1965    Generated at: Wed, Jul 16, 2025 2:11 PM CDT    Reporting period: Tue Jun 17, 2025 - Wed Jul 16, 2025  -----------------------------  Glucose Details    Average glucose: 178 mg/dL    GMI: 7.6%    Standard deviation: 47 mg/dL    Coefficient of Variation: 26.7%  -----------------------------  Time in Range    Very High: 7%    High: 32%    In Range: 61%    Low: 0%    Very Low: 0%    Target Range   mg/dL    -----------------------------  Sensor usage    Days with data: 30/30    Time active: 98%    Avg. calibrations per day: 0.0

## 2025-07-16 NOTE — PROGRESS NOTES
Name: Zenia David  Date: 7/16/2025      HISTORY OF PRESENT ILLNESS   Zenia David is a 60 year old female who presents for hospital follow up for diabetes mellitus diagnosed 23 years ago, transitioned to insulin therapy 15 years ago.      Prior HbA, C or glycohemoglobin were 9.9% 5/2017; 10.3% 8/2017; 9.5% 2/2018; 12.7% 1/2019; 8.9% 3/2019; 7.1% 7/2019; 8.1% 10/2019; 7.0% 1/2020; 7.8% 5/2020; 10.3% 1/2022; 8.9% 4/2022; 11.7% 10/2022; 11.9% 2/2023; 9.3% 6/2023; 10.3% 2/2024; 7.2% 6/2024; 6.3% 11/2024; 6.9% 3/2025; 7.1% POC Today     Dietary compliance: Poor; significant snacking at night and cheating on pasta/rice, chips, no soda     Exercise: No -->is walking with cane but limited with hip pain and neuropathy    Polyuria/polydipsia: No  Blurred vision: No    Episodes of hypoglycemia: No   Blood Glucose:  Checking 4 times per day  Reviewed Dexcom CGM     Current Meds:   iLet pump - bionic pancreas     Metformin d/c'd due to CKD  Farxiga d/c'd due to recurrent yeast infections     REVIEW OF SYSTEMS  The ROS was updated during office visit 7/16/2025 and updates noted below and in the HPI.     Eyes: Diabetic retinopathy present: Yes, diabetic cataract            Most recent visit to eye doctor in last 12 months: Yes    CV: Cardiovascular disease present: No         Hypertension present: Yes         Hyperlipidemia present: Yes         Peripheral Vascular Disease present: No    : Nephropathy present: No    Neuro: Neuropathy present: Yes    Skin: Infection or ulceration: No    Osteoporosis: No    Thyroid disease: No      Medications:     Current Outpatient Medications:     spironolactone 25 MG Oral Tab, Take 1 tablet (25 mg total) by mouth daily. For blood pressure and swelling of the legs., Disp: 90 tablet, Rfl: 0    DUPIXENT 300 MG/2ML Subcutaneous Solution Auto-injector, INJECT 300 MG UNDER THE SKIN EVERY 14 DAYS, Disp: 4 mL, Rfl: 3    traMADol 50 MG Oral Tab, Take 1 tablet (50 mg total) by mouth every 6 (six)  hours as needed for Pain (severe pain)., Disp: 16 tablet, Rfl: 0    insulin lispro 100 UNIT/ML Injection Solution, INJECT 200 UNITS VIA INSULIN PUMP DAILY, Disp: 60 mL, Rfl: 3    atorvastatin 20 MG Oral Tab, Take 1 tablet (20 mg total) by mouth nightly., Disp: 90 tablet, Rfl: 3    furosemide (LASIX) 20 MG Oral Tab, Take 1 tablet (20 mg total) by mouth 2 (two) times daily. For edema of the legs, Disp: 60 tablet, Rfl: 2    carvedilol 25 MG Oral Tab, Take 1 tablet (25 mg total) by mouth 2 (two) times daily with meals., Disp: 180 tablet, Rfl: 3    Continuous Glucose Sensor (DEXCOM G7 SENSOR) Does not apply Misc, 1 each Every 10 days. Use as directed every 10 days, Disp: 3 each, Rfl: 5    Glucose Blood (ONETOUCH VERIO) In Vitro Strip, Pt check blood sugar 4 times a day. E11.65, Disp: 400 strip, Rfl: 1    aspirin 81 MG Oral Tab EC, Take 1 tablet (81 mg total) by mouth daily., Disp: , Rfl:     fluticasone propionate 50 MCG/ACT Nasal Suspension, 2 sprays by Nasal route daily., Disp: 3 each, Rfl: 3    clobetasol 0.05 % External Ointment, Apply 1 Application. topically daily as needed., Disp: 60 g, Rfl: 2    acetaminophen 500 MG Oral Tab, Take 2 tablets (1,000 mg total) by mouth every 6 (six) hours as needed for Pain., Disp: , Rfl:      Allergies:   Allergies   Allergen Reactions    Latex HIVES    Norvasc [Amlodipine] SWELLING    Penicillins HIVES       Social History:   Social History     Socioeconomic History    Marital status:    Tobacco Use    Smoking status: Former     Current packs/day: 0.00     Average packs/day: 0.5 packs/day for 15.0 years (7.5 ttl pk-yrs)     Types: Cigarettes     Start date: 2001     Quit date: 2016     Years since quittin.4     Passive exposure: Past    Smokeless tobacco: Never   Vaping Use    Vaping status: Never Used   Substance and Sexual Activity    Alcohol use: No     Alcohol/week: 0.0 standard drinks of alcohol    Drug use: No   Other Topics Concern    Breast feeding No     Reaction to local anesthetic No    Pt has a pacemaker No    Pt has a defibrillator No       Medical History:   Past Medical History:    Acute carpal tunnel syndrome of right wrist    Right endoscopic carpal tunnel release    Carpal tunnel syndrome, left    Chronic back pain    CKD (chronic kidney disease) stage 3, GFR 30-59 ml/min (HCC)    Diabetes (HCC)    Diabetes mellitus (HCC)    dx 1995    Diastolic dysfunction    Grade I    Essential hypertension    dx 1995    High blood pressure    High cholesterol    Hyperlipidemia    Lipoma    surgical excision age 17    Macular degeneration of left eye    Neuropathy    Obesity (BMI 30-39.9)    Osteoarthritis       Surgical history:   Past Surgical History:   Procedure Laterality Date    Colonoscopy N/A 6/28/2024    Procedure: COLONOSCOPY;  Surgeon: Neto Simpson MD;  Location: [facility] ENDOSCOPY    Eye surgery      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   Back: no kyphosis  Respiratory:  non-labored. no increased work of breathing.    Lymph Nodes:  No abnormal nodes noted  Skin:  normal moisture and skin texture  Hematologic:  no excessive bruising  Psychiatric:  oriented to time, self, and place      ASSESSMENT/PLAN:      1. Diabetes Mellitus Type 2, controlled  -controlled, HgA1c 6.9% -->at goal   -Discussed importance of glycemic control to prevent complications of diabetes  -Discussed complications of diabetes include retinopathy, neuropathy,  nephropathy and cardiovascular disease  -Discussed importance of SBGM  -Discussed importance of low CHO diet, recommend 45gm per meal or 135gm per day  -Congratulated patient on improved BG levels with iLet pump   -Continue current pump settings    -Start Trulicity 1.5mg subcutaneous weekly, verbalized understanding of risks and benefits   -Continue Dexcom CGM   -LDL improved   -Abnormal foot exam performed 3/2025   -elevated urine microalbumin 4/2025 followed by Dr. Pickens   -DAVID with optho   -BP elevated, stable on repeat -->meds recently adjusted per PCP     RTC 4 months    Mary Schmitz MD   7/16/2025

## 2025-07-23 ENCOUNTER — HOSPITAL ENCOUNTER (EMERGENCY)
Facility: HOSPITAL | Age: 60
Discharge: HOME | End: 2025-07-23
Attending: EMERGENCY MEDICINE
Payer: MEDICAID

## 2025-07-23 ENCOUNTER — APPOINTMENT (OUTPATIENT)
Dept: CT IMAGING | Facility: HOSPITAL | Age: 60
End: 2025-07-23
Attending: EMERGENCY MEDICINE
Payer: MEDICAID

## 2025-07-23 VITALS
HEART RATE: 82 BPM | DIASTOLIC BLOOD PRESSURE: 78 MMHG | OXYGEN SATURATION: 98 % | BODY MASS INDEX: 44.98 KG/M2 | RESPIRATION RATE: 18 BRPM | HEIGHT: 65 IN | SYSTOLIC BLOOD PRESSURE: 158 MMHG | WEIGHT: 270 LBS | TEMPERATURE: 98 F

## 2025-07-23 DIAGNOSIS — N18.9 CHRONIC KIDNEY DISEASE, UNSPECIFIED CKD STAGE: ICD-10-CM

## 2025-07-23 DIAGNOSIS — M54.50 LEFT LUMBAR PAIN: Primary | ICD-10-CM

## 2025-07-23 LAB
ALBUMIN SERPL-MCNC: 4.5 G/DL (ref 3.2–4.8)
ALBUMIN/GLOB SERPL: 1.5 (ref 1–2)
ALP LIVER SERPL-CCNC: 238 U/L (ref 50–130)
ALT SERPL-CCNC: 67 U/L (ref 10–49)
ANION GAP SERPL CALC-SCNC: 5 MMOL/L (ref 0–18)
AST SERPL-CCNC: 54 U/L (ref ?–34)
ATRIAL RATE: 69 BPM
BASOPHILS # BLD AUTO: 0.13 X10(3) UL (ref 0–0.2)
BASOPHILS NFR BLD AUTO: 1.3 %
BILIRUB SERPL-MCNC: 0.3 MG/DL (ref 0.2–1.1)
BILIRUB UR QL: NEGATIVE
BUN BLD-MCNC: 37 MG/DL (ref 9–23)
BUN/CREAT SERPL: 24.7 (ref 10–20)
CALCIUM BLD-MCNC: 10 MG/DL (ref 8.7–10.4)
CHLORIDE SERPL-SCNC: 103 MMOL/L (ref 98–112)
CO2 SERPL-SCNC: 28 MMOL/L (ref 21–32)
CREAT BLD-MCNC: 1.5 MG/DL (ref 0.55–1.02)
DEPRECATED RDW RBC AUTO: 42.8 FL (ref 35.1–46.3)
EGFRCR SERPLBLD CKD-EPI 2021: 39 ML/MIN/1.73M2 (ref 60–?)
EOSINOPHIL # BLD AUTO: 0.63 X10(3) UL (ref 0–0.7)
EOSINOPHIL NFR BLD AUTO: 6.3 %
ERYTHROCYTE [DISTWIDTH] IN BLOOD BY AUTOMATED COUNT: 13 % (ref 11–15)
GLOBULIN PLAS-MCNC: 3.1 G/DL (ref 2–3.5)
GLUCOSE BLD-MCNC: 120 MG/DL (ref 70–99)
GLUCOSE BLDC GLUCOMTR-MCNC: 117 MG/DL (ref 70–99)
GLUCOSE UR-MCNC: NORMAL MG/DL
HCT VFR BLD AUTO: 36.4 % (ref 35–48)
HGB BLD-MCNC: 12 G/DL (ref 12–16)
IMM GRANULOCYTES # BLD AUTO: 0.08 X10(3) UL (ref 0–1)
IMM GRANULOCYTES NFR BLD: 0.8 %
KETONES UR-MCNC: NEGATIVE MG/DL
LEUKOCYTE ESTERASE UR QL STRIP.AUTO: NEGATIVE
LIPASE SERPL-CCNC: 32 U/L (ref 12–53)
LYMPHOCYTES # BLD AUTO: 3.51 X10(3) UL (ref 1–4)
LYMPHOCYTES NFR BLD AUTO: 35 %
MCH RBC QN AUTO: 29.5 PG (ref 26–34)
MCHC RBC AUTO-ENTMCNC: 33 G/DL (ref 31–37)
MCV RBC AUTO: 89.4 FL (ref 80–100)
MONOCYTES # BLD AUTO: 0.75 X10(3) UL (ref 0.1–1)
MONOCYTES NFR BLD AUTO: 7.5 %
NEUTROPHILS # BLD AUTO: 4.92 X10 (3) UL (ref 1.5–7.7)
NEUTROPHILS # BLD AUTO: 4.92 X10(3) UL (ref 1.5–7.7)
NEUTROPHILS NFR BLD AUTO: 49.1 %
NITRITE UR QL STRIP.AUTO: NEGATIVE
OSMOLALITY SERPL CALC.SUM OF ELEC: 292 MOSM/KG (ref 275–295)
P AXIS: 49 DEGREES
P-R INTERVAL: 160 MS
PH UR: 8 (ref 5–8)
PLATELET # BLD AUTO: 271 10(3)UL (ref 150–450)
POTASSIUM SERPL-SCNC: 5.5 MMOL/L (ref 3.5–5.1)
PROT SERPL-MCNC: 7.6 G/DL (ref 5.7–8.2)
PROT UR-MCNC: 100 MG/DL
Q-T INTERVAL: 408 MS
QRS DURATION: 80 MS
QTC CALCULATION (BEZET): 437 MS
R AXIS: 8 DEGREES
RBC # BLD AUTO: 4.07 X10(6)UL (ref 3.8–5.3)
SODIUM SERPL-SCNC: 136 MMOL/L (ref 136–145)
SP GR UR STRIP: 1.01 (ref 1–1.03)
T AXIS: 112 DEGREES
UROBILINOGEN UR STRIP-ACNC: NORMAL
VENTRICULAR RATE: 69 BPM
WBC # BLD AUTO: 10 X10(3) UL (ref 4–11)

## 2025-07-23 PROCEDURE — 99285 EMERGENCY DEPT VISIT HI MDM: CPT

## 2025-07-23 PROCEDURE — 93005 ELECTROCARDIOGRAM TRACING: CPT

## 2025-07-23 PROCEDURE — 96361 HYDRATE IV INFUSION ADD-ON: CPT

## 2025-07-23 PROCEDURE — 81001 URINALYSIS AUTO W/SCOPE: CPT | Performed by: EMERGENCY MEDICINE

## 2025-07-23 PROCEDURE — 99284 EMERGENCY DEPT VISIT MOD MDM: CPT

## 2025-07-23 PROCEDURE — 74176 CT ABD & PELVIS W/O CONTRAST: CPT | Performed by: EMERGENCY MEDICINE

## 2025-07-23 PROCEDURE — 93010 ELECTROCARDIOGRAM REPORT: CPT

## 2025-07-23 PROCEDURE — 80053 COMPREHEN METABOLIC PANEL: CPT | Performed by: EMERGENCY MEDICINE

## 2025-07-23 PROCEDURE — 96374 THER/PROPH/DIAG INJ IV PUSH: CPT

## 2025-07-23 PROCEDURE — 85025 COMPLETE CBC W/AUTO DIFF WBC: CPT | Performed by: EMERGENCY MEDICINE

## 2025-07-23 PROCEDURE — 83690 ASSAY OF LIPASE: CPT | Performed by: EMERGENCY MEDICINE

## 2025-07-23 PROCEDURE — 82962 GLUCOSE BLOOD TEST: CPT

## 2025-07-23 RX ORDER — PREDNISONE 20 MG/1
40 TABLET ORAL DAILY
Qty: 10 TABLET | Refills: 0 | Status: SHIPPED | OUTPATIENT
Start: 2025-07-23 | End: 2025-07-28

## 2025-07-23 RX ORDER — ORPHENADRINE CITRATE 100 MG/1
100 TABLET ORAL 2 TIMES DAILY
Qty: 20 TABLET | Refills: 0 | Status: SHIPPED | OUTPATIENT
Start: 2025-07-23 | End: 2025-08-02

## 2025-07-23 RX ORDER — ORPHENADRINE CITRATE 30 MG/ML
60 INJECTION INTRAMUSCULAR; INTRAVENOUS ONCE
Status: COMPLETED | OUTPATIENT
Start: 2025-07-23 | End: 2025-07-23

## 2025-07-23 RX ORDER — PANTOPRAZOLE SODIUM 40 MG/1
40 TABLET, DELAYED RELEASE ORAL DAILY
Qty: 7 TABLET | Refills: 0 | Status: SHIPPED | OUTPATIENT
Start: 2025-07-23 | End: 2025-07-30

## 2025-07-23 NOTE — ED PROVIDER NOTES
Patient Seen in: [facility] Emergency Department    History     Chief Complaint   Patient presents with    Back Pain     Stated Complaint: having really bad back problems     HPI    60-year-old female with past medical history of CKD, diabetes, hypertension, dyslipidemia presenting with with  for evaluation of left lower back pain with radiation to lower extremities.  No bowel or bladder incontinence, no saddle esthesia.  No anticoagulant use, no preceding traumatic/infectious complaints.  Positive dysuria for some time without associated hematuria.    Past Medical History[1]    Past Surgical History[2]         Family History[3]    Short Social Hx on File[4]    Review of Systems :  Constitutional: As per HPI  Respiratory: Negative for cough and shortness of breath.    Cardiovascular: Negative for chest pain and palpitations.   Gastrointestinal: Negative for vomiting and abdominal pain.   Genitourinary: Negative for dysuria and hematuria.   Musculoskeletal: Negative for joint swelling and arthralgias.  Positive for lumbar pain.  Skin: Negative for rash. No itching.      Positive for stated complaint: having really bad back problems  Other systems are as noted in HPI.  Constitutional and vital signs reviewed.      All other systems reviewed and negative except as noted above.    PSFH elements reviewed from today and agreed except as otherwise stated in HPI.    Physical Exam     ED Triage Vitals [07/23/25 0943]   BP (!) 186/69   Pulse 89   Resp 18   Temp 97.9 °F (36.6 °C)   Temp src Oral   SpO2 100 %   O2 Device None (Room air)       Current:BP (!) 186/69   Pulse 89   Temp 97.9 °F (36.6 °C) (Oral)   Resp 18   Ht 165.1 cm (5' 5\")   Wt 122.5 kg   LMP 07/20/2013   SpO2 100%   BMI 44.93 kg/m²         Physical Exam   Constitutional: No distress.   HEENT: MMM.  Head: Normocephalic.   Eyes: No injection.   Cardiovascular: RRR.  Lower extremities with 2+ DP/PT pulses.  Pulmonary/Chest: Effort normal.  CTAB.  Abdominal: Soft.  Nontender.  Musculoskeletal: No gross deformity.  No midline thoracolumbar tenderness/step-off/deformity.  Left paralumbar spasm without cutaneous or crepitant change with soft compartments.  Neurological: Alert.  Lower extremities with 5/5 strength proximally distally.  Skin: Skin is warm.   Psychiatric: Cooperative.  Nursing note and vitals reviewed.        ED Course     Labs Reviewed   POCT GLUCOSE - Abnormal; Notable for the following components:       Result Value    POC Glucose  117 (*)     All other components within normal limits   UA HOLD      EKG    Rate, intervals and axes as noted on EKG Report.  Rate: 69   Rhythm: Sinus rhythm  Reading: NSR 69 without ST elevation, unchanged from 3/16/2025 as independently interpreted by myself            CT ABDOMEN+PELVIS(CPT=74176)  Result Date: 7/23/2025  PROCEDURE: CT ABDOMEN+PELVIS(CPT=74176) INDICATIONS:  left lumbar/epigastric pain COMPARISON: CT abdomen pelvis 11/16/2024 TECHNIQUE: Multidetector CT images of the abdomen and pelvis were obtained without non-ionic intravenous contrast material. Automated exposure control for dose reduction was used. Oral contrast was not administered.Adjustment of the mA and/or kV was done  based on the patient's size. Iterative reconstruction technique for dose reduction was employed. Dose information was transmitted to the ACR (American College of Radiology) NRDR (National Radiology Data Registry), which includes the Dose Index Registry. FINDINGS: LUNG BASES: Subsegmental atelectasis in the lung bases. LIVER: Within normal limits. GALLBLADDER/BILIARY: Within normal limits.No biliary duct dilation. PANCREAS: Within normal limits. SPLEEN: No enlargement. ADRENALS: No defined mass or abnormal enlargement. KIDNEYS: No hydronephrosis or nephrolithiasis. Bilateral extrarenal pelves. GI: There is no evidence of bowel obstruction. Appendix is within normal limits. URINARY BLADDER: Within normal limits. LYMPH  NODES: No lymphadenopathy. PELVIC ORGANS: Within normal limits VASCULATURE: Severe atherosclerosis of the abdominal aorta. No abdominal aortic aneurysm. PERITONEUM/RETROPERITONEUM: No free fluid or free air. BONES: No suspicious osseous lesion or acute osseous abnormality. Multilevel degenerative changes of the lumbar spine. There is suggestion of at least moderate canal stenosis at L3-L4 and L4-L5. ABDOMINAL WALL: Within normal limits.     CONCLUSION: 1.  No acute pathology in the abdomen or pelvis. Electronically Verified and Signed by Attending Radiologist: Jelani Johnson MD 7/23/2025 1:31 PM Workstation: [id number]      ED Course as of 07/23/25 1716  ------------------------------------------------------------  Time: 07/23 9128  Comment: Resting       MDM   DIFFERENTIAL DIAGNOSIS: After history and physical exam differential diagnosis includes but is not limited to musculoskeletal pain, electrolyte derangement, UTI, pyelonephritis, ureterolithiasis.    Pulse ox: 100%:Normal on RA, as independently interpreted by myself    Medical Decision Making  Evaluation for left lumbar pain without neurovascular compromise/red flag symptoms in the setting of dysuria present time, labs with stable renal sufficiency and potassium noted without EKG change, CT imaging without acute process with improving symptoms after medication.  Stable for discharge with symptomatic care and ongoing outpatient follow-up.    Problems Addressed:  Chronic kidney disease, unspecified CKD stage: chronic illness or injury  Left lumbar pain: acute illness or injury    Amount and/or Complexity of Data Reviewed  External Data Reviewed: labs, radiology, ECG and notes.     Details: 2/16/2025 EKG, 11/16/2024 CT abdomen/pelvis result, 2/18/2025 creatinine, 4/7/2025 outpatient nephrology note reviewed  Labs: ordered. Decision-making details documented in ED Course.  Radiology: ordered and independent interpretation performed. Decision-making details  documented in ED Course.     Details: CT abd/pelvis without obvious free air as independently interpreted by myself  ECG/medicine tests: ordered and independent interpretation performed. Decision-making details documented in ED Course.    Risk  Prescription drug management.      I was wearing at minimum a facemask and eye protection throughou with use t this encounter with handwashing performed prior and after patient evaluation without personal hand/facial/oropharyngeal contact and gloves worn throughout encounter. See note and/or contact this provider for further PPE details.    We recommend that you schedule follow up care with a primary care provider within the next three months to obtain basic health screening including reassessment of your blood pressure.      You had elevated blood pressure today and you need to follow up with your doctor for a repeat blood pressure check and further discussion of lifestyle modifications that include Weight Reduction - Dietary Sodium Restriction - Increased Physical Activity and Moderation in alcohol (ETOH) Consumption. If possible check your pressure at home and keep a blood pressure log to bring to your physician.  Disposition and Plan     Clinical Impression:  1. Left lumbar pain    2. Chronic kidney disease, unspecified CKD stage        Disposition:  Discharge    Follow-up:  Tiago Amin DO  [address] [facility] [facility] [address]  [place] [address]  [phone]    Call  For followup and re-evaluation.    Thad Pickens MD  [address] [facility] [address] [phone]    Call  For nephrology followup and re-evaluation.    Mario Li MD  [address] [facility] [address] [facility] [address]  [facility] [address] [phone]    Call  For physiatry followup and re-evaluation.      Medications Prescribed:  Discharge Medication List as of 7/23/2025  2:36 PM        START taking these medications    Details   orphenadrine  MG Oral Tablet 12 Hr Take 100 mg by mouth 2 (two) times daily for  10 days. Use caution when taking this medication - it may make you drowsy/dizzy., Normal, Disp-20 tablet, R-0      Sodium Zirconium Cyclosilicate (LOKELMA) 5 g Oral Powd Pack Take 1 packet (5 g total) by mouth every 8 (eight) hours for 3 days., Normal, Disp-9 packet, R-0                      [1]   Past Medical History:   Acute carpal tunnel syndrome of right wrist    Right endoscopic carpal tunnel release    Carpal tunnel syndrome, left    Chronic back pain    CKD (chronic kidney disease) stage 3, GFR 30-59 ml/min ([facility])    Diabetes (HCC)    Diabetes mellitus (HCC)    dx     Diastolic dysfunction    Grade I    Essential hypertension    dx     High blood pressure    High cholesterol    Hyperlipidemia    Lipoma    surgical excision age 17    Macular degeneration of left eye    Neuropathy    Obesity (BMI 30-39.9)    Osteoarthritis   [2]   Past Surgical History:  Procedure Laterality Date    Colonoscopy N/A 2024    Procedure: COLONOSCOPY;  Surgeon: Neto Simpson MD;  Location: [facility] ENDOSCOPY    Eye surgery      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    Lipoma removal      Wrist arthroscop,release xvers lig Right 2019    Right endoscopic carpal tunnel release    Wrist arthroscop,release xvers lig Left 2019    Left endoscopic carpal tunnel release   [3]   Family History  Problem Relation Age of Onset    Heart Disorder Father     Stroke Father     Alcohol abuse Father     Diabetes Mother     Diabetes Sister     Diabetes Sister    [4]   Social History  Socioeconomic History    Marital status:    Tobacco Use    Smoking status: Former     Current packs/day: 0.00     Average packs/day: 0.5 packs/day for 15.0 years (7.5 ttl pk-yrs)     Types: Cigarettes     Start date: 2001     Quit date: 2016     Years since quittin.4     Passive exposure: Past    Smokeless tobacco: Never   Vaping Use    Vaping status: Never Used   Substance and Sexual Activity    Alcohol use: No      Alcohol/week: 0.0 standard drinks of alcohol    Drug use: No   Other Topics Concern    Breast feeding No    Reaction to local anesthetic No    Pt has a pacemaker No    Pt has a defibrillator No   Social History Narrative    Lives with     Homemaker     Social Drivers of Health     Food Insecurity: No Food Insecurity (2/16/2025)    NCSS - Food Insecurity     Worried About Running Out of Food in the Last Year: No     Ran Out of Food in the Last Year: No   Transportation Needs: No Transportation Needs (2/16/2025)    NCSS - Transportation     Lack of Transportation: No   Housing Stability: Not At Risk (2/16/2025)    NCSS - Housing/Utilities     Has Housing: Yes     Worried About Losing Housing: No     Unable to Get Utilities: No

## 2025-07-23 NOTE — ED QUICK NOTES
Pt independently walking. Family at bedside. DC paperwork reviewed with pt; all questions answered. Pt stable at DC. Pt educated to return with any worsening symptoms

## 2025-07-23 NOTE — ED INITIAL ASSESSMENT (HPI)
Pt with History of Diabetes with Insulin pump came in for lower back pain started 2 weeks ago, getting worse.  Denies trauma/fall/  Per pt she has history of \"back problem\" has tramadol but did not take it .  Pt is A/OX 4, breathing unlabored.  Too Tylenol 2 tablet ER at 0730 AM today.

## 2025-08-04 ENCOUNTER — OFFICE VISIT (OUTPATIENT)
Dept: FAMILY MEDICINE CLINIC | Facility: CLINIC | Age: 60
End: 2025-08-04

## 2025-08-04 VITALS
BODY MASS INDEX: 45.32 KG/M2 | HEART RATE: 77 BPM | SYSTOLIC BLOOD PRESSURE: 175 MMHG | DIASTOLIC BLOOD PRESSURE: 100 MMHG | TEMPERATURE: 98 F | HEIGHT: 65 IN | WEIGHT: 272 LBS

## 2025-08-04 DIAGNOSIS — I10 ESSENTIAL HYPERTENSION: Primary | ICD-10-CM

## 2025-08-04 DIAGNOSIS — R60.0 BILATERAL LEG EDEMA: ICD-10-CM

## 2025-08-04 DIAGNOSIS — E87.5 HYPERKALEMIA: ICD-10-CM

## 2025-08-04 DIAGNOSIS — N28.9 RENAL INSUFFICIENCY: ICD-10-CM

## 2025-08-04 PROCEDURE — 99214 OFFICE O/P EST MOD 30 MIN: CPT | Performed by: FAMILY MEDICINE

## 2025-08-08 ENCOUNTER — OFFICE VISIT (OUTPATIENT)
Dept: PODIATRY CLINIC | Facility: CLINIC | Age: 60
End: 2025-08-08

## 2025-08-08 DIAGNOSIS — B35.1 ONYCHOMYCOSIS: ICD-10-CM

## 2025-08-08 DIAGNOSIS — E11.42 TYPE 2 DIABETES MELLITUS WITH DIABETIC POLYNEUROPATHY, WITH LONG-TERM CURRENT USE OF INSULIN (HCC): Primary | ICD-10-CM

## 2025-08-08 DIAGNOSIS — B35.1 PAIN DUE TO ONYCHOMYCOSIS OF NAIL: ICD-10-CM

## 2025-08-08 DIAGNOSIS — Z79.4 TYPE 2 DIABETES MELLITUS WITH DIABETIC POLYNEUROPATHY, WITH LONG-TERM CURRENT USE OF INSULIN (HCC): Primary | ICD-10-CM

## 2025-08-08 DIAGNOSIS — M79.609 PAIN DUE TO ONYCHOMYCOSIS OF NAIL: ICD-10-CM

## 2025-08-08 PROCEDURE — 99204 OFFICE O/P NEW MOD 45 MIN: CPT | Performed by: STUDENT IN AN ORGANIZED HEALTH CARE EDUCATION/TRAINING PROGRAM

## 2025-08-14 ENCOUNTER — MED REC SCAN ONLY (OUTPATIENT)
Dept: PHYSICAL MEDICINE AND REHAB | Facility: CLINIC | Age: 60
End: 2025-08-14

## 2025-08-21 ENCOUNTER — TELEPHONE (OUTPATIENT)
Dept: ENDOCRINOLOGY CLINIC | Facility: CLINIC | Age: 60
End: 2025-08-21

## 2025-08-25 ENCOUNTER — OFFICE VISIT (OUTPATIENT)
Dept: FAMILY MEDICINE CLINIC | Facility: CLINIC | Age: 60
End: 2025-08-25

## 2025-08-25 ENCOUNTER — RESULTS FOLLOW-UP (OUTPATIENT)
Dept: FAMILY MEDICINE CLINIC | Facility: CLINIC | Age: 60
End: 2025-08-25

## 2025-08-25 ENCOUNTER — LAB ENCOUNTER (OUTPATIENT)
Dept: LAB | Age: 60
End: 2025-08-25
Attending: INTERNAL MEDICINE

## 2025-08-25 VITALS
TEMPERATURE: 98 F | HEART RATE: 90 BPM | SYSTOLIC BLOOD PRESSURE: 134 MMHG | WEIGHT: 264 LBS | DIASTOLIC BLOOD PRESSURE: 70 MMHG | HEIGHT: 65 IN | BODY MASS INDEX: 43.99 KG/M2

## 2025-08-25 DIAGNOSIS — R79.89 ELEVATED SERUM CREATININE: Primary | ICD-10-CM

## 2025-08-25 DIAGNOSIS — R60.0 BILATERAL LEG EDEMA: ICD-10-CM

## 2025-08-25 DIAGNOSIS — I10 ESSENTIAL HYPERTENSION: ICD-10-CM

## 2025-08-25 DIAGNOSIS — Z79.4 CONTROLLED TYPE 2 DIABETES MELLITUS WITH COMPLICATION, WITH LONG-TERM CURRENT USE OF INSULIN (HCC): ICD-10-CM

## 2025-08-25 DIAGNOSIS — I10 ESSENTIAL HYPERTENSION: Primary | ICD-10-CM

## 2025-08-25 DIAGNOSIS — E11.8 CONTROLLED TYPE 2 DIABETES MELLITUS WITH COMPLICATION, WITH LONG-TERM CURRENT USE OF INSULIN (HCC): ICD-10-CM

## 2025-08-25 LAB
ANION GAP SERPL CALC-SCNC: 9 MMOL/L (ref 0–18)
BUN BLD-MCNC: 51 MG/DL (ref 9–23)
BUN/CREAT SERPL: 23.1 (ref 10–20)
CALCIUM BLD-MCNC: 9.4 MG/DL (ref 8.7–10.4)
CHLORIDE SERPL-SCNC: 97 MMOL/L (ref 98–112)
CHOLEST SERPL-MCNC: 140 MG/DL (ref ?–200)
CO2 SERPL-SCNC: 31 MMOL/L (ref 21–32)
CREAT BLD-MCNC: 2.21 MG/DL (ref 0.55–1.02)
EGFRCR SERPLBLD CKD-EPI 2021: 25 ML/MIN/1.73M2 (ref 60–?)
FASTING PATIENT LIPID ANSWER: YES
FASTING STATUS PATIENT QL REPORTED: YES
GLUCOSE BLD-MCNC: 142 MG/DL (ref 70–99)
HDLC SERPL-MCNC: 46 MG/DL (ref 40–59)
LDLC SERPL CALC-MCNC: 77 MG/DL (ref ?–100)
NONHDLC SERPL-MCNC: 94 MG/DL (ref ?–130)
OSMOLALITY SERPL CALC.SUM OF ELEC: 300 MOSM/KG (ref 275–295)
POTASSIUM SERPL-SCNC: 4.6 MMOL/L (ref 3.5–5.1)
SODIUM SERPL-SCNC: 137 MMOL/L (ref 136–145)
TRIGL SERPL-MCNC: 91 MG/DL (ref 30–149)
TSI SER-ACNC: 1.54 UIU/ML (ref 0.55–4.78)
VLDLC SERPL CALC-MCNC: 14 MG/DL (ref 0–30)

## 2025-08-25 PROCEDURE — 80061 LIPID PANEL: CPT

## 2025-08-25 PROCEDURE — 84443 ASSAY THYROID STIM HORMONE: CPT

## 2025-08-25 PROCEDURE — 80048 BASIC METABOLIC PNL TOTAL CA: CPT

## 2025-08-25 PROCEDURE — 99214 OFFICE O/P EST MOD 30 MIN: CPT | Performed by: FAMILY MEDICINE

## 2025-08-25 PROCEDURE — 36415 COLL VENOUS BLD VENIPUNCTURE: CPT

## 2025-08-28 RX ORDER — ACYCLOVIR 400 MG/1
TABLET ORAL
Qty: 9 EACH | Refills: 1 | Status: SHIPPED | OUTPATIENT
Start: 2025-08-28

## (undated) DIAGNOSIS — I10 ESSENTIAL HYPERTENSION: ICD-10-CM

## (undated) DIAGNOSIS — IMO0002 UNCONTROLLED TYPE 2 DIABETES MELLITUS WITH COMPLICATION, WITH LONG-TERM CURRENT USE OF INSULIN: ICD-10-CM

## (undated) DEVICE — CUFF TRNQT CYL 24X4IN 2 PORT 2

## (undated) DEVICE — ECTRA II PROCEDURE KIT,                                    SINGLE-USE, DISPOSABLE. CONTENTS                                    PROBE KNIFE, RETROGRADE KNIFE,                                    TRIANGLE KNIFE, HAND PAD, SWABS: Brand: ECTRA

## (undated) DEVICE — KIT ENDO ORCAPOD 160/180/190

## (undated) DEVICE — ZIMMER® STERILE DISPOSABLE TOURNIQUET CUFF WITH PLC, DUAL PORT, SINGLE BLADDER, 24 IN. (61 CM)

## (undated) DEVICE — BANDAGE ROLL,100% COTTON, 6 PLY, SMALL: Brand: KERLIX

## (undated) DEVICE — PLASTIC HAND: Brand: MEDLINE INDUSTRIES, INC.

## (undated) DEVICE — SOL  .9 1000ML BTL

## (undated) DEVICE — TOWEL OR BLU 16X26 STRL

## (undated) DEVICE — ESMARK: Brand: DEROYAL

## (undated) DEVICE — SINGLE-USE SNARE: Brand: CAPTIVATOR™ COLD

## (undated) DEVICE — FORCEPS BX LG 2.4MM X 240CM NDL RAD JAW 4

## (undated) DEVICE — SUTURE ETHILON 4-0 699G

## (undated) DEVICE — GAMMEX® PI HYBRID SIZE 7, STERILE POWDER-FREE SURGICAL GLOVE, POLYISOPRENE AND NEOPRENE BLEND: Brand: GAMMEX

## (undated) DEVICE — MEDI-VAC NON-CONDUCTIVE SUCTION TUBING 6MM X 1.8M (6FT.) L: Brand: CARDINAL HEALTH

## (undated) DEVICE — SYRINGE, LUER SLIP, STERILE, 60ML: Brand: MEDLINE

## (undated) DEVICE — KIT CLEAN ENDOKIT 1.1OZ GOWNX2

## (undated) DEVICE — CO2 CANNULA,SSOFT,ADLT,7O2,4CO2,FEMALE: Brand: MEDLINE

## (undated) NOTE — ED AVS SNAPSHOT
[facility] Emergency Department    [address] [address]    Phone:  [phone]    Fax:  [phone]           Marlyn Benitez   MRN: [id number]    Department:  [facility] Emergency Department   Date of Visit:  3/14/2 speech difficulty, or clear fluid leaking from nose or ears  Tylenol as needed for headache  Follow up with neurosurgery as discussed within one week    Discharge References/Attachments     HEAD INJURY (ADULT) (ENGLISH)      Disclosure     Insurance plans IF THERE IS ANY CHANGE OR WORSENING OF YOUR CONDITION, CALL YOUR PRIMARY CARE PHYSICIAN AT ONCE OR RETURN IMMEDIATELY TO THE EMERGENCY DEPARTMENT.     If you have been prescribed any medication(s), please fill your prescription right away and begin taking t - If you have concerns related to behavioral health issues or thoughts of harming yourself, contact [address] [place] [address] at [phone].     - If you don’t have insurance, Arturo Chatterjee has partnered with [facility]

## (undated) NOTE — LETTER
04/02/20        Fredi Beatty  [address]  [address]  [place] [address]      Dear Marivel Soto,    Our records indicate that you have outstanding lab work and or testing that was ordered for you and has not yet been completed:  Orders Placed This Encounter

## (undated) NOTE — LETTER
6/6/2024              Zenia David        [address]        [place] [address]         Dear Zenia,    Our records indicate that the tests ordered for you by SYED Valera  have not been done.  If you have, in fact, already completed the tests or you do not wish to have the tests done, please contact our office at THE NUMBER LISTED BELOW.  Otherwise, please proceed with the testing.  Enclosed is a duplicate order for your convenience.    Labs Order:    Helicobacter Pylori Breath Test, Adult (Order #[id number]) on 3/11/24       Sincerely,    SYED Valera  [facility]  [address]  [facility] [id number] [phone]

## (undated) NOTE — LETTER
AUTHORIZATION FOR SURGICAL OPERATION OR OTHER PROCEDURE    1. I hereby authorize Dr. Anguiano, and [facility] staff assigned to my case to perform the following operation and/or procedure at the [facility] site:      _______________________________________________________________________________________________      _______________________________________________________________________________________________    2.  My physician has explained the nature and purpose of the operation or other procedure, possible alternative methods of treatment, the risks involved, and the possibility of complication to me.  I acknowledge that no guarantee has been made as to the result that may be obtained.  3.  I recognize that, during the course of this operation, or other procedure, unforseen conditions may necessitate additional or different procedure than those listed above.  I, therefore, further authorize and request that the above named physician, his/her physician assistants or designees perform such procedures as are, in his/her professional opinion, necessary and desirable.  4.  Any tissue or organs removed in the operation or other procedure may be disposed of by and at the discretion of the [facility] and [facility].  5.  I understand that in the event of a medical emergency, I will be transported by local paramedics to [facility] or other hospital emergency department.  6.  I certify that I have read and fully understand the above consent to operation and/or other procedure.    7.  I acknowledge that my physician has explained sedation/analgesia administration to me including the risks and benefits.  I consent to the administration of sedation/analgesia as may be necessary or desirable in the judgement of my physician.    Witness signature: ___________________________________________________ Date:  ______/______/_____                    Time:  ________  A.M.  P.M.       Patient Name:  ______________________________________________________  (please print)      Patient signature:  ___________________________________________________             Relationship to Patient:           []  Parent    Responsible person                          []  Spouse  In case of minor or                    [] Other  _____________   Incompetent name:  __________________________________________________                               (please print)      _____________      Responsible person  In case of minor or  Incompetent signature:  _______________________________________________    Statement of Physician  My signature below affirms that prior to the time of the procedure, I have explained to the patient and/or his/her guardian, the risks and benefits involved in the proposed treatment and any reasonable alternative to the proposed treatment.  I have also explained the risks and benefits involved in the refusal of the proposed treatment and have answered the patient's questions.                        Date:  ______/______/_______  Provider                      Signature:  __________________________________________________________       Time:  ___________ A.M    P.M.

## (undated) NOTE — ED AVS SNAPSHOT
[facility] Emergency Department    [address]     [address] [place] [address]    Phone:  [phone]    Fax:  [phone]           Marlyn Benitez   MRN: [id number]    Department:  [facility] Emergency Department   Date of Visit:  3/14/2 and Class Registration line at [phone] or find a doctor online by visiting [email or web address].    IF THERE IS ANY CHANGE OR WORSENING OF YOUR CONDITION, CALL YOUR PRIMARY CARE PHYSICIAN AT ONCE OR RETURN IMMEDIATELY TO [address] [place] [address].     If

## (undated) NOTE — LETTER
10/08/20        [facility]  [address]  [address]  [place] [place] [facility] [address]      Dear Fransisco Ashley,    Our records indicate that you have outstanding lab work and or testing that was ordered for you and has not yet been completed:  Orders Placed This Encounter

## (undated) NOTE — ED AVS SNAPSHOT
[facility] Emergency Department    [address]     [address] [place] [address]    Phone:  [phone]    Fax:  [phone]           Artur Garcia   MRN: [id number]    Department:  [facility] Emergency Department   Date of Visit:  1/25/2 You can get these medications from any pharmacy     Bring a paper prescription for each of these medications    - famoTIDine 20 MG Tabs  - Sulfamethoxazole-TMP -160 MG Tabs per tablet  - TraMADol HCl 50 MG Tabs              Discharge Instructions return to your personal doctor) about any new or lasting problems. The primary care or specialist physician may see patients referred from the [facility] Emergency Department. Follow-up care is at the discretion of that Physician.   If you n [facility] [address]  [place] [address]) [phone]                Additional Information       We are concerned for your overall well being:    - If you are a smoker or have smoked in the last 12 months, we encourage you to explore op

## (undated) NOTE — LETTER
5/31/2019              [address] [place] [address] [address]        [place] [address]         Dear Luzmaria Joseph records indicate that the tests ordered for you by Joyce Sebastian MD  have not been done.   If you have, in fact, already completed the

## (undated) NOTE — LETTER
[facility]  Administration of Anesthesia  I, Zenia David agree to be cared for by a physician anesthesiologist alone and/or with a nurse anesthetist, who is specially trained to monitor me and give me medicine to put me to sleep or keep me comfortable during my procedure    I understand that my anesthesiologist and/or anesthetist is not an employee or agent of [facility] or [email or web address]. He or she works for [facility]    As the patient asking for anesthesia services, I agree to:  Allow the anesthesiologist (anesthesia doctor) to give me medicine and do additional procedures as necessary. Some examples are: Starting or using an “IV” to give me medicine, fluids or blood during my procedure, and having a breathing tube placed to help me breathe when I’m asleep (intubation). In the event that my heart stops working properly, I understand that my anesthesiologist will make every effort to sustain my life, unless otherwise directed by [facility] Do Not Resuscitate documents.  Tell my anesthesia doctor before my procedure:  If I am pregnant.  The last time that I ate or drank.  iii. All of the medicines I take (including prescriptions, herbal supplements, and pills I can buy without a prescription (including street drugs/illegal medications). Failure to inform my anesthesiologist about these medicines may increase my risk of anesthetic complications.  iv.If I am allergic to anything or have had a reaction to anesthesia before.  I understand how the anesthesia medicine will help me (benefits).  I understand that with any type of anesthesia medicine there are risks:  The most common risks are: nausea, vomiting, sore throat, muscle soreness, damage to my eyes, mouth, or teeth (from breathing tube placement).  Rare risks include: remembering what happened during my procedure, allergic reactions to medications, injury to my airway, heart, lungs, vision, nerves, or  muscles and in extremely rare instances death.  My doctor has explained to me other choices available to me for my care (alternatives).  Pregnant Patients (“epidural”):  I understand that the risks of having an epidural (medicine given into my back to help control pain during labor), include itching, low blood pressure, difficulty urinating, headache or slowing of the baby’s heart. Very rare risks include infection, bleeding, seizure, irregular heart rhythms and nerve injury.  Regional Anesthesia (“spinal”, “epidural”, & “nerve blocks”):  I understand that rare but potential complications include headache, bleeding, infection, seizure, irregular heart rhythms, and nerve injury.    _____________________________________________________________________________  Patient (or Representative) Signature/Relationship to Patient  Date   Time    _____________________________________________________________________________   Name (if used)    Language/Organization   Time    _____________________________________________________________________________  Nurse Anesthetist Signature     Date   Time  _____________________________________________________________________________  Anesthesiologist Signature     Date   Time  I have discussed the procedure and information above with the patient (or patient’s representative) and answered their questions. The patient or their representative has agreed to have anesthesia services.    _____________________________________________________________________________  Witness        Date   Time  I have verified that the signature is that of the patient or patient’s representative, and that it was signed before the procedure  Patient Name: Zenia David     : 3/30/1965                 Printed: 2024 at 1:42 PM    Medical Record #: [id number]                                            Page 1 of 1  ----------ANESTHESIA CONSENT----------

## (undated) NOTE — LETTER
No referring provider defined for this encounter.       04/07/25        Patient: Zenia David   YOB: 1965   Date of Visit: 4/7/2025       Dear  Dr. Eloisa DO,      Thank you for referring Zenia David to my practice.  Please find my assessment and plan below.      As you know she is a 60-year-old female with a history of adult onset diabetes mellitus, hypertension and congestive heart failure who I now had the pleasure of seeing for chronic kidney disease stage III.  She is a former patient of Dr. Hawley.  The patient was hospitalized from February 16 through February 18, 2025 for shortness of breath.  Was diagnosed with congestive heart failure.  Echocardiogram during that hospitalization showed a left ventricular ejection fraction of 55 to 60%.  Diastolic dysfunction was indeterminate.  Was sent home on torsemide 20 mg daily.  2 days ago she increased it to twice daily because of increasing lower extremity edema.  No shortness of breath.    On physical exam her blood pressure 155/67 with a pulse of 75 and she weighed 266 pounds.  Neck was supple without JVD.  Lungs were clear.  Heart revealed a regular rate and rhythm and S4 but no gallops, murmurs or rubs.  Abdomen was soft, flat, nontender without organomegaly, masses or bruits.  Extremities reveal 1-2+ lower extremity edema bilaterally.    She had a CT scan of the abdomen done on November 16, 2024.  Kidneys appeared to be essentially normal.  Has chronic constipation.  Reviewed recent laboratory studies.  Of note is that her creatinine is 1.27 on November 16, 2024 with an estimated GFR of 48 cc/min.  During this recent hospitalization on February 16, 2025 her creatinine is 1.53 but increased to 1.66 at time of discharge in February 18, 2025 now with an estimated GFR of 35 cc/min.  Last A1c was 6.9 on March 26.    I therefore informed the patient that there has been some deterioration in her renal function.  In part some of this may be  secondary to diuretics.  Blood pressures were also borderline elevated.  She was advised to check her blood pressures daily and call me in a week.  Follow-up laboratory studies have been ordered she is due now.  Try to minimize dose of diuretics as best possible.  Low-salt diet.  Otherwise maintain adequate duration.  Avoid nonsteroidals.  Will call with results and determine follow-up.  She potentially would be a good candidate for Ozempic.  He has not really been able to lose weight.  She will discuss this with Dr. Schmitz.    Thank you very much for allowing me to participate in the care of your patient.  If you have any questions please were free to call.           Sincerely,   Thad Pickens MD   [facility], [facility], [place]  [address]  [facility] [address]    Document electronically generated by:  Thad Pickens MD

## (undated) NOTE — IP AVS SNAPSHOT
[address]  [address] [facility] [phone]                Discharge Summary   2/2/2017    [facility]           Admission Information        Provider Department    2/2/2017 Yodit Lipscomb MD [facility] 3w/Sw Commonly known as:  ULTRAM        Take 1 tablet (50 mg total) by mouth every 6 (six) hours as needed for Pain.     Cody Bee                             STOP taking these medications     ergocalciferol 92110 units Caps   Commonly known as:  DRISDOL/VI Discharge References/Attachments     DIABETES, DIET (ENGLISH)      Follow-up Information     Follow up with Ashley Gomez In 1 week.     Specialty:  Internal Medicine    Why:  Post Discharge Followup and to have stress test ordered    Contact giovanni 232 (H) (02/03/17)  57 (H) (02/03/17)  1.81 (H) (02/03/17)  9.4 (02/03/17)  56 (02/03/17)  19      Metabolic Lab Results  (Last result in the past 90 days)    ALT Bilirubin,Total Total Protein Albumin Sodium Potassium Chloride    (02/03/17)  20 (02/03/17) Your unique Home Comfort Zones Access Code: James Altamirano  Expires: 3/26/2017  3:06 PM    If you have questions, you can call [phone] to talk to our [facility] Staff. Remember, Home Comfort Zones is NOT to be used for urgent needs. For medical emergencies, dial 911. twice a week or high blood sugar (greater than 200) for more than 2-3 days           Narcotic Medications     TraMADol HCl 50 MG Oral Tab       Use:  Treat pain   Most common side effects: Constipation, drowsiness, dizziness, urinary retention (inability t

## (undated) NOTE — ED AVS SNAPSHOT
Luzmaria Andrade   MRN: [id number]    Department:  [facility] Emergency Department   Date of Visit:  1/13/2018           Disclosure     Insurance plans vary and the physician(s) referred by the ER may not be covered by your plan.  Please contact yo CARE PHYSICIAN AT ONCE OR RETURN IMMEDIATELY TO THE EMERGENCY DEPARTMENT. If you have been prescribed any medication(s), please fill your prescription right away and begin taking the medication(s) as directed.   If you believe that any of the medications

## (undated) NOTE — LETTER
[facility]  [address], [place], [place]    Authorization for Surgical Operation and Procedure                               I hereby authorize Neto Simpson MD, my physician and his/her assistants (if applicable), which may include medical students, residents, and/or fellows, to perform the following surgical operation/ procedure and administer such anesthesia as may be determined necessary by my physician: Operation/Procedure name (s) COLONOSCOPY on Zenia MARÍA David   2.   I recognize that during the surgical operation/procedure, unforeseen conditions may necessitate additional or different procedures than those listed above.  I, therefore, further authorize and request that the above-named surgeon, assistants, or designees perform such procedures as are, in their judgment, necessary and desirable.    3.   My surgeon/physician has discussed prior to my surgery the potential benefits, risks and side effects of this procedure; the likelihood of achieving goals; and potential problems that might occur during recuperation.  They also discussed reasonable alternatives to the procedure, including risks, benefits, and side effects related to the alternatives and risks related to not receiving this procedure.  I have had all my questions answered and I acknowledge that no guarantee has been made as to the result that may be obtained.    4.   Should the need arise during my operation/procedure, which includes change of level of care prior to discharge, I also consent to the administration of blood and/or blood products.  Further, I understand that despite careful testing and screening of blood or blood products by collecting agencies, I may still be subject to ill effects as a result of receiving a blood transfusion and/or blood products.  The following are some, but not all, of the potential risks that can occur: fever and allergic reactions, hemolytic reactions, transmission of diseases such  as Hepatitis, AIDS and Cytomegalovirus (CMV) and fluid overload.  In the event that I wish to have an autologous transfusion of my own blood, or a directed donor transfusion, I will discuss this with my physician.  Check only if Refusing Blood or Blood Products  I understand refusal of blood or blood products as deemed necessary by my physician may have serious consequences to my condition to include possible death. I hereby assume responsibility for my refusal and release the hospital, its personnel, and my physicians from any responsibility for the consequences of my refusal.    o  Refuse   5.   I authorize the use of any specimen, organs, tissues, body parts or foreign objects that may be removed from my body during the operation/procedure for diagnosis, research or teaching purposes and their subsequent disposal by hospital authorities.  I also authorize the release of specimen test results and/or written reports to my treating physician on the hospital medical staff or other referring or consulting physicians involved in my care, at the discretion of the Pathologist or my treating physician.    6.   I consent to the photographing or videotaping of the operations or procedures to be performed, including appropriate portions of my body for medical, scientific, or educational purposes, provided my identity is not revealed by the pictures or by descriptive texts accompanying them.  If the procedure has been photographed/videotaped, the surgeon will obtain the original picture, image, videotape or CD.  The hospital will not be responsible for storage, release or maintenance of the picture, image, tape or CD.    7.   I consent to the presence of a  or observers in the operating room as deemed necessary by my physician or their designees.    8.   I recognize that in the event my procedure results in extended X-Ray/fluoroscopy time, I may develop a skin reaction.    9. If I have a Do Not Attempt  Resuscitation (DNAR) order in place, that status will be suspended while in the operating room, procedural suite, and during the recovery period unless otherwise explicitly stated by me (or a person authorized to consent on my behalf). The surgeon or my attending physician will determine when the applicable recovery period ends for purposes of reinstating the DNAR order.  10. Patients having a sterilization procedure: I understand that if the procedure is successful the results will be permanent and it will therefore be impossible for me to inseminate, conceive, or bear children.  I also understand that the procedure is intended to result in sterility, although the result has not been guaranteed.   11. I acknowledge that my physician has explained sedation/analgesia administration to me including the risk and benefits I consent to the administration of sedation/analgesia as may be necessary or desirable in the judgment of my physician.    I CERTIFY THAT I HAVE READ AND FULLY UNDERSTAND THE ABOVE CONSENT TO OPERATION and/or OTHER PROCEDURE.     ____________________________________  _________________________________        ______________________________  Signature of Patient    Signature of Responsible Person                Printed Name of Responsible Person                                      ____________________________________  _____________________________                ________________________________  Signature of Witness        Date  Time         Relationship to Patient    STATEMENT OF PHYSICIAN My signature below affirms that prior to the time of the procedure; I have explained to the patient and/or his/her legal representative, the risks and benefits involved in the proposed treatment and any reasonable alternative to the proposed treatment. I have also explained the risks and benefits involved in refusal of the proposed treatment and alternatives to the proposed treatment and have answered the patient's  questions. If I have a significant financial interest in a co-management agreement or a significant financial interest in any product or implant, or other significant relationship used in this procedure/surgery, I have disclosed this and had a discussion with my patient.     _____________________________________________________              _____________________________  (Signature of Physician)                                                                                         (Date)                                   (Time)  Patient Name: Zenia David      : 3/30/1965      Printed: 2024     Medical Record #: [id number]                                      Page 1 of 1

## (undated) NOTE — MR AVS SNAPSHOT
[address]  [address] [facility]  [phone]               Thank you for choosing us for your health care visit with Chilango Almaraz DPM.  We are glad to serve you and happy to provide you with this your appointment immediately. However, if you are unsure about the requirements for authorization, please wait 5-7 days and then contact your physician's office.  At that time, you will be provided with any authorization numbers or be assured that none are Take 1 tablet (50 mg total) by mouth every 6 (six) hours as needed for Pain. Commonly known as:  ULTRAM                   MyChart     Sign up for [employer], your secure online medical record.   [employer] will allow you to access patient instructions from your

## (undated) NOTE — MR AVS SNAPSHOT
[address]  [address] [facility]  [phone]               Thank you for choosing us for your health care visit with Yossi Montemayor MD.  We are glad to serve you and happy to provide you with this summary o [phone]: 6  [phone]: 7  138-669: 6  [phone]: 6  360379: 6                 Allergies as of Jun 07, 2017     Penicillins Hives                Today's Vital Signs     BP Pulse Height Weight BMI    140/84 mmHg 79 5' 4\" (1.626 m) 231 lb 6.4 oz (104.962 kg) 39 your doctor or other care provider to review them with you.             Results of Recent Testing     GLUCOSE BLOOD TEST      Component Value Standard Range & Units    GLUCOSE 263     Test Strip Lot # [id number] Numeric    Test Strip Expiration Date 6/30/2018 D

## (undated) NOTE — MR AVS SNAPSHOT
[address]  [address] [facility]  [phone]               Thank you for choosing us for your health care visit with Maritza Velazco DO.   We are glad to serve you and happy to provide you with this summary Referral Details     Referred By    Referred To    DO Bud Mckeon 61.   Phone:  [phone]   Fax:  [phone]    Diagnoses:  Uncontrolled type 2 diabetes mellitus with other diabetic kidney complicatio days to schedule your appointment unless notified. To schedules an appointment with an outpatient dietitian, please call Central Scheduling at [phone].  Select option 1 for an [facility] location, option 2 for an [place] location authorization, such as [place], please feel free to schedule your appointment immediately. However, if you are unsure about the requirements for authorization, please wait 5-7 days and then contact your physician's office.  At that time, you will DENZEL CONTOUR NEXT TEST Strp   Generic drug:  Glucose Blood   U UTD           BD PEN NEEDLE SHORT U/F 31G X 8 MM Misc   Generic drug:  Insulin Pen Needle   U TO INJ INSULIN UTD QID           insulin glargine 100 UNIT/ML Soln   Inject 45 Units into the ski Your blood pressure indicates you may be at-risk for Hypertension. Please consider the following Lifestyle Modifications. Also, please return for a follow-up Blood Pressure Check in 1 month.      Lifestyle Modification Recommendations:    Modification R Start activities slowly and build up over time Do what you like   Get your heart pumping – brisk walking, biking, swimming Even 10 minute increments are effective and add up over the week   2 ½ hours per week – spread out over time Use a benson to keep you

## (undated) NOTE — LETTER
01/08/20        Jaleesa Lin  [address]  [address]  [facility] [address]      Dear Consuelo Thompson,    Our records indicate that you have outstanding lab work and or testing that was ordered for you and has not yet been completed:  Orders Placed This Encounter

## (undated) NOTE — LETTER
AUTHORIZATION FOR SURGICAL OPERATION OR OTHER PROCEDURE    1.  I hereby authorize Dr. Patrica Vega, and [facility] staff assigned to my case to perform the following operation and/or procedure at the [facility]:    Steroid injection left kne ______/______/_____                    Time:  ________ A. M.  P.M.        Patient Name:  ______________________________________________________  (please print)      Patient signature:  ___________________________________________________             Relations

## (undated) NOTE — LETTER
19      Patient: Olivier Lopez  : 3/30/1965 Visit date: 2019    Dear Tyler Roche,      I examined your patient in follow-up today. She is 3 weeks post right endoscopic carpal tunnel release.   Her preoperative symptoms are gone

## (undated) NOTE — LETTER
01/02/20    Florian Locus  [address]  [address]  [facility] [address]      Dear María Burgos,    Our records indicate that you have outstanding lab work and or testing that was ordered for you and has not yet been completed:  Orders Placed This Encounter  CARDI

## (undated) NOTE — ED AVS SNAPSHOT
[facility] Emergency Department    [address] [address]    Phone:  [phone]    Fax:  [phone]           Dodie Sandoval   MRN: [id number]    Department:  [facility] Emergency Department   Date of Visit:  1/25/2 and Class Registration line at [phone] or find a doctor online by visiting [email or web address].    IF THERE IS ANY CHANGE OR WORSENING OF YOUR CONDITION, CALL YOUR PRIMARY CARE PHYSICIAN AT ONCE OR RETURN IMMEDIATELY TO [address] [place] [address].     If

## (undated) NOTE — MR AVS SNAPSHOT
[address]  [address] [facility]  [phone]               Thank you for choosing us for your health care visit with Chava Koch DO.   We are glad to serve you and happy to provide you with this summary Instructions and Information about Your Health     None      Allergies as of May 22, 2017     Penicillins Hives                Today's Vital Signs     BP Pulse Temp Height Weight BMI    134/61 mmHg 91 97 °F (36.1 °C) (Oral) 5' 3\" (1.6 m) 224 lb (101.606 k medications prescribed for you. Read the directions carefully, and ask your doctor or other care provider to review them with you.          Where to Get Your Medications      These medications were sent to [facility] [address], [address] [facility]

## (undated) NOTE — LETTER
AUTHORIZATION FOR SURGICAL OPERATION OR OTHER PROCEDURE    1. I hereby authorize SARAH Frank , and [place] [facility] staff assigned to my case to perform the following operation and/or procedure at the [place] [facility]:      vulvar biopsy    2. My physician has explained the nature and purpose of the operation or other procedure, possible alternative methods of treatment, the risks involved, and the possibility of complication to me. I acknowledge that no guarantee has been made as to the result that may be obtained. 3.  I recognize that, during the course of this operation, or other procedure, unforseen conditions may necessitate additional or different procedure than those listed above. I, therefore, further authorize and request that the above named physician, his/her physician assistants or designees perform such procedures as are, in his/her professional opinion, necessary and desirable. 4.  Any tissue or organs removed in the operation or other procedure may be disposed of by and at the discretion of the [facility] and [facility]. 5.  I understand that in the event of a medical emergency, I will be transported by local paramedics to [facility] or other hospital emergency department. 6.  I certify that I have read and fully understand the above consent to operation and/or other procedure. 7.  I acknowledge that my physician has explained sedation/analgesia administration to me including the risks and benefits. I consent to the administration of sedation/analgesia as may be necessary or desirable in the judgement of my physician. Witness signature: ___________________________________________________ Date: 02/17/2023                  Time:  ________ A. M.  P.M.        Patient Name:  ______________________________________________________  (please print)      Patient signature:  ___________________________________________________             Relationship to Patient:           []  Parent    Responsible person                          []  Spouse  In case of minor or                    [] Other  _____________   Incompetent name:  __________________________________________________                               (please print)      _____________      Responsible person  In case of minor or  Incompetent signature:  _______________________________________________    Statement of Physician  My signature below affirms that prior to the time of the procedure, I have explained to the patient and/or his/her guardian, the risks and benefits involved in the proposed treatment and any reasonable alternative to the proposed treatment. I have also explained the risks and benefits involved in the refusal of the proposed treatment and have answered the patient's questions.                         Date:  02/17/2023  Provider                      Signature:  __________________________________________________________       Time:  ___________ KATIE RAMIREZ

## (undated) NOTE — LETTER
19      Patient: Miguel A Pelaez  : 3/30/1965 Visit date: 2019    Dear Shannon Arguello,      I examined your patient in consultation today. She has severe bilateral carpal tunnel syndrome.   We will plan on right endoscopic carpal tu

## (undated) NOTE — LETTER
19      Patient: Tania Tsai  : 3/30/1965 Visit date: 2019    Dear Miya Dodge,      I examined your patient in follow-up today.     She is 3 weeks post left endoscopic carpal tunnel release, 6 weeks post right endoscopic carpal

## (undated) NOTE — Clinical Note
Lisa - I'm sorry we forgot to schedule the visit with you.  Do you want to just look at her Dexcom next week or have her come in?  Thanks!

## (undated) NOTE — Clinical Note
April 3, 2017         Jane Toribio DO  [address] [facility] [address]      Patient: Stephany Bryant   YOB: 1965   Date of Visit: 4/3/2017       Dear Dr. Ismael Sparks,    I saw your patient, Stephany Bryant, on 4/3/2017.  Enc